# Patient Record
Sex: MALE | Race: WHITE | NOT HISPANIC OR LATINO | Employment: UNEMPLOYED | URBAN - METROPOLITAN AREA
[De-identification: names, ages, dates, MRNs, and addresses within clinical notes are randomized per-mention and may not be internally consistent; named-entity substitution may affect disease eponyms.]

---

## 2017-01-03 PROBLEM — E78.00 HYPERCHOLESTEREMIA: Status: ACTIVE | Noted: 2017-01-03

## 2017-01-03 PROBLEM — Z90.49 S/P LAPAROSCOPIC APPENDECTOMY: Status: ACTIVE | Noted: 2017-01-03

## 2017-01-03 PROBLEM — I10 ESSENTIAL HYPERTENSION: Status: ACTIVE | Noted: 2017-01-03

## 2017-09-01 ENCOUNTER — OFFICE VISIT (OUTPATIENT)
Dept: NEUROLOGY | Facility: CLINIC | Age: 52
End: 2017-09-01
Payer: COMMERCIAL

## 2017-09-01 VITALS
WEIGHT: 192.69 LBS | SYSTOLIC BLOOD PRESSURE: 99 MMHG | DIASTOLIC BLOOD PRESSURE: 62 MMHG | HEIGHT: 68 IN | HEART RATE: 91 BPM | BODY MASS INDEX: 29.2 KG/M2

## 2017-09-01 DIAGNOSIS — M48.02 CERVICAL SPINAL STENOSIS: ICD-10-CM

## 2017-09-01 DIAGNOSIS — G54.2 CERVICAL SYNDROME: ICD-10-CM

## 2017-09-01 PROCEDURE — 3078F DIAST BP <80 MM HG: CPT | Mod: S$GLB,,, | Performed by: NEUROMUSCULOSKELETAL MEDICINE & OMM

## 2017-09-01 PROCEDURE — 3074F SYST BP LT 130 MM HG: CPT | Mod: S$GLB,,, | Performed by: NEUROMUSCULOSKELETAL MEDICINE & OMM

## 2017-09-01 PROCEDURE — 99204 OFFICE O/P NEW MOD 45 MIN: CPT | Mod: S$GLB,,, | Performed by: NEUROMUSCULOSKELETAL MEDICINE & OMM

## 2017-09-01 PROCEDURE — 3008F BODY MASS INDEX DOCD: CPT | Mod: S$GLB,,, | Performed by: NEUROMUSCULOSKELETAL MEDICINE & OMM

## 2017-09-01 PROCEDURE — 99999 PR PBB SHADOW E&M-NEW PATIENT-LVL III: CPT | Mod: PBBFAC,,, | Performed by: NEUROMUSCULOSKELETAL MEDICINE & OMM

## 2017-09-01 RX ORDER — CYCLOBENZAPRINE HCL 10 MG
10 TABLET ORAL 2 TIMES DAILY PRN
Refills: 0 | COMMUNITY
Start: 2017-08-11 | End: 2017-10-09

## 2017-09-01 RX ORDER — METHOCARBAMOL 500 MG/1
500 TABLET, FILM COATED ORAL 2 TIMES DAILY PRN
Refills: 0 | COMMUNITY
Start: 2017-06-20 | End: 2017-10-09

## 2017-09-01 NOTE — PROGRESS NOTES
Tyrone Garcia Danielle  1965  Review of patient's allergies indicates:   Allergen Reactions    Codeine Nausea Only     [unfilled]    Past Medical History:   Diagnosis Date    Bipolar disorder     Hyperlipidemia     Hypertension      Social History     Social History    Marital status: Single     Spouse name: N/A    Number of children: N/A    Years of education: N/A     Occupational History    Not on file.     Social History Main Topics    Smoking status: Never Smoker    Smokeless tobacco: Never Used    Alcohol use Yes    Drug use: No    Sexual activity: Not on file     Other Topics Concern    Not on file     Social History Narrative    No narrative on file     No family history on file.    Review of systems:  Constitutional-negative  Eyes-negative  ENT, mouth-negative  Cardiovascular-negative  Respiratory-negative  GI-negative  - negative  Musculoskeletal-negative  Skin-negative  Neurologic-negative  Psychiatric-negative  Endocrine-negative  Hematology/lymph nodes-negative  Allergies/immunology-negative  Tyrone Garcia Danielle  1965  Review of patient's allergies indicates:   Allergen Reactions    Codeine Nausea Only     [unfilled]    Past Medical History:   Diagnosis Date    Bipolar disorder     Hyperlipidemia     Hypertension      Social History     Social History    Marital status: Single     Spouse name: N/A    Number of children: N/A    Years of education: N/A     Occupational History    Not on file.     Social History Main Topics    Smoking status: Never Smoker    Smokeless tobacco: Never Used    Alcohol use Yes    Drug use: No    Sexual activity: Not on file     Other Topics Concern    Not on file     Social History Narrative    No narrative on file     No family history on file.    Review of systems:  Constitutional-negative  Eyes-negative  ENT, mouth-negative  Cardiovascular-negative  Respiratory-negative  GI-negative  -  negative  Musculoskeletal-negative  Skin-negative  Neurologic-negative  Psychiatric-negative  Endocrine-negative  Hematology/lymph nodes-negative  Allergies/immunology-negative  Gen. 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  He works 18 hours per day 7 days a week frequently.  He has been attending physical therapy where they have been doing needling.  Present history:   This a 52-year-old white male who presents with a two-month history of left neck and shoulder pain.  Patient has been attending physical therapy for several weeks where he has been having needling and range of motion exercises.  He feels that this is not been helping.  He has been taking hydrocodone 1-2 times per day as well as Flexeril 10 mg in the morning.  He was previously on naproxen for about one month but he does not feel this helped him.  The pain is described as as sharp stabbing pain at times radiating into the shoulder.  He denies any numbness, tingling, or gait disorder.  He does complain of some recent sexual dysfunction and bladder hesitancy.  MRI recently performed outside the system supposedly showed cervical spinal stenosis at C5-6 and C6-7.  It was these findings prompted a neurology referral.    Neurological Exam:  Mental status-alert and oriented to person, place, and time; attention span and concentration is good. Fund of knowledge-patient is aware of current events and able to give detailed history of the current problem.recent and remote memory seems intact. Language function is normal with no evidence of aphasia  Cranial nerves:Visual acuity to hand chart -normal; visual fields to confrontation normal;pupils were equal and reactive to light ;no evidence of ptosis ;  funduscopic  examination was normal with sharp disc margins. external ocular movements were full with no nystagmus. Facial sensation to pinprick : normal ; corneal reflexes intact; Facial muscles were symmetrical. Hearing is unimpaired symmetrical finger rub; Tongue movements - normal ; palate movements - normal ;Swallowing unimpaired. Shoulder shrug was intact with good strength Speech was normal  Motor examination: Upper : normal                                      Lower extremities - Normal;muscle tone was normal ;                  Right-handed  Sensory examination:   Upper; normal pinprick and soft touch ;   Lower extremities - normal and symmetrical.   Vibration sense: 15-20 seconds @ toes  Deep tendon reflexes: upper extremities :1-2+ symmetrical ;     lower extremities KJ- 1-2 +; AJ - 1-2+ Both plantar responses were flexor  Cerebellar examination upper: Normal finger to nose and rapid alternating movements  Gait: Steady with no ataxia;      heel and toe walk normal  Romberg test: negative       Tandem gait: Normal    Involuntary movements: Negative  TMJ - no tenderness  Cervical examination: Decreased left range of motion with  Pain;  Cervical tenderness : Left positive  Lumbar examination: Low back tenderness-negative                  Sciatic notchtenderness-negative            Straight leg raising : negative    Impression: Left cervical syndrome; MRI reportedly shows severe spinal stenosis at C5-6 and C6-7.    Recommendations/Plan : Long discussion with the patient in reference to spinal stenosis and neck problems.  At this point would continue present physical therapy with restriction of mobilization in view of spinal stenosis and possible symptoms of bladder and sexual dysfunction potentially related to this issue.  At this point he has no proximal leg weakness however he is warned that if he develops this could be related to the spinal stenosis and spinal cord involvement- myelopathy.  Patient will obtain MRI  report and further recommendations will be made as to further course of action- neurosurgical referral versus pain management for possible epidural steroid injection.  Patient noted to have very low blood pressure 87/59 with recent increase of  blood pressure medications.  He is told to follow-up with his primary care doctor.  Follow-up one month

## 2017-09-01 NOTE — LETTER
September 1, 2017      Claribel Wong MD  [address] [facility] [address]           [facility]  [address] [place] [address]  Phone: [phone]          Patient: Tyrone Santos   MR Number: [id number]   YOB: 1965   Date of Visit: 9/1/2017       Dear Dr. Claribel Wong:    Thank you for referring Tyrone Santos to me for evaluation. Attached you will find relevant portions of my assessment and plan of care.    If you have questions, please do not hesitate to call me. I look forward to following Tyrone Santos along with you.    Sincerely,    Jose Daily MD    Enclosure  CC:  No Recipients    If you would like to receive this communication electronically, please contact [email or web address] or [phone] to request more information on [employer] Link access.    For providers and/or their staff who would like to refer a patient to [facility], please contact us through our one-stop-shop provider referral line, [facility] , at [phone].    If you feel you have received this communication in error or would no longer like to receive these types of communications, please e-mail [email or web address]

## 2017-09-06 ENCOUNTER — TELEPHONE (OUTPATIENT)
Dept: NEUROLOGY | Facility: CLINIC | Age: 52
End: 2017-09-06

## 2017-09-06 NOTE — TELEPHONE ENCOUNTER
----- Message from Wesley Mack sent at 9/6/2017  9:02 AM CDT -----  Contact: Patient @ [phone]  Patient is calling to get an update on the MRI that was dropped off for  to review, pls call       I have spoken with this patient and explained to him that we need the written report from his MRI not the disc and if he did not have the report he could stop by the office and complete a MANSI form and I can request the results for him he stated that would be the easiest way and he will stop by the office

## 2017-09-12 ENCOUNTER — TELEPHONE (OUTPATIENT)
Dept: NEUROLOGY | Facility: CLINIC | Age: 52
End: 2017-09-12

## 2017-09-12 NOTE — TELEPHONE ENCOUNTER
----- Message from Vida Ryder sent at 9/12/2017  8:05 AM CDT -----  Contact: Patient [phone]  Patient is calling to find out if, office received his MRI results. Please call         I have spoken to this patient and have explained to him that I have submitted his MANSI to [facility] and have not received his MRI report as of now and explained that I am not sure how the process works there and also explained to him that the process could go faster if he could go to [facility] and retreive the files him self he stated that he will attempt to do this at lunch and will keep me posted on his progress and I explained to him that I will  Contact him if I receive the report before he does

## 2017-09-13 DIAGNOSIS — M79.10 MYALGIA: ICD-10-CM

## 2017-09-13 DIAGNOSIS — M48.02 CERVICAL SPINAL STENOSIS: Primary | ICD-10-CM

## 2017-09-19 ENCOUNTER — TELEPHONE (OUTPATIENT)
Dept: NEUROLOGY | Facility: CLINIC | Age: 52
End: 2017-09-19

## 2017-09-19 NOTE — TELEPHONE ENCOUNTER
----- Message from Abi Lugo sent at 9/18/2017  3:24 PM CDT -----  Contact: self @ [phone]  Pt has been scheduled to see dr castillo on Monday 9-25-17.  Pt would like to know if this is the direction dr rivera wanted him to go.  Would like to know if his records will be forwarded to [place] before Monday or will he have to come pick them up to bring with him to the appt.  pls call.       I have attempted to contact this patient to inform him that he is headed in the right direction but there was no answer therefore I did not leave a message

## 2017-09-21 ENCOUNTER — TELEPHONE (OUTPATIENT)
Dept: NEUROLOGY | Facility: CLINIC | Age: 52
End: 2017-09-21

## 2017-09-21 NOTE — TELEPHONE ENCOUNTER
----- Message from Vida Ryder sent at 9/21/2017 11:21 AM CDT -----  Contact: Patient [phone]  Patient is calling to find out if he needs to come in to  his MRI disk and report to bring to his visit on Monday, or will it be sent to Dr. Berry office. Please call         I have spoken to this patient and informed him that he has made a great choice in choosing an [facility]geon also that can come by tomorrow anytime to  his report

## 2017-09-25 ENCOUNTER — OFFICE VISIT (OUTPATIENT)
Dept: NEUROSURGERY | Facility: CLINIC | Age: 52
End: 2017-09-25
Payer: COMMERCIAL

## 2017-09-25 ENCOUNTER — HOSPITAL ENCOUNTER (OUTPATIENT)
Dept: RADIOLOGY | Facility: HOSPITAL | Age: 52
Discharge: HOME | End: 2017-09-25
Attending: NEUROLOGICAL SURGERY
Payer: COMMERCIAL

## 2017-09-25 VITALS
HEIGHT: 68 IN | SYSTOLIC BLOOD PRESSURE: 128 MMHG | DIASTOLIC BLOOD PRESSURE: 86 MMHG | HEART RATE: 85 BPM | BODY MASS INDEX: 28.64 KG/M2 | WEIGHT: 189 LBS

## 2017-09-25 DIAGNOSIS — M47.812 SPONDYLOSIS OF CERVICAL REGION WITHOUT MYELOPATHY OR RADICULOPATHY: ICD-10-CM

## 2017-09-25 DIAGNOSIS — M79.18 CERVICAL MYOFASCIAL PAIN SYNDROME: Primary | ICD-10-CM

## 2017-09-25 DIAGNOSIS — M79.18 CERVICAL MYOFASCIAL PAIN SYNDROME: ICD-10-CM

## 2017-09-25 PROBLEM — G54.2 CERVICAL SYNDROME: Status: RESOLVED | Noted: 2017-09-01 | Resolved: 2017-09-25

## 2017-09-25 PROCEDURE — 3008F BODY MASS INDEX DOCD: CPT | Mod: S$GLB,,, | Performed by: NEUROLOGICAL SURGERY

## 2017-09-25 PROCEDURE — 3079F DIAST BP 80-89 MM HG: CPT | Mod: S$GLB,,, | Performed by: NEUROLOGICAL SURGERY

## 2017-09-25 PROCEDURE — 99204 OFFICE O/P NEW MOD 45 MIN: CPT | Mod: S$GLB,,, | Performed by: NEUROLOGICAL SURGERY

## 2017-09-25 PROCEDURE — 99999 PR PBB SHADOW E&M-EST. PATIENT-LVL IV: CPT | Mod: PBBFAC,,, | Performed by: NEUROLOGICAL SURGERY

## 2017-09-25 PROCEDURE — 72050 X-RAY EXAM NECK SPINE 4/5VWS: CPT | Mod: 26,,, | Performed by: RADIOLOGY

## 2017-09-25 PROCEDURE — 3074F SYST BP LT 130 MM HG: CPT | Mod: S$GLB,,, | Performed by: NEUROLOGICAL SURGERY

## 2017-09-25 PROCEDURE — 72050 X-RAY EXAM NECK SPINE 4/5VWS: CPT | Mod: TC

## 2017-09-25 RX ORDER — CELECOXIB 200 MG/1
200 CAPSULE ORAL 2 TIMES DAILY
Qty: 60 CAPSULE | Refills: 2 | Status: SHIPPED | OUTPATIENT
Start: 2017-09-25 | End: 2017-10-09

## 2017-09-25 NOTE — LETTER
September 25, 2017      Jose Daily MD  [address] [place] [address]           [facility]  [address], [address]  [facility] [address]  Phone: [phone]          Patient: Tyrone Santos   MR Number: [id number]   YOB: 1965   Date of Visit: 9/25/2017       Dear Dr. Jose Daily:    Thank you for referring Tyrone Santos to me for evaluation. Attached you will find relevant portions of my assessment and plan of care.    If you have questions, please do not hesitate to call me. I look forward to following Tyrone Santos along with you.    Sincerely,    Henry Berry MD    Enclosure  CC:  No Recipients    If you would like to receive this communication electronically, please contact [email or web address] or [phone] to request more information on [employer] Link access.    For providers and/or their staff who would like to refer a patient to [facility], please contact us through our one-stop-shop provider referral line, [facility], at [phone].    If you feel you have received this communication in error or would no longer like to receive these types of communications, please e-mail [email or web address]

## 2017-09-25 NOTE — PROGRESS NOTES
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 Has done heavy lifting at work.  Has been complaining of left sided neck pain irradiating in the left temporal area and left shoulder for 2-3 months. Denies having arm pain, weakness, numbness, difficulty buttoning shirts or constant gait imbalance. The pain is constant and can be severe. Has tried naproxen, ibuprofen, flexeril, gabapentin and percocet without significant pain relief. Has tried PT but no deep neck flexor muscle strengthening exercises.                 PAST MEDICAL HISTORY:  Active Ambulatory Problems     Diagnosis Date Noted    S/P laparoscopic appendectomy 01/03/2017    Essential hypertension 01/03/2017    Hypercholesteremia 01/03/2017    Cervical spinal stenosis 09/01/2017    Cervical myofascial pain syndrome 09/25/2017     Resolved Ambulatory Problems     Diagnosis Date Noted    Cervical syndrome 09/01/2017     Past Medical History:   Diagnosis Date    Bipolar disorder     Hyperlipidemia     Hypertension        PAST SURGICAL HISTORY:  Past Surgical History:   Procedure Laterality Date    APPENDECTOMY      WRIST SURGERY      6 years ago       SOCIAL HISTORY:   Social History     Social History    Marital status: Single     Spouse name: N/A    Number of children: N/A    Years of education: N/A     Occupational History    Not on file.     Social History Main Topics    Smoking status: Never Smoker    Smokeless tobacco: Never Used    Alcohol use Yes    Drug use: No    Sexual activity: Not on file     Other Topics Concern    Not on file     Social History Narrative    No narrative on file       FAMILY HISTORY:  No family history on file.    CURRENTS  MEDICATIONS:  Current Outpatient Prescriptions on File Prior to Visit   Medication Sig Dispense Refill    amlodipine (NORVASC) 10 MG tablet Take 10 mg by mouth once daily.  0    aspirin (ECOTRIN) 81 MG EC tablet Take 81 mg by mouth once daily.      buPROPion (WELLBUTRIN XL) 150 MG TB24 tablet Take 150 mg by mouth once daily.      cyclobenzaprine (FLEXERIL) 10 MG tablet Take 10 mg by mouth 2 (two) times daily as needed.  0    hydrocodone-acetaminophen 5-325mg (NORCO) 5-325 mg per tablet Take 1 tablet by mouth every 4 (four) hours.  0    lisinopril (PRINIVIL,ZESTRIL) 20 MG tablet Take 20 mg by mouth once daily.  0    methocarbamol (ROBAXIN) 500 MG Tab Take 500 mg by mouth 2 (two) times daily as needed.  0    omeprazole (PRILOSEC) 20 MG capsule Take 20 mg by mouth once daily.  0    oxcarbazepine (TRILEPTAL) 300 MG Tab Take 300 mg by mouth 2 (two) times daily.  1    quetiapine (SEROQUEL) 200 MG Tab Take 400 mg by mouth nightly.  1    simvastatin (ZOCOR) 10 MG tablet Take 10 mg by mouth every evening.  0    [DISCONTINUED] b complex vitamins tablet Take 1 tablet by mouth once daily.      [DISCONTINUED] ciprofloxacin HCl (CIPRO) 500 MG tablet Take 500 mg by mouth every 12 (twelve) hours.  0     No current facility-administered medications on file prior to visit.        ALLERGIES:  Review of patient's allergies indicates:   Allergen Reactions    Codeine Nausea Only       REVIEW OF SYSTEMS:  Review of Systems   Constitutional: Negative for diaphoresis, fever and weight loss.   Respiratory: Negative for shortness of breath.    Cardiovascular: Negative for chest pain.   Gastrointestinal: Negative for blood in stool.   Genitourinary: Negative for hematuria.   Endo/Heme/Allergies: Does not bruise/bleed easily.   All other systems reviewed and are negative.      OBJECTIVE:    PHYSICAL EXAMINATION:   Vitals:    09/25/17 0816   BP: 128/86   Pulse: 85       Physical Exam:  Vitals reviewed.    Constitutional: He appears  well-developed and well-nourished.     Eyes: Pupils are equal, round, and reactive to light. Conjunctivae and EOM are normal.     Cardiovascular: Normal distal pulses and no edema.     Abdominal: Soft.     Skin: Skin displays no rash on trunk and no rash on extremities. Skin displays no lesions on trunk and no lesions on extremities.     Psych/Behavior: He is alert. He is oriented to person, place, and time. He has a normal mood and affect.     Musculoskeletal:        Neck: Range of motion is full.     Neurological:        DTRs: Tricep reflexes are 3+ on the right side and 3+ on the left side. Bicep reflexes are 3+ on the right side and 3+ on the left side. Brachioradialis reflexes are 3+ on the right side and 3+ on the left side. Patellar reflexes are 3+ on the right side and 3+ on the left side. Achilles reflexes are 2+ on the right side and 2+ on the left side.       Back Exam     Tenderness   The patient is experiencing tenderness in the cervical (left trapezius and levator scapulae +++ trigger points).    Range of Motion   Extension: normal   Flexion: normal   Lateral Bend Right: normal   Lateral Bend Left: normal   Rotation Right: normal   Rotation Left: normal     Muscle Strength   Right Quadriceps:  5/5   Left Quadriceps:  5/5   Right Hamstrings:  5/5   Left Hamstrings:  5/5     Tests   Straight leg raise right: negative  Straight leg raise left: negative    Other   Toe Walk: normal  Heel Walk: normal                Neurologic Exam     Mental Status   Oriented to person, place, and time.   Speech: speech is normal   Level of consciousness: alert    Cranial Nerves   Cranial nerves II through XII intact.     CN III, IV, VI   Pupils are equal, round, and reactive to light.  Extraocular motions are normal.     Motor Exam   Muscle bulk: normal  Overall muscle tone: normal    Strength   Right deltoid: 5/5  Left deltoid: 5/5  Right biceps: 5/5  Left biceps: 5/5  Right triceps: 5/5  Left triceps: 5/5  Right wrist  flexion: 5/5  Left wrist flexion: 5/5  Right wrist extension: 5/5  Left wrist extension: 5/5  Right interossei: 5/5  Left interossei: 5/5  Right iliopsoas: 5/5  Left iliopsoas: 5/5  Right quadriceps: 5/5  Left quadriceps: 5/5  Right hamstrin/5  Left hamstrin/5  Right anterior tibial: 5/5  Left anterior tibial: 5/5  Right posterior tibial: 5/5  Left posterior tibial: 5/5  Right peroneal: 5/5  Left peroneal: 5/5  Right gastroc: 5/5  Left gastroc: 5/5    Sensory Exam   Light touch normal.   Pinprick normal.     Gait, Coordination, and Reflexes     Gait  Gait: normal    Coordination   Finger to nose coordination: normal  Tandem walking coordination: normal    Reflexes   Right brachioradialis: 3+  Left brachioradialis: 3+  Right biceps: 3+  Left biceps: 3+  Right triceps: 3+  Left triceps: 3+  Right patellar: 3+  Left patellar: 3+  Right achilles: 2+  Left achilles: 2+  Right plantar: normal  Left plantar: normal  Right Mcginnis: present  Left Mcginnis: present  Right ankle clonus: absent  Left ankle clonus: absent        DIAGNOSTIC DATA:  I personally reviewed the following imaging:   Cervical spine MRI 2017: C5-6 and C6-7 spondylosis with mild central stenosis and moderate bilateral foraminal stenosis.     ASSESMENT:  This is a 52 y.o. male with     Problem List Items Addressed This Visit        Neuro    Spondylosis of cervical region without myelopathy or radiculopathy       Orthopedic    Cervical myofascial pain syndrome - Primary    Relevant Orders    Ambulatory consult to Physical Therapy    Ambulatory Referral to Pain Clinic    X-Ray Cervical Spine AP Lat with Flexion  Extension      Other Visit Diagnoses    None.       Upper trapezius and levator scapulae trigger points injections with Dr Champagne  PT 3 times a week for 6 weeks: deep neck flexor muscle strengthening, myofascial release  Follow-up in 6 weeks  Celebrex 200 mg PO BID            Henry Berry MD  Pager: [phone]

## 2017-10-05 ENCOUNTER — CLINICAL SUPPORT (OUTPATIENT)
Dept: REHABILITATION | Facility: HOSPITAL | Age: 52
End: 2017-10-05
Attending: NEUROLOGICAL SURGERY
Payer: COMMERCIAL

## 2017-10-05 DIAGNOSIS — M79.18 CERVICAL MYOFASCIAL PAIN SYNDROME: ICD-10-CM

## 2017-10-05 PROCEDURE — 97161 PT EVAL LOW COMPLEX 20 MIN: CPT

## 2017-10-05 PROCEDURE — 97140 MANUAL THERAPY 1/> REGIONS: CPT

## 2017-10-05 NOTE — PLAN OF CARE
Physical Therapy Initial Evaluation     Name: Tyrone Santos  Clinic Number: [id number]    Diagnosis: No diagnosis found.  Physician: Henry Berry MD  Treatment Orders: PT Eval and Treat  Past Medical History:   Diagnosis Date    Bipolar disorder     Hyperlipidemia     Hypertension      Current Outpatient Prescriptions   Medication Sig    amlodipine (NORVASC) 10 MG tablet Take 10 mg by mouth once daily.    aspirin (ECOTRIN) 81 MG EC tablet Take 81 mg by mouth once daily.    buPROPion (WELLBUTRIN XL) 150 MG TB24 tablet Take 150 mg by mouth once daily.    celecoxib (CELEBREX) 200 MG capsule Take 1 capsule (200 mg total) by mouth 2 (two) times daily.    cyclobenzaprine (FLEXERIL) 10 MG tablet Take 10 mg by mouth 2 (two) times daily as needed.    hydrocodone-acetaminophen 5-325mg (NORCO) 5-325 mg per tablet Take 1 tablet by mouth every 4 (four) hours.    lisinopril (PRINIVIL,ZESTRIL) 20 MG tablet Take 20 mg by mouth once daily.    methocarbamol (ROBAXIN) 500 MG Tab Take 500 mg by mouth 2 (two) times daily as needed.    omeprazole (PRILOSEC) 20 MG capsule Take 20 mg by mouth once daily.    oxcarbazepine (TRILEPTAL) 300 MG Tab Take 300 mg by mouth 2 (two) times daily.    quetiapine (SEROQUEL) 200 MG Tab Take 400 mg by mouth nightly.    simvastatin (ZOCOR) 10 MG tablet Take 10 mg by mouth every evening.     No current facility-administered medications for this visit.      Review of patient's allergies indicates:   Allergen Reactions    Codeine Nausea Only       Time In: 1:05 pm   Time Out: 1:50 pm     Subjective     Patient reports 2.5 months ago he started getting headaches on the L side of his head and pain in L side of neck/upper trap region. Pt saw a MD at , and went to PT 2x/week x8 weeks and did dry needling and manipulation, with no relief. Pt was not happy at , so started seeing MD at [facility]. Pt denies any trauma to the neck. Pt reports  "he gets the headaches when the neck pain is "at its peak". Pt denies any numbness, tingling, or weakness in the UE. Co morbidities include HTN, bipolar, and high cholesterol (all controlled by medication). Pt reports he has been taking percocet for the pain, but is currently out of it and is tolerating the pain until he sees pain management Monday morning. Pt reports every time he turns his head or moves it up or down, his neck "pops" and it is painful. Pt reprots that turning the L side is the hardest and most painful.   Radicular symptoms:  denies  Diagnostic Imaging: xray: There is straightening of the normal cervical lordosis.  There is loss of disc space height with degenerative endplate change identified from C5-6 through C7-T1.  There is no instability on flexion-extension.  There is no fracture, dislocation, or bone erosion.  MRI: disc osteophytes   Pain Scale: Tyrone rates pain on a scale of 0-10 to be 9 at worst; 8 currently; 5 at best .  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, does a lot of physical labor                        Pts goals:  Decrease pain     Objective     Posture Alignment: slouched posture    Palpation: TTP L upper trap; hypertonicity B upper trap regoin (L>R)    CERVICAL SPINE AROM:   Flexion: 100% pain on L   Extension: 10% pain   Left Sidebend: 10% pain on L   Right Sidebend: 50% pulling on L   Left Rotation: 50% pain on L   Right Rotation: 50% pulling on L     SEGMENTAL MOBILITY: decreased as observed with AROM    UPPER EXTREMITY STRENGTH:   Left Right   Shoulder Flexion 5/5 5/5   Shoulder Abduction 5/5 5/5     Shoulder Internal Rotation 5/5 " "5/5   Shoulder External Rotation 4+/5 4+/5   Elbow Flexion 5/5 5/5   Elbow Extension 5/5 5/5   Wrist Flexion 5/5 5/5   Wrist Extension 5/5 5/5   Decreased periscapular strength    Dermatomes: Sensation: Light Touch: Intact    FLEXIBILITY: tight upper trap, levator scap, pec    Pt/family was provided educational information, including: role of PT, goals for PT, scheduling - pt verbalized understanding. Discussed insurance limitations with pt.     TREATMENT     PT Evaluation Completed? Yes  Discussed Plan of Care with patient: Yes    Tyrone received 3 minutes of therapeutic exercise including:  Scapular retraction 2x10  L upper trap stretch 2x30"    Tyrone received 10 minutes of manual therapy including:  Vacuum/cupping STM with manual therapy techniques was performed to B upper trap and periscapular region to decrease muscle tightness, increase circulation and promote healing process. The pt's skin was monitored for redness adjusting pressure as needed. The pt was instructed in possible side effects of bruising and/or soreness.     Written Home Exercises Provided: scapular retraction (see handout)   Tyrone demo good understanding of the education provided. Patient demo good return demo of skill of exercises.    Assessment     History  Co-morbidities and personal factors that may impact the plan of care Examination  Body Structures and Functions, activity limitations and participation restrictions that may impact the plan of care    Clinical Presentation   Co-morbidities:   HTN  High cholesterol        Personal Factors:   Bipolar  Body Regions:   neck    Body Systems:    gross symmetry  ROM  strength  gross coordinated movement  Poor postural awareness           Participation Restrictions:   Pain with functional mobility      Activity limitations:   Learning and applying knowledge  no deficits    General Tasks and Commands  no deficits    Communication  no deficits    Mobility  lifting and carrying objects    Self care  no " deficits    Domestic Life  no deficits    Interactions/Relationships  no deficits    Life Areas  no deficits    Community and Social Life  no deficits         stable and uncomplicated                      low   low  low Decision Making/ Complexity Score:  low     Pt with L side neck pain and headaches presents with increased tone and tenderness in L upper trap region, poor postural awareness, decreased strength, decreased ROM, decreased flexibility, and subsequent functional limitations. Pt will benefit from PT to address these deficits.   Pt prognosis is Good.  Pt will benefit from skilled outpatient physical therapy to address the above stated deficits, provide pt/family education and to maximize pt's level of independence.     Medical necessity is demonstrated by the following IMPAIRMENTS/PROBLEMS:  1. Increased Pain  2. Decreased Segmental Mobility & Decreased ROM  3. Decreased Core & BLE strength  4. Decreased Flexibility BLE  5. Decreased Tolerance to Functional Activities    Pt's spiritual, cultural and educational needs considered and pt agreeable to plan of care and goals as stated below:     Anticipated Barriers for physical therapy: chronic pain    Short Term GOALS: 3 weeks. Pt agrees with goals set.  1. Patient demonstrates independence with HEP.   2. Patient demonstrates independence with Postural Awareness.   3. Patient demonstrates independence with body mechanics.     Long Term GOALS: 6 weeks. Pt agrees with goals set.  1. Patient demonstrates increased cervical AROM by 25% to improve tolerance to functional activities.   2. Patient demonstrates increased strength BUE's to 5/5 or greater to improve tolerance to functional activities.   3. Patient demonstrates improved overall function per FOTO Neck Survey to 46% or less.     Functional Limitations Reports  Tool: FOTO Neck Survey  Score: 56% Limitation    PLAN     Outpatient physical therapy 2 times weekly to include: pt ed, hep, therapeutic  exercises, neuromuscular re-education/ balance exercises, joint mobilizations, aquatic therapy and modalities prn. Cont PT for  6 weeks. Pt may be seen by PTA as part of the rehabilitation team.     Therapist: Wesley Singletary, PT  10/5/2017

## 2017-10-09 ENCOUNTER — OFFICE VISIT (OUTPATIENT)
Dept: PAIN MEDICINE | Facility: CLINIC | Age: 52
End: 2017-10-09
Attending: ANESTHESIOLOGY
Payer: COMMERCIAL

## 2017-10-09 VITALS
WEIGHT: 186.06 LBS | DIASTOLIC BLOOD PRESSURE: 93 MMHG | HEART RATE: 91 BPM | BODY MASS INDEX: 28.2 KG/M2 | SYSTOLIC BLOOD PRESSURE: 128 MMHG | HEIGHT: 68 IN | TEMPERATURE: 98 F

## 2017-10-09 DIAGNOSIS — M47.812 FACET ARTHRITIS, DEGENERATIVE, CERVICAL SPINE: ICD-10-CM

## 2017-10-09 DIAGNOSIS — M79.10 MYALGIA: Primary | ICD-10-CM

## 2017-10-09 DIAGNOSIS — M62.838 CERVICAL PARASPINAL MUSCLE SPASM: ICD-10-CM

## 2017-10-09 PROCEDURE — 99244 OFF/OP CNSLTJ NEW/EST MOD 40: CPT | Mod: 25,S$GLB,, | Performed by: ANESTHESIOLOGY

## 2017-10-09 PROCEDURE — 99999 PR PBB SHADOW E&M-EST. PATIENT-LVL III: CPT | Mod: PBBFAC,,, | Performed by: ANESTHESIOLOGY

## 2017-10-09 PROCEDURE — 20553 NJX 1/MLT TRIGGER POINTS 3/>: CPT | Mod: S$GLB,,, | Performed by: ANESTHESIOLOGY

## 2017-10-09 RX ORDER — ETODOLAC 400 MG/1
400 TABLET, FILM COATED ORAL 2 TIMES DAILY PRN
Qty: 30 TABLET | Refills: 3 | Status: SHIPPED | OUTPATIENT
Start: 2017-10-09 | End: 2017-10-23

## 2017-10-09 RX ORDER — GABAPENTIN 300 MG/1
CAPSULE ORAL
COMMUNITY
Start: 2017-09-20 | End: 2018-09-24

## 2017-10-09 RX ORDER — BUPIVACAINE HYDROCHLORIDE 5 MG/ML
9 INJECTION, SOLUTION EPIDURAL; INTRACAUDAL
Status: COMPLETED | OUTPATIENT
Start: 2017-10-09 | End: 2017-10-09

## 2017-10-09 RX ORDER — TIZANIDINE 4 MG/1
4 TABLET ORAL EVERY 8 HOURS PRN
Qty: 45 TABLET | Refills: 3 | Status: SHIPPED | OUTPATIENT
Start: 2017-10-09 | End: 2017-10-16

## 2017-10-09 RX ORDER — BETAMETHASONE SODIUM PHOSPHATE AND BETAMETHASONE ACETATE 3; 3 MG/ML; MG/ML
6 INJECTION, SUSPENSION INTRA-ARTICULAR; INTRALESIONAL; INTRAMUSCULAR; SOFT TISSUE
Status: COMPLETED | OUTPATIENT
Start: 2017-10-09 | End: 2017-10-09

## 2017-10-09 RX ADMIN — BUPIVACAINE HYDROCHLORIDE 45 MG: 5 INJECTION, SOLUTION EPIDURAL; INTRACAUDAL at 08:10

## 2017-10-09 RX ADMIN — BETAMETHASONE SODIUM PHOSPHATE AND BETAMETHASONE ACETATE 6 MG: 3; 3 INJECTION, SUSPENSION INTRA-ARTICULAR; INTRALESIONAL; INTRAMUSCULAR; SOFT TISSUE at 08:10

## 2017-10-09 NOTE — PROGRESS NOTES
Subjective:     Patient ID: Tyrone Santos is a 52 y.o. male.    Chief Complaint: Pain    Consulted by: MD Jamal     Disclaimer: This note was generated using voice recognition software.  There may be a typographical errors that were missed during proofreading.      HPI:    Tyrone Santos is a 52 y.o. male who presents today with left sided neck pain X 2-3 months in absence of trauma.  This pain is described in detail below.    He reports pain begins in the neck area and radiates into the shoulder, never into the left arm. He reports symptoms are worse with sudden activity (twisting/turning). Pain reported as constant, and described as sharp. No burning, but patient reports pins and needles sensation. Reports subjective weakness in LUE. Difficulty with lifting heavy objects - he reports this is more limited secondary to pain. Patient denies bowel/bladder incontinence or saddle anesthesia. However, he reports difficulty with urination (incomplete emptying) for approximately 1 month. The only remedy that has helped his pain has been percocet.     Physical Therapy: currently enrolled in PT x 1 week. Had previous PT at  with dry needling which was not helpful.     Non-pharmacologic Treatment: heating pad - not helpful.          · TENS? no    Pain Medications:         · Currently taking:  Celebrex - not helpful.     · Has tried in the past:  Percocet - helpful for 1 day, then no longer helpful. Neurontin (unknown dose - not helpful). Robaxin, not helpful.  Flexeril    · Has not tried: Muscle relaxants, TCAs, SNRIs, anticonvulsants, topical creams    Blood thinners: no    Interventional Therapies: no    Relevant Surgeries: no    Affecting sleep? Yes - difficult to stay asleep.     Affecting daily activities? yes    Depressive symptoms? no          · SI/HI? No    Work status: yes - entertainment industry.     Prescription Monitoring Program database:  Reviewed and consistent with medication use as  prescribed.      Neck Pain    This is a chronic problem. The current episode started more than 1 month ago. The problem occurs constantly. The problem has been gradually worsening. The pain is present in the left side. The quality of the pain is described as aching, stabbing and cramping. The pain is moderate. Pertinent negatives include no chest pain, fever or photophobia. He has tried NSAIDs and acetaminophen for the symptoms. The treatment provided mild relief. Physical therapy was not tried.      Last 3 PDI Scores 10/9/2017   Pain Disability Index (PDI) 52     Review of Systems   Constitution: Positive for night sweats. Negative for chills, fever, malaise/fatigue and weight gain.        Night sweats for several years   Eyes: Negative for blurred vision, double vision and photophobia.   Cardiovascular: Negative for chest pain, dyspnea on exertion and irregular heartbeat.   Respiratory: Negative for cough and shortness of breath.    Skin: Negative for skin cancer.   Musculoskeletal: Positive for muscle weakness, myalgias and neck pain.   Gastrointestinal: Negative for change in bowel habit and bowel incontinence.   Genitourinary: Positive for incomplete emptying. Negative for bladder incontinence.             Past Medical History:   Diagnosis Date    Bipolar disorder     Hyperlipidemia     Hypertension        Past Surgical History:   Procedure Laterality Date    APPENDECTOMY      WRIST SURGERY      6 years ago       Review of patient's allergies indicates:   Allergen Reactions    Codeine Nausea Only       Current Outpatient Prescriptions   Medication Sig Dispense Refill    amlodipine (NORVASC) 10 MG tablet Take 10 mg by mouth once daily.  0    aspirin (ECOTRIN) 81 MG EC tablet Take 81 mg by mouth once daily.      buPROPion (WELLBUTRIN XL) 150 MG TB24 tablet Take 150 mg by mouth once daily.      gabapentin (NEURONTIN) 300 MG capsule       lisinopril (PRINIVIL,ZESTRIL) 20 MG tablet Take 20 mg by mouth  "once daily.  0    omeprazole (PRILOSEC) 20 MG capsule Take 20 mg by mouth once daily.  0    oxcarbazepine (TRILEPTAL) 300 MG Tab Take 300 mg by mouth 2 (two) times daily.  1    quetiapine (SEROQUEL) 200 MG Tab Take 400 mg by mouth nightly.  1    simvastatin (ZOCOR) 10 MG tablet Take 10 mg by mouth every evening.  0    etodolac (LODINE) 400 MG tablet Take 1 tablet (400 mg total) by mouth 2 (two) times daily as needed. 30 tablet 3    tizanidine (ZANAFLEX) 4 MG tablet Take 1 tablet (4 mg total) by mouth every 8 (eight) hours as needed. 45 tablet 3     No current facility-administered medications for this visit.        History reviewed. No pertinent family history.    Social History     Social History    Marital status: Single     Spouse name: N/A    Number of children: N/A    Years of education: N/A     Occupational History    Not on file.     Social History Main Topics    Smoking status: Never Smoker    Smokeless tobacco: Never Used    Alcohol use Yes    Drug use: No    Sexual activity: Not on file     Other Topics Concern    Not on file     Social History Narrative    No narrative on file       Objective:     Vitals:    10/09/17 0725   BP: (!) 128/93   Pulse: 91   Temp: 98.3 °F (36.8 °C)   Weight: 84.4 kg (186 lb 1.1 oz)   Height: 5' 8" (1.727 m)   PainSc:   8     GEN:  Well developed, well nourished.  No acute distress.  No pain behavior.  HEENT:  No trauma.  Mucous membranes moist.  Nares patent bilaterally.  PSYCH: Normal affect. Thought content appropriate.  CHEST:  Breathing symmetric.  No audible wheezing.  ABD: Soft, non-tender, non-distended.  SKIN:  Warm, pink, dry.  No rash on exposed areas.    EXT:  No cyanosis, clubbing, or edema.  No color change or changes in nail or hair growth.  NEURO/MUSCULOSKELETAL:  Fully alert, oriented, and appropriate. Speech normal deuce. No cranial nerve deficits.   Gait: Normal.  5/5 motor strength throughout upper extremities.   Sensory: No sensory deficit " in the upper extremities.   Reflexes: 2+ and symmetric throughout.  Negative Mcginnis's bilaterally.  C-Spine:  Decreased ROM with pain on flexion, extension, ipsilateral and contralateral bending. Positive facet loading on left.  Negative Spurling's bilaterally.    TTP over left cervical paraspinal muscles        Imaging:        The imaging studies listed below were independently reviewed by me, and I agree with the findings as documented below.     MRI C spine:  C3-4 osteophytes, Left facet arthropathy, left NF stenosis, mild/mod central spinal stenosis    C4-5 no central stenosis    C5-6 bilateral facet hypertrophy, mild central stenosis    C6-7 mild central stenosis    C7-T1 mild central stenosis, facet hypertrophy      X ray C spine:  straightening of the normal cervical lordosis.  There is loss of disc space height with degenerative endplate change identified from C5-6 through C7-T1.  There is no instability on flexion-extension.  There is no fracture, dislocation, or bone erosion.?    Assessment:     Encounter Diagnoses   Name Primary?    Myalgia Yes    Cervical paraspinal muscle spasm     Facet arthritis, degenerative, cervical spine        Plan:     Diagnoses and all orders for this visit:    Myalgia  -     etodolac (LODINE) 400 MG tablet; Take 1 tablet (400 mg total) by mouth 2 (two) times daily as needed.  -     tizanidine (ZANAFLEX) 4 MG tablet; Take 1 tablet (4 mg total) by mouth every 8 (eight) hours as needed.  -     bupivacaine (PF) 0.5% (5 mg/ml) injection 45 mg; Inject 9 mLs (45 mg total) into the muscle one time.    Cervical paraspinal muscle spasm  -     etodolac (LODINE) 400 MG tablet; Take 1 tablet (400 mg total) by mouth 2 (two) times daily as needed.  -     tizanidine (ZANAFLEX) 4 MG tablet; Take 1 tablet (4 mg total) by mouth every 8 (eight) hours as needed.  -     bupivacaine (PF) 0.5% (5 mg/ml) injection 45 mg; Inject 9 mLs (45 mg total) into the muscle one time.    Facet arthritis,  degenerative, cervical spine  -     etodolac (LODINE) 400 MG tablet; Take 1 tablet (400 mg total) by mouth 2 (two) times daily as needed.  -     tizanidine (ZANAFLEX) 4 MG tablet; Take 1 tablet (4 mg total) by mouth every 8 (eight) hours as needed.  -     bupivacaine (PF) 0.5% (5 mg/ml) injection 45 mg; Inject 9 mLs (45 mg total) into the muscle one time.         Patient's pain is consistent with the above.    We discussed the assessment and recommendations.  All available images were reviewed. We discussed the disease process, prognosis, treatment plan, and risks and benefits. The patient is aware of the risks and benefits of the medications being prescribed, common side effects, and proper usage. The following is the plan we agreed on:     1. Trigger point injections to left cervical paraspinal muscles today (see procedure note below)  2. Discontinue Celebrex as this has not been helpful. Will start Lodine (patient denies any kidney or liver disease) - 2 week supply given  3. RX given for Zanaflex today for muscle pain (2 week supply given)  4. MANSI from PCP for lab-work for baselines with NSAIDs and tizanidine  5. I do not recommend opioids for the chronic treatment of his non-malignant pain at this time.  Short courses for acute flares are appropriate.  6. RTC in 2 weeks. If above medications are helpful, will give 1 month supply at that time    To High MD, Pain Fellow    Trigger Point Injection Procedure note:   The procedure was discussed with the patient including complications of damage, bleeding, infection, and failure of pain relief.     All medications, allergies, and relevant histories were reviewed. No recent antibiotics or infections.  A time-out was taken to verify the correct patient, procedure, laterality, and appropriate medications/allergies.    Trigger points were identified by palpation and marked. CHG prep of sites done. A 27-gauge needle was advanced to the point of maximal tenderness,  and 1 mL of a mixture of 0.5% bupivacaine with betamethasone 3 mg was injected after negative aspiration. All sites done in the same manner. Patient tolerated the procedure well and without complications. Sites injected included: left levator scap x 1, 3x upper left trapezius, 1x rhomboid     The patient tolerated the procedure well and was discharged in excellent condition.    Thank you for allowing me to participate in the care of this patient.   Please do not hesitate to call me at [phone] with any questions or concerns.    Greater than 25 minutes spent in total in todays visit with the patient, with more than half that time direct face to face counseling and education with the patient today. We discussed the disease process, prognosis, treatment plan, and risks and benefits.    I have seen the patient with the fellow physician.  I have performed my own history and physical exam and we have come up with the above plan.  The patient is in agreement with our plan.    I certify that I provided the above services.  I was present for the entire procedure, which was performed by the fellow physician under my supervision.  There were no parts of the procedure that were performed not by myself or without my direct supervision.    The above plan and management options were discussed at length with patient. Patient is in agreement with the above and verbalized understanding. It will be communicated with the referring physician via electronic record, fax, or mail.

## 2017-10-09 NOTE — LETTER
October 9, 2017      Henry Berry MD  [address]  [address]  [facility] [address]           [facility] [place] - Pain [facility]  [address]  [facility] [address]  Phone: [phone]  Fax: [phone]          Patient: Tyrone Santos   MR Number: [id number]   YOB: 1965   Date of Visit: 10/9/2017       Dear Dr. Henry Berry:    Thank you for referring Tyrone Santos to me for evaluation. Attached you will find relevant portions of my assessment and plan of care.    If you have questions, please do not hesitate to call me. I look forward to following Tyrone Santos along with you.    Sincerely,    Izabela Champagne MD    Enclosure  CC:  No Recipients    If you would like to receive this communication electronically, please contact [email or web address] or [phone] to request more information on [facility] access.    For providers and/or their staff who would like to refer a patient to [facility], please contact us through our one-stop-shop provider referral line, [facility], at [phone].    If you feel you have received this communication in error or would no longer like to receive these types of communications, please e-mail [email or web address]

## 2017-10-10 ENCOUNTER — TELEPHONE (OUTPATIENT)
Dept: PAIN MEDICINE | Facility: CLINIC | Age: 52
End: 2017-10-10

## 2017-10-10 NOTE — TELEPHONE ENCOUNTER
"----- Message from Emerald Wesleyin sent at 10/10/2017  7:31 AM CDT -----  Contact: Patient herself  X  1st Request  _  2nd Request  _  3rd Request    Who: Tyrone Santos (mrn# [id number])    Why:  Patient called and said, "he's experienced side effects from medication ZANAFLEX."  Says,"he becomes pale green and yellow in color with full body pain." Please give a call back at your earliest convenience.        THANKS!    What Number to Call Back:  [phone]    When to Expect a call back: (Before the end of the day)   -- if the call is after 12:00, the call back will be tomorrow.                          "

## 2017-10-10 NOTE — TELEPHONE ENCOUNTER
Patient was contacted as per patient he stated that he looks pale he thinks its from the medication. Patient stated that he cut the pill in half and within an hour he had pain in his neck down to his ankle. Patient stated that he has discontinued the Zanaflex and he has taken the other medication and he's reacting fine. Patient was informed that this message will be forwarded to  and the office will contact him.

## 2017-10-11 ENCOUNTER — CLINICAL SUPPORT (OUTPATIENT)
Dept: REHABILITATION | Facility: HOSPITAL | Age: 52
End: 2017-10-11
Attending: NEUROLOGICAL SURGERY
Payer: COMMERCIAL

## 2017-10-11 DIAGNOSIS — M79.18 CERVICAL MYOFASCIAL PAIN SYNDROME: Primary | ICD-10-CM

## 2017-10-11 DIAGNOSIS — M48.02 CERVICAL SPINAL STENOSIS: ICD-10-CM

## 2017-10-11 DIAGNOSIS — M47.812 SPONDYLOSIS OF CERVICAL REGION WITHOUT MYELOPATHY OR RADICULOPATHY: ICD-10-CM

## 2017-10-11 PROCEDURE — 97110 THERAPEUTIC EXERCISES: CPT

## 2017-10-11 PROCEDURE — 97140 MANUAL THERAPY 1/> REGIONS: CPT

## 2017-10-11 NOTE — TELEPHONE ENCOUNTER
Patient was contacted and informed that his message was forwarded to  and the staff is waiting for a response once this happens he will get a call back on this matter

## 2017-10-11 NOTE — TELEPHONE ENCOUNTER
"----- Message from Yeni Wilson sent at 10/11/2017  8:05 AM CDT -----  _  1st Request  XXXX_  2nd Request  _  3rd Request        Who: patient    Why: Requesting a call back in regards to the side effects of the ZANAFLEX."  , pt called yesterday and did received a call back stating they would talk to the doctor, pt is calling back to check on the status of this. Pt states he stopped taking the ZANAFLEX."  Until he hears from the doctor    What Number to Call Back:[phone]    When to Expect a call back: (Within 24 hours)    Please return the call at earliest convenience. Thanks!                            "

## 2017-10-11 NOTE — TELEPHONE ENCOUNTER
"----- Message from Yeni Wilson sent at 10/11/2017  8:05 AM CDT -----  _  1st Request  XXXX_  2nd Request  _  3rd Request        Who: patient    Why: Requesting a call back in regards to the side effects of the ZANAFLEX."  , pt called yesterday and did received a call back stating they would talk to the doctor, pt is calling back to check on the status of this. Pt states he stopped taking the ZANAFLEX."  Until he hears from the doctor    What Number to Call Back:[phone]    When to Expect a call back: (Within 24 hours)    Please return the call at earliest convenience. Thanks!                            "

## 2017-10-11 NOTE — PROGRESS NOTES
"                                                    Physical Therapy Daily Note     Name: Tyrone Santos  Clinic Number: [id number]  Diagnosis:   Encounter Diagnoses   Name Primary?   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 Pt has stopped taking that pain medication and has been trying to contact the doctor.   Pain Scale: Tyrone rates pain on a scale of 0-10 to be 6 currently.    Objective     Tyrone received individual therapeutic exercises to develop strength, endurance, ROM, flexibility, posture and core stabilization for 33 minutes including:  UBE 3' bwd  Supine chin tucks 2x10  Supine serratus punches 2x10  Supine shoulder ER OTB 2x10  Supine shoulder HZ ABD with ER OTB 2x10  Corner stretch 3x30"  Wall angels 1x10    Tyrone received the following manual therapy techniques: Manual traction and Soft tissue Mobilization were applied to the: cervical spine for 10 minutes including:  Vacuum/cupping STM with manual therapy techniques was performed to upper trap and periscapular region to decrease muscle tightness, increase circulation and promote healing process. The pt's skin was monitored for redness adjusting pressure as needed. The pt was instructed in possible side effects of bruising and/or soreness.   Gentle cervical manual traction      The patient received the following supervised modalities after being cleared for contradictions: moist heat to cervical spine x15 minutes    Written Home Exercises Provided: per eval   Pt demo good understanding of the education provided. Tyrone demonstrated good return demonstration of activities.     Education provided re: " benefits of cupping and possible bruising; MATHIEU Hansen verbalized good understanding of education provided.   No spiritual or educational barriers to learning provided    Assessment     Patient tolerated tx well without increased pain. Pt reports relief after tx. Pt requires cues for proper periscapular muscle activation during exercises.   This is a 52 y.o. male referred to outpatient physical therapy and presents with a medical diagnosis of neck pain and demonstrates limitations as described in the problem list. Pt prognosis is Good. Pt will continue to benefit from skilled outpatient physical therapy to address the deficits listed in the problem list, provide pt/family education and to maximize pt's level of independence in the home and community environment.     Goals as follows:  Short Term GOALS: 3 weeks. Pt agrees with goals set.  1. Patient demonstrates independence with HEP.   2. Patient demonstrates independence with Postural Awareness.   3. Patient demonstrates independence with body mechanics.      Long Term GOALS: 6 weeks. Pt agrees with goals set.  1. Patient demonstrates increased cervical AROM by 25% to improve tolerance to functional activities.   2. Patient demonstrates increased strength BUE's to 5/5 or greater to improve tolerance to functional activities.   3. Patient demonstrates improved overall function per FOTO Neck Survey to 46% or less.      Plan     Continue with established Plan of Care towards PT goals.    Therapist: Wesley Singletary, PT  10/11/2017

## 2017-10-12 ENCOUNTER — TELEPHONE (OUTPATIENT)
Dept: PAIN MEDICINE | Facility: CLINIC | Age: 52
End: 2017-10-12

## 2017-10-12 NOTE — TELEPHONE ENCOUNTER
----- Message from Juliann Nichole sent at 10/12/2017  7:42 AM CDT -----  x_  1st Request  _  2nd Request  _  3rd Request        Who: huma    Why:  pt. Requesting to speak with dr. Champagne only. Please call to discuss    What Number to Call Back:[phone]    When to Expect a call back: (Within 24 hours)    Please return the call at earliest convenience. Thanks!

## 2017-10-13 ENCOUNTER — CLINICAL SUPPORT (OUTPATIENT)
Dept: REHABILITATION | Facility: HOSPITAL | Age: 52
End: 2017-10-13
Attending: NEUROLOGICAL SURGERY
Payer: COMMERCIAL

## 2017-10-13 DIAGNOSIS — M47.812 SPONDYLOSIS OF CERVICAL REGION WITHOUT MYELOPATHY OR RADICULOPATHY: ICD-10-CM

## 2017-10-13 DIAGNOSIS — M48.02 CERVICAL SPINAL STENOSIS: ICD-10-CM

## 2017-10-13 DIAGNOSIS — M79.18 CERVICAL MYOFASCIAL PAIN SYNDROME: Primary | ICD-10-CM

## 2017-10-13 PROCEDURE — 97140 MANUAL THERAPY 1/> REGIONS: CPT

## 2017-10-13 PROCEDURE — 97110 THERAPEUTIC EXERCISES: CPT

## 2017-10-13 NOTE — PROGRESS NOTES
"TIME RECORD    Date:  10/13/2017    Start Time:  11:00 am   Stop Time:  11:50 am     PROCEDURES:    TIMED  Procedure Time Min.   MT Start:11:00 am   Stop:11:10 am  10   TE Start:11:10 am   Stop:11:40 am  30          UNTIMED  Procedure Time Min.   MHP Start:11:40 am   Stop:11:50 am  10      Total Timed Minutes:  40  Total Timed Units:  3  Total Untimed Units:  1  Charges Billed/# of units:  1 MT, 2 TE    Progress/Current Status    Subjective:     Patient ID: Tyrone Santos is a 52 y.o. male.  Diagnosis:   1. Cervical myofascial pain syndrome     2. Spondylosis of cervical region without myelopathy or radiculopathy     3. Cervical spinal stenosis       Pain: 4 /10  Patient reports having 4/10 pain at left neck region with patient stating my pain is still uncontrolled because my medicine is not right. I have called my doctors office every day since Monday and I have not received a call back yet. I can tell you that I felt better after last time so that is a good thing.     Objective:     Patient received MT x 10 minutes consisting of STM/MFR to left UT musculature and Vacuum/cupping STM with manual therapy techniques was performed to left UT and periscapular region to decrease muscle tightness, increase circulation and promote healing process. The pt's skin was monitored for redness adjusting pressure as needed. The pt was instructed in possible side effects of bruising and/or soreness. Then, patient completed therex per log as below 1:1 with PT x 30 minutes with patient able to tolerate progression of exercises without any complaints of increase in pain requiring verbal instruction to ensure patient obtains good posture throughout all exercises. Session ended with moist heat pad to cervical spine x 10 minutes in hookline position.     Date 10/13/17   Visit  Exp 12/31/17 3/15   MHP 10'   MT 10'    UT Str. 30"x2   LV Str. --   Pec Str. 30"x3    Chin Tuck 5"x10   SA punches 2x10    shldr ER in supine OTB  2x10   Shldr " Hz Abd w/ ER OTB  2x10   Lats --   Rows --   Horizontal Abd --   Scaption GTB  2x10   Wall Slides 5'x10   UBE Bwd 3'   Initials MNB     Assessment:     Patient with increase in tissue tension and tenderness to palpation which improved after manual therapy and cupping to left UT and periscapular region. Patient able to complete all exercises without any complaints of increase in pain however some reports of fatigue requiring verbal instruction to ensure proper form and posture with each exercise.     Patient Education/Response:     Patient educated to continue to complete HEP on days not attending therapy with patient demonstrating understanding.    Plans and Goals:     Continue as gm, progress with cervical spine stretching and strengthening as able. .    Short Term GOALS: 3 weeks. Pt agrees with goals set.  1. Patient demonstrates independence with HEP.   2. Patient demonstrates independence with Postural Awareness.   3. Patient demonstrates independence with body mechanics.      Long Term GOALS: 6 weeks. Pt agrees with goals set.  1. Patient demonstrates increased cervical AROM by 25% to improve tolerance to functional activities.   2. Patient demonstrates increased strength BUE's to 5/5 or greater to improve tolerance to functional activities.   3. Patient demonstrates improved overall function per FOTO Neck Survey to 46% or less.

## 2017-10-13 NOTE — TELEPHONE ENCOUNTER
----- Message from Juliann Nichole sent at 10/13/2017  8:04 AM CDT -----  _  1st Request  _x  2nd Request  _  3rd Request        Who: Tyrone    Why: pt. Calling to speak with dr. Champagne only. Please call asap.    What Number to Call Back:[phone]    When to Expect a call back: (Before the end of the day)   -- if the call is after 12:00, the call back will be tomorrow.

## 2017-10-16 ENCOUNTER — TELEPHONE (OUTPATIENT)
Dept: PAIN MEDICINE | Facility: CLINIC | Age: 52
End: 2017-10-16

## 2017-10-16 ENCOUNTER — CLINICAL SUPPORT (OUTPATIENT)
Dept: REHABILITATION | Facility: HOSPITAL | Age: 52
End: 2017-10-16
Attending: NEUROLOGICAL SURGERY
Payer: COMMERCIAL

## 2017-10-16 DIAGNOSIS — M79.10 MYALGIA: Primary | ICD-10-CM

## 2017-10-16 DIAGNOSIS — M79.18 CERVICAL MYOFASCIAL PAIN SYNDROME: Primary | ICD-10-CM

## 2017-10-16 DIAGNOSIS — M47.812 SPONDYLOSIS OF CERVICAL REGION WITHOUT MYELOPATHY OR RADICULOPATHY: ICD-10-CM

## 2017-10-16 DIAGNOSIS — M62.838 CERVICAL PARASPINAL MUSCLE SPASM: ICD-10-CM

## 2017-10-16 DIAGNOSIS — M48.02 CERVICAL SPINAL STENOSIS: ICD-10-CM

## 2017-10-16 PROCEDURE — 97140 MANUAL THERAPY 1/> REGIONS: CPT

## 2017-10-16 PROCEDURE — 97150 GROUP THERAPEUTIC PROCEDURES: CPT

## 2017-10-16 RX ORDER — ORPHENADRINE CITRATE 100 MG/1
100 TABLET, EXTENDED RELEASE ORAL 2 TIMES DAILY PRN
Qty: 30 TABLET | Refills: 0 | Status: SHIPPED | OUTPATIENT
Start: 2017-10-16 | End: 2017-10-23

## 2017-10-16 NOTE — TELEPHONE ENCOUNTER
----- Message from Sugey Rosales sent at 10/16/2017  8:21 AM CDT -----  Contact: Patient  _  1st Request  _x  2nd Request  _  3rd Request         Who:  BETH CASTELLANOS [[id number]]     Why: pt. Calling to speak with dr. Champagne or nurse he states he had a reaction from the pain medication he was given tizanidine (ZANAFLEX) 4 MG tablet and he would like to speak with someone asap     What Number to Call Back:[phone]     When to Expect a call back: (Before the end of the day)              -- if the call is after 12:00, the call back will be tomorrow.

## 2017-10-16 NOTE — PROGRESS NOTES
"TIME RECORD    Date:  10/16/2017    Start Time: 7:00 am   Stop Time:  8:00 am     PROCEDURES:    TIMED  Procedure Time Min.   MT Start:7:10 am   Stop:7:20 am  10   TE Start:7:20 am   Stop:7:50 am  30          UNTIMED  Procedure Time Min.   MHP Start:7:00 am   Stop:7:10 am  10      Total Timed Minutes:  40  Total Timed Units:  3  Total Untimed Units:  1  Charges Billed/# of units:  1 MT, 2 TE    Progress/Current Status    Subjective:     Patient ID: Tyrone Santos is a 52 y.o. male.  Diagnosis:   1. Cervical myofascial pain syndrome     2. Spondylosis of cervical region without myelopathy or radiculopathy     3. Cervical spinal stenosis       Pain: 8 /10  Patient reports that he had a reaction to his medication a week ago and has not been able to get a hold of his doctor.  He states that his pain is bad this morning.  Exercises at home are going well.  He states that he always feels better leaving therapy.     Objective:     Patient received MT x 10 minutes consisting of STM/MFR to left UT musculature and Vacuum/cupping STM with manual therapy techniques was performed to left UT and periscapular region to decrease muscle tightness, increase circulation and promote healing process. The pt's skin was monitored for redness adjusting pressure as needed. The pt was instructed in possible side effects of bruising and/or soreness. Then, patient completed therex per log as below 1:1 with PT x 30 minutes with patient able to tolerate progression of exercises without any complaints of increase in pain requiring verbal instruction to ensure patient obtains good posture throughout all exercises. Session ended with moist heat pad to cervical spine x 10 minutes in hookline position.     Date 10/16/17   Visit  Exp 12/31/17 4/15   MHP 10'   MT 10'    UT Str. 30"x5   LV Str. 30"x5   Pec Str. 30"x3    Chin Tuck 5"x10   SA punches    shldr ER in sitting OTB  2x10   Shldr Hz Abd w/ ER OTB  2x10   Lats OTB 10x3   Rows OTB 10x3 "   Horizontal Abd --   Scaption GTB  2x10   Wall Slides 5'x10   UBE    Initials MNB     Assessment:     Patient tolerated treatment well.  He had increased L levator tightness which improved with manual therapy and levator stretch.  Patient reported  Improved symptoms with treatment.  He was encouraged to watch his posture and perform HEP 3x/day for best response.      Patient Education/Response:     Patient educated to continue to complete HEP on days not attending therapy with patient demonstrating understanding.    Plans and Goals:     Continue as gm, progress with cervical spine stretching and strengthening as able. .    Short Term GOALS: 3 weeks. Pt agrees with goals set.  1. Patient demonstrates independence with HEP. (progressing, not met)  2. Patient demonstrates independence with Postural Awareness. (progressing, not met)  3. Patient demonstrates independence with body mechanics. (progressing, not met)     Long Term GOALS: 6 weeks. Pt agrees with goals set.  1. Patient demonstrates increased cervical AROM by 25% to improve tolerance to functional activities. (progressing, not met)  2. Patient demonstrates increased strength BUE's to 5/5 or greater to improve tolerance to functional activities. (progressing, not met)  3. Patient demonstrates improved overall function per FOTO Neck Survey to 46% or less. (progressing, not met)

## 2017-10-16 NOTE — TELEPHONE ENCOUNTER
Spoke to him.  Recommend Lidocaine patch and norflex.  Sent Norflex to pharmacy.  Also [facility] that [facility]

## 2017-10-16 NOTE — TELEPHONE ENCOUNTER
Patient was contacted as per patient he stated that he took the Zanaflex last Monday and had a adverse reaction so he has no taken the medication since then. Patient stated that he has nothing for pain and would like for  to contact him on this matter. Patient was informed that this message will be forwarded to her.

## 2017-10-18 ENCOUNTER — CLINICAL SUPPORT (OUTPATIENT)
Dept: REHABILITATION | Facility: HOSPITAL | Age: 52
End: 2017-10-18
Attending: NEUROLOGICAL SURGERY
Payer: COMMERCIAL

## 2017-10-18 DIAGNOSIS — M48.02 CERVICAL SPINAL STENOSIS: ICD-10-CM

## 2017-10-18 DIAGNOSIS — M47.812 SPONDYLOSIS OF CERVICAL REGION WITHOUT MYELOPATHY OR RADICULOPATHY: ICD-10-CM

## 2017-10-18 DIAGNOSIS — M79.18 CERVICAL MYOFASCIAL PAIN SYNDROME: Primary | ICD-10-CM

## 2017-10-18 PROCEDURE — 97110 THERAPEUTIC EXERCISES: CPT

## 2017-10-18 PROCEDURE — 97140 MANUAL THERAPY 1/> REGIONS: CPT

## 2017-10-18 NOTE — PROGRESS NOTES
"                                                    Physical Therapy Daily Note     Name: Tyrone Santos  Clinic Number: [id number]  Diagnosis:   Encounter Diagnoses   Name Primary?    Spondylosis of cervical region without myelopathy or radiculopathy     Cervical myofascial pain syndrome Yes    Cervical spinal stenosis      Physician: Henry Berry MD  Visit #: 5 of 12  PTA Visit #: 1  Time In: 12:00 pm   Time Out: 1:01 pm     Subjective     Pt reports neck is about a 7  Pain Scale: Tyrone rates pain on a scale of 0-10 to be 7 currently.    Objective     Tyrone received individual therapeutic exercises to develop strength, endurance, ROM, flexibility, posture and core stabilization for 35 minutes including:  UBE 3' bwd  UT/LS S  Supine chin tucks 30x 5" hold  Supine serratus punches 3x10  Scaption 3x10  Supine shoulder ER OTB 3x10  Supine shoulder HZ ABD with ER OTB 3x10  Corner stretch 3x30"  Wall angels 1x10  Rows OTB 3x10  Shoulder ext OTB 3x10    Tyrone received the following manual therapy techniques: Manual traction and Soft tissue Mobilization were applied to the: cervical spine for 10 minutes including:  Vacuum/cupping STM with manual therapy techniques was performed to upper trap and periscapular region to decrease muscle tightness, increase circulation and promote healing process. The pt's skin was monitored for redness adjusting pressure as needed. The pt was instructed in possible side effects of bruising and/or soreness.   Gentle cervical manual traction  -NT  Suboccipital release    The patient received the following supervised modalities after being cleared for contradictions: moist heat to cervical spine x15 minutes    Written Home Exercises Provided: per eval   Pt demo good understanding of the education provided. Tyrone demonstrated good return demonstration of activities.     Education provided re: benefits of cupping and possible bruising; DOMS  Tyrone verbalized good understanding of education " provided.   No spiritual or educational barriers to learning provided    Assessment     Patient tolerated tx well without increased pain. Tone UT. Pt doesn't report any cunha in symptoms.   This is a 52 y.o. male referred to outpatient physical therapy and presents with a medical diagnosis of neck pain and demonstrates limitations as described in the problem list. Pt prognosis is Good. Pt will continue to benefit from skilled outpatient physical therapy to address the deficits listed in the problem list, provide pt/family education and to maximize pt's level of independence in the home and community environment.     Goals as follows:  Short Term GOALS: 3 weeks. Pt agrees with goals set.  1. Patient demonstrates independence with HEP.   2. Patient demonstrates independence with Postural Awareness.   3. Patient demonstrates independence with body mechanics.      Long Term GOALS: 6 weeks. Pt agrees with goals set.  1. Patient demonstrates increased cervical AROM by 25% to improve tolerance to functional activities.   2. Patient demonstrates increased strength BUE's to 5/5 or greater to improve tolerance to functional activities.   3. Patient demonstrates improved overall function per FOTO Neck Survey to 46% or less.      Plan     Continue with established Plan of Care towards PT goals.    Therapist: Tracy Núñez, ODESSA  10/18/2017

## 2017-10-23 ENCOUNTER — CLINICAL SUPPORT (OUTPATIENT)
Dept: REHABILITATION | Facility: HOSPITAL | Age: 52
End: 2017-10-23
Attending: NEUROLOGICAL SURGERY
Payer: COMMERCIAL

## 2017-10-23 ENCOUNTER — OFFICE VISIT (OUTPATIENT)
Dept: PAIN MEDICINE | Facility: CLINIC | Age: 52
End: 2017-10-23
Attending: ANESTHESIOLOGY
Payer: COMMERCIAL

## 2017-10-23 VITALS
WEIGHT: 183 LBS | HEIGHT: 68 IN | SYSTOLIC BLOOD PRESSURE: 129 MMHG | DIASTOLIC BLOOD PRESSURE: 84 MMHG | BODY MASS INDEX: 27.74 KG/M2 | TEMPERATURE: 97 F | HEART RATE: 90 BPM | RESPIRATION RATE: 16 BRPM

## 2017-10-23 DIAGNOSIS — M79.10 MYALGIA: Primary | ICD-10-CM

## 2017-10-23 DIAGNOSIS — M79.18 CERVICAL MYOFASCIAL PAIN SYNDROME: Primary | ICD-10-CM

## 2017-10-23 DIAGNOSIS — M62.838 CERVICAL PARASPINAL MUSCLE SPASM: ICD-10-CM

## 2017-10-23 DIAGNOSIS — M47.812 FACET ARTHRITIS, DEGENERATIVE, CERVICAL SPINE: ICD-10-CM

## 2017-10-23 DIAGNOSIS — M48.02 CERVICAL SPINAL STENOSIS: ICD-10-CM

## 2017-10-23 DIAGNOSIS — M47.812 SPONDYLOSIS OF CERVICAL REGION WITHOUT MYELOPATHY OR RADICULOPATHY: ICD-10-CM

## 2017-10-23 PROCEDURE — 97110 THERAPEUTIC EXERCISES: CPT

## 2017-10-23 PROCEDURE — 97140 MANUAL THERAPY 1/> REGIONS: CPT

## 2017-10-23 PROCEDURE — 99999 PR PBB SHADOW E&M-EST. PATIENT-LVL III: CPT | Mod: PBBFAC,,, | Performed by: ANESTHESIOLOGY

## 2017-10-23 PROCEDURE — 99213 OFFICE O/P EST LOW 20 MIN: CPT | Mod: S$GLB,,, | Performed by: ANESTHESIOLOGY

## 2017-10-23 RX ORDER — DICLOFENAC SODIUM 50 MG/1
50 TABLET, DELAYED RELEASE ORAL 2 TIMES DAILY PRN
Qty: 30 TABLET | Refills: 3 | Status: SHIPPED | OUTPATIENT
Start: 2017-10-23 | End: 2017-12-22

## 2017-10-23 RX ORDER — CHLORZOXAZONE 500 MG/1
500 TABLET ORAL 4 TIMES DAILY PRN
Qty: 60 TABLET | Refills: 3 | Status: SHIPPED | OUTPATIENT
Start: 2017-10-23 | End: 2017-12-22

## 2017-10-23 NOTE — LETTER
October 23, 2017      [facility]  [address]  [facility] [address]  Phone: [phone]  Fax: [phone]       Patient: Tyrone Santos   YOB: 1965  Date of Visit: 10/23/2017    To Whom It May Concern:    Randall Santos  was at [facility] on 10/23/2017. He may return to work on 10/23/2017 with no restrictions. If you have any questions or concerns, or if I can be of further assistance, please do not hesitate to contact me.    Sincerely,    Izabela Champagne MD

## 2017-10-23 NOTE — PROGRESS NOTES
Subjective:      Patient ID: Tyrone Santos is a 52 y.o. male.    Chief Complaint: left neck and shoulder pain    Referred by: No ref. provider found     HPI:    Initial Visit      Tyrone Santos is a 52 y.o. male who presents today with left sided neck pain X 2-3 months in absence of trauma.  This pain is described in detail below.     He reports pain begins in the neck area and radiates into the shoulder, never into the left arm. He reports symptoms are worse with sudden activity (twisting/turning). Pain reported as constant, and described as sharp. No burning, but patient reports pins and needles sensation. Reports subjective weakness in LUE. Difficulty with lifting heavy objects - he reports this is more limited secondary to pain. Patient denies bowel/bladder incontinence or saddle anesthesia. However, he reports difficulty with urination (incomplete emptying) for approximately 1 month. The only remedy that has helped his pain has been percocet.      Interim history (10/23/2017):  He returns for follow up today. Pain in similar location and distribution as previous. No relief with trigger point injection. Continues PT - this is only helpful for pain while at therapy, but not receiving prolonged benefit from this. He had reaction to zanaflex and increased pain. He was switched to Norflex, which was not helpful. Lodine not helpful either. Lidocaine patch not helpful. Pain improved with laying still. Reports decreased concentration due to pain. No new weakness or numbness. He is asking for something stronger for pain    Physical Therapy: currently enrolled in PT x 1 week. Had previous PT at  with dry needling which was not helpful.      Non-pharmacologic Treatment: heating pad - not helpful.          · TENS? no     Pain Medications:         · Currently taking:   Etodolac, lidocaine patch, Norflex - not helpful.      · Has tried in the past:  Percocet - helpful for 1 day, then no longer helpful. Neurontin  "(unknown dose - not helpful). Robaxin, not helpful.  Flexeril,  Celebrex - not helpful. Tizanidine caused "locking up"     · Has not tried: Muscle relaxants, TCAs, SNRIs, anticonvulsants, topical creams     Blood thinners: no     Interventional Therapies:   · TPIs 10/9/2017: No relief     Relevant Surgeries: no     Affecting sleep? Yes - difficult to stay asleep.      Affecting daily activities? yes     Depressive symptoms? no          · SI/HI? No     Work status: yes - entertainment industry.      Prescription Monitoring Program database:  Reviewed and consistent with medication use as prescribed.    Pain Scales  Best: 7/10  Worst: 9/10  Usually: 8/10  Today: 8/10        Review of Systems   Constitution: Negative for chills, fever, malaise/fatigue, weight gain and weight loss.   HENT: Negative for ear pain and hoarse voice.    Eyes: Negative for blurred vision, pain and visual disturbance.   Cardiovascular: Negative for chest pain, dyspnea on exertion and irregular heartbeat.   Respiratory: Negative for cough, shortness of breath and wheezing.    Endocrine: Negative for cold intolerance and heat intolerance.   Hematologic/Lymphatic: Negative for adenopathy and bleeding problem. Does not bruise/bleed easily.   Skin: Negative for color change, itching and rash.   Musculoskeletal: Positive for neck pain. Negative for back pain.   Gastrointestinal: Negative for change in bowel habit, diarrhea, hematemesis, hematochezia, melena and vomiting.   Genitourinary: Positive for hesitancy. Negative for flank pain, frequency, hematuria and urgency.   Neurological: Positive for headaches. Negative for difficulty with concentration, dizziness, loss of balance and seizures.   Psychiatric/Behavioral: Negative for altered mental status, depression and suicidal ideas. The patient is not nervous/anxious.    Allergic/Immunologic: Negative for HIV exposure.     Past Medical History:   Diagnosis Date    Bipolar disorder     " Hyperlipidemia     Hypertension        Past Surgical History:   Procedure Laterality Date    APPENDECTOMY      WRIST SURGERY      6 years ago       Review of patient's allergies indicates:   Allergen Reactions    Codeine Nausea Only    Tizanidine Other (See Comments)     Caused increased pain and muscle pain       Current Outpatient Prescriptions   Medication Sig Dispense Refill    amlodipine (NORVASC) 10 MG tablet Take 10 mg by mouth once daily.  0    aspirin (ECOTRIN) 81 MG EC tablet Take 81 mg by mouth once daily.      buPROPion (WELLBUTRIN XL) 150 MG TB24 tablet Take 150 mg by mouth once daily.      gabapentin (NEURONTIN) 300 MG capsule       lisinopril (PRINIVIL,ZESTRIL) 20 MG tablet Take 20 mg by mouth once daily.  0    omeprazole (PRILOSEC) 20 MG capsule Take 20 mg by mouth once daily.  0    oxcarbazepine (TRILEPTAL) 300 MG Tab Take 300 mg by mouth 2 (two) times daily.  1    quetiapine (SEROQUEL) 200 MG Tab Take 400 mg by mouth nightly.  1    simvastatin (ZOCOR) 10 MG tablet Take 10 mg by mouth every evening.  0    chlorzoxazone (PARAFON FORTE) 500 mg Tab Take 1 tablet (500 mg total) by mouth 4 (four) times daily as needed. 60 tablet 3    diclofenac (VOLTAREN) 50 MG EC tablet Take 1 tablet (50 mg total) by mouth 2 (two) times daily as needed. 30 tablet 3     No current facility-administered medications for this visit.        No family history on file.    Social History     Social History    Marital status: Single     Spouse name: N/A    Number of children: N/A    Years of education: N/A     Occupational History    Not on file.     Social History Main Topics    Smoking status: Never Smoker    Smokeless tobacco: Never Used    Alcohol use Yes    Drug use: No    Sexual activity: Not on file     Other Topics Concern    Not on file     Social History Narrative    No narrative on file           Objective:       Vitals:    10/23/17 0731 10/23/17 0736   BP: 129/84    Pulse: 90    Resp: 16   "  Temp: 97.3 °F (36.3 °C)    TempSrc: Oral    Weight: 83 kg (182 lb 15.7 oz)    Height: 5' 8" (1.727 m)    PainSc:   8   8   PainLoc: Neck        GEN:  Well developed, well nourished.  No acute distress.  No pain behavior.  HEENT:  No trauma.  Mucous membranes moist.  Nares patent bilaterally.  PSYCH: Normal affect. Thought content appropriate.  CHEST:  Breathing symmetric.  No audible wheezing.  ABD: Soft, non-tender, non-distended.  SKIN:  Warm, pink, dry.  No rash on exposed areas.    EXT:  No cyanosis, clubbing, or edema.  No color change or changes in nail or hair growth.  NEURO/MUSCULOSKELETAL:  Fully alert, oriented, and appropriate. Speech normal deuce. No cranial nerve deficits.   Gait: Normal.  5/5 motor strength throughout upper extremities.   Sensory: No sensory deficit in the upper extremities.   Reflexes: 2+ and symmetric throughout.  Negative Mcginnis's bilaterally.  C-Spine:  Decreased ROM with pain on flexion, extension, ipsilateral and contralateral bending. Positive facet loading on left.  Negative Spurling's bilaterally.    TTP over left cervical paraspinal muscles          Imaging:         The imaging studies listed below were independently reviewed by me, and I agree with the findings as documented below.      MRI C spine:  C3-4 osteophytes, Left facet arthropathy, left NF stenosis, mild/mod central spinal stenosis     C4-5 no central stenosis     C5-6 bilateral facet hypertrophy, mild central stenosis     C6-7 mild central stenosis     C7-T1 mild central stenosis, facet hypertrophy        X ray C spine:  straightening of the normal cervical lordosis.  There is loss of disc space height with degenerative endplate change identified from C5-6 through C7-T1.  There is no instability on flexion-extension.  There is no fracture, dislocation, or bone erosion.?         Ortho/SPM Exam      Assessment:       Encounter Diagnoses   Name Primary?    Myalgia Yes    Cervical paraspinal muscle spasm     " Facet arthritis, degenerative, cervical spine          Plan:       Diagnoses and all orders for this visit:    Myalgia  -     diclofenac (VOLTAREN) 50 MG EC tablet; Take 1 tablet (50 mg total) by mouth 2 (two) times daily as needed.  -     chlorzoxazone (PARAFON FORTE) 500 mg Tab; Take 1 tablet (500 mg total) by mouth 4 (four) times daily as needed.    Cervical paraspinal muscle spasm  -     diclofenac (VOLTAREN) 50 MG EC tablet; Take 1 tablet (50 mg total) by mouth 2 (two) times daily as needed.  -     chlorzoxazone (PARAFON FORTE) 500 mg Tab; Take 1 tablet (500 mg total) by mouth 4 (four) times daily as needed.    Facet arthritis, degenerative, cervical spine  -     diclofenac (VOLTAREN) 50 MG EC tablet; Take 1 tablet (50 mg total) by mouth 2 (two) times daily as needed.  -     chlorzoxazone (PARAFON FORTE) 500 mg Tab; Take 1 tablet (500 mg total) by mouth 4 (four) times daily as needed.       Patient's pain is consistent with the above.     We discussed the assessment and recommendations.  All available images were reviewed. We discussed the disease process, prognosis, treatment plan, and risks and benefits. The patient is aware of the risks and benefits of the medications being prescribed, common side effects, and proper usage. The following is the plan we agreed on:      1. Will schedule for LEFT C5, C6, C7, C8 MBB under fluoroscopic guidance.   1. If positive, will proceed to RFA.  2. If negative, BOZENA vs TPI  2. Discontinue Lodine and Norflex as these are not helpful for patient's pain.   3. Will start Diclofenac - 2 week supply given  4. RX given for Parafon Forte today for muscle pain (2 week supply given)  5. Pennsaid topical sample given to patient as well. If this is helpful, he is to call for rx.   6. Again, I do not recommend opioids for the chronic treatment of his non-malignant pain at this time.  Short courses for acute flares are appropriate.  7. RTC in 2 weeks. If above medications are helpful,  will give 1 month supply at that time    Thank you for allowing me to participate in the care of this patient.   Please do not hesitate to call me at [phone] with any questions or concerns.    Greater than 25 minutes spent in total in todays visit with the patient, with more than half that time direct face to face counseling and education with the patient today. We discussed the disease process, prognosis, treatment plan, and risks and benefits.    I have seen the patient with the fellow physician.  I have performed my own history and physical exam and we have come up with the above plan.  The patient is in agreement with our plan.    The above plan and management options were discussed at length with patient. Patient is in agreement with the above and verbalized understanding. It will be communicated with the consulting physician via electronic record, fax, or mail

## 2017-10-23 NOTE — PROGRESS NOTES
"                                                    Physical Therapy Daily Note     Name: Tyrone Santos  Clinic Number: [id number]  Diagnosis:   Encounter Diagnoses   Name Primary?    Spondylosis of cervical region without myelopathy or radiculopathy     Cervical myofascial pain syndrome Yes    Cervical spinal stenosis      Physician: Henry Berry MD  Visit #: 6 of 12  PTA Visit #: 2  Time In: 12:00 pm   Time Out: 1:02 pm     Subjective     Pt reports neck is about a 7, feel better for 1.5 day after sessoin but then come back   Pain Scale: Tyrone rates pain on a scale of 0-10 to be 7 currently.    Objective   Foto Score:62%    Tyrone received individual therapeutic exercises to develop strength, endurance, ROM, flexibility, posture and core stabilization for 42 minutes including:  UBE 3' bwd  UT/LS S 3x30"  Supine chin tucks 30x 5" hold  Supine serratus punches 3x10 #1  Scaption 3x10 #1  Supine shoulder ER OTB 3x10  Supine shoulder HZ ABD with ER OTB 3x10  Corner stretch 3x30"  Wall angels 2x10  Rows OTB 3x10  Shoulder ext OTB 3x10    Tyrone received the following manual therapy techniques: Manual traction and Soft tissue Mobilization were applied to the: cervical spine for 10 minutes including:  Vacuum/cupping STM with manual therapy techniques was performed to upper trap and periscapular region to decrease muscle tightness, increase circulation and promote healing process. The pt's skin was monitored for redness adjusting pressure as needed. The pt was instructed in possible side effects of bruising and/or soreness.   Gentle cervical manual traction  -NT  Suboccipital release    The patient received the following supervised modalities after being cleared for contradictions: moist heat to cervical spine x10 minutes    Written Home Exercises Provided: per eval   Pt demo good understanding of the education provided. Tyrone demonstrated good return demonstration of activities.     Education provided re: benefits of " cupping and possible bruising; MATHIEU Hansen verbalized good understanding of education provided.   No spiritual or educational barriers to learning provided    Assessment     Patient tolerated tx well without increased pain. Pt's foto score went down even though pt states he doesn't feel any worse than when he first started therapy. Pt states he feels better for 1.5 days after each session but then pain comes back. Saw MD this a.m., MD put him on different medication   This is a 52 y.o. male referred to outpatient physical therapy and presents with a medical diagnosis of neck pain and demonstrates limitations as described in the problem list. Pt prognosis is Good. Pt will continue to benefit from skilled outpatient physical therapy to address the deficits listed in the problem list, provide pt/family education and to maximize pt's level of independence in the home and community environment.     Goals as follows:  Short Term GOALS: 3 weeks. Pt agrees with goals set.  1. Patient demonstrates independence with HEP.   2. Patient demonstrates independence with Postural Awareness.   3. Patient demonstrates independence with body mechanics.      Long Term GOALS: 6 weeks. Pt agrees with goals set.  1. Patient demonstrates increased cervical AROM by 25% to improve tolerance to functional activities.   2. Patient demonstrates increased strength BUE's to 5/5 or greater to improve tolerance to functional activities.   3. Patient demonstrates improved overall function per FOTO Neck Survey to 46% or less.      Plan     Continue with established Plan of Care towards PT goals.    Therapist: Trayc Núñez, ODESSA  10/23/2017

## 2017-10-25 ENCOUNTER — CLINICAL SUPPORT (OUTPATIENT)
Dept: REHABILITATION | Facility: HOSPITAL | Age: 52
End: 2017-10-25
Attending: NEUROLOGICAL SURGERY
Payer: COMMERCIAL

## 2017-10-25 DIAGNOSIS — M79.18 CERVICAL MYOFASCIAL PAIN SYNDROME: Primary | ICD-10-CM

## 2017-10-25 DIAGNOSIS — M47.812 SPONDYLOSIS OF CERVICAL REGION WITHOUT MYELOPATHY OR RADICULOPATHY: ICD-10-CM

## 2017-10-25 DIAGNOSIS — M48.02 CERVICAL SPINAL STENOSIS: ICD-10-CM

## 2017-10-25 PROCEDURE — 97140 MANUAL THERAPY 1/> REGIONS: CPT

## 2017-10-25 PROCEDURE — 97110 THERAPEUTIC EXERCISES: CPT

## 2017-10-25 NOTE — PROGRESS NOTES
"                                                    Physical Therapy Daily Note     Name: Tyrone Santos  Clinic Number: [id number]  Diagnosis:   Encounter Diagnoses   Name Primary?    Spondylosis of cervical region without myelopathy or radiculopathy     Cervical myofascial pain syndrome Yes    Cervical spinal stenosis      Physician: Henry Berry MD  Visit #: 7 of 12  PTA Visit #: 3  Time In: 11:53 pm   Time Out: 12:53 pm     Subjective     Pt reports:"doing good, sitting at about a 4"  Pain Scale: Tyrone rates pain on a scale of 0-10 to be 4 currently.    Objective       Tyrone received individual therapeutic exercises to develop strength, endurance, ROM, flexibility, posture and core stabilization for 40 minutes including:  UBE 3' bwd  UT/LS S 3x30"  Supine chin tucks 30x 5" hold  Supine serratus punches 3x10 #1  Scaption 3x10 #1  Supine shoulder ER OTB 3x10  Supine shoulder HZ ABD with ER OTB 3x10  Corner stretch 3x30"  Wall angels 2x10  Rows OTB 3x10  Shoulder ext OTB 3x10    Tyrone received the following manual therapy techniques: Manual traction and Soft tissue Mobilization were applied to the: cervical spine for 10 minutes including:  Vacuum/cupping STM with manual therapy techniques was performed to upper trap and periscapular region to decrease muscle tightness, increase circulation and promote healing process. The pt's skin was monitored for redness adjusting pressure as needed. The pt was instructed in possible side effects of bruising and/or soreness.   Gentle cervical manual traction  -NT  Suboccipital release    The patient received the following supervised modalities after being cleared for contradictions: moist heat to cervical spine x10 minutes    Written Home Exercises Provided: per eval   Pt demo good understanding of the education provided. Tyrone demonstrated good return demonstration of activities.     Education provided re: benefits of cupping and possible bruising; DOMS  Tyrone verbalized " good understanding of education provided.   No spiritual or educational barriers to learning provided    Assessment     Patient tolerated tx well without increased pain. Pt experiencing decreased neck pain today. Progressing well w/ poc  This is a 52 y.o. male referred to outpatient physical therapy and presents with a medical diagnosis of neck pain and demonstrates limitations as described in the problem list. Pt prognosis is Good. Pt will continue to benefit from skilled outpatient physical therapy to address the deficits listed in the problem list, provide pt/family education and to maximize pt's level of independence in the home and community environment.     Goals as follows:  Short Term GOALS: 3 weeks. Pt agrees with goals set.  1. Patient demonstrates independence with HEP.   2. Patient demonstrates independence with Postural Awareness.   3. Patient demonstrates independence with body mechanics.      Long Term GOALS: 6 weeks. Pt agrees with goals set.  1. Patient demonstrates increased cervical AROM by 25% to improve tolerance to functional activities.   2. Patient demonstrates increased strength BUE's to 5/5 or greater to improve tolerance to functional activities.   3. Patient demonstrates improved overall function per FOTO Neck Survey to 46% or less.      Plan     Continue with established Plan of Care towards PT goals.    Therapist: Tracy Núñez, PTA  10/25/2017

## 2017-10-30 ENCOUNTER — CLINICAL SUPPORT (OUTPATIENT)
Dept: REHABILITATION | Facility: HOSPITAL | Age: 52
End: 2017-10-30
Attending: NEUROLOGICAL SURGERY
Payer: COMMERCIAL

## 2017-10-30 DIAGNOSIS — M48.02 CERVICAL SPINAL STENOSIS: ICD-10-CM

## 2017-10-30 DIAGNOSIS — M79.18 CERVICAL MYOFASCIAL PAIN SYNDROME: Primary | ICD-10-CM

## 2017-10-30 DIAGNOSIS — M47.812 SPONDYLOSIS OF CERVICAL REGION WITHOUT MYELOPATHY OR RADICULOPATHY: ICD-10-CM

## 2017-10-30 PROCEDURE — 97110 THERAPEUTIC EXERCISES: CPT

## 2017-10-30 PROCEDURE — 97140 MANUAL THERAPY 1/> REGIONS: CPT

## 2017-10-30 NOTE — PROGRESS NOTES
"                                                    Physical Therapy Daily Note     Name: Tyrone Santos  Clinic Number: [id number]  Diagnosis:   Encounter Diagnoses   Name Primary?    Spondylosis of cervical region without myelopathy or radiculopathy     Cervical myofascial pain syndrome Yes    Cervical spinal stenosis      Physician: Henry Berry MD  Visit #: 8 of 12  PTA Visit #: 3  Time In: 7:00  Time Out: 8:05    Subjective     Pt reports: That he is a 5/10 pain today.  He states that he will have some times where his neck does not bother him.  Pain is definitely better since starting therapy.   Pain Scale: Tyrone rates pain on a scale of 0-10 to be 4 currently.    Objective       Tyrone received individual therapeutic exercises to develop strength, endurance, ROM, flexibility, posture and core stabilization for 40 minutes including:  UBE 3' bwd  UT/LS S 3x30"  Supine chin tucks 30x 5" hold  Supine serratus punches 3x10 #1  Scaption 3x10 #1  Standing shoulder ER OTB 3x10  Supine shoulder HZ ABD with ER OTB 3x10(NT)  Corner stretch 3x30"  Wall angels 2x10  Rows OTB 3x10  Shoulder ext OTB 3x10  Standing shoulder flexion with RTB horizontal resistance. 3x10  Retractions into OTB 3x10    Tyrone received the following manual therapy techniques: Manual traction and Soft tissue Mobilization were applied to the: cervical spine for 10 minutes including:  Deep pressure holds and j-strokes to cervical paraspinals and upper trapezius. Vacuum/cupping STM with manual therapy techniques was performed to upper trap and periscapular region to decrease muscle tightness, increase circulation and promote healing process. The pt's skin was monitored for redness adjusting pressure as needed. The pt was instructed in possible side effects of bruising and/or soreness.   Gentle cervical manual traction   Suboccipital release    The patient received the following supervised modalities after being cleared for contradictions: ice to " cervical spine in z-lie position x10 minutes    Written Home Exercises Provided: per eval   Pt demo good understanding of the education provided. Tyrone demonstrated good return demonstration of activities.     Education provided re: benefits of cupping and possible bruising; DOMS, benefits of using ice.   Tyrone verbalized good understanding of education provided.   No spiritual or educational barriers to learning provided    Assessment     Patient tolerated tx well without increased pain. Patient reported decreased discomfort with manual therapy.  Patient ended with ice and was instructed on the benefits of ice and to use it at home.  Patient understood.  Progressing well w/ poc  This is a 52 y.o. male referred to outpatient physical therapy and presents with a medical diagnosis of neck pain and demonstrates limitations as described in the problem list. Pt prognosis is Good. Pt will continue to benefit from skilled outpatient physical therapy to address the deficits listed in the problem list, provide pt/family education and to maximize pt's level of independence in the home and community environment.     Goals as follows:  Short Term GOALS: 3 weeks. Pt agrees with goals set.  1. Patient demonstrates independence with HEP. (progressing, not met)  2. Patient demonstrates independence with Postural Awareness. (progressing, not met)  3. Patient demonstrates independence with body mechanics. (progressing, not met)     Long Term GOALS: 6 weeks. Pt agrees with goals set.  1. Patient demonstrates increased cervical AROM by 25% to improve tolerance to functional activities. (progressing, not met)  2. Patient demonstrates increased strength BUE's to 5/5 or greater to improve tolerance to functional activities. (progressing, not met)  3. Patient demonstrates improved overall function per FOTO Neck Survey to 46% or less. (progressing, not met)     Plan     Continue with established Plan of Care towards PT goals.    Therapist:  Tyrell Nuñez, PT  10/30/2017

## 2017-11-01 ENCOUNTER — CLINICAL SUPPORT (OUTPATIENT)
Dept: REHABILITATION | Facility: HOSPITAL | Age: 52
End: 2017-11-01
Attending: NEUROLOGICAL SURGERY
Payer: COMMERCIAL

## 2017-11-01 DIAGNOSIS — M79.18 CERVICAL MYOFASCIAL PAIN SYNDROME: Primary | ICD-10-CM

## 2017-11-01 DIAGNOSIS — M48.02 CERVICAL SPINAL STENOSIS: ICD-10-CM

## 2017-11-01 DIAGNOSIS — M47.812 SPONDYLOSIS OF CERVICAL REGION WITHOUT MYELOPATHY OR RADICULOPATHY: ICD-10-CM

## 2017-11-01 PROCEDURE — 97110 THERAPEUTIC EXERCISES: CPT

## 2017-11-01 PROCEDURE — 97140 MANUAL THERAPY 1/> REGIONS: CPT

## 2017-11-01 NOTE — PROGRESS NOTES
"                                                    Physical Therapy Daily Note     Name: Tyrone Santos  Clinic Number: [id number]  Diagnosis:   Encounter Diagnoses   Name Primary?    Spondylosis of cervical region without myelopathy or radiculopathy     Cervical myofascial pain syndrome Yes    Cervical spinal stenosis      Physician: Henry Berry MD  Visit #: 9 of 12  PTA Visit #: 1  Time In: 12:03 pm  Time Out: 1:01 pm    Subjective     Pt reports: neck is a 4  Pain Scale: Tyrone rates pain on a scale of 0-10 to be 4 currently.    Objective       Tyrone received individual therapeutic exercises to develop strength, endurance, ROM, flexibility, posture and core stabilization for 38 minutes including:  UBE 3' bwd  UT/LS S 3x30"  Supine chin tucks 30x 5" hold  Supine serratus punches 3x10 #1  Scaption 3x10 #1  Standing shoulder ER OTB 3x10  Supine shoulder HZ ABD with ER OTB 3x10(NT)  Corner stretch 3x30"  Wall angels 2x10  W's OTB 3x10  Shoulder ext OTB 3x10  Standing shoulder flexion with RTB horizontal resistance. 3x10  Retractions into OTB 3x10-NT    Tyrone received the following manual therapy techniques: Manual traction and Soft tissue Mobilization were applied to the: cervical spine for 10 minutes including:  Deep pressure holds and j-strokes to cervical paraspinals and upper trapezius. Vacuum/cupping STM with manual therapy techniques was performed to upper trap and periscapular region to decrease muscle tightness, increase circulation and promote healing process. The pt's skin was monitored for redness adjusting pressure as needed. The pt was instructed in possible side effects of bruising and/or soreness.   Gentle cervical manual traction -NT  Suboccipital release    The patient received the following supervised modalities after being cleared for contradictions: ice to cervical spine in z-lie position x10 minutes    Written Home Exercises Provided: per eval   Pt demo good understanding of the education " provided. Tyrone demonstrated good return demonstration of activities.     Education provided re: benefits of cupping and possible bruising; DOMS, benefits of using ice.   Tyrone verbalized good understanding of education provided.   No spiritual or educational barriers to learning provided    Assessment     Patient tolerated tx well without increased pain. Progressing well w/ poc  This is a 52 y.o. male referred to outpatient physical therapy and presents with a medical diagnosis of neck pain and demonstrates limitations as described in the problem list. Pt prognosis is Good. Pt will continue to benefit from skilled outpatient physical therapy to address the deficits listed in the problem list, provide pt/family education and to maximize pt's level of independence in the home and community environment.     Goals as follows:  Short Term GOALS: 3 weeks. Pt agrees with goals set.  1. Patient demonstrates independence with HEP. (progressing, not met)  2. Patient demonstrates independence with Postural Awareness. (progressing, not met)  3. Patient demonstrates independence with body mechanics. (progressing, not met)     Long Term GOALS: 6 weeks. Pt agrees with goals set.  1. Patient demonstrates increased cervical AROM by 25% to improve tolerance to functional activities. (progressing, not met)  2. Patient demonstrates increased strength BUE's to 5/5 or greater to improve tolerance to functional activities. (progressing, not met)  3. Patient demonstrates improved overall function per FOTO Neck Survey to 46% or less. (progressing, not met)     Plan     Continue with established Plan of Care towards PT goals.    Therapist: Tracy Núñez, PTA  11/1/2017

## 2017-11-02 ENCOUNTER — HOSPITAL ENCOUNTER (OUTPATIENT)
Facility: OTHER | Age: 52
Discharge: HOME | End: 2017-11-02
Attending: ANESTHESIOLOGY | Admitting: ANESTHESIOLOGY
Payer: COMMERCIAL

## 2017-11-02 ENCOUNTER — SURGERY (OUTPATIENT)
Age: 52
End: 2017-11-02

## 2017-11-02 VITALS
BODY MASS INDEX: 28.79 KG/M2 | HEIGHT: 68 IN | OXYGEN SATURATION: 99 % | HEART RATE: 83 BPM | SYSTOLIC BLOOD PRESSURE: 126 MMHG | TEMPERATURE: 98 F | DIASTOLIC BLOOD PRESSURE: 80 MMHG | WEIGHT: 190 LBS | RESPIRATION RATE: 18 BRPM

## 2017-11-02 DIAGNOSIS — M47.812 FACET ARTHRITIS, DEGENERATIVE, CERVICAL SPINE: Primary | ICD-10-CM

## 2017-11-02 PROCEDURE — 25000003 PHARM REV CODE 250: Performed by: ANESTHESIOLOGY

## 2017-11-02 PROCEDURE — 25500020 PHARM REV CODE 255: Performed by: ANESTHESIOLOGY

## 2017-11-02 PROCEDURE — 64490 INJ PARAVERT F JNT C/T 1 LEV: CPT | Performed by: ANESTHESIOLOGY

## 2017-11-02 PROCEDURE — 64491 INJ PARAVERT F JNT C/T 2 LEV: CPT | Mod: LT,,, | Performed by: ANESTHESIOLOGY

## 2017-11-02 PROCEDURE — S0020 INJECTION, BUPIVICAINE HYDRO: HCPCS | Performed by: ANESTHESIOLOGY

## 2017-11-02 PROCEDURE — 64492 INJ PARAVERT F JNT C/T 3 LEV: CPT | Mod: LT,,, | Performed by: ANESTHESIOLOGY

## 2017-11-02 PROCEDURE — 64492 INJ PARAVERT F JNT C/T 3 LEV: CPT | Performed by: ANESTHESIOLOGY

## 2017-11-02 PROCEDURE — 64490 INJ PARAVERT F JNT C/T 1 LEV: CPT | Mod: LT,,, | Performed by: ANESTHESIOLOGY

## 2017-11-02 PROCEDURE — 64491 INJ PARAVERT F JNT C/T 2 LEV: CPT | Performed by: ANESTHESIOLOGY

## 2017-11-02 RX ORDER — BUPIVACAINE HYDROCHLORIDE 5 MG/ML
3 INJECTION, SOLUTION EPIDURAL; INTRACAUDAL ONCE
Status: COMPLETED | OUTPATIENT
Start: 2017-11-02 | End: 2017-11-02

## 2017-11-02 RX ORDER — LIDOCAINE HYDROCHLORIDE 10 MG/ML
10 INJECTION INFILTRATION; PERINEURAL ONCE
Status: COMPLETED | OUTPATIENT
Start: 2017-11-02 | End: 2017-11-02

## 2017-11-02 RX ADMIN — IOHEXOL 5 ML: 300 INJECTION, SOLUTION INTRAVENOUS at 08:11

## 2017-11-02 RX ADMIN — LIDOCAINE HYDROCHLORIDE 10 ML: 10 INJECTION, SOLUTION INFILTRATION; PERINEURAL at 08:11

## 2017-11-02 RX ADMIN — BUPIVACAINE HYDROCHLORIDE 3 ML: 5 INJECTION, SOLUTION EPIDURAL; INTRACAUDAL; PERINEURAL at 08:11

## 2017-11-02 NOTE — OP NOTE
Date: 11/02/2017    Procedure: Cervical Medial Branch Blocks, Left C5-8.      Referring Provider: None    Pre-op diagnosis: Other osteoarthritis of spine, cervical region [M47.892]    Post-op diagnosis: Other osteoarthritis of spine, cervical region [M47.892]     Physician: Dr. Izabela Champagne     Assistant: None    Anesthestia: local     All medications, allergies, and relevant histories were reviewed. No recent antibiotics or infections.  A time-out was taken to verify the correct patient, procedure, laterality, and appropriate medications/allergies.    Cervical Medial Branch Block:     The procedure risks, benefits, and possible complications were discussed with the patient including nerve damage, infection, spinal headache, and paresis. NIBP, pulse rate, and O2 saturation were monitored throughout the procedure.   Patient was placed in the prone position with the midriff elevated. Oblique view of the spine was obtained with fluoroscopy. Entry sites were marked over the skin. Skin was prepped with CHG and draped. Sterile precautions observed throughout the procedure. Xylocaine 1% was infiltrated locally over the entire site. A 22-gauge spinal needle was introduced at an angle, and the needle was placed onto the lateral aspect of the mid articular pilar. Placement was confirmed with a fluoroscopic view. 0.5 cc of 0.5% bupivicaine was injected at each level. Procedure performed at the levels indicated: Left:C5-8 . Patient tolerated the procedure well and there were no complications.     The patient discharged home with a responsible adult.    Future Management:   If good results, can proceed to RFA.  If no relief, can follow up to discuss options.

## 2017-11-02 NOTE — DISCHARGE INSTRUCTIONS

## 2017-11-06 ENCOUNTER — CLINICAL SUPPORT (OUTPATIENT)
Dept: REHABILITATION | Facility: HOSPITAL | Age: 52
End: 2017-11-06
Attending: NEUROLOGICAL SURGERY
Payer: COMMERCIAL

## 2017-11-06 DIAGNOSIS — M47.812 SPONDYLOSIS OF CERVICAL REGION WITHOUT MYELOPATHY OR RADICULOPATHY: ICD-10-CM

## 2017-11-06 DIAGNOSIS — M79.18 CERVICAL MYOFASCIAL PAIN SYNDROME: Primary | ICD-10-CM

## 2017-11-06 DIAGNOSIS — M48.02 CERVICAL SPINAL STENOSIS: ICD-10-CM

## 2017-11-06 PROCEDURE — 97140 MANUAL THERAPY 1/> REGIONS: CPT

## 2017-11-06 PROCEDURE — 97110 THERAPEUTIC EXERCISES: CPT

## 2017-11-06 NOTE — PROGRESS NOTES
"                                                    Physical Therapy Daily Note     Name: Tyrone Santos  Clinic Number: [id number]  Diagnosis:   Encounter Diagnoses   Name Primary?    Spondylosis of cervical region without myelopathy or radiculopathy     Cervical myofascial pain syndrome Yes    Cervical spinal stenosis      Physician: Henry Berry MD  Visit #: 10 of 12  PTA Visit #: 1  Time In: 7:30 am  Time Out: 8:35m    Subjective     Pt reports: Patient states that he received injections from his doctor last week.  He states that they worked for a day and then his pain returned.  He states that he is going to see his neurologist tomorrow.  He states that he has seen some improvements since starting therapy.   Pain Scale: Tyrone rates pain on a scale of 0-10 to be 5 currently.    Objective     CERVICAL SPINE AROM:   Flexion: 100% pain on L   Extension: 50% pain   Left Sidebend: 50% pain on L   Right Sidebend: 50% pulling on L   Left Rotation: 75% pain on L   Right Rotation: 75% pulling on L         UPPER EXTREMITY STRENGTH:    Left Right   Shoulder Flexion 5/5 5/5   Shoulder Abduction 5/5 5/5      Shoulder Internal Rotation 5/5 5/5   Shoulder External Rotation 4+/5 4+/5   Elbow Flexion 5/5 5/5   Elbow Extension 5/5 5/5   Wrist Flexion 5/5 5/5   Wrist Extension 5/5 5/5     Tyrone received individual therapeutic exercises to develop strength, endurance, ROM, flexibility, posture and core stabilization for 38 minutes including:  UBE 3' bwd 3 fwd  UT/LS S 3x30"  Supine chin tucks 30x 5" hold  Seated chin tucks 30x1"  Supine serratus punches 3x10 #1(NT)  Scaption 3x10 #1  Standing shoulder ER OTB 3x10  Supine shoulder HZ ABD with ER OTB 3x10(NT)  Corner stretch 3x30"  Wall angels 2x10(NT)  W's OTB 3x10  Shoulder ext BTB 3x15  Standing shoulder flexion with RTB horizontal resistance. 3x10  Retractions into OTB 3x10-NT  Standing shoulder rows 3x15 BTB  Standing ER stretch with wand 30x2"    Tyrone received the " following manual therapy techniques: Manual traction and Soft tissue Mobilization were applied to the: cervical spine for 10 minutes including:   Manual therapy consisted of IASTM with edge tool.  IASTM was performed to cervical paraspinals.  Patient reported no increased symptoms following.     The patient received the following supervised modalities after being cleared for contradictions: ice to cervical spine in z-lie position x10 minutes    Written Home Exercises Provided: per eval   Pt demo good understanding of the education provided. Tyrone demonstrated good return demonstration of activities.     Education provided re: benefits of cupping and possible bruising; DOMS, benefits of using ice.   Tyrone verbalized good understanding of education provided.   No spiritual or educational barriers to learning provided    Assessment     Patient tolerated tx well without increased pain. Progressing well w/ poc.  Patient demonstrates tightness in B shoulders and as a result has compromised posture.  GH joints are internally rotated and he is very tight with ER.  Patient can benefit from flexibility training and stretching of postural muscles.  Reassessed patient and he is making good goals towards goals.  Patient's ROM is improving.     This is a 52 y.o. male referred to outpatient physical therapy and presents with a medical diagnosis of neck pain and demonstrates limitations as described in the problem list. Pt prognosis is Good. Pt will continue to benefit from skilled outpatient physical therapy to address the deficits listed in the problem list, provide pt/family education and to maximize pt's level of independence in the home and community environment.     Goals as follows:  Short Term GOALS: 3 weeks. Pt agrees with goals set.  1. Patient demonstrates independence with HEP. (met)  2. Patient demonstrates independence with Postural Awareness. (progressing,not met)  3. Patient demonstrates independence with body  mechanics. (progressing, not met)     Long Term GOALS: 6 weeks. Pt agrees with goals set.  1. Patient demonstrates increased cervical AROM by 25% to improve tolerance to functional activities. (progressing, not met)  2. Patient demonstrates increased strength BUE's to 5/5 or greater to improve tolerance to functional activities. (progressing, not met)  3. Patient demonstrates improved overall function per FOTO Neck Survey to 46% or less. (progressing, not met)     Plan     Continue with established Plan of Care towards PT goals.    Therapist: Tyrell Nuñez, PT  11/6/2017

## 2017-11-07 ENCOUNTER — OFFICE VISIT (OUTPATIENT)
Dept: NEUROSURGERY | Facility: CLINIC | Age: 52
End: 2017-11-07
Payer: COMMERCIAL

## 2017-11-07 VITALS
HEIGHT: 68 IN | HEART RATE: 81 BPM | WEIGHT: 186.5 LBS | SYSTOLIC BLOOD PRESSURE: 103 MMHG | DIASTOLIC BLOOD PRESSURE: 69 MMHG | BODY MASS INDEX: 28.26 KG/M2

## 2017-11-07 DIAGNOSIS — M47.812 ARTHROPATHY OF CERVICAL FACET JOINT: ICD-10-CM

## 2017-11-07 DIAGNOSIS — M47.812 CERVICAL SPONDYLOSIS WITHOUT MYELOPATHY: Primary | ICD-10-CM

## 2017-11-07 DIAGNOSIS — M79.18 CERVICAL MYOFASCIAL PAIN SYNDROME: ICD-10-CM

## 2017-11-07 PROCEDURE — 99999 PR PBB SHADOW E&M-EST. PATIENT-LVL III: CPT | Mod: PBBFAC,,, | Performed by: NEUROLOGICAL SURGERY

## 2017-11-07 PROCEDURE — 99213 OFFICE O/P EST LOW 20 MIN: CPT | Mod: S$GLB,,, | Performed by: NEUROLOGICAL SURGERY

## 2017-11-07 RX ORDER — TRAMADOL HYDROCHLORIDE 50 MG/1
50 TABLET ORAL EVERY 6 HOURS PRN
Qty: 60 TABLET | Refills: 0 | Status: SHIPPED | OUTPATIENT
Start: 2017-11-07 | End: 2017-11-17

## 2017-11-07 NOTE — PROGRESS NOTES
NEUROSURGICAL PROGRESS NOTE    DATE OF SERVICE:  11/07/2017    ATTENDING PHYSICIAN:  Henry Berry MD    SUBJECTIVE:    INTERIM HISTORY:    This is a very pleasant 52 y.o. male, who is status post cervical medial branch block with short term good pain relief. He has C5-6 and C6-7 spondylosis with foraminal stenosis and facet arthropathy. He has left trapezius myofascial pain that irradiates over the left shoulder. He tried celebrex, diclofenac, zanaflex without significant pain releif. Denies having weakness in his hands or gait imbalance. HE has been doing PT with some pain improvement.         Neck Disability  Neck Disability-Pain Score: 4  Neck Disability-Pain Intensity: The pain is moderate at the moment  Neck Disability-Personal Care: I can look after myself but it causes extra pain  Neck Disability-Lifting: Pain prevents me from lifting heavy weights off the floor but I can manage light to medium weights if they are conveniently positioned  Neck Disability-Reading: I can read as much as I want to, with moderate pain in my neck  Neck Disability-Headaches: I have moderate headaches that come infrequently  Neck Disability-Concentration: I can concentrate fully, when I want to, with slight difficulty  Neck Disability-work: I can do most of my usual work, but no more  Neck Disability-Driving: I can drive my car as long as I want with moderate pain in my neck  Neck Disability-Sleeping: My sleep is mildly disturbed (1-2 hours sleeplessness)  Neck Disability-Recreation: I am able to engage in most but not all of my usual recreation activiites because of pain in my neck    PAST MEDICAL HISTORY:  Active Ambulatory Problems     Diagnosis Date Noted    S/P laparoscopic appendectomy 01/03/2017    Essential hypertension 01/03/2017    Hypercholesteremia 01/03/2017    Cervical spinal stenosis 09/01/2017    Cervical myofascial pain syndrome 09/25/2017    Spondylosis of cervical region without myelopathy or  radiculopathy 09/25/2017    Facet arthritis, degenerative, cervical spine 11/02/2017     Resolved Ambulatory Problems     Diagnosis Date Noted    Cervical syndrome 09/01/2017     Past Medical History:   Diagnosis Date    Bipolar disorder     Hyperlipidemia     Hypertension        PAST SURGICAL HISTORY:  Past Surgical History:   Procedure Laterality Date    APPENDECTOMY      WRIST SURGERY      6 years ago       SOCIAL HISTORY:   Social History     Social History    Marital status: Single     Spouse name: N/A    Number of children: N/A    Years of education: N/A     Occupational History    Not on file.     Social History Main Topics    Smoking status: Never Smoker    Smokeless tobacco: Never Used    Alcohol use Yes    Drug use: No    Sexual activity: Not on file     Other Topics Concern    Not on file     Social History Narrative    No narrative on file       FAMILY HISTORY:  No family history on file.    CURRENTS MEDICATIONS:  Current Outpatient Prescriptions on File Prior to Visit   Medication Sig Dispense Refill    amlodipine (NORVASC) 10 MG tablet Take 10 mg by mouth once daily.  0    aspirin (ECOTRIN) 81 MG EC tablet Take 81 mg by mouth once daily.      buPROPion (WELLBUTRIN XL) 150 MG TB24 tablet Take 150 mg by mouth once daily.      diclofenac (VOLTAREN) 50 MG EC tablet Take 1 tablet (50 mg total) by mouth 2 (two) times daily as needed. 30 tablet 3    lisinopril (PRINIVIL,ZESTRIL) 20 MG tablet Take 20 mg by mouth once daily.  0    omeprazole (PRILOSEC) 20 MG capsule Take 20 mg by mouth once daily.  0    quetiapine (SEROQUEL) 200 MG Tab Take 400 mg by mouth nightly.  1    simvastatin (ZOCOR) 10 MG tablet Take 10 mg by mouth every evening.  0    chlorzoxazone (PARAFON FORTE) 500 mg Tab Take 1 tablet (500 mg total) by mouth 4 (four) times daily as needed. 60 tablet 3    gabapentin (NEURONTIN) 300 MG capsule       oxcarbazepine (TRILEPTAL) 300 MG Tab Take 300 mg by mouth 2 (two) times  daily.  1     No current facility-administered medications on file prior to visit.        ALLERGIES:  Review of patient's allergies indicates:   Allergen Reactions    Codeine Nausea Only    Tizanidine Other (See Comments)     Caused increased pain and muscle pain       REVIEW OF SYSTEMS:  Review of Systems   Constitutional: Negative for diaphoresis, fever and weight loss.   Respiratory: Negative for shortness of breath.    Cardiovascular: Negative for chest pain.   Gastrointestinal: Negative for blood in stool.   Genitourinary: Negative for hematuria.   Endo/Heme/Allergies: Does not bruise/bleed easily.   All other systems reviewed and are negative.        OBJECTIVE:    PHYSICAL EXAMINATION:   Vitals:    11/07/17 1044   BP: 103/69   Pulse: 81       Physical Exam:  Vitals reviewed.    Constitutional: He appears well-developed and well-nourished.     Eyes: Pupils are equal, round, and reactive to light. Conjunctivae and EOM are normal.     Cardiovascular: Normal distal pulses and no edema.     Abdominal: Soft.     Skin: Skin displays no rash on trunk and no rash on extremities. Skin displays no lesions on trunk and no lesions on extremities.     Psych/Behavior: He is alert. He is oriented to person, place, and time. He has a normal mood and affect.     Musculoskeletal:        Neck: Range of motion is full.     Neurological:        DTRs: Tricep reflexes are 2+ on the right side and 2+ on the left side. Bicep reflexes are 2+ on the right side and 2+ on the left side. Brachioradialis reflexes are 2+ on the right side and 2+ on the left side. Patellar reflexes are 2+ on the right side and 2+ on the left side. Achilles reflexes are 2+ on the right side and 2+ on the left side.       Back Exam     Tenderness   The patient is experiencing tenderness in the cervical (left upper trapezius).    Range of Motion   Extension: normal   Flexion: normal   Lateral Bend Right: normal   Lateral Bend Left: normal   Rotation Right:  normal   Rotation Left: normal     Muscle Strength   Right Quadriceps:  5/5   Left Quadriceps:  5/5   Right Hamstrings:  5/5   Left Hamstrings:  5/5     Tests   Straight leg raise right: negative  Straight leg raise left: negative    Other   Toe Walk: normal  Heel Walk: normal            SI joint:   Palpation at the right and left SI joints not painful  NANCY test is negative bilaterally  Gaenslen test is negative bilaterally  Thigh thrust test is negative bilaterally    Neurologic Exam     Mental Status   Oriented to person, place, and time.   Speech: speech is normal   Level of consciousness: alert    Cranial Nerves   Cranial nerves II through XII intact.     CN III, IV, VI   Pupils are equal, round, and reactive to light.  Extraocular motions are normal.     Motor Exam   Muscle bulk: normal  Overall muscle tone: normal    Strength   Right deltoid: 5/5  Left deltoid: 5/5  Right biceps: 5/5  Left biceps: 5/5  Right triceps: 5/5  Left triceps: 5/5  Right wrist flexion: 5/5  Left wrist flexion: 5/5  Right wrist extension: 5/5  Left wrist extension: 5/5  Right interossei: 5/5  Left interossei: 5/5  Right iliopsoas: 5/5  Left iliopsoas: 5/5  Right quadriceps: 5/5  Left quadriceps: 5/5  Right hamstrin/5  Left hamstrin/5  Right anterior tibial: 5/5  Left anterior tibial: 5/5  Right posterior tibial: 5/5  Left posterior tibial: 5/5  Right peroneal: 5/5  Left peroneal: 5/5  Right gastroc: 5/5  Left gastroc: 5/5    Sensory Exam   Light touch normal.   Pinprick normal.     Gait, Coordination, and Reflexes     Gait  Gait: normal    Coordination   Finger to nose coordination: normal  Tandem walking coordination: normal    Reflexes   Right brachioradialis: 2+  Left brachioradialis: 2+  Right biceps: 2+  Left biceps: 2+  Right triceps: 2+  Left triceps: 2+  Right patellar: 2+  Left patellar: 2+  Right achilles: 2+  Left achilles: 2+  Right plantar: normal  Left plantar: normal  Right Mcginnis: absent  Left Mcginnis:  absent  Right ankle clonus: absent  Left ankle clonus: absent        DIAGNOSTIC DATA:  I personally reviewed the following imaging:   Cervical spine MRI 09/29/2017: C5-6 and C6-7 spondylosis with bilateral foraminal stenosis, facet arthropathy    ASSESMENT:  This is a 52 y.o. male with     Problem List Items Addressed This Visit        Orthopedic    Cervical myofascial pain syndrome      Other Visit Diagnoses     Cervical spondylosis without myelopathy    -  Primary    Arthropathy of cervical facet joint                PLAN:  Tramadol 50 mg 60 pills to help with the pain during the day at work  OK with cervical RFA  Follow-up in 3 months            Henry Berry MD  Pager: [phone]

## 2017-11-09 ENCOUNTER — CLINICAL SUPPORT (OUTPATIENT)
Dept: REHABILITATION | Facility: HOSPITAL | Age: 52
End: 2017-11-09
Attending: NEUROLOGICAL SURGERY
Payer: COMMERCIAL

## 2017-11-09 DIAGNOSIS — M79.18 CERVICAL MYOFASCIAL PAIN SYNDROME: Primary | ICD-10-CM

## 2017-11-09 DIAGNOSIS — M47.812 SPONDYLOSIS OF CERVICAL REGION WITHOUT MYELOPATHY OR RADICULOPATHY: ICD-10-CM

## 2017-11-09 DIAGNOSIS — M48.02 CERVICAL SPINAL STENOSIS: ICD-10-CM

## 2017-11-09 PROCEDURE — 97110 THERAPEUTIC EXERCISES: CPT

## 2017-11-09 PROCEDURE — 97140 MANUAL THERAPY 1/> REGIONS: CPT

## 2017-11-09 NOTE — PROGRESS NOTES
"                                                    Physical Therapy Daily Note     Name: Tyrone Santos  Clinic Number: [id number]  Diagnosis:   Encounter Diagnoses   Name Primary?    Spondylosis of cervical region without myelopathy or radiculopathy     Cervical myofascial pain syndrome Yes    Cervical spinal stenosis      Physician: Henry Berry MD  Visit #: 11 of 12  PTA Visit #: 0  Time In: 7:00 am  Time Out: 8:05 am    Subjective     Pt reports: significant improvement with PT intervention. Pt reports he is going to have the procedure to burn the nerves in the near future.   Pain Scale: Tyrone rates pain on a scale of 0-10 to be 2 currently.    Objective     Tyrone received individual therapeutic exercises to develop strength, endurance, ROM, flexibility, posture and core stabilization for 45 minutes including:  UBE 3' bwd 3 fwd  UT/LS S 3x30"  Supine chin tucks 30x 5" hold - NT  Seated chin tucks 30x1"  Supine serratus punches 3x10 #1  Scaption 3x10 #1  Standing shoulder ER OTB 3x10  Supine shoulder HZ ABD with ER OTB 3x10(NT)  Corner stretch 3x30"  Wall angels 2x10  W's OTB 3x10  Shoulder ext BTB 3x15  Standing shoulder flexion with RTB horizontal resistance. 3x10  Retractions into OTB 3x10-NT  Standing shoulder rows 3x15 BTB  Standing ER stretch with wand 30x2" - NT    Tyrone received the following manual therapy techniques: Manual traction and Soft tissue Mobilization were applied to the: cervical spine for 10 minutes including:   Manual therapy consisted of IASTM with edge tool.  IASTM was performed to cervical paraspinals.  Patient reported no increased symptoms following.   L shoulder oscillations and GH posterior glide    The patient received the following supervised modalities after being cleared for contradictions: ice to cervical spine in z-lie position x10 minutes    Written Home Exercises Provided: per eval   Pt demo good understanding of the education provided. Tyrone demonstrated good return " demonstration of activities.     Education provided re: benefits of cupping and possible bruising; DOMS, benefits of using ice.   Tyrone verbalized good understanding of education provided.   No spiritual or educational barriers to learning provided    Assessment     Patient tolerated tx well without increased pain. Progressing well w/ current POC towards PT goals.     This is a 52 y.o. male referred to outpatient physical therapy and presents with a medical diagnosis of neck pain and demonstrates limitations as described in the problem list. Pt prognosis is Good. Pt will continue to benefit from skilled outpatient physical therapy to address the deficits listed in the problem list, provide pt/family education and to maximize pt's level of independence in the home and community environment.     Goals as follows:  Short Term GOALS: 3 weeks. Pt agrees with goals set.  1. Patient demonstrates independence with HEP. (met)  2. Patient demonstrates independence with Postural Awareness. (progressing,not met)  3. Patient demonstrates independence with body mechanics. (progressing, not met)     Long Term GOALS: 6 weeks. Pt agrees with goals set.  1. Patient demonstrates increased cervical AROM by 25% to improve tolerance to functional activities. (progressing, not met)  2. Patient demonstrates increased strength BUE's to 5/5 or greater to improve tolerance to functional activities. (progressing, not met)  3. Patient demonstrates improved overall function per FOTO Neck Survey to 46% or less. (progressing, not met)     Plan     Continue with established Plan of Care towards PT goals.    Therapist: Wesley Singletary, PT  11/9/2017

## 2017-11-13 ENCOUNTER — CLINICAL SUPPORT (OUTPATIENT)
Dept: REHABILITATION | Facility: HOSPITAL | Age: 52
End: 2017-11-13
Attending: NEUROLOGICAL SURGERY
Payer: COMMERCIAL

## 2017-11-13 DIAGNOSIS — M48.02 CERVICAL SPINAL STENOSIS: ICD-10-CM

## 2017-11-13 DIAGNOSIS — M47.812 SPONDYLOSIS OF CERVICAL REGION WITHOUT MYELOPATHY OR RADICULOPATHY: ICD-10-CM

## 2017-11-13 DIAGNOSIS — M79.18 CERVICAL MYOFASCIAL PAIN SYNDROME: Primary | ICD-10-CM

## 2017-11-13 PROCEDURE — 97140 MANUAL THERAPY 1/> REGIONS: CPT

## 2017-11-13 PROCEDURE — 97110 THERAPEUTIC EXERCISES: CPT

## 2017-11-13 NOTE — PROGRESS NOTES
"                                                    Physical Therapy Daily Note     Name: Tyrone Santos  Clinic Number: [id number]  Diagnosis:   Encounter Diagnoses   Name Primary?    Spondylosis of cervical region without myelopathy or radiculopathy     Cervical myofascial pain syndrome Yes    Cervical spinal stenosis      Physician: Henry Berry MD  Visit #: 12 of 12  PTA Visit #: 1  Time In: 12:00  pm  Time Out: 12:55 pm    Subjective     Pt reports: "its only a 2"  Pain Scale: Tyrone rates pain on a scale of 0-10 to be 2 currently.    Objective     Tyrone received individual therapeutic exercises to develop strength, endurance, ROM, flexibility, posture and core stabilization for 35 minutes including:  UBE 2' bwd 2fwd  UT/LS S 3x30"  Supine chin tucks 30x 5" hold - NT  Seated chin tucks 30x1"  Supine serratus punches 3x10 #1  Scaption 3x10 #1  Standing shoulder ER OTB 3x10  Supine shoulder HZ ABD with ER OTB 3x10(NT)  Corner stretch 3x30"  Wall angels 2x10  W's OTB 3x10  Shoulder ext BTB 3x15  Standing shoulder flexion with RTB horizontal resistance. 3x10  Retractions into OTB 3x10-NT  Standing shoulder rows 3x15 BTB  Standing ER stretch with wand 30x2" - NT    Tyrone received the following manual therapy techniques: Manual traction and Soft tissue Mobilization were applied to the: cervical spine for 10 minutes including:   Manual therapy consisted of IASTM with edge tool.  IASTM was performed to cervical paraspinals.  Patient reported no increased symptoms following.   L shoulder oscillations and GH posterior glide-NT    The patient received the following supervised modalities after being cleared for contradictions: ice to cervical spine in z-lie position x10 minutes    Written Home Exercises Provided: per eval   Pt demo good understanding of the education provided. Tyrone demonstrated good return demonstration of activities.     Education provided re: benefits of cupping and possible bruising; DOMS, benefits " of using ice.   Tyrone verbalized good understanding of education provided.   No spiritual or educational barriers to learning provided    Assessment     Patient tolerated tx well without increased pain. Re-eval next session.     This is a 52 y.o. male referred to outpatient physical therapy and presents with a medical diagnosis of neck pain and demonstrates limitations as described in the problem list. Pt prognosis is Good. Pt will continue to benefit from skilled outpatient physical therapy to address the deficits listed in the problem list, provide pt/family education and to maximize pt's level of independence in the home and community environment.     Goals as follows:  Short Term GOALS: 3 weeks. Pt agrees with goals set.  1. Patient demonstrates independence with HEP. (met)  2. Patient demonstrates independence with Postural Awareness. (progressing,not met)  3. Patient demonstrates independence with body mechanics. (progressing, not met)     Long Term GOALS: 6 weeks. Pt agrees with goals set.  1. Patient demonstrates increased cervical AROM by 25% to improve tolerance to functional activities. (progressing, not met)  2. Patient demonstrates increased strength BUE's to 5/5 or greater to improve tolerance to functional activities. (progressing, not met)  3. Patient demonstrates improved overall function per FOTO Neck Survey to 46% or less. (progressing, not met)     Plan     Continue with established Plan of Care towards PT goals.    Therapist: Tracy Núñez, PTA  11/13/2017

## 2017-11-15 ENCOUNTER — CLINICAL SUPPORT (OUTPATIENT)
Dept: REHABILITATION | Facility: HOSPITAL | Age: 52
End: 2017-11-15
Attending: NEUROLOGICAL SURGERY
Payer: COMMERCIAL

## 2017-11-15 DIAGNOSIS — M47.812 SPONDYLOSIS OF CERVICAL REGION WITHOUT MYELOPATHY OR RADICULOPATHY: ICD-10-CM

## 2017-11-15 DIAGNOSIS — M79.18 CERVICAL MYOFASCIAL PAIN SYNDROME: Primary | ICD-10-CM

## 2017-11-15 DIAGNOSIS — M48.02 CERVICAL SPINAL STENOSIS: ICD-10-CM

## 2017-11-15 PROCEDURE — 97110 THERAPEUTIC EXERCISES: CPT

## 2017-11-15 PROCEDURE — 97140 MANUAL THERAPY 1/> REGIONS: CPT

## 2017-11-15 NOTE — PROGRESS NOTES
"                                                    Physical Therapy Daily Note     Name: Tyrone Santos  Clinic Number: [id number]  Diagnosis:   Encounter Diagnoses   Name Primary?    Spondylosis of cervical region without myelopathy or radiculopathy     Cervical myofascial pain syndrome Yes    Cervical spinal stenosis      Physician: Henry Berry MD  Visit #: 1 of 1  PTA Visit #: 0  Time In: 11:00  am  Time Out: 12:00 pm    Subjective     Pt reports: "there is no doubt" PT has decreased his neck pain. Pt reports he has been more loose after the last few sessions.   Pain Scale: Tyrone rates pain on a scale of 0-10 to be 2 currently.    Objective   Functional Limitations Reports  Tool: FOTO Neck Survey  Score: 31% Limitation    CERVICAL SPINE AROM:   Flexion: 100%   Extension: 50%    Left Sidebend: 75%    Right Sidebend: 75%   Left Rotation: 75%   Right Rotation: 100%        UPPER EXTREMITY STRENGTH:    Left Right   Shoulder Flexion 5/5 5/5   Shoulder Abduction 5/5 5/5      Shoulder Internal Rotation 5/5 5/5   Shoulder External Rotation 5/5 5/5   Elbow Flexion 5/5 5/5   Elbow Extension 5/5 5/5   Wrist Flexion 5/5 5/5   Wrist Extension 5/5 5/5     Tyrone received individual therapeutic exercises to develop strength, endurance, ROM, flexibility, posture and core stabilization for 42 minutes including:  UBE 2' bwd 2' fwd  UT/LS S 3x30"  Seated chin tucks 30x1"  Supine serratus punches 3x10 #1  Scaption 3x10 #1  Standing shoulder ER OTB 3x10  Corner stretch 3x30"  Wall angels 2x10  Shoulder ext BTB 3x15  Standing shoulder flexion with RTB horizontal resistance. 3x10  Standing shoulder rows 3x15 BTB    Tyrone received the following manual therapy techniques: Manual traction and Soft tissue Mobilization were applied to the: cervical spine for 8 minutes including:  Manual therapy consisted of IASTM with edge tool.  IASTM was performed to cervical paraspinals.  Patient reported no increased symptoms following.     The " patient received the following supervised modalities after being cleared for contradictions: ice to cervical spine in z-lie position x10 minutes    Written Home Exercises Provided: see handout   Pt demo good understanding of the education provided. Tyrone demonstrated good return demonstration of activities.     Education provided re: continue HEP  Tyrone verbalized good understanding of education provided.   No spiritual or educational barriers to learning provided    Assessment     Patient tolerated tx well without increased pain. Pt progressed well with PT as evidenced by increased ROM, increased strength, decreased pain, improved postural awareness, and decreased functional limitations. Pt met all STGs and LTGs. Pt discharged with HEP.     This is a 52 y.o. male referred to outpatient physical therapy and presents with a medical diagnosis of neck pain and demonstrates limitations as described in the problem list.     Goals as follows:  Short Term GOALS: 3 weeks. Pt agrees with goals set.  1. Patient demonstrates independence with HEP. MET  2. Patient demonstrates independence with Postural Awareness. MET  3. Patient demonstrates independence with body mechanics. MET     Long Term GOALS: 6 weeks. Pt agrees with goals set.  1. Patient demonstrates increased cervical AROM by 25% to improve tolerance to functional activities. MET  2. Patient demonstrates increased strength BUE's to 5/5 or greater to improve tolerance to functional activities. MET  3. Patient demonstrates improved overall function per FOTO Neck Survey to 46% or less. MET     Plan     Discharge to [facility]    Therapist: Wesley Singletary, PT  11/15/2017

## 2017-12-14 ENCOUNTER — TELEPHONE (OUTPATIENT)
Dept: PAIN MEDICINE | Facility: CLINIC | Age: 52
End: 2017-12-14

## 2017-12-14 NOTE — TELEPHONE ENCOUNTER
Procedure Schedulers have left several message asking the patient to call back to schedule the following procedure: Cervical Left C5-C8 RFA with Dr. Champagne, paperwork on file.

## 2018-09-21 ENCOUNTER — TELEPHONE (OUTPATIENT)
Dept: NEUROSURGERY | Facility: CLINIC | Age: 53
End: 2018-09-21

## 2018-09-21 NOTE — TELEPHONE ENCOUNTER
I contacted the pt and LM requesting that the pt bring his updated imaging to the appt on monday 9/24/18.

## 2018-09-24 ENCOUNTER — OFFICE VISIT (OUTPATIENT)
Dept: NEUROSURGERY | Facility: CLINIC | Age: 53
End: 2018-09-24
Payer: COMMERCIAL

## 2018-09-24 VITALS
WEIGHT: 169.13 LBS | HEART RATE: 84 BPM | TEMPERATURE: 98 F | DIASTOLIC BLOOD PRESSURE: 76 MMHG | BODY MASS INDEX: 25.63 KG/M2 | HEIGHT: 68 IN | SYSTOLIC BLOOD PRESSURE: 113 MMHG

## 2018-09-24 DIAGNOSIS — M47.812 FACET ARTHRITIS, DEGENERATIVE, CERVICAL SPINE: ICD-10-CM

## 2018-09-24 DIAGNOSIS — M48.02 CERVICAL SPINAL STENOSIS: ICD-10-CM

## 2018-09-24 DIAGNOSIS — M79.18 CERVICAL MYOFASCIAL PAIN SYNDROME: Primary | ICD-10-CM

## 2018-09-24 DIAGNOSIS — M47.12 OSTEOARTHRITIS OF CERVICAL SPINE WITH MYELOPATHY: ICD-10-CM

## 2018-09-24 PROCEDURE — 99215 OFFICE O/P EST HI 40 MIN: CPT | Mod: S$GLB,,, | Performed by: NEUROLOGICAL SURGERY

## 2018-09-24 PROCEDURE — 99999 PR PBB SHADOW E&M-EST. PATIENT-LVL III: CPT | Mod: PBBFAC,,, | Performed by: NEUROLOGICAL SURGERY

## 2018-09-24 RX ORDER — OXYCODONE AND ACETAMINOPHEN 10; 325 MG/1; MG/1
1 TABLET ORAL EVERY 6 HOURS PRN
Status: ON HOLD | COMMUNITY
Start: 2018-09-20 | End: 2018-10-24 | Stop reason: CLARIF

## 2018-09-24 RX ORDER — GABAPENTIN 600 MG/1
1800 TABLET ORAL NIGHTLY
COMMUNITY
Start: 2018-08-30 | End: 2018-11-05

## 2018-09-24 NOTE — H&P (VIEW-ONLY)
History & Physical    I, Simón Whitaker, attest that this documentation has been prepared under the direction and in the presence of Jw Anne MD.    09/30/2018    Chief Complaint   Patient presents with    Consult       History of Present Illness:  Tyrone Santos is a 53 y.o. patient who presents to me by self-referral for evaluation of his cervical spine.     Patient complains of left-sided neck pain that starts from his left ear and radiates down to his left shoulder. Patient reports gradual onset of this pain from 1.5 years ago. He is unable to identify a specific precipitating event. Patient reports his pain as constant aching pain that is 9/10 in intensity. His pain worsens with neck rotation and nothing makes it better. Past treatments include physical therapy, chiropractor, cervical spine ESIs, narcotics (percocet). He states that only percocet has given him any relief. Associated signs and symptoms are negative for change in fine motor skills, negative for dropping objects from his hands, negative for balance difficulty or falls within the last 2 years, negative for worsened handwriting (patient is left-handed).      Patient is a former smoker (20-year history of smoking, 1/2 ppd),  discontinued about 20 years ago.       Review of patient's allergies indicates:   Allergen Reactions    Codeine Nausea Only    Tizanidine Other (See Comments)     Caused increased pain and muscle pain       Current Outpatient Medications   Medication Sig Dispense Refill    amlodipine (NORVASC) 10 MG tablet Take 10 mg by mouth once daily.  0    aspirin (ECOTRIN) 81 MG EC tablet Take 81 mg by mouth once daily.      buPROPion (WELLBUTRIN XL) 150 MG TB24 tablet Take 150 mg by mouth once daily.      gabapentin (NEURONTIN) 400 MG capsule 400 mg 3 (three) times daily as needed.       lisinopril (PRINIVIL,ZESTRIL) 20 MG tablet Take 20 mg by mouth once daily.  0    omeprazole (PRILOSEC) 20 MG capsule Take 20 mg by mouth  "once daily.  0    oxcarbazepine (TRILEPTAL) 300 MG Tab Take 300 mg by mouth 2 (two) times daily.  1    oxyCODONE-acetaminophen (PERCOCET)  mg per tablet       simvastatin (ZOCOR) 10 MG tablet Take 10 mg by mouth every evening.  0    quetiapine (SEROQUEL) 200 MG Tab Take 400 mg by mouth nightly.  1     No current facility-administered medications for this visit.        Past Medical History:   Diagnosis Date    Bipolar disorder     Hyperlipidemia     Hypertension        Past Surgical History:   Procedure Laterality Date    APPENDECTOMY      BLOCK-NERVE-MEDIAL BRANCH-CERVICAL Left 11/2/2017    Performed [facility]abela [facility]agne [facility] at [facility] PAIN [facility]GT    WRIST SURGERY      6 years ago       History reviewed. No pertinent family history.    Social History     Tobacco Use    Smoking status: Never Smoker    Smokeless tobacco: Never Used   Substance Use Topics    Alcohol use: Yes    Drug use: No        Review of Systems:  Review of Systems   Constitutional: Negative for appetite change.   HENT: Negative for congestion.    Eyes: Negative for discharge.   Respiratory: Negative for apnea.    Cardiovascular: Negative for chest pain.   Gastrointestinal: Negative for abdominal distention.   Endocrine: Negative for cold intolerance.   Genitourinary: Negative for difficulty urinating.   Musculoskeletal: Positive for arthralgias and neck pain (left-sided).   Allergic/Immunologic: Negative for environmental allergies.   Neurological: Negative for dizziness, weakness and headaches.   Psychiatric/Behavioral: Negative for agitation.       Vital Signs (Most Recent)  Temp: 98.4 °F (36.9 °C) (09/24/18 1351)  Pulse: 84 (09/24/18 1351)  BP: 113/76 (09/24/18 1351)  5' 8" (1.727 m)  76.7 kg (169 lb 1.6 oz)       Physical Exam:  Physical Exam:    Constitutional: He appears well-developed.     Eyes: Pupils are equal, round, and reactive to light. EOM are normal. Right eye exhibits no discharge. Left eye exhibits no discharge. "     Abdominal: Soft.     Skin: Skin displays no rash on trunk and no rash on extremities.     Psych/Behavior: He is alert. He is oriented to person, place, and time.     Musculoskeletal: Gait is normal.        Neck: There is no tenderness.        Back: Range of motion is full.        Right Upper Extremities: Range of motion is full. Muscle strength is 5/5. Tone is normal.        Left Upper Extremities: Range of motion is full. Muscle strength is 5/5. Tone is normal.       Right Lower Extremities: Range of motion is full. Muscle strength is 5/5. Tone is normal.        Left Lower Extremities: Range of motion is full. Muscle strength is 5/5. Tone is normal.     Neurological:        Coordination: He has abnormal tandem walking coordination. He has a normal Romberg Test.        Sensory: There is no sensory deficit in the trunk. There is no sensory deficit in the extremities.        DTRs: DTRs are DTRS NORMAL AND SYMMETRICnormal and symmetric. Tricep reflexes are 4+ on the right side and 4+ on the left side. Bicep reflexes are 4+ on the right side and 4+ on the left side. Brachioradialis reflexes are 4+ on the right side and 4+ on the left side. Patellar reflexes are 4+ on the right side and 4+ on the left side. Achilles reflexes are 4+ on the right side and 4+ on the left side. He displays Babinski's sign on the right side. He displays Babinski's sign on the left side.        Cranial nerves: Cranial nerve(s) II, III, IV, V, VI, VII, VIII, IX, X, XI and XII are intact.     Positive Spurling's on the left side.   Negative Lhermitte's.     Positive Mcginnis's bilaterally.   Abnormal tandem gait.   Positive clonus.   Positive Babinski's     Negative straight leg raising test bilaterally.   Negative Yon's test bilaterally.     Laboratory  CBC: Reviewed  CMP: Reviewed    Diagnostic Results:  MRI: Reviewed   I have personally reviewed the patient's MRI/ bone scan of the cervical spine and CT C-spine with 3D reconstruction  and bone scan: 8/27/18 from diagnostic imaging services.  There is dynamic instability at C3-4 and spondylosis at C5-6, C6-7, C7-T1. I do not see any foraminal stenosis. Patient also has a bone scan that shows most of the enhancement at the C3-4 facet and minimal enhancement at C5-6, C6-7, and C7-T1.     X-Ray Cervical Spine AP Lat with Flexion  Extension   Findings: There is straightening of the normal cervical lordosis.  There is loss of disc space height with degenerative endplate change identified from C5-6 through C7-T1.  There is no instability on flexion-extension.  There is no fracture, dislocation, or bone       ASSESSMENT/PLAN:       ICD-10-CM ICD-9-CM   1. Cervical myofascial pain syndrome M79.1 729.1   2. Cervical spinal stenosis M48.02 723.0   3. Facet arthritis, degenerative, cervical spine M47.812 721.0   4. Osteoarthritis of cervical spine with myelopathy M47.12 721.1       PLAN:    1. Chronic neck pain with spinal stenosis, and dynamic instability at C3-4 and spondylosis at C5-6, C6-7, C7-T1. I do not see any foraminal stenosis. Patient also has a bone scan that shows most of the enhancement at the C3-4 facet and minimal enhancement at C5-6, C6-7, and C7-T1. Patient also shows early myelopathic signs and myelomalacia changes at C3-4.     2. I have explained the natural history of spondylotic myelopathy, the stepwise decline in neurological function over time, and the role of surgery to prevent further neurological decline but not to improve the already lost neurological functions.  Patient understands this natural history, and is aware of signs and symptoms of progression, including worsening gait and balance, loss of bb  function, weakness, loss of fine motor skills.      3. I have recommended the patient to have a C3-4 anterior cervical decompression and fusion. I have explained that the fusion surgery will take care of the dynamic instability and will prevent his myelopathic symptoms from getting  worse, but I cannot guarantee that his pain will get resolved. Patient has failed all conservative therapy. I will prescribe him a neck brace and see him back in clinic in 8 weeks after he talks to his fiancee and discusses with his job the possible surgery.    4. I spent 45 minutes with the patient, 50% of time in face to face counseling.       Tyroen was seen today for consult.    Diagnoses and all orders for this visit:    Cervical myofascial pain syndrome    Cervical spinal stenosis    Facet arthritis, degenerative, cervical spine    Osteoarthritis of cervical spine with myelopathy        I, Dr. Jw Anne, personally performed the services described in this documentation. All medical record entries made by the scribe were at my direction and in my presence.  I have reviewed the chart and agree that the record reflects my personal performance and is accurate and complete. Jw Anne MD.  6:20 AM 09/30/2018

## 2018-09-24 NOTE — PROGRESS NOTES
History & Physical    I, Simón Whitaker, attest that this documentation has been prepared under the direction and in the presence of Jw Anne MD.    09/30/2018    Chief Complaint   Patient presents with    Consult       History of Present Illness:  Tyrone Santos is a 53 y.o. patient who presents to me by self-referral for evaluation of his cervical spine.     Patient complains of left-sided neck pain that starts from his left ear and radiates down to his left shoulder. Patient reports gradual onset of this pain from 1.5 years ago. He is unable to identify a specific precipitating event. Patient reports his pain as constant aching pain that is 9/10 in intensity. His pain worsens with neck rotation and nothing makes it better. Past treatments include physical therapy, chiropractor, cervical spine ESIs, narcotics (percocet). He states that only percocet has given him any relief. Associated signs and symptoms are negative for change in fine motor skills, negative for dropping objects from his hands, negative for balance difficulty or falls within the last 2 years, negative for worsened handwriting (patient is left-handed).      Patient is a former smoker (20-year history of smoking, 1/2 ppd),  discontinued about 20 years ago.       Review of patient's allergies indicates:   Allergen Reactions    Codeine Nausea Only    Tizanidine Other (See Comments)     Caused increased pain and muscle pain       Current Outpatient Medications   Medication Sig Dispense Refill    amlodipine (NORVASC) 10 MG tablet Take 10 mg by mouth once daily.  0    aspirin (ECOTRIN) 81 MG EC tablet Take 81 mg by mouth once daily.      buPROPion (WELLBUTRIN XL) 150 MG TB24 tablet Take 150 mg by mouth once daily.      gabapentin (NEURONTIN) 400 MG capsule 400 mg 3 (three) times daily as needed.       lisinopril (PRINIVIL,ZESTRIL) 20 MG tablet Take 20 mg by mouth once daily.  0    omeprazole (PRILOSEC) 20 MG capsule Take 20 mg by mouth  "once daily.  0    oxcarbazepine (TRILEPTAL) 300 MG Tab Take 300 mg by mouth 2 (two) times daily.  1    oxyCODONE-acetaminophen (PERCOCET)  mg per tablet       simvastatin (ZOCOR) 10 MG tablet Take 10 mg by mouth every evening.  0    quetiapine (SEROQUEL) 200 MG Tab Take 400 mg by mouth nightly.  1     No current facility-administered medications for this visit.        Past Medical History:   Diagnosis Date    Bipolar disorder     Hyperlipidemia     Hypertension        Past Surgical History:   Procedure Laterality Date    APPENDECTOMY      BLOCK-NERVE-MEDIAL BRANCH-CERVICAL Left 11/2/2017    Performed by [facility] at [facility]    WRIST SURGERY      6 years ago       History reviewed. No pertinent family history.    Social History     Tobacco Use    Smoking status: Never Smoker    Smokeless tobacco: Never Used   Substance Use Topics    Alcohol use: Yes    Drug use: No        Review of Systems:  Review of Systems   Constitutional: Negative for appetite change.   HENT: Negative for congestion.    Eyes: Negative for discharge.   Respiratory: Negative for apnea.    Cardiovascular: Negative for chest pain.   Gastrointestinal: Negative for abdominal distention.   Endocrine: Negative for cold intolerance.   Genitourinary: Negative for difficulty urinating.   Musculoskeletal: Positive for arthralgias and neck pain (left-sided).   Allergic/Immunologic: Negative for environmental allergies.   Neurological: Negative for dizziness, weakness and headaches.   Psychiatric/Behavioral: Negative for agitation.       Vital Signs (Most Recent)  Temp: 98.4 °F (36.9 °C) (09/24/18 1351)  Pulse: 84 (09/24/18 1351)  BP: 113/76 (09/24/18 1351)  5' 8" (1.727 m)  76.7 kg (169 lb 1.6 oz)       Physical Exam:  Physical Exam:    Constitutional: He appears well-developed.     Eyes: Pupils are equal, round, and reactive to light. EOM are normal. Right eye exhibits no discharge. Left eye exhibits no discharge. "     Abdominal: Soft.     Skin: Skin displays no rash on trunk and no rash on extremities.     Psych/Behavior: He is alert. He is oriented to person, place, and time.     Musculoskeletal: Gait is normal.        Neck: There is no tenderness.        Back: Range of motion is full.        Right Upper Extremities: Range of motion is full. Muscle strength is 5/5. Tone is normal.        Left Upper Extremities: Range of motion is full. Muscle strength is 5/5. Tone is normal.       Right Lower Extremities: Range of motion is full. Muscle strength is 5/5. Tone is normal.        Left Lower Extremities: Range of motion is full. Muscle strength is 5/5. Tone is normal.     Neurological:        Coordination: He has abnormal tandem walking coordination. He has a normal Romberg Test.        Sensory: There is no sensory deficit in the trunk. There is no sensory deficit in the extremities.        DTRs: DTRs are DTRS NORMAL AND SYMMETRICnormal and symmetric. Tricep reflexes are 4+ on the right side and 4+ on the left side. Bicep reflexes are 4+ on the right side and 4+ on the left side. Brachioradialis reflexes are 4+ on the right side and 4+ on the left side. Patellar reflexes are 4+ on the right side and 4+ on the left side. Achilles reflexes are 4+ on the right side and 4+ on the left side. He displays Babinski's sign on the right side. He displays Babinski's sign on the left side.        Cranial nerves: Cranial nerve(s) II, III, IV, V, VI, VII, VIII, IX, X, XI and XII are intact.     Positive Spurling's on the left side.   Negative Lhermitte's.     Positive Mcginnis's bilaterally.   Abnormal tandem gait.   Positive clonus.   Positive Babinski's     Negative straight leg raising test bilaterally.   Negative Yon's test bilaterally.     Laboratory  CBC: Reviewed  CMP: Reviewed    Diagnostic Results:  MRI: Reviewed   I have personally reviewed the patient's MRI/ bone scan of the cervical spine and CT C-spine with 3D reconstruction  and bone scan: 8/27/18 from diagnostic imaging services.  There is dynamic instability at C3-4 and spondylosis at C5-6, C6-7, C7-T1. I do not see any foraminal stenosis. Patient also has a bone scan that shows most of the enhancement at the C3-4 facet and minimal enhancement at C5-6, C6-7, and C7-T1.     X-Ray Cervical Spine AP Lat with Flexion  Extension   Findings: There is straightening of the normal cervical lordosis.  There is loss of disc space height with degenerative endplate change identified from C5-6 through C7-T1.  There is no instability on flexion-extension.  There is no fracture, dislocation, or bone       ASSESSMENT/PLAN:       ICD-10-CM ICD-9-CM   1. Cervical myofascial pain syndrome M79.1 729.1   2. Cervical spinal stenosis M48.02 723.0   3. Facet arthritis, degenerative, cervical spine M47.812 721.0   4. Osteoarthritis of cervical spine with myelopathy M47.12 721.1       PLAN:    1. Chronic neck pain with spinal stenosis, and dynamic instability at C3-4 and spondylosis at C5-6, C6-7, C7-T1. I do not see any foraminal stenosis. Patient also has a bone scan that shows most of the enhancement at the C3-4 facet and minimal enhancement at C5-6, C6-7, and C7-T1. Patient also shows early myelopathic signs and myelomalacia changes at C3-4.     2. I have explained the natural history of spondylotic myelopathy, the stepwise decline in neurological function over time, and the role of surgery to prevent further neurological decline but not to improve the already lost neurological functions.  Patient understands this natural history, and is aware of signs and symptoms of progression, including worsening gait and balance, loss of bb  function, weakness, loss of fine motor skills.      3. I have recommended the patient to have a C3-4 anterior cervical decompression and fusion. I have explained that the fusion surgery will take care of the dynamic instability and will prevent his myelopathic symptoms from getting  worse, but I cannot guarantee that his pain will get resolved. Patient has failed all conservative therapy. I will prescribe him a neck brace and see him back in clinic in 8 weeks after he talks to his fiancee and discusses with his job the possible surgery.    4. I spent 45 minutes with the patient, 50% of time in face to face counseling.       Tyrone was seen today for consult.    Diagnoses and all orders for this visit:    Cervical myofascial pain syndrome    Cervical spinal stenosis    Facet arthritis, degenerative, cervical spine    Osteoarthritis of cervical spine with myelopathy        I, Dr. Jw Anne, personally performed the services described in this documentation. All medical record entries made by the scribe were at my direction and in my presence.  I have reviewed the chart and agree that the record reflects my personal performance and is accurate and complete. Jw Anne MD.  6:20 AM 09/30/2018

## 2018-09-30 PROBLEM — M47.12 OSTEOARTHRITIS OF CERVICAL SPINE WITH MYELOPATHY: Status: ACTIVE | Noted: 2017-09-25

## 2018-10-02 ENCOUNTER — TELEPHONE (OUTPATIENT)
Dept: NEUROSURGERY | Facility: CLINIC | Age: 53
End: 2018-10-02

## 2018-10-02 DIAGNOSIS — M48.02 CERVICAL SPINAL STENOSIS: Primary | ICD-10-CM

## 2018-10-02 DIAGNOSIS — M47.12 OSTEOARTHRITIS OF CERVICAL SPINE WITH MYELOPATHY: ICD-10-CM

## 2018-10-02 DIAGNOSIS — M79.18 CERVICAL MYOFASCIAL PAIN SYNDROME: ICD-10-CM

## 2018-10-03 ENCOUNTER — TELEPHONE (OUTPATIENT)
Dept: PREADMISSION TESTING | Facility: HOSPITAL | Age: 53
End: 2018-10-03

## 2018-10-03 NOTE — TELEPHONE ENCOUNTER
----- Message from Brittney Orta MA sent at 10/2/2018  3:14 PM CDT -----  Good Afternoon,    is requesting that the pt be medically cleared with  and Anesthesia . The pt is scheduled for ACDF C3-4 Oct 24th.  Thank You

## 2018-10-03 NOTE — TELEPHONE ENCOUNTER
The patient above will be triaged by an RN in the Pre-op center.  Your physician's request will be given to the assigned nurse to implement in the patient's perioperative plan.

## 2018-10-04 ENCOUNTER — TELEPHONE (OUTPATIENT)
Dept: NEUROSURGERY | Facility: CLINIC | Age: 53
End: 2018-10-04

## 2018-10-04 NOTE — TELEPHONE ENCOUNTER
"Called pt back. Explained the pre-op suite would be contacting him with his appts. States he was told Monday they would contact him (that day or the next day". Explained that was generally not the case with procedures booked 3 weeks out. Pt v/u.    ----- Message from Mary Kay Mathias sent at 10/4/2018  4:39 PM CDT -----  Contact: Pt.[phone]  Needs Advice    Reason for call:  The patient stated that no one contacted him regarding a Pre-Op appointment         Communication Preference:PHONE     Additional Information:      "

## 2018-10-05 ENCOUNTER — ANESTHESIA EVENT (OUTPATIENT)
Dept: SURGERY | Facility: HOSPITAL | Age: 53
DRG: 472 | End: 2018-10-05
Payer: COMMERCIAL

## 2018-10-05 DIAGNOSIS — Z01.818 PREOPERATIVE TESTING: Primary | ICD-10-CM

## 2018-10-05 NOTE — PRE-PROCEDURE INSTRUCTIONS
Patient stated that has not had any problems with anesthesia in the past. Will  Need medical optimization by Dr. Sol, poc appt, labs, and ekg.  Our  will call to set up these appts. His PCP is Dr. Yoon Wong ([facility]). Preop instructions given. Hold asa, asa containing products, nsaids, vitamins and supplements one week prior to surgery. Verbalizes understanding..

## 2018-10-05 NOTE — PRE ADMISSION SCREENING
Anesthesia Assessment: Preoperative EQUATION    Planned Procedure: Procedure(s) (LRB):  DISCECTOMY, SPINE, CERVICAL, ANTERIOR APPROACH, WITH FUSION C3-4 (N/A)  Requested Anesthesia Type:General  Surgeon: Jw Anne MD  Service: Neurosurgery  Known or anticipated Date of Surgery:10/24/2018    Surgeon notes: reviewed    Electronic QUestionnaire Assessment completed via nurse interview with patient.    NO AQ      Triage considerations:     Previous anesthesia records:No problems and Not available    Last PCP note: within 3 months , outside [facility]   Subspecialty notes: Pain Management, NEUROSURGERY    Other important co-morbidities: PER Epic: HTN, HLD     Tests already available:  No recent tests.            Instructions given. (See in Nurse's note)    Optimization:  Anesthesia Preop Clinic Assessment  Indicated    Medical Opinion Indicated         Plan:    Testing:  CBC, CMP, PT/INR, T&S and EKG   Pre-anesthesia  visit       Visit focus: concerns in complex and/or prolonged anesthesia     Consultation:IM Perioperative Hospitalist     Patient  has previously scheduled Medical Appointment:NONE    Navigation: Tests Scheduled. TBD             Consults scheduled.TBD             Results will be tracked by Preop Clinic.

## 2018-10-05 NOTE — ANESTHESIA PREPROCEDURE EVALUATION
Anesthesia Assessment: Preoperative EQUATION     Planned Procedure: Procedure(s) (LRB):  DISCECTOMY, SPINE, CERVICAL, ANTERIOR APPROACH, WITH FUSION C3-4 (N/A)  Requested Anesthesia Type:General  Surgeon: Jw Anne MD  Service: Neurosurgery  Known or anticipated Date of Surgery:10/24/2018     Surgeon notes: reviewed     Electronic QUestionnaire Assessment completed via nurse interview with patient.    NO AQ        Triage considerations:      Previous anesthesia records:No problems and Not available     Last PCP note: within 3 months , outside [facility]   Subspecialty notes: Pain Management, NEUROSURGERY     Other important co-morbidities: PER Epic: HTN, HLD     Tests already available:  No recent tests.                            Instructions given. (See in Nurse's note)     Optimization:  Anesthesia Preop Clinic Assessment  Indicated    Medical Opinion Indicated                                        Plan:    Testing:  CBC, CMP, PT/INR, T&S and EKG   Pre-anesthesia  visit                                        Visit focus: concerns in complex and/or prolonged anesthesia                           Consultation:IM Perioperative Hospitalist                           Patient  has previously scheduled Medical Appointment:NONE     Navigation: Tests Scheduled. TBD                        Consults scheduled.TBD                        Results will be tracked by Preop Clinic.       10/5 Informed Dr. Leopold is a [facility] patient.                              Electronically signed by Jennifer Bar RN at 10/5/2018 11:34 AM       Pre-admit on 10/24/2018            Detailed Report                                                                                                                     10/05/2018  Tyrone Santos is a 53 y.o., male.    Anesthesia Evaluation    I have reviewed the Patient Summary Reports.    I have reviewed the Nursing Notes.   I have reviewed the Medications.   Steroids Taken In Past Year:      Review of Systems  Anesthesia Hx:  History of prior surgery of interest to airway management or planning: Previous anesthesia: MAC  Bunring off nerves in neck: 8 months ago with MAC.  Denies Family Hx of Anesthesia complications.   Denies Personal Hx of Anesthesia complications.   Social:  Bipolar affective disorder Tobacco Use: Former smoker, quit smoking >10 years ago Alcohol Use: Pt consumes a few beers daily,    Hematology/Oncology:         -- Anemia:   EENT/Dental:   Denies Throat Symptoms Denies Jaw Problems   Cardiovascular:   Hypertension  Functional Capacity good / => 4 METS, job is very physical: heavy lifting/walking  Denies Coronary Artery Disease.  Denies Deep Venous Thrombosis (DVT)  Hypertension    Pulmonary:  Denies Asthma.  Denies Chronic Obstructive Pulmonary Disease (COPD).  Obstructive Sleep Apnea (ERIC), CPAP used.   Renal/:  Denies Kidney Function/Disease    Hepatic/GI:   GERD  Esophageal / Stomach Disorders Gerd Controlled by chronic antireflux medication.  Denies Liver Disease    Musculoskeletal:   Arthritis   Joint Disease:  Arthritis, Osteoarthritis  Spine Disorders: Spinal Stenosis, Cervical Spinal Stenosis   Neurological:   Chronic continuous opioids Osteoarthritis  Denies Seizure Disorder  Denies CVA - Cerebrovasular Accident  Denies TIA - Transient Ischemic Attack    Endocrine:  Denies Diabetes  Denies Thyroid Disease  Metabolic Disorders, Hyperlipoproteinemia, hypercholesterolemia  Psych:   Psychiatric History Bipolar disorder       Patient Active Problem List   Diagnosis    S/P laparoscopic appendectomy    Essential hypertension    Hypercholesteremia    Cervical spinal stenosis    Cervical myofascial pain syndrome    Osteoarthritis of cervical spine with myelopathy    Facet arthritis, degenerative, cervical spine    Chronic, continuous use of opioids    Acid reflux    Bipolar affective disorder in remission    Sleep apnea    Anemia    Elevated AST (SGOT)    Cervical  stenosis of spinal canal     Past Medical History:   Diagnosis Date    Bipolar disorder     GERD (gastroesophageal reflux disease)     Hyperlipidemia     Hypertension     Overdose of illicit drug     quit in the early 2000's     Past Surgical History:   Procedure Laterality Date    APPENDECTOMY      around 2012    BLOCK-NERVE-MEDIAL BRANCH-CERVICAL Left 11/2/2017    [facility]    SCAPHOID FRACTURE SURGERY      had to have a bone graft -early  1990's     WRIST SURGERY      6 years ago         Physical Exam  General:  Well nourished    Airway/Jaw/Neck:  Airway Findings: Mouth Opening: Normal Tongue: Normal  General Airway Assessment: Possible difficult intubation  Mallampati: II  TM Distance: Normal, at least 6 cm  Jaw/Neck Findings:  Neck ROM: Extension Decreased, Mod., Decreased Lateral Motion, to the right, to the left      Dental:  Dental Findings: In tact   Chest/Lungs:  Chest/Lungs Findings: Clear to auscultation     Heart/Vascular:  Heart Findings: Rate: Normal  Rhythm: Regular Rhythm  Sounds: Normal        Mental Status:  Mental Status Findings:  Cooperative, Alert and Oriented         Anesthesia Plan  Type of Anesthesia, risks & benefits discussed:  Anesthesia Type:  general  Patient's Preference: general  Intra-op Monitoring Plan: standard ASA monitors  Intra-op Monitoring Plan Comments:   Post Op Pain Control Plan: per primary service following discharge from PACU and multimodal analgesia  Post Op Pain Control Plan Comments:   Induction:   IV  Beta Blocker:  Patient is not currently on a Beta-Blocker (No further documentation required).       Informed Consent: Patient understands risks and agrees with Anesthesia plan.  Questions answered. Anesthesia consent signed with patient.  ASA Score: 2     Day of Surgery Review of History & Physical:    H&P update referred to the surgeon.         Ready For Surgery From Anesthesia Perspective.     The patient was seen by  Perioperative Internal Medicine physician Dr. Sol on 10/19/18 , please see recommendations.    Munir Michaud RN

## 2018-10-08 ENCOUNTER — TELEPHONE (OUTPATIENT)
Dept: PREADMISSION TESTING | Facility: HOSPITAL | Age: 53
End: 2018-10-08

## 2018-10-08 NOTE — TELEPHONE ENCOUNTER
----- Message from Jennifer Bar RN sent at 10/5/2018 11:43 AM CDT -----  Please schedule Dr. Sol, poc, labs, and ekg appts. no sooner than 10/10. Thanks!

## 2018-10-17 ENCOUNTER — HOSPITAL ENCOUNTER (EMERGENCY)
Facility: HOSPITAL | Age: 53
Discharge: HOME | End: 2018-10-17
Attending: EMERGENCY MEDICINE
Payer: COMMERCIAL

## 2018-10-17 VITALS
BODY MASS INDEX: 25.76 KG/M2 | HEART RATE: 69 BPM | HEIGHT: 68 IN | SYSTOLIC BLOOD PRESSURE: 111 MMHG | TEMPERATURE: 99 F | RESPIRATION RATE: 18 BRPM | DIASTOLIC BLOOD PRESSURE: 77 MMHG | OXYGEN SATURATION: 98 % | WEIGHT: 170 LBS

## 2018-10-17 DIAGNOSIS — S69.92XA WRIST INJURIES, LEFT, INITIAL ENCOUNTER: ICD-10-CM

## 2018-10-17 DIAGNOSIS — S63.502A WRIST SPRAIN, LEFT, INITIAL ENCOUNTER: Primary | ICD-10-CM

## 2018-10-17 PROCEDURE — 25000003 PHARM REV CODE 250: Performed by: EMERGENCY MEDICINE

## 2018-10-17 PROCEDURE — 99285 EMERGENCY DEPT VISIT HI MDM: CPT | Mod: 25

## 2018-10-17 PROCEDURE — 29125 APPL SHORT ARM SPLINT STATIC: CPT | Mod: LT

## 2018-10-17 PROCEDURE — 99284 EMERGENCY DEPT VISIT MOD MDM: CPT | Mod: ,,, | Performed by: EMERGENCY MEDICINE

## 2018-10-17 RX ORDER — NAPROXEN 500 MG/1
500 TABLET ORAL
Status: COMPLETED | OUTPATIENT
Start: 2018-10-17 | End: 2018-10-17

## 2018-10-17 RX ADMIN — NAPROXEN 500 MG: 500 TABLET ORAL at 06:10

## 2018-10-17 NOTE — ED TRIAGE NOTES
53 year old male presents to the ED c/o left wrist pain. States that he injured it while using a drill when he was working on his house last night

## 2018-10-17 NOTE — ED PROVIDER NOTES
Encounter Date: 10/17/2018    SCRIBE #1 NOTE: I, Son Judith, am scribing for, and in the presence of,  Dr. Doan . I have scribed the entire note.       History     Chief Complaint   Patient presents with    Wrist Injury     Pt reports yesterday he twisted his left wrist. Pt able to move wrist slightly but with pain. No obvious deformity noted. +2 pulse.     Time patient was seen by the provider: 6:38 AM      The patient is a 53 y.o. male who presents to the ED with a complaint of severe left wrist pain. Last night, the pt was using a drill and twisted his left wrist. His pain worsens when palpated and with movement. Pt was at baseline before the injury. Reports of previously injuring his wrist in the past.       The history is provided by the patient and medical records.     Review of patient's allergies indicates:   Allergen Reactions    Codeine Nausea Only    Tizanidine Other (See Comments)     Caused increased pain and muscle pain     Past Medical History:   Diagnosis Date    Bipolar disorder     GERD (gastroesophageal reflux disease)     Hyperlipidemia     Hypertension     Overdose of illicit drug     quit in the early      Past Surgical History:   Procedure Laterality Date    APPENDECTOMY      around     BLOCK-NERVE-MEDIAL BRANCH-CERVICAL Left 2017    Performed by Izabela [facility]agne [facility] at [facility]IN MGT    SCAPHOID FRACTURE SURGERY      had to have a bone graft -early       WRIST SURGERY      6 years ago     No family history on file.  Social History     Tobacco Use    Smoking status: Former Smoker     Last attempt to quit:      Years since quittin.8    Smokeless tobacco: Never Used   Substance Use Topics    Alcohol use: Yes     Comment: 2 beers most night     Drug use: No     Review of Systems   Constitutional: Negative for fever.   HENT: Negative for congestion.    Eyes: Negative for visual disturbance.   Respiratory: Negative for cough.    Cardiovascular: Negative  for chest pain.   Gastrointestinal: Negative for abdominal pain.   Genitourinary: Negative for dysuria.   Musculoskeletal:        + left wrist pain    Skin: Negative for color change.   Neurological: Negative for headaches.       Physical Exam     Initial Vitals [10/17/18 0627]   BP Pulse Resp Temp SpO2   128/71 64 18 98 °F (36.7 °C) 99 %      MAP       --         Physical Exam    Nursing note and vitals reviewed.    Appearance: No acute distress.  Skin: No rashes seen.  Good turgor.  No abrasions. No ecchymoses, no erythema, no warmth (left wrist)  Eyes: No conjunctival injection.  ENT: Oropharynx clear.    Chest: Clear to auscultation bilaterally.  Good air movement.  No wheezes.  No rhonchi.  Cardiovascular: Regular rate and rhythm.  No murmurs. No gallops. No rubs.  Abdomen: Soft.  Not distended.  Nontender.  No guarding.  No rebound.  Musculoskeletal: No deformities.  Neck supple.  No meningismus. Tenderness to distal midline of mid wrist (left wrist).  Neurologic: Motor intact.  Sensation intact.  Cerebellar intact.  Cranial nerves intact.  Mental Status:  Alert and oriented x 3.  Appropriate, conversant.      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  Additional severe degenerative change throughout the distal pole of the scaphoid.  Recommend consultation with orthopedics.    Lateral displacement of the lunate.    Joint effusion about the ulnar.    Additional degenerative change of the radius and trapezium.    This report was flagged in Epic as abnormal.    Electronically signed by resident: Rafael  Carole  Date:    10/17/2018  Time:    08:52    Electronically signed by: Henry Caldwell MD  Date:    10/17/2018  Time:    09:29             Narrative:    EXAMINATION:  CT WRIST WITHOUT CONTRAST LEFT    CLINICAL HISTORY:  Bone pain, wrist;    TECHNIQUE:  Noncontrast axial CT images of the left wrist were obtained without the administration of IV contrast.    COMPARISON:  Wrist radiograph left 10/17/2018    FINDINGS:  Chronic appearing sclerosis of the scaphoid with 2 remote appearing fractures in the proximal and distal aspects of the mid body of the scaphoid both with sclerotic margins.  Lucency and bone loss of the proximal and mid body scaphoid consistent with avascular necrosis.  Distal aspect of the scaphoid show cystic degenerative changes.  Additional degenerative changes of the styloid process of the radius at the radioscaphoid margin with some erosive degenerative changes and degenerative change of the trapezium.  There is lateral displacement of the lunate.  The other carpal bones and metacarpal are better maintained.    Joint effusion about the ulnar.                               X-Ray Wrist Complete Left (Final result)  Result time 10/17/18 07:41:19    Final result by Tyrell Manuel MD (10/17/18 07:41:19)                 Impression:      Significant deformity of the scaphoid as well as irregularity of the distal radius, predominately involving the radial styloid and scaphoid facet of the distal radius.  Findings may relate to scaphoid nonunion advanced collapsed with associated secondary degenerative osteoarthrosis of the radioscaphoid articulation.  Clinical correlation with patient history is advised.    No definite acute displaced fracture.      Electronically signed by: Tyrell Manuel MD  Date:    10/17/2018  Time:    07:41             Narrative:    EXAMINATION:  XR WRIST COMPLETE 3 VIEWS LEFT    CLINICAL HISTORY:  Unspecified injury of left wrist, hand and finger(s), initial  encounter    TECHNIQUE:  PA, lateral, and oblique views of the left wrist were performed.    COMPARISON:  No prior studies are available for comparison.    FINDINGS:  There is a significant deformity of the scaphoid which appears chronic in etiology.  There is an additional deformity of the distal radius predominately involving the scaphoid facet and radial styloid.    No definite acute displaced fracture identified.  There is soft tissue edema about the left wrist.  No radiopaque foreign body identified.                               X-Ray Forearm Left (Final result)  Result time 10/17/18 07:40:16    Final result by Tyrell Manuel MD (10/17/18 07:40:16)                 Impression:      No radiographic evidence of acute osseous injury of the left forearm.      Electronically signed by: Tyrell Manuel MD  Date:    10/17/2018  Time:    07:40             Narrative:    EXAMINATION:  XR FOREARM LEFT    CLINICAL HISTORY:  Unspecified injury of left wrist, hand and finger(s), initial encounter    TECHNIQUE:  AP and lateral views of the left forearm were performed.    COMPARISON:  None    FINDINGS:  There are chronic appearing deformities of the scaphoid and radial styloid.  The remaining visualized osseous structures appear intact without evidence of acute displaced fracture.  Elbow alignment appears within normal limits without significant joint effusion appreciated.  Soft tissues are unremarkable.                                 Medical Decision Making:   History:   Old Medical Records: I decided to obtain old medical records.  Initial Assessment:   Left wrist injury when a drill torque him. Tender over the ulnar styloid process. Mid wrist joint tenderness. Will x-ray. Hx of scaphoid fracture in the past with surgery and screwed removed.   Independently Interpreted Test(s):   I have ordered and independently interpreted X-rays - see prior notes.  Clinical Tests:   Radiological Study: Ordered and Reviewed  ED  Management:  CT showed old scaphoid fracture and avascular necrosis. Discussed with ortho. They reviewed CT. Nothing acute. They recommended f/u and removable wrist splint.             Scribe Attestation:   Scribe #1: I performed the above scribed service and the documentation accurately describes the services I performed. I attest to the accuracy of the note.               Clinical Impression:   The primary encounter diagnosis was Wrist sprain, left, initial encounter. A diagnosis of Wrist injuries, left, initial encounter was also pertinent to this visit.      Disposition:   Disposition: Discharged  Condition: Stable                        Kranthi Doan MD  10/19/18 2871

## 2018-10-19 ENCOUNTER — HOSPITAL ENCOUNTER (OUTPATIENT)
Dept: CARDIOLOGY | Facility: CLINIC | Age: 53
Discharge: HOME | End: 2018-10-19
Attending: ANESTHESIOLOGY
Payer: COMMERCIAL

## 2018-10-19 ENCOUNTER — HOSPITAL ENCOUNTER (OUTPATIENT)
Dept: PREADMISSION TESTING | Facility: HOSPITAL | Age: 53
Discharge: HOME | End: 2018-10-19
Attending: ANESTHESIOLOGY
Payer: COMMERCIAL

## 2018-10-19 ENCOUNTER — INITIAL CONSULT (OUTPATIENT)
Dept: INTERNAL MEDICINE | Facility: CLINIC | Age: 53
End: 2018-10-19
Payer: COMMERCIAL

## 2018-10-19 VITALS
HEART RATE: 77 BPM | TEMPERATURE: 98 F | SYSTOLIC BLOOD PRESSURE: 115 MMHG | DIASTOLIC BLOOD PRESSURE: 78 MMHG | WEIGHT: 169.13 LBS | RESPIRATION RATE: 18 BRPM | OXYGEN SATURATION: 97 % | BODY MASS INDEX: 25.63 KG/M2 | HEIGHT: 68 IN

## 2018-10-19 DIAGNOSIS — K21.9 GASTROESOPHAGEAL REFLUX DISEASE, ESOPHAGITIS PRESENCE NOT SPECIFIED: ICD-10-CM

## 2018-10-19 DIAGNOSIS — D64.9 ANEMIA, UNSPECIFIED TYPE: ICD-10-CM

## 2018-10-19 DIAGNOSIS — E78.00 HYPERCHOLESTEREMIA: ICD-10-CM

## 2018-10-19 DIAGNOSIS — G47.30 SLEEP APNEA, UNSPECIFIED TYPE: ICD-10-CM

## 2018-10-19 DIAGNOSIS — R74.01 ELEVATED AST (SGOT): ICD-10-CM

## 2018-10-19 DIAGNOSIS — F31.70 BIPOLAR AFFECTIVE DISORDER IN REMISSION: ICD-10-CM

## 2018-10-19 DIAGNOSIS — Z01.818 PREOPERATIVE TESTING: ICD-10-CM

## 2018-10-19 DIAGNOSIS — Z01.818 PREOP EXAMINATION: Primary | ICD-10-CM

## 2018-10-19 DIAGNOSIS — F11.90 CHRONIC, CONTINUOUS USE OF OPIOIDS: ICD-10-CM

## 2018-10-19 DIAGNOSIS — I10 ESSENTIAL HYPERTENSION: ICD-10-CM

## 2018-10-19 PROCEDURE — 99244 OFF/OP CNSLTJ NEW/EST MOD 40: CPT | Mod: S$GLB,,, | Performed by: HOSPITALIST

## 2018-10-19 PROCEDURE — 99999 PR PBB SHADOW E&M-EST. PATIENT-LVL I: CPT | Mod: PBBFAC,,, | Performed by: HOSPITALIST

## 2018-10-19 PROCEDURE — 93000 ELECTROCARDIOGRAM COMPLETE: CPT | Mod: S$GLB,,, | Performed by: INTERNAL MEDICINE

## 2018-10-19 NOTE — PROGRESS NOTES
[facility] - Pre Op Consult  Progress Note    Patient Name: Tyrone Santos  MRN: [id number]  Date of Evaluation- 10/19/2018  PCP- Yoon Wong MD    Future cases for Tyrone Santos [[id number]]     Case ID Status Date Time [facility] Procedure Provider Location    [id number] [facility] 10/24/2018  8:00  DISCECTOMY, SPINE, CERVICAL, ANTERIOR APPROACH, WITH FUSION C3-4 Jw Anne MD [[id number]] [facility] OR 2ND FLR          HPI:  History of present illness- I had the pleasure of meeting this pleasant 53 y.o. gentleman in the pre op clinic prior to his elective spine surgery. The patient is new to me .     I have obtained the history by speaking to the patient and by reviewing the electronic health records.    Events leading up to surgery / History of presenting illness -    Cervical spinal stenosis   Cervical myofascial pain syndrome   Osteoarthritis of cervical spine with myelopathy       He has been troubled with severe  Left side of the neck , down to the Left shoulder and left upper shoulder blade  for 1.5 years  .Tried Physical therapy , chiropractic treatment , needling treatment, pain management  with no lasting benefits   ,  Pain increases with activity and decreases with pain medication.      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 He sees a room with improvement with diet     He has Bipolar disorder and is  under Psychiatry care and subjectively  Feel that he is doing good     Patient Active Problem List    Diagnosis Date Noted    Chronic, continuous use of opioids 10/19/2018    Acid reflux 10/19/2018    Bipolar affective disorder in remission 10/19/2018                             Essential hypertension 01/03/2017    Hypercholesteremia 01/03/2017     Not known to have heart disease , Diabetes  Mellitus, Lung disease       Subjective/ Objective:          Chief complaint-Preoperative evaluation, Perioperative Medical management, complication reduction plan     Active cardiac conditions- none    Revised cardiac risk index predictors- none    Functional capacity -Examples of physical activity , stays active ,  can take 1 flight of stairs, cutting the grass with a mower, raking lawn, painting, planting shrubs, weeding garden, swimming, dancing and digging----- He can undertake all the above activities without  chest pain,chest tightness, Shortness of breath ,dizziness,lightheadedness making his exercise tolerance more, than 4 Mets.       Review of Systems   Constitutional: Negative for chills and fever.        No unusual weight changes     HENT:        Sleep apnea   Eyes:        No unusual vision changes   Respiratory:        No cough , phlegm     No Hemoptysis   Cardiovascular:        As noted   Gastrointestinal:        Bowels- Regular   No overt GI/ blood losses   Endocrine:        Prednisone use > 20 mg daily for 3 weeks- None   Genitourinary: Negative for dysuria.        No urinary hesitancy    Musculoskeletal:        As above      Skin: Negative for rash.   Neurological: Negative for syncope.        No unilateral weakness   Hematological:          Aspirin use for preventive reasons - holding 1 week pre op   Psychiatric/Behavioral:        Bipolar  Controlled   No SI/HI     No vascular stenting   No past medical history pertinent negatives.  No family history on file.      No anesthesia, bleeding, cardiac problems , PONV with previous surgeries/procedures.  Medications and Allergies reviewed in epic.   FH- No anesthesia,bleeding / venous thrombosis , early onset heart disease in family   Lives with his fiancee , who is going to help post op  Physical Exam  There were no vitals taken for this visit.      Physical Exam  Constitutional- Vitals - There is no height or weight on file to calculate BMI., There  were no vitals filed for this visit.  General appearance-Conscious,Coherent  Eyes- No conjunctival icterus,pupils  round  and reactive to light   ENT-Oral cavity-red spot on the palate - suggestedfollow up and  moist  , Hearing grossly normal   Neck- No thyromegaly ,Trachea -central, No jugular venous distension,   No Carotid Bruit   Cardiovascular -Heart Sounds- Normal  and  no murmur   , No gallop rhythm   Respiratory - Normal Respiratory Effort, Normal breath sounds,  no wheeze  and  no forced expiratory wheeze    Peripheral pitting pedal edema-- none , no calf pain   Gastrointestinal -Soft abdomen, No palpable masses, Non Tender,Liver,Spleen not palpable. No-- free fluid and shifting dullness  Musculoskeletal- No finger Clubbing. Strength grossly normal   Lymphatic-No Palpable cervical, axillary,Inguinal lymphadenopathy   Psychiatric - normal effect,Orientation  Rt Dorsalis pedis pulses-palpable    Lt Dorsalis pedis pulses- palpable   Rt Posterior tibial pulses -palpable   Left posterior tibial pulses -palpable   Miscellaneous -  no asterixis,  no dupuytren's contracture,  Surgical scarabove  the umbilicus ,  no renal bruit,  bowel sounds positive  and osteoarthritic nodes   Investigations  Lab and Imaging have been reviewed in [facility].    Review of Medicine tests    EKG-pending      Review of clinical lab tests:  Lab Results   Component Value Date    CREATININE 1.0 10/19/2018    HGB 13.5 (L) 10/19/2018     10/19/2018           Review of old records- Was done and information gathered regards to events leading to surgery and health conditions of significance in the perioperative period.        Preoperative cardiac risk assessment-  The patient does not have any active cardiac conditions . Revised cardiac risk index predictors-0 ---.Functional capacity is more than 4 Mets. He will be undergoing a Spine procedure that carries a intermediate risk     The estimated risk of the rate of adverse cardiac outcomes   0.4%    No further cardiac work up is indicated prior to proceeding with the surgery          American Society of Anesthesiologists Physical status classification ( ASA ) class: 3     Postoperative pulmonary complication risk assessment:      ARISCAT ( Canet) risk index- risk class -  Low, if duration of surgery is under 3 hours, intermediate, if duration of surgery is over 3 hours      Nahum Respiratory failure index- percentage risk of respiratory failure: 1.8 %     Assessment/Plan:     Essential hypertension  Amlodipine   Lisinopril  Home BP readings -120/80  Recent BP readings in the record-  Hypertension-  Blood pressure is acceptable . I suggest continuation of Amlodipine - during the entire perioperative period. I suggest holdingLisinopril - on the morning of the surgery and can continue that  post operatively under blood pressure, electrolyte and renal function monitoring as long as they are acceptable.I suggest addressing pain control as uncontrolled pain can increased blood pressure     Acid reflux  Controlled   GERD-  I suggest continuation of the Proton pump inhibitor in the perioperative period . I suggest aspiration precautions    Bipolar affective disorder in remission  On , Wellbutrion, Triletal   Controlled   Had a history of substance abuse that he quit early 2000's   I suggest monitoring the sodium as SIADH fromWellbutrion, Triletal   use and hypersecretion of ADH associated with surgery can reduce sodium in the perioperative period      Chronic, continuous use of opioids  For over 3 months   Under Pain management Dr Banuelos   Plan is for increase in Gabapentin to 1800 mg at bed time ( currently at 1200 mg at bed time )     Chronic continuous opioid use- In view of the opioid use, the patient may have opioid tolerance .  I suggest considering the possibility of opioid tolerance  in planning post operative pain control     As per pain management ,plan is for his opioid ( Percocet ) to be changed  to Oxycodone 15 mg as needed 4 times a day as needed     Teena op team to please know that he is under Opioid contract and  already has a prescription for post op opioid    Hypercholesteremia  HLD-I  suggest continuation of statin during the entire perioperative period.    Sleep apnea  Sleep apnea .Uses CPAP .Suggested bringing for hospital use . Informed the risk of worsening sleep apnea in the perioperative period and suggest using CPAP  use any time in 24 hrs ( day or night )for planned sleep    Avoidance of  supine sleep, weight gain and alcoholic beverages discussed since all of these can worsen ERIC        I suggest  caution with usage of medication that can cause respiratory suppression in the perioperative period    Care with opioids discussed             Anemia  Mild   No NSAID use   Suggested follow up     Elevated AST (SGOT)  Mild   Suggested follow up , alcohol reduction  Care with Hepatotoxic use teena op   No suggestion of  hepatic decompensation           Preventive perioperative care    Thromboembolic prophylaxis:  His risk factors for thrombosis include surgical procedure and age.I suggest  thromboembolic prophylaxis ( mechanical/pharmacological, weighing the risk benefits of pharmacological agent use considering teena procedural bleeding )  during the perioperative period.I suggested being active in the post operative period.      Postoperative pulmonary complication prophylaxis-Risk factors for post operative pulmonary complications include surgery lasting over 3 hours and ASA class >2- I suggest incentive spirometry use, early ambulation and end tidal carbon dioxide monitoring , oral care, head end of bed elevation       Renal complication prophylaxis-Risk factors for renal complications include hypertension . I suggest keeping him well hydrated .I suggested drinking 2 litre's of water a day      Surgical site Infection Prophylaxis-I  suggest appropriate antibiotic for Prophylaxis against Surgical  site infections     Delirium prophylaxis-Risk factors - opioid use - I suggest avoidance / minimizing the use of  Benzodiazepines ( unless the patient has been taking it on a regular basis ),Anticholinergic medication,Antihistamines ( like  Benadryl).I suggest minimizing the use of opioid medication and use of IV tylenol,if it is appropriate. I suggest using the lowest possible dose of opioids for the shortest duration possible in the perioperative period. I suggest to Keep shades/blinds open during the day, lights off and shades closed at night to encourage normal sleep/wake cycle.I encourage the presence of the family member with the patient at all times, if at all possible as mental status changes can be picked up early by the family members and they help with reorientation. I encouraged the presence of family to help with orientation in the perioperative period. Benadryl avoidance suggested      In view of Spine procedure the patient  is at risk of postoperative urinary retention.  I suggest avoidance / minimizing the of  Benzodiazepines,Anticholinergic medication,antihistamines ( Benadryl) , if possible in the perioperative period. I suggest using the minimum possible use of opioids for the minimum period of time in the perioperative period. Benadryl avoidance suggested      This visit was focused on Preoperative evaluation, Perioperative Medical management, complication reduction plans. I suggest that the patient follows up with primary care or relevant sub specialists for ongoing health care.    I appreciate the opportunity to be involved in this patients care. Please feel free to contact me if there were any questions about this consultation.    Patient is optimized     Patient/was instructed to call and update me about any changes to health,  medication, office visits ,testing out side of the teena operative care center , hospitalizations between now and surgery     Zeinab Sol MD  Perioperative  Medicine  [facility]   Pager [phone]  --  10/19- 16 27   /78 , pulse 77, Temp 97.7 , sat 97 %   Review of Medicine tests    EKG- I had independently reviewed the EKG from--10/19/2018   It was reported to be showing   Sinus bradycardia  Otherwise normal ECG  No previous ECGs available  ----  10/23- 7 53     Surgery preponed to today   Called to follow up , to address any concerns with the up coming surgery or any questions on Medication instructions -  Doing good,No changes to Medication, Health -  Spoke to him   He had no questions

## 2018-10-19 NOTE — ASSESSMENT & PLAN NOTE
Controlled   GERD-  I suggest continuation of the Proton pump inhibitor in the perioperative period . I suggest aspiration precautions

## 2018-10-19 NOTE — DISCHARGE INSTRUCTIONS
Anesthesia: General Anesthesia     You are watched continuously during your procedure by your anesthesia provider.     Youre due to have surgery. During surgery, youll be given medicine called anesthesia or anesthetic. This will keep you comfortable and pain-free. Your anesthesia provider will use general anesthesia.  What is general anesthesia?  General anesthesia puts you into a state like deep sleep. It goes into the bloodstream (IV anesthetics), into the lungs (gas anesthetics), or both. You feel nothing during the procedure. You will not remember it. During the procedure, the anesthesia provider monitors you continuously. He or she checks your heart rate and rhythm, blood pressure, breathing, and blood oxygen.  · IV anesthetics. IV anesthetics are given through an IV line in your arm. Theyre often given first. This is so you are asleep before a gas anesthetic is started. Some kinds of IV anesthetics relieve pain. Others relax you. Your doctor will decide which kind is best in your case.  · Gas anesthetics. Gas anesthetics are breathed into the lungs. They are often used to keep you asleep. They can be given through a facemask or a tube placed in your larynx or trachea (breathing tube).  ? If you have a facemask, your anesthesia provider will most likely place it over your nose and mouth while youre still awake. Youll breathe oxygen through the mask as your IV anesthetic is started. Gas anesthetic may be added through the mask.  ? If you have a tube in the larynx or trachea, it will be inserted into your throat after youre asleep.  Anesthesia tools and medicines  You will likely have:  · IV anesthetics. These are put into an IV line into your bloodstream.  · Gas anesthetics. You breathe these anesthetics into your lungs, where they pass into your bloodstream.  · Pulse oximeter. This is a small clip that is attached to the end of your finger. This measures your blood oxygen level.  · Electrocardiography  leads (electrodes). These are small sticky pads that are placed on your chest. They record your heart rate and rhythm.  · Blood pressure cuff. This reads your blood pressure.  Risks and possible complications  General anesthesia has some risks. These include:  · Breathing problems  · Nausea and vomiting  · Sore throat or hoarseness (usually temporary)  · Allergic reaction to the anesthetic  · Irregular heartbeat (rare)  · Cardiac arrest (rare)   Anesthesia safety  · Follow all instructions you are given for how long not to eat or drink before your procedure.  · Be sure your doctor knows what medicines and drugs you take. This includes over-the-counter medicines, herbs, supplements, alcohol or other drugs. You will be asked when those were last taken.  · Have an adult family member or friend drive you home after the procedure.  · For the first 24 hours after your surgery:  ? Do not drive or use heavy equipment.  ? Do not make important decisions or sign legal documents. If important decisions or signing legal documents is necessary during the first 24 hours after surgery, have a trusted family member or spouse act on your behalf.  ? Avoid alcohol.  ? Have a responsible adult stay with you. He or she can watch for problems and help keep you safe.  Date Last Reviewed: 12/1/2016  © 4188-3084 [employer]. [address] [place] [address], [place] [address]. All rights reserved. This information is not intended as a substitute for professional medical care. Always follow your healthcare professional's instructions

## 2018-10-19 NOTE — ASSESSMENT & PLAN NOTE
For over 3 months   Under Pain management Dr Banuelos   Plan is for increase in Gabapentin to 1800 mg at bed time ( currently at 1200 mg at bed time )     Chronic continuous opioid use- In view of the opioid use, the patient may have opioid tolerance .  I suggest considering the possibility of opioid tolerance  in planning post operative pain control     As per pain management ,plan is for his opioid ( Percocet ) to be changed to Oxycodone 15 mg as needed 4 times a day as needed     [facility] op team to please know that he is under Opioid contract and  already has a prescription for post op opioid

## 2018-10-19 NOTE — ASSESSMENT & PLAN NOTE
Mild   Suggested follow up , alcohol reduction  Care with Hepatotoxic use teena op   No suggestion of  hepatic decompensation

## 2018-10-19 NOTE — ASSESSMENT & PLAN NOTE
Sleep apnea .Uses CPAP .Suggested bringing for hospital use . Informed the risk of worsening sleep apnea in the perioperative period and suggest using CPAP  use any time in 24 hrs ( day or night )for planned sleep    Avoidance of  supine sleep, weight gain and alcoholic beverages discussed since all of these can worsen ERIC        I suggest  caution with usage of medication that can cause respiratory suppression in the perioperative period    Care with opioids discussed

## 2018-10-19 NOTE — ASSESSMENT & PLAN NOTE
Amlodipine   Lisinopril  Home BP readings -120/80  Recent BP readings in the record-  Hypertension-  Blood pressure is acceptable . I suggest continuation of Amlodipine - during the entire perioperative period. I suggest holdingLisinopril - on the morning of the surgery and can continue that  post operatively under blood pressure, electrolyte and renal function monitoring as long as they are acceptable.I suggest addressing pain control as uncontrolled pain can increased blood pressure

## 2018-10-19 NOTE — LETTER
October 19, 2018      Rhonda G Leopold, MD  [address]  [place] [address]           [facility] - Pre Op Consult  [address] [facility] [address]  Phone: [phone]          Patient: Tyrone Santos   MR Number: [id number]   YOB: 1965   Date of Visit: 10/19/2018       Dear Dr. Rhonda G Leopold:    Thank you for referring Tyrone Santos to me for evaluation. Attached you will find relevant portions of my assessment and plan of care.    If you have questions, please do not hesitate to call me. I look forward to following Tyrone Santos along with you.    Sincerely,    Zeinab Sol MD    Enclosure  CC:  MD Yoon Apodaca) MD Judith    If you would like to receive this communication electronically, please contact [email or web address] or [phone] to request more information on [employer] access.    For providers and/or their staff who would like to refer a patient to [facility], please contact us through our one-stop-shop provider referral line, [facility], at [phone].    If you feel you have received this communication in error or would no longer like to receive these types of communications, please e-mail [email or web address]

## 2018-10-19 NOTE — OUTPATIENT SUBJECTIVE & OBJECTIVE
Outpatient Subjective & Objective     Chief complaint-Preoperative evaluation, Perioperative Medical management, complication reduction plan     Active cardiac conditions- none    Revised cardiac risk index predictors- none    Functional capacity -Examples of physical activity , stays active ,  can take 1 flight of stairs, cutting the grass with a mower, raking lawn, painting, planting shrubs, weeding garden, swimming, dancing and digging----- He can undertake all the above activities without  chest pain,chest tightness, Shortness of breath ,dizziness,lightheadedness making his exercise tolerance more, than 4 Mets.       Review of Systems   Constitutional: Negative for chills and fever.        No unusual weight changes     HENT:        Sleep apnea   Eyes:        No unusual vision changes   Respiratory:        No cough , phlegm     No Hemoptysis   Cardiovascular:        As noted   Gastrointestinal:        Bowels- Regular   No overt GI/ blood losses   Endocrine:        Prednisone use > 20 mg daily for 3 weeks- None   Genitourinary: Negative for dysuria.        No urinary hesitancy    Musculoskeletal:        As above      Skin: Negative for rash.   Neurological: Negative for syncope.        No unilateral weakness   Hematological:          Aspirin use for preventive reasons - holding 1 week pre op   Psychiatric/Behavioral:        Bipolar  Controlled   No SI/HI     No vascular stenting   No past medical history pertinent negatives.  No family history on file.      No anesthesia, bleeding, cardiac problems , PONV with previous surgeries/procedures.  Medications and Allergies reviewed in epic.   FH- No anesthesia,bleeding / venous thrombosis , early onset heart disease in family   Lives with his maria fernanda , who is going to help post op  Physical Exam  There were no vitals taken for this visit.      Physical Exam  Constitutional- Vitals - There is no height or weight on file to calculate BMI., There were no vitals filed for  this visit.  General appearance-Conscious,Coherent  Eyes- No conjunctival icterus,pupils  round  and reactive to light   ENT-Oral cavity-red spot on the palate - suggestedfollow up and  moist  , Hearing grossly normal   Neck- No thyromegaly ,Trachea -central, No jugular venous distension,   No Carotid Bruit   Cardiovascular -Heart Sounds- Normal  and  no murmur   , No gallop rhythm   Respiratory - Normal Respiratory Effort, Normal breath sounds,  no wheeze  and  no forced expiratory wheeze    Peripheral pitting pedal edema-- none , no calf pain   Gastrointestinal -Soft abdomen, No palpable masses, Non Tender,Liver,Spleen not palpable. No-- free fluid and shifting dullness  Musculoskeletal- No finger Clubbing. Strength grossly normal   Lymphatic-No Palpable cervical, axillary,Inguinal lymphadenopathy   Psychiatric - normal effect,Orientation  Rt Dorsalis pedis pulses-palpable    Lt Dorsalis pedis pulses- palpable   Rt Posterior tibial pulses -palpable   Left posterior tibial pulses -palpable   Miscellaneous -  no asterixis,  no dupuytren's contracture,  Surgical scarabove  the umbilicus ,  no renal bruit,  bowel sounds positive  and osteoarthritic nodes   Investigations  Lab and Imaging have been reviewed in epic.    Review of Medicine tests    EKG-pending      Review of clinical lab tests:  Lab Results   Component Value Date    CREATININE 1.0 10/19/2018    HGB 13.5 (L) 10/19/2018     10/19/2018           Review of old records- Was done and information gathered regards to events leading to surgery and health conditions of significance in the perioperative period.    Outpatient Subjective & Objective

## 2018-10-19 NOTE — HPI
History of present illness- I had the pleasure of meeting this pleasant 53 y.o. gentleman in the pre op clinic prior to his elective spine surgery. The patient is new to me .     I have obtained the history by speaking to the patient and by reviewing the electronic health records.    Events leading up to surgery / History of presenting illness -    Cervical spinal stenosis   Cervical myofascial pain syndrome   Osteoarthritis of cervical spine with myelopathy       He has been troubled with severe  Left side of the neck , down to the Left shoulder and left upper shoulder blade  for 1.5 years  .Tried Physical therapy , chiropractic treatment , needling treatment, pain management  with no lasting benefits   ,  Pain increases with activity and decreases with pain medication.      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 He sees a room with improvement with diet     He has Bipolar disorder and is  under Psychiatry care and subjectively  Feel that he is doing good     Patient Active Problem List    Diagnosis Date Noted    Chronic, continuous use of opioids 10/19/2018    Acid reflux 10/19/2018    Bipolar affective disorder in remission 10/19/2018                             Essential hypertension 01/03/2017    Hypercholesteremia 01/03/2017     Not known to have heart disease , Diabetes Mellitus, Lung disease

## 2018-10-19 NOTE — ASSESSMENT & PLAN NOTE
On , Wellbutrion, Triletal   Controlled   Had a history of substance abuse that he quit early 2000's   I suggest monitoring the sodium as SIADH fromWellbutrion, Triletal   use and hypersecretion of ADH associated with surgery can reduce sodium in the perioperative period

## 2018-10-22 ENCOUNTER — TELEPHONE (OUTPATIENT)
Dept: NEUROSURGERY | Facility: CLINIC | Age: 53
End: 2018-10-22

## 2018-10-22 DIAGNOSIS — M48.02 CERVICAL SPINAL STENOSIS: Primary | ICD-10-CM

## 2018-10-22 DIAGNOSIS — Z98.1 S/P CERVICAL SPINAL FUSION: Primary | ICD-10-CM

## 2018-10-22 DIAGNOSIS — M47.812 FACET ARTHRITIS, DEGENERATIVE, CERVICAL SPINE: ICD-10-CM

## 2018-10-22 DIAGNOSIS — M47.12 OSTEOARTHRITIS OF CERVICAL SPINE WITH MYELOPATHY: ICD-10-CM

## 2018-10-22 NOTE — TELEPHONE ENCOUNTER
C3-4 spondylosis with dynamic instability and myelomalacia changes. Patient also has degenerative spondylotic changes at C5-6 C6-7 and C7-T1 that are not as severe as the C3-4 level.     Patient have discusses with his fiance risks and benefits of the surgery and wants to move forward with surgery.     Consent is provided for a Anterior cervical decompression and fusion C3-4.    Risks / Benefits of treatment VS no treatment were discussed in layman's terms. Risks included but are not limited to: bleeding, infection, loss of limb, abdominal hemorrhage, trache-esophageal injury with need of temporary or permanent feeding/breathing tube, renal failure, speech and vision deficits, stroke, brain hemorrhage, paralysis, coma and death.     Ample time was provided for question. I have explained that I can not guarantee him from being pain free after the surgery.    Opportunity for a second opinion was given.     The patient wishes to proceed at this point.     Jw Anne M.D.  [facility].

## 2018-10-23 ENCOUNTER — HOSPITAL ENCOUNTER (INPATIENT)
Facility: HOSPITAL | Age: 53
LOS: 1 days | Discharge: HOME | DRG: 472 | End: 2018-10-24
Attending: NEUROLOGICAL SURGERY | Admitting: NEUROLOGICAL SURGERY
Payer: COMMERCIAL

## 2018-10-23 ENCOUNTER — HOSPITAL ENCOUNTER (OUTPATIENT)
Dept: RADIOLOGY | Facility: HOSPITAL | Age: 53
Discharge: HOME | DRG: 472 | End: 2018-10-23
Attending: NEUROLOGICAL SURGERY | Admitting: NEUROLOGICAL SURGERY
Payer: COMMERCIAL

## 2018-10-23 DIAGNOSIS — M47.812 FACET ARTHRITIS, DEGENERATIVE, CERVICAL SPINE: ICD-10-CM

## 2018-10-23 DIAGNOSIS — M47.12 OSTEOARTHRITIS OF CERVICAL SPINE WITH MYELOPATHY: ICD-10-CM

## 2018-10-23 DIAGNOSIS — M48.02 CERVICAL SPINAL STENOSIS: ICD-10-CM

## 2018-10-23 DIAGNOSIS — M48.02 CERVICAL STENOSIS OF SPINAL CANAL: Primary | ICD-10-CM

## 2018-10-23 LAB
ABO + RH BLD: NORMAL
ANION GAP SERPL CALC-SCNC: 10 MMOL/L
APTT BLDCRRT: 22.9 SEC
BASOPHILS # BLD AUTO: 0.05 K/UL
BASOPHILS NFR BLD: 1 %
BLD GP AB SCN CELLS X3 SERPL QL: NORMAL
BUN SERPL-MCNC: 13 MG/DL
CALCIUM SERPL-MCNC: 9.5 MG/DL
CHLORIDE SERPL-SCNC: 105 MMOL/L
CO2 SERPL-SCNC: 24 MMOL/L
CREAT SERPL-MCNC: 0.8 MG/DL
DIFFERENTIAL METHOD: ABNORMAL
EOSINOPHIL # BLD AUTO: 0.1 K/UL
EOSINOPHIL NFR BLD: 1.2 %
ERYTHROCYTE [DISTWIDTH] IN BLOOD BY AUTOMATED COUNT: 13.2 %
EST. GFR  (AFRICAN AMERICAN): >60 ML/MIN/1.73 M^2
EST. GFR  (NON AFRICAN AMERICAN): >60 ML/MIN/1.73 M^2
GLUCOSE SERPL-MCNC: 97 MG/DL
HCT VFR BLD AUTO: 38.9 %
HGB BLD-MCNC: 12.8 G/DL
IMM GRANULOCYTES # BLD AUTO: 0.01 K/UL
IMM GRANULOCYTES NFR BLD AUTO: 0.2 %
INR PPP: 0.9
LYMPHOCYTES # BLD AUTO: 0.9 K/UL
LYMPHOCYTES NFR BLD: 18.5 %
MCH RBC QN AUTO: 31.4 PG
MCHC RBC AUTO-ENTMCNC: 32.9 G/DL
MCV RBC AUTO: 95 FL
MONOCYTES # BLD AUTO: 0.6 K/UL
MONOCYTES NFR BLD: 11.9 %
NEUTROPHILS # BLD AUTO: 3.3 K/UL
NEUTROPHILS NFR BLD: 67.2 %
NRBC BLD-RTO: 0 /100 WBC
PLATELET # BLD AUTO: 264 K/UL
PMV BLD AUTO: 9.4 FL
POTASSIUM SERPL-SCNC: 3.9 MMOL/L
PROTHROMBIN TIME: 9.9 SEC
RBC # BLD AUTO: 4.08 M/UL
SODIUM SERPL-SCNC: 139 MMOL/L
WBC # BLD AUTO: 4.86 K/UL

## 2018-10-23 PROCEDURE — C1713 ANCHOR/SCREW BN/BN,TIS/BN: HCPCS | Performed by: NEUROLOGICAL SURGERY

## 2018-10-23 PROCEDURE — 0RG10K0 FUSION OF CERVICAL VERTEBRAL JOINT WITH NONAUTOLOGOUS TISSUE SUBSTITUTE, ANTERIOR APPROACH, ANTERIOR COLUMN, OPEN APPROACH: ICD-10-PCS | Performed by: NEUROLOGICAL SURGERY

## 2018-10-23 PROCEDURE — 20600001 HC STEP DOWN PRIVATE ROOM

## 2018-10-23 PROCEDURE — 63600175 PHARM REV CODE 636 W HCPCS: Performed by: STUDENT IN AN ORGANIZED HEALTH CARE EDUCATION/TRAINING PROGRAM

## 2018-10-23 PROCEDURE — 25000003 PHARM REV CODE 250: Performed by: NEUROLOGICAL SURGERY

## 2018-10-23 PROCEDURE — 63600175 PHARM REV CODE 636 W HCPCS: Performed by: NEUROLOGICAL SURGERY

## 2018-10-23 PROCEDURE — 22551 ARTHRD ANT NTRBDY CERVICAL: CPT | Mod: ,,, | Performed by: NEUROLOGICAL SURGERY

## 2018-10-23 PROCEDURE — 63600175 PHARM REV CODE 636 W HCPCS: Performed by: ANESTHESIOLOGY

## 2018-10-23 PROCEDURE — 71000033 HC RECOVERY, INTIAL HOUR: Performed by: NEUROLOGICAL SURGERY

## 2018-10-23 PROCEDURE — 37000009 HC ANESTHESIA EA ADD 15 MINS: Performed by: NEUROLOGICAL SURGERY

## 2018-10-23 PROCEDURE — 4A1004G MONITORING OF CENTRAL NERVOUS ELECTRICAL ACTIVITY, INTRAOPERATIVE, OPEN APPROACH: ICD-10-PCS | Performed by: NEUROLOGICAL SURGERY

## 2018-10-23 PROCEDURE — 22845 INSERT SPINE FIXATION DEVICE: CPT | Mod: ,,, | Performed by: NEUROLOGICAL SURGERY

## 2018-10-23 PROCEDURE — 0RT30ZZ RESECTION OF CERVICAL VERTEBRAL DISC, OPEN APPROACH: ICD-10-PCS | Performed by: NEUROLOGICAL SURGERY

## 2018-10-23 PROCEDURE — 86850 RBC ANTIBODY SCREEN: CPT

## 2018-10-23 PROCEDURE — 71000039 HC RECOVERY, EACH ADD'L HOUR: Performed by: NEUROLOGICAL SURGERY

## 2018-10-23 PROCEDURE — 20931 SP BONE ALGRFT STRUCT ADD-ON: CPT | Mod: ,,, | Performed by: NEUROLOGICAL SURGERY

## 2018-10-23 PROCEDURE — D9220A PRA ANESTHESIA: Mod: ,,, | Performed by: ANESTHESIOLOGY

## 2018-10-23 PROCEDURE — 25000003 PHARM REV CODE 250: Performed by: STUDENT IN AN ORGANIZED HEALTH CARE EDUCATION/TRAINING PROGRAM

## 2018-10-23 PROCEDURE — 85025 COMPLETE CBC W/AUTO DIFF WBC: CPT

## 2018-10-23 PROCEDURE — 36000711: Performed by: NEUROLOGICAL SURGERY

## 2018-10-23 PROCEDURE — 37000008 HC ANESTHESIA 1ST 15 MINUTES: Performed by: NEUROLOGICAL SURGERY

## 2018-10-23 PROCEDURE — 85730 THROMBOPLASTIN TIME PARTIAL: CPT

## 2018-10-23 PROCEDURE — 72125 CT NECK SPINE W/O DYE: CPT | Mod: 26,,, | Performed by: RADIOLOGY

## 2018-10-23 PROCEDURE — 72125 CT NECK SPINE W/O DYE: CPT | Mod: TC

## 2018-10-23 PROCEDURE — 36000710: Performed by: NEUROLOGICAL SURGERY

## 2018-10-23 PROCEDURE — 94761 N-INVAS EAR/PLS OXIMETRY MLT: CPT

## 2018-10-23 PROCEDURE — 27201423 OPTIME MED/SURG SUP & DEVICES STERILE SUPPLY: Performed by: NEUROLOGICAL SURGERY

## 2018-10-23 PROCEDURE — 0RB30ZZ EXCISION OF CERVICAL VERTEBRAL DISC, OPEN APPROACH: ICD-10-PCS | Performed by: NEUROLOGICAL SURGERY

## 2018-10-23 PROCEDURE — 85610 PROTHROMBIN TIME: CPT

## 2018-10-23 PROCEDURE — 27800903 OPTIME MED/SURG SUP & DEVICES OTHER IMPLANTS: Performed by: NEUROLOGICAL SURGERY

## 2018-10-23 PROCEDURE — 80048 BASIC METABOLIC PNL TOTAL CA: CPT

## 2018-10-23 DEVICE — IMPLANTABLE DEVICE: Type: IMPLANTABLE DEVICE | Site: SPINE CERVICAL | Status: FUNCTIONAL

## 2018-10-23 RX ORDER — SODIUM CHLORIDE 9 MG/ML
INJECTION, SOLUTION INTRAVENOUS CONTINUOUS PRN
Status: DISCONTINUED | OUTPATIENT
Start: 2018-10-23 | End: 2018-10-23

## 2018-10-23 RX ORDER — MUPIROCIN 20 MG/G
1 OINTMENT TOPICAL 2 TIMES DAILY
Status: DISCONTINUED | OUTPATIENT
Start: 2018-10-23 | End: 2018-10-24 | Stop reason: HOSPADM

## 2018-10-23 RX ORDER — HEPARIN SODIUM 5000 [USP'U]/ML
5000 INJECTION, SOLUTION INTRAVENOUS; SUBCUTANEOUS EVERY 8 HOURS
Status: DISCONTINUED | OUTPATIENT
Start: 2018-10-24 | End: 2018-10-24 | Stop reason: HOSPADM

## 2018-10-23 RX ORDER — ROCURONIUM BROMIDE 10 MG/ML
INJECTION, SOLUTION INTRAVENOUS
Status: DISCONTINUED | OUTPATIENT
Start: 2018-10-23 | End: 2018-10-23

## 2018-10-23 RX ORDER — OXYCODONE AND ACETAMINOPHEN 10; 325 MG/1; MG/1
1 TABLET ORAL EVERY 4 HOURS PRN
Status: DISCONTINUED | OUTPATIENT
Start: 2018-10-23 | End: 2018-10-24 | Stop reason: HOSPADM

## 2018-10-23 RX ORDER — HYDROMORPHONE HYDROCHLORIDE 1 MG/ML
1 INJECTION, SOLUTION INTRAMUSCULAR; INTRAVENOUS; SUBCUTANEOUS ONCE
Status: COMPLETED | OUTPATIENT
Start: 2018-10-24 | End: 2018-10-23

## 2018-10-23 RX ORDER — ACETAMINOPHEN 325 MG/1
325 TABLET ORAL EVERY 6 HOURS PRN
Status: DISCONTINUED | OUTPATIENT
Start: 2018-10-23 | End: 2018-10-24 | Stop reason: HOSPADM

## 2018-10-23 RX ORDER — MUPIROCIN 20 MG/G
OINTMENT TOPICAL
Status: DISCONTINUED | OUTPATIENT
Start: 2018-10-23 | End: 2018-10-23 | Stop reason: HOSPADM

## 2018-10-23 RX ORDER — AMOXICILLIN 250 MG
1 CAPSULE ORAL 2 TIMES DAILY
Status: DISCONTINUED | OUTPATIENT
Start: 2018-10-23 | End: 2018-10-24 | Stop reason: HOSPADM

## 2018-10-23 RX ORDER — DIAZEPAM 5 MG/1
5 TABLET ORAL ONCE
Status: COMPLETED | OUTPATIENT
Start: 2018-10-24 | End: 2018-10-23

## 2018-10-23 RX ORDER — ONDANSETRON 8 MG/1
8 TABLET, ORALLY DISINTEGRATING ORAL EVERY 6 HOURS PRN
Status: DISCONTINUED | OUTPATIENT
Start: 2018-10-23 | End: 2018-10-24 | Stop reason: HOSPADM

## 2018-10-23 RX ORDER — FENTANYL CITRATE 50 UG/ML
INJECTION, SOLUTION INTRAMUSCULAR; INTRAVENOUS
Status: DISCONTINUED | OUTPATIENT
Start: 2018-10-23 | End: 2018-10-23

## 2018-10-23 RX ORDER — FENTANYL CITRATE 50 UG/ML
50 INJECTION, SOLUTION INTRAMUSCULAR; INTRAVENOUS EVERY 5 MIN PRN
Status: DISCONTINUED | OUTPATIENT
Start: 2018-10-23 | End: 2018-10-23 | Stop reason: HOSPADM

## 2018-10-23 RX ORDER — PROPOFOL 10 MG/ML
VIAL (ML) INTRAVENOUS
Status: DISCONTINUED | OUTPATIENT
Start: 2018-10-23 | End: 2018-10-23

## 2018-10-23 RX ORDER — SODIUM CHLORIDE 9 MG/ML
INJECTION, SOLUTION INTRAVENOUS CONTINUOUS
Status: DISCONTINUED | OUTPATIENT
Start: 2018-10-23 | End: 2018-10-24 | Stop reason: HOSPADM

## 2018-10-23 RX ORDER — HYDROMORPHONE HYDROCHLORIDE 1 MG/ML
0.5 INJECTION, SOLUTION INTRAMUSCULAR; INTRAVENOUS; SUBCUTANEOUS
Status: DISCONTINUED | OUTPATIENT
Start: 2018-10-23 | End: 2018-10-24 | Stop reason: HOSPADM

## 2018-10-23 RX ORDER — GLYCOPYRROLATE 0.2 MG/ML
INJECTION INTRAMUSCULAR; INTRAVENOUS
Status: DISCONTINUED | OUTPATIENT
Start: 2018-10-23 | End: 2018-10-23

## 2018-10-23 RX ORDER — SODIUM CHLORIDE 0.9 % (FLUSH) 0.9 %
3 SYRINGE (ML) INJECTION EVERY 8 HOURS
Status: DISCONTINUED | OUTPATIENT
Start: 2018-10-23 | End: 2018-10-24 | Stop reason: HOSPADM

## 2018-10-23 RX ORDER — BUPROPION HYDROCHLORIDE 150 MG/1
150 TABLET ORAL DAILY
Status: DISCONTINUED | OUTPATIENT
Start: 2018-10-24 | End: 2018-10-24 | Stop reason: HOSPADM

## 2018-10-23 RX ORDER — DIAZEPAM 5 MG/1
5 TABLET ORAL EVERY 6 HOURS PRN
Status: DISCONTINUED | OUTPATIENT
Start: 2018-10-23 | End: 2018-10-24 | Stop reason: HOSPADM

## 2018-10-23 RX ORDER — BACITRACIN 50000 [IU]/1
INJECTION, POWDER, FOR SOLUTION INTRAMUSCULAR
Status: DISCONTINUED | OUTPATIENT
Start: 2018-10-23 | End: 2018-10-23 | Stop reason: HOSPADM

## 2018-10-23 RX ORDER — MUPIROCIN 20 MG/G
1 OINTMENT TOPICAL
Status: DISCONTINUED | OUTPATIENT
Start: 2018-10-23 | End: 2018-10-23 | Stop reason: HOSPADM

## 2018-10-23 RX ORDER — OXCARBAZEPINE 150 MG/1
300 TABLET, FILM COATED ORAL 2 TIMES DAILY
Status: DISCONTINUED | OUTPATIENT
Start: 2018-10-23 | End: 2018-10-24 | Stop reason: HOSPADM

## 2018-10-23 RX ORDER — ASPIRIN 81 MG/1
81 TABLET ORAL EVERY MORNING
Refills: 0 | COMMUNITY
Start: 2018-10-23 | End: 2021-02-24

## 2018-10-23 RX ORDER — MAG HYDROX/ALUMINUM HYD/SIMETH 200-200-20
30 SUSPENSION, ORAL (FINAL DOSE FORM) ORAL EVERY 4 HOURS PRN
Status: DISCONTINUED | OUTPATIENT
Start: 2018-10-23 | End: 2018-10-24 | Stop reason: HOSPADM

## 2018-10-23 RX ORDER — LIDOCAINE HYDROCHLORIDE AND EPINEPHRINE 10; 10 MG/ML; UG/ML
INJECTION, SOLUTION INFILTRATION; PERINEURAL
Status: DISCONTINUED | OUTPATIENT
Start: 2018-10-23 | End: 2018-10-23 | Stop reason: HOSPADM

## 2018-10-23 RX ORDER — MUPIROCIN 20 MG/G
1 OINTMENT TOPICAL DAILY
Status: DISCONTINUED | OUTPATIENT
Start: 2018-10-25 | End: 2018-10-23

## 2018-10-23 RX ORDER — SIMVASTATIN 10 MG/1
10 TABLET, FILM COATED ORAL DAILY
Status: DISCONTINUED | OUTPATIENT
Start: 2018-10-24 | End: 2018-10-24 | Stop reason: HOSPADM

## 2018-10-23 RX ORDER — MUPIROCIN 20 MG/G
1 OINTMENT TOPICAL 2 TIMES DAILY
Status: DISCONTINUED | OUTPATIENT
Start: 2018-10-23 | End: 2018-10-23

## 2018-10-23 RX ORDER — ACETAMINOPHEN 10 MG/ML
1000 INJECTION, SOLUTION INTRAVENOUS EVERY 8 HOURS
Status: DISCONTINUED | OUTPATIENT
Start: 2018-10-23 | End: 2018-10-24 | Stop reason: HOSPADM

## 2018-10-23 RX ORDER — ACETAMINOPHEN 10 MG/ML
1000 INJECTION, SOLUTION INTRAVENOUS ONCE
Status: COMPLETED | OUTPATIENT
Start: 2018-10-23 | End: 2018-10-23

## 2018-10-23 RX ORDER — ONDANSETRON 2 MG/ML
INJECTION INTRAMUSCULAR; INTRAVENOUS
Status: DISCONTINUED | OUTPATIENT
Start: 2018-10-23 | End: 2018-10-23

## 2018-10-23 RX ORDER — LIDOCAINE 50 MG/G
1 PATCH TOPICAL
Status: DISCONTINUED | OUTPATIENT
Start: 2018-10-23 | End: 2018-10-24 | Stop reason: HOSPADM

## 2018-10-23 RX ORDER — KETAMINE HCL IN 0.9 % NACL 50 MG/5 ML
SYRINGE (ML) INTRAVENOUS
Status: DISCONTINUED | OUTPATIENT
Start: 2018-10-23 | End: 2018-10-23

## 2018-10-23 RX ORDER — MIDAZOLAM HYDROCHLORIDE 1 MG/ML
INJECTION, SOLUTION INTRAMUSCULAR; INTRAVENOUS
Status: DISCONTINUED | OUTPATIENT
Start: 2018-10-23 | End: 2018-10-23

## 2018-10-23 RX ORDER — ACETAMINOPHEN 500 MG
500 TABLET ORAL EVERY 6 HOURS PRN
Status: DISCONTINUED | OUTPATIENT
Start: 2018-10-23 | End: 2018-10-24 | Stop reason: HOSPADM

## 2018-10-23 RX ORDER — AMLODIPINE BESYLATE 10 MG/1
10 TABLET ORAL DAILY
Status: DISCONTINUED | OUTPATIENT
Start: 2018-10-24 | End: 2018-10-24 | Stop reason: HOSPADM

## 2018-10-23 RX ORDER — SODIUM CHLORIDE 0.9 % (FLUSH) 0.9 %
3 SYRINGE (ML) INJECTION
Status: DISCONTINUED | OUTPATIENT
Start: 2018-10-23 | End: 2018-10-23 | Stop reason: HOSPADM

## 2018-10-23 RX ORDER — PROPOFOL 10 MG/ML
VIAL (ML) INTRAVENOUS CONTINUOUS PRN
Status: DISCONTINUED | OUTPATIENT
Start: 2018-10-23 | End: 2018-10-23

## 2018-10-23 RX ORDER — LIDOCAINE HCL/PF 100 MG/5ML
SYRINGE (ML) INTRAVENOUS
Status: DISCONTINUED | OUTPATIENT
Start: 2018-10-23 | End: 2018-10-23

## 2018-10-23 RX ORDER — BISACODYL 10 MG
10 SUPPOSITORY, RECTAL RECTAL DAILY PRN
Status: DISCONTINUED | OUTPATIENT
Start: 2018-10-23 | End: 2018-10-24 | Stop reason: HOSPADM

## 2018-10-23 RX ORDER — METHYLPREDNISOLONE ACETATE 40 MG/ML
INJECTION, SUSPENSION INTRA-ARTICULAR; INTRALESIONAL; INTRAMUSCULAR; SOFT TISSUE
Status: DISCONTINUED | OUTPATIENT
Start: 2018-10-23 | End: 2018-10-23 | Stop reason: HOSPADM

## 2018-10-23 RX ORDER — HYDROMORPHONE HYDROCHLORIDE 2 MG/ML
0.2 INJECTION, SOLUTION INTRAMUSCULAR; INTRAVENOUS; SUBCUTANEOUS EVERY 5 MIN PRN
Status: DISCONTINUED | OUTPATIENT
Start: 2018-10-23 | End: 2018-10-23 | Stop reason: HOSPADM

## 2018-10-23 RX ORDER — CEFAZOLIN SODIUM 1 G/3ML
2 INJECTION, POWDER, FOR SOLUTION INTRAMUSCULAR; INTRAVENOUS
Status: COMPLETED | OUTPATIENT
Start: 2018-10-23 | End: 2018-10-23

## 2018-10-23 RX ORDER — OXYCODONE HYDROCHLORIDE 5 MG/1
5 TABLET ORAL EVERY 4 HOURS PRN
Status: DISCONTINUED | OUTPATIENT
Start: 2018-10-23 | End: 2018-10-24 | Stop reason: HOSPADM

## 2018-10-23 RX ORDER — ACETAMINOPHEN 10 MG/ML
INJECTION, SOLUTION INTRAVENOUS
Status: DISCONTINUED | OUTPATIENT
Start: 2018-10-23 | End: 2018-10-23

## 2018-10-23 RX ORDER — GABAPENTIN 400 MG/1
1200 CAPSULE ORAL NIGHTLY
Status: DISCONTINUED | OUTPATIENT
Start: 2018-10-23 | End: 2018-10-24 | Stop reason: HOSPADM

## 2018-10-23 RX ORDER — SODIUM CHLORIDE 9 MG/ML
INJECTION, SOLUTION INTRAVENOUS CONTINUOUS
Status: DISCONTINUED | OUTPATIENT
Start: 2018-10-23 | End: 2018-10-23

## 2018-10-23 RX ORDER — LISINOPRIL 20 MG/1
20 TABLET ORAL DAILY
Status: DISCONTINUED | OUTPATIENT
Start: 2018-10-24 | End: 2018-10-24 | Stop reason: HOSPADM

## 2018-10-23 RX ORDER — NEOSTIGMINE METHYLSULFATE 1 MG/ML
INJECTION, SOLUTION INTRAVENOUS
Status: DISCONTINUED | OUTPATIENT
Start: 2018-10-23 | End: 2018-10-23

## 2018-10-23 RX ADMIN — FENTANYL CITRATE 25 MCG: 50 INJECTION, SOLUTION INTRAMUSCULAR; INTRAVENOUS at 04:10

## 2018-10-23 RX ADMIN — HYDROMORPHONE HYDROCHLORIDE 0.2 MG: 2 INJECTION, SOLUTION INTRAMUSCULAR; INTRAVENOUS; SUBCUTANEOUS at 11:10

## 2018-10-23 RX ADMIN — OXYCODONE HYDROCHLORIDE AND ACETAMINOPHEN 1 TABLET: 10; 325 TABLET ORAL at 05:10

## 2018-10-23 RX ADMIN — FENTANYL CITRATE 50 MCG: 50 INJECTION INTRAMUSCULAR; INTRAVENOUS at 05:10

## 2018-10-23 RX ADMIN — HYDROMORPHONE HYDROCHLORIDE 0.5 MG: 1 INJECTION, SOLUTION INTRAMUSCULAR; INTRAVENOUS; SUBCUTANEOUS at 11:10

## 2018-10-23 RX ADMIN — OXCARBAZEPINE 300 MG: 150 TABLET, FILM COATED ORAL at 09:10

## 2018-10-23 RX ADMIN — SODIUM CHLORIDE, SODIUM GLUCONATE, SODIUM ACETATE, POTASSIUM CHLORIDE, MAGNESIUM CHLORIDE, SODIUM PHOSPHATE, DIBASIC, AND POTASSIUM PHOSPHATE: .53; .5; .37; .037; .03; .012; .00082 INJECTION, SOLUTION INTRAVENOUS at 01:10

## 2018-10-23 RX ADMIN — ACETAMINOPHEN 1000 MG: 10 INJECTION, SOLUTION INTRAVENOUS at 03:10

## 2018-10-23 RX ADMIN — NEOSTIGMINE METHYLSULFATE 3 MG: 1 INJECTION INTRAVENOUS at 04:10

## 2018-10-23 RX ADMIN — CEFAZOLIN 2 G: 330 INJECTION, POWDER, FOR SOLUTION INTRAMUSCULAR; INTRAVENOUS at 02:10

## 2018-10-23 RX ADMIN — PROPOFOL 150 MG: 10 INJECTION, EMULSION INTRAVENOUS at 01:10

## 2018-10-23 RX ADMIN — ACETAMINOPHEN 1000 MG: 10 INJECTION, SOLUTION INTRAVENOUS at 10:10

## 2018-10-23 RX ADMIN — OXYCODONE HYDROCHLORIDE AND ACETAMINOPHEN 1 TABLET: 10; 325 TABLET ORAL at 09:10

## 2018-10-23 RX ADMIN — SODIUM CHLORIDE: 0.9 INJECTION, SOLUTION INTRAVENOUS at 09:10

## 2018-10-23 RX ADMIN — HYDROMORPHONE HYDROCHLORIDE 0.2 MG: 2 INJECTION, SOLUTION INTRAMUSCULAR; INTRAVENOUS; SUBCUTANEOUS at 12:10

## 2018-10-23 RX ADMIN — ACETAMINOPHEN 1000 MG: 10 INJECTION, SOLUTION INTRAVENOUS at 09:10

## 2018-10-23 RX ADMIN — Medication 20 MG: at 03:10

## 2018-10-23 RX ADMIN — SODIUM CHLORIDE, SODIUM GLUCONATE, SODIUM ACETATE, POTASSIUM CHLORIDE, MAGNESIUM CHLORIDE, SODIUM PHOSPHATE, DIBASIC, AND POTASSIUM PHOSPHATE: .53; .5; .37; .037; .03; .012; .00082 INJECTION, SOLUTION INTRAVENOUS at 02:10

## 2018-10-23 RX ADMIN — GABAPENTIN 1200 MG: 400 CAPSULE ORAL at 09:10

## 2018-10-23 RX ADMIN — MUPIROCIN 1 G: 20 OINTMENT TOPICAL at 09:10

## 2018-10-23 RX ADMIN — PROPOFOL 150 MCG/KG/MIN: 10 INJECTION, EMULSION INTRAVENOUS at 01:10

## 2018-10-23 RX ADMIN — GLYCOPYRROLATE 0.4 MG: 0.2 INJECTION, SOLUTION INTRAMUSCULAR; INTRAVENOUS at 04:10

## 2018-10-23 RX ADMIN — REMIFENTANIL HYDROCHLORIDE 0.2 MCG/KG/MIN: 1 INJECTION, POWDER, LYOPHILIZED, FOR SOLUTION INTRAVENOUS at 01:10

## 2018-10-23 RX ADMIN — FENTANYL CITRATE 100 MCG: 50 INJECTION, SOLUTION INTRAMUSCULAR; INTRAVENOUS at 01:10

## 2018-10-23 RX ADMIN — SODIUM CHLORIDE: 0.9 INJECTION, SOLUTION INTRAVENOUS at 01:10

## 2018-10-23 RX ADMIN — DIAZEPAM 5 MG: 5 TABLET ORAL at 11:10

## 2018-10-23 RX ADMIN — MIDAZOLAM HYDROCHLORIDE 2 MG: 1 INJECTION, SOLUTION INTRAMUSCULAR; INTRAVENOUS at 01:10

## 2018-10-23 RX ADMIN — OXYCODONE HYDROCHLORIDE 5 MG: 5 TABLET ORAL at 10:10

## 2018-10-23 RX ADMIN — DIAZEPAM 5 MG: 5 TABLET ORAL at 06:10

## 2018-10-23 RX ADMIN — DIAZEPAM 5 MG: 5 TABLET ORAL at 10:10

## 2018-10-23 RX ADMIN — ONDANSETRON 4 MG: 2 INJECTION INTRAMUSCULAR; INTRAVENOUS at 03:10

## 2018-10-23 RX ADMIN — ROCURONIUM BROMIDE 50 MG: 10 INJECTION, SOLUTION INTRAVENOUS at 01:10

## 2018-10-23 RX ADMIN — LIDOCAINE HYDROCHLORIDE 75 MG: 20 INJECTION, SOLUTION INTRAVENOUS at 01:10

## 2018-10-23 RX ADMIN — HYDROMORPHONE HYDROCHLORIDE 1 MG: 1 INJECTION, SOLUTION INTRAMUSCULAR; INTRAVENOUS; SUBCUTANEOUS at 11:10

## 2018-10-23 NOTE — PLAN OF CARE
Pt prepped and ready for surgery.  Pt asking if he can get pain pill prior to procedure.  Anesthesia contacted to complete consent at bedside, prior to pain medication administration

## 2018-10-23 NOTE — TRANSFER OF CARE
"Anesthesia Transfer of Care Note    Patient: Tyrone Santos    Procedure(s) Performed: Procedure(s) (LRB):  DISCECTOMY, SPINE, CERVICAL, ANTERIOR APPROACH, WITH FUSION C3-4 (N/A)    Patient location: PACU    Anesthesia Type: general    Transport from OR: Transported from OR on 6-10 L/min O2 by face mask with adequate spontaneous ventilation    Post pain: adequate analgesia    Post assessment: tolerated procedure well and no apparent anesthetic complications    Post vital signs: stable    Level of consciousness: awake, alert and oriented    Nausea/Vomiting: no nausea/vomiting    Complications: none    Transfer of care protocol was followed      Last vitals:   Visit Vitals  /65 (BP Location: Right arm, Patient Position: Lying)   Pulse 63   Temp 36.8 °C (98.3 °F) (Oral)   Resp 16   Ht 5' 8" (1.727 m)   Wt 77.1 kg (170 lb)   SpO2 99%   BMI 25.85 kg/m²     "

## 2018-10-23 NOTE — ANESTHESIA POSTPROCEDURE EVALUATION
"Anesthesia Post Evaluation    Patient: Tyrone Santos    Procedure(s) Performed: Procedure(s) (LRB):  DISCECTOMY, SPINE, CERVICAL, ANTERIOR APPROACH, WITH FUSION C3-4 (N/A)    Final Anesthesia Type: general  Patient location during evaluation: PACU  Patient participation: Yes- Able to Participate  Level of consciousness: awake and alert  Post-procedure vital signs: reviewed and stable  Pain management: adequate  Airway patency: patent  PONV status at discharge: No PONV  Anesthetic complications: no      Cardiovascular status: blood pressure returned to baseline and hemodynamically stable  Respiratory status: spontaneous ventilation, unassisted and room air  Follow-up not needed.        Visit Vitals  /65   Pulse (!) 55   Temp 37.2 °C (98.9 °F) (Temporal)   Resp 16   Ht 5' 8" (1.727 m)   Wt 77.1 kg (170 lb)   SpO2 98%   BMI 25.85 kg/m²       Pain/Kassandra Score: Pain Assessment Performed: Yes (10/23/2018  5:29 PM)  Presence of Pain: complains of pain/discomfort (10/23/2018  5:29 PM)  Pain Rating Prior to Med Admin: 7 (10/23/2018  5:17 PM)  Pain Rating Post Med Admin: 3 (states that this is his baseline) (10/23/2018  5:29 PM)  Kassandra Score: 9 (10/23/2018  5:29 PM)        "

## 2018-10-23 NOTE — INTERVAL H&P NOTE
The patient has been examined and the H&P has been reviewed:    I concur with the findings and no changes have occurred since H&P was written.    Anesthesia/Surgery risks, benefits and alternative options discussed and understood by patient/family.          Active Hospital Problems    Diagnosis  POA    Cervical stenosis of spinal canal [M48.02]  Yes      Resolved Hospital Problems   No resolved problems to display.

## 2018-10-23 NOTE — BRIEF OP NOTE
[facility]  Neurosurgery  Operative Note    SUMMARY      Date of Procedure: 10/23/2018     Procedure: Procedure(s) (LRB):  DISCECTOMY, SPINE, CERVICAL, ANTERIOR APPROACH, WITH FUSION C3-4 (N/A)     Surgeon(s) and Role:     * Jw Anne MD - Primary    Assisting Surgeon: Mo Silva MD    Pre-Operative Diagnosis: Cervical spinal stenosis [M48.02]  Cervical myofascial pain syndrome [M79.18]  Osteoarthritis of cervical spine with myelopathy [M47.12]    Post-Operative Diagnosis: Post-Op Diagnosis Codes:     * Cervical spinal stenosis [M48.02]     * Cervical myofascial pain syndrome [M79.18]     * Osteoarthritis of cervical spine with myelopathy [M47.12]    Anesthesia: General    Technical Procedures Used: C3-4 ACDF    Description of the Findings of the Procedure: see full op note    Complications: No    Estimated Blood Loss (EBL): 20 mL           Specimens:   Specimen (12h ago, onward)    None           Implants:   Implant Name Type Inv. Item Serial No.  Lot No. LRB No. Used   SPACER ARTIX-C  CORTICAL 6MM - [id number]  SPACER ARTIX-C  CORTICAL 6MM  X SPINE SYSTEMS [id number] N/A 1   OsteoSelect (DBM) Putty     [id number] N/A 1   12mm Zavation Plate    [employer]  N/A 1   4.0 x 16mm Screws    [employer]  N/A 4              Condition: Good    Disposition: PACU - hemodynamically stable.

## 2018-10-23 NOTE — PROGRESS NOTES
Spoke with Diya Madrid, verified to send patient to XRAY before transfer to phase 2 of recovery and then discharge home with brace.

## 2018-10-24 ENCOUNTER — ANESTHESIA (OUTPATIENT)
Dept: SURGERY | Facility: HOSPITAL | Age: 53
DRG: 472 | End: 2018-10-24
Payer: COMMERCIAL

## 2018-10-24 VITALS
HEIGHT: 68 IN | WEIGHT: 170 LBS | DIASTOLIC BLOOD PRESSURE: 71 MMHG | HEART RATE: 70 BPM | RESPIRATION RATE: 18 BRPM | OXYGEN SATURATION: 88 % | BODY MASS INDEX: 25.76 KG/M2 | TEMPERATURE: 98 F | SYSTOLIC BLOOD PRESSURE: 93 MMHG

## 2018-10-24 PROCEDURE — 25000003 PHARM REV CODE 250: Performed by: STUDENT IN AN ORGANIZED HEALTH CARE EDUCATION/TRAINING PROGRAM

## 2018-10-24 PROCEDURE — 25000003 PHARM REV CODE 250: Performed by: NEUROLOGICAL SURGERY

## 2018-10-24 PROCEDURE — 63600175 PHARM REV CODE 636 W HCPCS: Performed by: NEUROLOGICAL SURGERY

## 2018-10-24 PROCEDURE — 63600175 PHARM REV CODE 636 W HCPCS: Performed by: STUDENT IN AN ORGANIZED HEALTH CARE EDUCATION/TRAINING PROGRAM

## 2018-10-24 PROCEDURE — A4216 STERILE WATER/SALINE, 10 ML: HCPCS | Performed by: NEUROLOGICAL SURGERY

## 2018-10-24 PROCEDURE — 97161 PT EVAL LOW COMPLEX 20 MIN: CPT

## 2018-10-24 PROCEDURE — 97165 OT EVAL LOW COMPLEX 30 MIN: CPT

## 2018-10-24 RX ORDER — OXYCODONE AND ACETAMINOPHEN 10; 325 MG/1; MG/1
1 TABLET ORAL
Qty: 60 TABLET | Refills: 0 | Status: SHIPPED | OUTPATIENT
Start: 2018-10-24 | End: 2018-11-05 | Stop reason: ALTCHOICE

## 2018-10-24 RX ORDER — DIAZEPAM 5 MG/1
5 TABLET ORAL EVERY 6 HOURS PRN
Qty: 60 TABLET | Refills: 0 | Status: SHIPPED | OUTPATIENT
Start: 2018-10-24 | End: 2021-02-24

## 2018-10-24 RX ORDER — OXYCODONE HYDROCHLORIDE 15 MG/1
15 TABLET ORAL EVERY 6 HOURS PRN
COMMUNITY
End: 2021-02-24

## 2018-10-24 RX ADMIN — OXYCODONE HYDROCHLORIDE AND ACETAMINOPHEN 1 TABLET: 10; 325 TABLET ORAL at 08:10

## 2018-10-24 RX ADMIN — ACETAMINOPHEN 1000 MG: 10 INJECTION, SOLUTION INTRAVENOUS at 05:10

## 2018-10-24 RX ADMIN — OXYCODONE HYDROCHLORIDE AND ACETAMINOPHEN 1 TABLET: 10; 325 TABLET ORAL at 01:10

## 2018-10-24 RX ADMIN — BUPROPION HYDROCHLORIDE 150 MG: 150 TABLET, FILM COATED, EXTENDED RELEASE ORAL at 08:10

## 2018-10-24 RX ADMIN — SODIUM CHLORIDE: 0.9 INJECTION, SOLUTION INTRAVENOUS at 05:10

## 2018-10-24 RX ADMIN — OXCARBAZEPINE 300 MG: 150 TABLET, FILM COATED ORAL at 08:10

## 2018-10-24 RX ADMIN — AMLODIPINE BESYLATE 10 MG: 10 TABLET ORAL at 08:10

## 2018-10-24 RX ADMIN — OXYCODONE HYDROCHLORIDE AND ACETAMINOPHEN 1 TABLET: 10; 325 TABLET ORAL at 05:10

## 2018-10-24 RX ADMIN — Medication 3 ML: at 05:10

## 2018-10-24 RX ADMIN — SENNOSIDES AND DOCUSATE SODIUM 1 TABLET: 8.6; 5 TABLET ORAL at 08:10

## 2018-10-24 RX ADMIN — OXYCODONE HYDROCHLORIDE 5 MG: 5 TABLET ORAL at 04:10

## 2018-10-24 RX ADMIN — DIAZEPAM 5 MG: 5 TABLET ORAL at 05:10

## 2018-10-24 RX ADMIN — HEPARIN SODIUM 5000 UNITS: 5000 INJECTION, SOLUTION INTRAVENOUS; SUBCUTANEOUS at 05:10

## 2018-10-24 RX ADMIN — HYDROMORPHONE HYDROCHLORIDE 0.5 MG: 1 INJECTION, SOLUTION INTRAMUSCULAR; INTRAVENOUS; SUBCUTANEOUS at 03:10

## 2018-10-24 RX ADMIN — LISINOPRIL 20 MG: 20 TABLET ORAL at 08:10

## 2018-10-24 NOTE — PLAN OF CARE
Patient discharged home.  The patient does not have any home needs.  Family provided transportation home.  Neurosurgery clinic to schedule follow up appointment.    Future Appointments   Date Time Provider Department Center   11/5/2018  9:40 AM Fide Mack RN [facility]   12/10/2018 12:00 PM Jw Anne MD [facility]        10/24/18 1506   Final Note   Assessment Type Final Discharge Note   Anticipated Discharge Disposition Home   Hospital Follow Up  Appt(s) scheduled? (Neurosrugery clinic to schedule follow up appointment.)   Discharge plans and expectations educations in teach back method with documentation complete? Yes

## 2018-10-24 NOTE — HPI
Patient complains of left-sided neck pain that starts from his left ear and radiates down to his left shoulder. Patient reports gradual onset of this pain from 1.5 years ago. He is unable to identify a specific precipitating event. Patient reports his pain as constant aching pain that is 9/10 in intensity. His pain worsens with neck rotation and nothing makes it better. Past treatments include physical therapy, chiropractor, cervical spine ESIs, narcotics (percocet). He states that only percocet has given him any relief. Associated signs and symptoms are negative for change in fine motor skills, negative for dropping objects from his hands, negative for balance difficulty or falls within the last 2 years, negative for worsened handwriting (patient is left-handed).

## 2018-10-24 NOTE — PT/OT/SLP EVAL
Physical Therapy Evaluation and Discharge Note    Patient Name:  Tyrone Santos   MRN:  [id number]    Recommendations:     Discharge Recommendations:  home   Discharge Equipment Recommendations: none   Barriers to discharge: none    Assessment:     Tyrone Santos is a 53 y.o. male admitted with a medical diagnosis of C3-C4 discectomy with fusion.  At this time, patient is functioning at their prior level of function and does not require further acute PT services. Strength throughout trunk and all extremities are found to be WFL. Pt does not require physical therapy services at this time due to decreased assistance required and high level of function. Anticipated discharge to home.    Recent Surgery: Procedure(s) (LRB):  DISCECTOMY, SPINE, CERVICAL, ANTERIOR APPROACH, WITH FUSION C3-4 (N/A) 1 Day Post-Op    Plan:     During this hospitalization, patient does not require further acute PT services.  Please re-consult if situation changes.      Subjective     Chief Complaint: pain  Patient/Family Comments/goals: Pt states he is ready to go home  Pain/Comfort:  · Pain Rating 1: 5/10  · Location - Side 1: Left  · Location 1: neck  · Pain Addressed 1: Reposition  · Pain Rating Post-Intervention 1: 5/10    Patients cultural, spiritual, Faith conflicts given the current situation: no conflicts    Living Environment:  Pt lives in a 1-story home with no MAGALY (small threshold at front door). [facility]  Prior to admission, patients level of function was independent.  Equipment used at home: none. Upon discharge, patient will have assistance from fiancee.    Objective:     Communicated with RN prior to session.  Patient found laying supine upon PT entry to room found with: peripheral IV, CPAP     General Precautions: Standard, fall   Orthopedic Precautions:N/A   Braces: N/A     Exams:  · Cognitive Exam:  Patient is oriented to Person, Place, Time and Situation  · Fine Motor Coordination:    · -        Intact  · Gross Motor Coordination:  WFL  · Postural Exam:  Patient presented with the following abnormalities:    · -       No postural abnormalities identified  · Sensation:    · -       Intact  · Skin Integrity/Edema:      · -       Skin integrity: Visible skin intact  · -       Edema: None noted    · RLE ROM: WFL  · RLE Strength: WFL  · LLE ROM: WFL  · LLE Strength: WFL    Functional Mobility:  Bed mobility:  Sit>supine: x1 trial(s); independent    Transfers:  Sit>stand: x1 trial(s) from bed; supervision  Stand>sit: x1 trial(s) to bed; supervision    Gait:  · Distance: x1 trial(s); 200ft with SBA from PT  · AD: none  · Gait deviations: decreased arm swing bilaterally    Balance:  · Static sitting: independent  · Dyamic sitting: independent  · Static standing: supervision  · Dynamic standing: supervision        AM-PAC 6 CLICK MOBILITY  Total Score:22       Therapeutic Activities and Exercises:   Functional mobility performed as described above. No exercises performed due to pt high level of function. Pt educated on BLE exercises to perform while sitting up in chair to maintain endurance. All questions and needs addressed.     AM-PAC 6 CLICK MOBILITY  Total Score:22     Patient left up in chair with all lines intact, call button in reach and RN notified.    GOALS:   Multidisciplinary Problems     Physical Therapy Goals     Not on file          Multidisciplinary Problems (Resolved)        Problem: Physical Therapy Goal    Goal Priority Disciplines Outcome Goal Variances Interventions   Physical Therapy Goal   (Resolved)     PT, PT/OT Outcome(s) achieved                     History:     Past Medical History:   Diagnosis Date    Bipolar disorder     GERD (gastroesophageal reflux disease)     Hyperlipidemia     Hypertension     Overdose of illicit drug     quit in the early 2000's       Past Surgical History:   Procedure Laterality Date    APPENDECTOMY      around 2012    BLOCK-NERVE-MEDIAL BRANCH-CERVICAL Left  11/2/2017    [facility]    SCAPHOID FRACTURE SURGERY      had to have a bone graft -early  1990's     WRIST SURGERY      6 years ago       Clinical Decision Making:     History  Co-morbidities and personal factors that may impact the plan of care Examination  Body Structures and Functions, activity limitations and participation restrictions that may impact the plan of care Clinical Presentation   Decision Making/ Complexity Score   Co-morbidities:   [] Time since onset of injury / illness / exacerbation  [] Status of current condition  []Patient's cognitive status and safety concerns    [] Multiple Medical Problems (see med hx)  Personal Factors:   [] Patient's age  [] Prior Level of function   [] Patient's home situation (environment and family support)  [] Patient's level of motivation  [] Expected progression of patient      HISTORY:(criteria)    [x] 12811 - no personal factors/history    [] 44301 - has 1-2 personal factor/comorbidity     [] 58573 - has >3 personal factor/comorbidity     Body Regions:  [] Objective examination findings  [] Head     []  Neck  [] Trunk   [] Upper Extremity  [] Lower Extremity    Body Systems:  [] For communication ability, affect, cognition, language, and learning style: the assessment of the ability to make needs known, consciousness, orientation (person, place, and time), expected emotional /behavioral responses, and learning preferences (eg, learning barriers, education  needs)  [] For the neuromuscular system: a general assessment of gross coordinated movement (eg, balance, gait, locomotion, transfers, and transitions) and motor function  (motor control and motor learning)  [] For the musculoskeletal system: the assessment of gross symmetry, gross range of motion, gross strength, height, and weight  [] For the integumentary system: the assessment of pliability(texture), presence of scar formation, skin color, and skin integrity  [] For  cardiovascular/pulmonary system: the assessment of heart rate, respiratory rate, blood pressure, and edema     Activity limitations:    [] Patient's cognitive status and saf ety concerns          [x] Status of current condition      [] Weight bearing restriction  [] Cardiopulmunary Restriction    Participation Restrictions:   [] Goals and goal agreement with the patient     [] Rehab potential (prognosis) and probable outcome      Examination of Body System: (criteria)    [x] 74881 - addressing 1-2 elements    [] 22962 - addressing a total of 3 or more elements     [] 74296 -  Addressing a total of 4 or more elements         Clinical Presentation: (criteria)  Stable - 78798     On examination of body system using standardized tests and measures patient presents with 1-2 elements from any of the following: body structures and functions, activity limitations, and/or participation restrictions.  Leading to a clinical presentation that is considered stable and/or uncomplicated                              Clinical Decision Making  (Eval Complexity):  Low- 67431     Time Tracking:     PT Received On: 10/24/18  PT Start Time: 0741     PT Stop Time: 0752  PT Total Time (min): 11 min     Billable Minutes: Evaluation 11      Aleksander Brito, [facility]  10/24/2018

## 2018-10-24 NOTE — ASSESSMENT & PLAN NOTE
Pt is 53 y./o male with cervical stenosis now s/p C3-4 ACDF POD#1    -Pt neurologically stable  -Neuro checks q4h  -Post op imaging satisfactory  -Adult regular diet  -Voiding appropriately  -Pain controlled on current regimen.    -C-collar to be worn at all times except when flat in bed  -TEDs/SCDs for dvt prophylaxis  -IS to bedside. Pt should use every hour while awake  -Pt stable for discharge. All discharge instructions were explained to pt and family and all questions were answered. Follow up appointments will be scheduled. Pt instructed to call NSGY clinic with any new/worsening symptoms.

## 2018-10-24 NOTE — DISCHARGE SUMMARY
[facility]  Neurosurgery  Discharge Summary      Patient Name: Tyrone Santos  MRN: [id number]  Admission Date: 10/23/2018  Hospital Length of Stay: 1 days  Discharge Date and Time:  10/24/2018 6:46 PM  Attending Physician: No att. providers found   Discharging Provider: Rosemary Palacios PA-C  Primary Care Provider: Yoon Wong MD    HPI:    Patient complains of left-sided neck pain that starts from his left ear and radiates down to his left shoulder. Patient reports gradual onset of this pain from 1.5 years ago. He is unable to identify a specific precipitating event. Patient reports his pain as constant aching pain that is 9/10 in intensity. His pain worsens with neck rotation and nothing makes it better. Past treatments include physical therapy, chiropractor, cervical spine ESIs, narcotics (percocet). He states that only percocet has given him any relief. Associated signs and symptoms are negative for change in fine motor skills, negative for dropping objects from his hands, negative for balance difficulty or falls within the last 2 years, negative for worsened handwriting (patient is left-handed).         Procedure(s) (LRB):  DISCECTOMY, SPINE, CERVICAL, ANTERIOR APPROACH, WITH FUSION C3-4 (N/A)     Hospital Course: The patient was taken to the OR for the above mentioned procedure  and tolerated the procedure well.  After surgery, the patient was extubated and taken to recovery in stable condition.  After observation in recovery, the patient was transferred to the neurosurgical floor.  At time of discharge the patient was neurologically stable, was afebrile, and vital signs were stable.  The patient was tolerating a diet and voiding without difficulty.  The patient is being discharged to their home.  Discharge instructions were verbally discussed with the patient, including wound care and follow up appointments.  The patient was also given a discharge instruction sheet explaining the  aforementioned discussion.  The patient verbalized understanding of instructions and agreed to the plan.    Consults:     Significant Diagnostic Studies: Labs:   BMP:   Recent Labs   Lab 10/23/18  0939   GLU 97      K 3.9      CO2 24   BUN 13   CREATININE 0.8   CALCIUM 9.5    and CBC   Recent Labs   Lab 10/23/18  0939   WBC 4.86   HGB 12.8*   HCT 38.9*          Pending Diagnostic Studies:     None        Final Active Diagnoses:    Diagnosis Date Noted POA    PRINCIPAL PROBLEM:  Cervical stenosis of spinal canal [M48.02] 10/23/2018 Yes      Problems Resolved During this Admission:      Discharged Condition: good    Disposition: Home or Self Care    Follow Up:  Follow-up Information     Jw Anne MD In 2 weeks.    Specialty:  Neurosurgery  Why:  For wound re-check w/ PA  Contact information:  [address]  [facility] [address]  [phone]                 Patient Instructions:      Diet general     Diet Adult Regular     Diet Adult Regular     Call MD for:  extreme fatigue     Call MD for:  persistent dizziness or light-headedness     Call MD for:  hives     Call MD for:  redness, tenderness, or signs of infection (pain, swelling, redness, odor or green/yellow discharge around incision site)     Call MD for:  difficulty breathing, headache or visual disturbances     Call MD for:  severe uncontrolled pain     Call MD for:  persistent nausea and vomiting     Call MD for:  temperature >100.4     No dressing needed     No dressing needed   Order Comments: See ACDF Spine discharge instructions     Call MD for:  temperature >100.4     Call MD for:  persistent nausea and vomiting or diarrhea     Call MD for:  severe uncontrolled pain     Call MD for:  redness, tenderness, or signs of infection (pain, swelling, redness, odor or green/yellow discharge around incision site)     Call MD for:  difficulty breathing or increased cough     Call MD for:  severe persistent headache     Call MD  for:  worsening rash     Call MD for:  persistent dizziness, light-headedness, or visual disturbances     Call MD for:  increased confusion or weakness     Activity as tolerated     Activity per ACDF Spine discharge instructions     Notify your health care provider if you experience any of the following:  increased confusion or weakness     Notify your health care provider if you experience any of the following:  persistent dizziness, light-headedness, or visual disturbances     Notify your health care provider if you experience any of the following:  worsening rash     Notify your health care provider if you experience any of the following:  severe persistent headache     Notify your health care provider if you experience any of the following:  difficulty breathing or increased cough     Notify your health care provider if you experience any of the following:  redness, tenderness, or signs of infection (pain, swelling, redness, odor or green/yellow discharge around incision site)     Notify your health care provider if you experience any of the following:  severe uncontrolled pain     Notify your health care provider if you experience any of the following:  persistent nausea and vomiting or diarrhea     Notify your health care provider if you experience any of the following:  temperature >100.4     Activity as tolerated     Medications:  Reconciled Home Medications:      Medication List      START taking these medications    diazePAM 5 MG tablet  Commonly known as:  VALIUM  Take 1 tablet (5 mg total) by mouth every 6 (six) hours as needed (muscle spasm).     oxyCODONE-acetaminophen  mg per tablet  Commonly known as:  PERCOCET  Take 1 tablet by mouth every 4 to 6 hours as needed.  PT refused prescription due to previous scripts from pain management      CHANGE how you take these medications    aspirin 81 MG EC tablet  Commonly known as:  ECOTRIN  Take 1 tablet (81 mg total) by mouth every morning. DO NOT TAKE  FOR 7 DAYS AFTER SURGERY  What changed:  additional instructions        CONTINUE taking these medications    amLODIPine 10 MG tablet  Commonly known as:  NORVASC  Take 10 mg by mouth every morning.     buPROPion 150 MG TB24 tablet  Commonly known as:  WELLBUTRIN XL  Take 150 mg by mouth every morning.     gabapentin 600 MG tablet  Commonly known as:  NEURONTIN  Take 1,800 mg by mouth every evening.     lisinopril 20 MG tablet  Commonly known as:  PRINIVIL,ZESTRIL  Take 20 mg by mouth every morning.     omeprazole 20 MG capsule  Commonly known as:  PRILOSEC  Take 20 mg by mouth every morning.     OXcarbazepine 300 MG Tab  Commonly known as:  TRILEPTAL  Take 300 mg by mouth 2 (two) times daily.     simvastatin 10 MG tablet  Commonly known as:  ZOCOR  Take 10 mg by mouth every morning.        ASK your doctor about these medications    oxyCODONE 15 MG Tab  Commonly known as:  ROXICODONE  Take 15 mg by mouth every 6 (six) hours as needed for Pain.            Rosemary Palacios PA-C  Neurosurgery  [facility]

## 2018-10-24 NOTE — PLAN OF CARE
CM met with patient and S/O this am to obtain discharge planning assessment.  Patient is POD 1 for anterior cervical diskectomy and fusion.  Planned discharge is home with family - Plan (A) or home with family and home health - Plan (B).    PCP:  Yoon Wong MD     Payor:  Payor: GENERIC COMMERCIAL / Plan: GENERIC COMMERCIAL / Product Type: Commercial /      Pharmacy:    [facility]/pharmacy #[id number] - [place] - [address] [address]  Phone: [phone] Fax: [phone]       10/24/18 1500   Discharge Assessment   Assessment Type Discharge Planning Assessment   Confirmed/corrected address and phone number on facesheet? Yes   Assessment information obtained from? Patient   Expected Length of Stay (days) 1   Communicated expected length of stay with patient/caregiver yes   Prior to hospitilization cognitive status: Alert/Oriented   Prior to hospitalization functional status: Independent   Current cognitive status: Alert/Oriented   Current Functional Status: Independent   Lives With spouse   Able to Return to Prior Arrangements yes   Is patient able to care for self after discharge? Yes   Who are your caregiver(s) and their phone number(s)? Aileen Hester - S/O [phone]   Patient's perception of discharge disposition home or selfcare   Readmission Within The Last 30 Days no previous admission in last 30 days   Patient currently being followed by outpatient case management? No   Patient currently receives any other outside agency services? No   Equipment Currently Used at Home none   Do you have any problems affording any of your prescribed medications? No   Is the patient taking medications as prescribed? yes   Does the patient have transportation home? Yes   Transportation Available family or friend will provide   Does the patient receive services at the Coumadin Clinic? No   Discharge Plan A Home with family   Discharge Plan B Home with family;Home Health   Patient/Family In Agreement  With Plan yes

## 2018-10-24 NOTE — DISCHARGE INSTRUCTIONS
Please follow ONLY the instructions that are checked below.    Activity Restrictions:  [x]  Return to work will be determined on an individual basis.  [x]  No lifting greater than 10 pounds.  [x]  Avoid bending and twisting the area of your surgery more than 45 degrees from neutral position in any direction.  [x]  No driving or operating machinery:  [x]  until cleared by your surgeon.  [x]  while taking narcotic pain medications or muscle relaxants.  []  No cervical collar, soft collar, or lumbar brace required.  []  Wear your brace at all times. You may be given an extra brace or soft collar to wear when showering.  [x]  Wear your brace at all times except when flat in bed.  []  Wear brace for comfort only.  [x]  Increase ambulation over the next 2 weeks so that you are walking 2 miles per day at 2 weeks post-operatively.  [x]  Walk on paved surfaces only. It is okay to walk up and down stairs while holding onto a side rail.  [x]  No sexual activity for 2-3 weeks.    Discharge Medication/Follow-up:  [x]  Please refer to discharge medication reconciliation form.  [x]  Do not take ANY non-steroidal anti-inflammatory drugs (NSAIDS), including the following: ibuprofen, naprosyn, Aleve, Advil, Indocin, Mobic, or Celebrex for:  []  4 weeks  [x]  8 weeks  []  6 months  [x]  Prescriptions for appropriate medication will be given upon discharge.   []  Pain control:             []  Muscle relaxer:            [x]  Take docusate (Colace 100 mg): take one capsule a day as needed for constipation. You can get this over the counter.  [x]  Follow-up appointment:  [x]  10-14 days post-op for wound check by physician assistant/nurse- 11/5/18  [x]  4-6 weeks with MD: 12/10/18 with Dr. Jean  [x]  with x-rays  []  without x-rays  []  An appointment will be mailed to you.    Wound Care:  []  Remove dressing or bandaid in    days.  [x]  No bandage required. Keep your incision open to the air.  [x]  You may shower on the 2nd day after  your surgery. Have the force of water hit you opposite from the incision. Pat the incision dry after your shower; do not scrub the incision.  [x]  You cannot take a bath until 8 weeks after surgery.  []  Apply bacitracin to incision twice a day for    more days.    Call your doctor or go to the Emergency Room for any signs of infection, including: increased redness, drainage, pain, or fever (temperature ?101.5 for 24 hours). Call your doctor or go to the Emergency Room if there are any localized neurological changes; problems with speech, vision, numbness, tingling, weakness, or severe headache; or for other concerns.    Special Instructions:  [x]  No use of tobacco products.  [x]  Diet: Please eat a regular diet as tolerated.  []  Other diet:              Specific physician instructions:           Physicians need 3 days' notice for pain medicine to be refilled. Pain medicine will only be refilled between 8 AM and 5 PM, Monday through Friday, due to Food and Drug Administration regulation of documentation.    If you have any questions about this form, please call [phone].    Form No. 73572 (Revised 10/31/2013)

## 2018-10-24 NOTE — PLAN OF CARE
Problem: Patient Care Overview  Goal: Plan of Care Review  POC reviewed with pt. Verbalized understanding. VSS. Neuro exam remains unchanged. Fall precautions maintained. Pain managed with PRN meds. SCDs on. Pt advised to call staff for assistance. Will continue to monitor.

## 2018-10-24 NOTE — PROGRESS NOTES
Pt arrived on unit from PACU via stretcher. Walked to bed. NAD noted. VSS. Plan of care reviewed with pt and fiance. Pt oriented to room, call bell within reach, and instructed to call for any assistance. Pt verbalized understanding. SCDs on. Will continue to monitor.

## 2018-10-24 NOTE — PROGRESS NOTES
[facility]  Neurosurgery  Progress Note    Subjective:     History of Present Illness:  Patient complains of left-sided neck pain that starts from his left ear and radiates down to his left shoulder. Patient reports gradual onset of this pain from 1.5 years ago. He is unable to identify a specific precipitating event. Patient reports his pain as constant aching pain that is 9/10 in intensity. His pain worsens with neck rotation and nothing makes it better. Past treatments include physical therapy, chiropractor, cervical spine ESIs, narcotics (percocet). He states that only percocet has given him any relief. Associated signs and symptoms are negative for change in fine motor skills, negative for dropping objects from his hands, negative for balance difficulty or falls within the last 2 years, negative for worsened handwriting (patient is left-handed).         Post-Op Info:  Procedure(s) (LRB):  DISCECTOMY, SPINE, CERVICAL, ANTERIOR APPROACH, WITH FUSION C3-4 (N/A)   1 Day Post-Op     Interval History: Pt resting in bed comfortably. States he has been ambulating around room without difficulty. Pain well controlled. Tolerating diet and voiding. Denies weakness or loss of sensation.     Medications:  Continuous Infusions:  Scheduled Meds:  PRN Meds:     Review of Systems  Objective:     Weight: 77.1 kg (170 lb)  Body mass index is 25.85 kg/m².  Vital Signs (Most Recent):  Temp: 97.9 °F (36.6 °C) (10/24/18 1202)  Pulse: 70 (10/24/18 1202)  Resp: 18 (10/24/18 1202)  BP: 93/71 (10/24/18 1202)  SpO2: (!) 88 % (10/24/18 1202) Vital Signs (24h Range):  Temp:  [96.2 °F (35.7 °C)-98.5 °F (36.9 °C)] 97.9 °F (36.6 °C)  Pulse:  [55-77] 70  Resp:  [17-18] 18  SpO2:  [88 %-98 %] 88 %  BP: ()/(68-79) 93/71                           Neurosurgery Physical Exam  General: well developed, well nourished, no distress.   Head: normocephalic, atraumatic  Neurologic: Alert and oriented. Thought content  appropriate.  GCS: Motor: 6/Verbal: 5/Eyes: 4 GCS Total: 15  Mental Status: Awake, Alert, Oriented x 4  Language: No aphasia  Speech: No dysarthria  Cranial nerves: face symmetric, tongue midline, CN II-XII grossly intact.   Eyes: pupils equal, round, reactive to light with accomodation, EOMI.   Pulmonary: normal respirations, no signs of respiratory distress  Abdomen: soft, non-distended, not tender to palpation  Sensory: intact to light touch throughout    Motor Strength:Moves all extremities spontaneously with good tone.  Full strength upper and lower extremities. No abnormal movements seen.     Strength  Deltoids Triceps Biceps Wrist Extension Wrist Flexion Hand    Upper: R 5/5 5/5 5/5 5/5 5/5 5/5    L 5/5 5/5 5/5 5/5 5/5 5/5     Iliopsoas Quadriceps Knee  Flexion Tibialis  anterior Gastro- cnemius EHL   Lower: R 5/5 5/5 5/5 5/5 5/5 5/5    L 5/5 5/5 5/5 5/5 5/5 5/5     Mcginnis: absent  Clonus: absent  Babinski: absent  Pulses: 2+ and symmetric radial and dorsalis pedis.  Skin: Skin is warm, dry and intact.  Incision c/d/i with no surrounding erythema, edema, or drainage    Significant Labs:  Recent Labs   Lab 10/23/18  0939   GLU 97      K 3.9      CO2 24   BUN 13   CREATININE 0.8   CALCIUM 9.5     Recent Labs   Lab 10/23/18  0939   WBC 4.86   HGB 12.8*   HCT 38.9*        Recent Labs   Lab 10/23/18  0939   INR 0.9   APTT 22.9     Microbiology Results (last 7 days)     ** No results found for the last 168 hours. **        Recent Lab Results     None        All pertinent labs from the last 24 hours have been reviewed.    Significant Diagnostics:  No new imaging for review    Assessment/Plan:     * Cervical stenosis of spinal canal    Pt is 53 y./o male with cervical stenosis now s/p C3-4 ACDF POD#1    -Pt neurologically stable  -Neuro checks q4h  -Post op imaging satisfactory  -Adult regular diet  -Voiding appropriately  -Pain controlled on current regimen.    -C-collar to be worn at all  times except when flat in bed  -TEDs/SCDs for dvt prophylaxis  -IS to bedside. Pt should use every hour while awake  -Pt stable for discharge. All discharge instructions were explained to pt and family and all questions were answered. Follow up appointments will be scheduled. Pt instructed to call NSGY clinic with any new/worsening symptoms.            Rosemary Palacios PA-C  Neurosurgery  [facility]

## 2018-10-24 NOTE — PLAN OF CARE
Problem: Physical Therapy Goal  Goal: Physical Therapy Goal  Outcome: Outcome(s) achieved Date Met: 10/24/18  Evaluation performed. Pt discharged due to no acute PT needs.    Aleksander Brito, JORGE  10/24/2018

## 2018-10-24 NOTE — OP NOTE
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  C3-4 dynamic instability.    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  C3-4 dynamic instability.    PROCEDURES PERFORMED:   1.- Anterior cervical diskectomy, decompression and fusion C3-4 with bilateral foraminotomies.   2.- Anterior cervical arthrodesis with Use of cadaveric bone graft C3-4: [id number] mm 7degree.   3.- Use of allograft DBX.   4.- Anterior cervical instrumentation C3-4, using 12 mm lordotic cervical plate (Zavation plate, spinewave).  5.- Neuromonitoring with SSEP, MEP.  6.- Microscopic technique.     Surgeon(s) and Role:     * Jw Anne MD - Primary    RESIDENT:  Mo Silva MD (Neurosurgery resident)    ANESTHESIA: General    ESTIMATED BLOOD LOSS: 20 cc    IVF: 2 Lt    CLINICAL HISTORY AND INDICATIONS FOR SURGERY: Tyrone Santos is a 53 y.o. male who developed signs and symptoms of cervical myelopathy with a herniated disk and dynamic instability at C3-4. Preoperative MRI demonstrated disk herniation at C3-C4, spinal stenosis with cord compression. he failed all non surgical treatment options. Given the progression of the symptoms, surgery was offered and after discussing all the attendant risks, benefits, and potential complication of surgery. he  wanted to proceed with surgery and consented to surgery.     OPERATIVE PROCEDURE: The patient was brought into the Operating Room, identified and general anesthesia was induced using endotracheal intubation. We set up for intraoperative SSEPs and free-running EMGs and baseline parameters were obtained. The patient was then gently logrolled in the supine  position and   all neurocutaneous pressure points were checked and padded.  We used lateral x-ray to localize the C3-C4 level and right transverse incision was designed. Anterior cervical region was prepped and draped using sterile technique.    Time out was carried. Perioperative antibiotics were given.   We incised the skin, subcutaneous tissue, and then got to the platysma. We   dissected the platysma along its fibers, undermined the platysma as well as the   Deep cutaneous tissue layer of the skin. This gave us good mobilization of the tissues up and down. We then found the medial edge of the sternocleidomastoid muscle. We found the omohyoid, which was disescted along its fibers for easy mobilization. We then went through the avascular tissue plane, palpated for the carotid sheath, which was maintained laterally, and the tracheo-esophageal complex was maintained medially. We then got into the anterior cervical region.  We placed a spinal needle and obtained another lateral x-ray to confirm that we are at C3-C4 level. We then used a monopolar cautery to undermine the longus colli bilaterally. We placed Trimline retractors. We used Leksell rongeur to remove the osteophyte overlying the C3-C4 disk space.    We used a #15 blade to cut into the C3-C4 disk space and then placed Kenly pin distractors. We distracted the disk spaces. We brought in the operating microscope and under the operative microscope, we used a combination of angled curette and pituitary forceps to do a complete diskectomy at C3-C4. We undercut the osteophyte bridging the disk space to get to better access to the disk space and remove all the remaining disk materials. We got to the posterior longitudinal ligament. We used a nerve hook to enter the PLL laterally and then used a combination of Kerrisons 1, 2, and 3 to remove the PLL together with the bridging osteophytes pushing on the spinal cord and the nerve roots. We were able to do a complete  removal of the PLL as well as do complete osteophytectomy, fully decompressing the spinal cord and the nerve roots at C3-C4. We used a nerve hook to probe into the foramina and they were fully decompressed. We had good decompression of the nerve roots at these levels as well as the central spinal canal. We used Gelfoam soaked in thrombin for hemostasis throughout the surgery. We used angled curette and a rasp to prepare the endplates. We then chose a 6 mm lordotic allograft nd placed it at C3-C4 interbody space to create the C3-4 anterior arthodesis.     We chose a 12*mm plate and place it in the anterior cervical region, at the C3-C4,  using 4 by 16 mm self-drilling screws to fix to the bone from C3-4. Final AP and lateral x-ray were satisfactory. Neuromonitoring signals were at baseline at the end of the procedure.     We withdrew the Trimline retractors and cauterized any bleeding points on our way out. We then used 3-0 Vicryl stitch to approximate the platysma and the subcuticular tissue and we used skin glue to approximate the skin edges. We placed Telfa and Tegaderm for final dressing of the incision.     The patient was repositioned supine on the hospital bed, weaned off general   anesthesia and extubated. he  was moving both upper and lower extremities   symmetrically and strongly and was transferred to the Recovery Room in stable   condition.     Dr. Jean was present during the entire procedure.

## 2018-10-24 NOTE — SUBJECTIVE & OBJECTIVE
Interval History: Pt resting in bed comfortably. States he has been ambulating around room without difficulty. Pain well controlled. Tolerating diet and voiding. Denies weakness or loss of sensation.     Medications:  Continuous Infusions:  Scheduled Meds:  PRN Meds:     Review of Systems  Objective:     Weight: 77.1 kg (170 lb)  Body mass index is 25.85 kg/m².  Vital Signs (Most Recent):  Temp: 97.9 °F (36.6 °C) (10/24/18 1202)  Pulse: 70 (10/24/18 1202)  Resp: 18 (10/24/18 1202)  BP: 93/71 (10/24/18 1202)  SpO2: (!) 88 % (10/24/18 1202) Vital Signs (24h Range):  Temp:  [96.2 °F (35.7 °C)-98.5 °F (36.9 °C)] 97.9 °F (36.6 °C)  Pulse:  [55-77] 70  Resp:  [17-18] 18  SpO2:  [88 %-98 %] 88 %  BP: ()/(68-79) 93/71                           Neurosurgery Physical Exam  General: well developed, well nourished, no distress.   Head: normocephalic, atraumatic  Neurologic: Alert and oriented. Thought content appropriate.  GCS: Motor: 6/Verbal: 5/Eyes: 4 GCS Total: 15  Mental Status: Awake, Alert, Oriented x 4  Language: No aphasia  Speech: No dysarthria  Cranial nerves: face symmetric, tongue midline, CN II-XII grossly intact.   Eyes: pupils equal, round, reactive to light with accomodation, EOMI.   Pulmonary: normal respirations, no signs of respiratory distress  Abdomen: soft, non-distended, not tender to palpation  Sensory: intact to light touch throughout    Motor Strength:Moves all extremities spontaneously with good tone.  Full strength upper and lower extremities. No abnormal movements seen.     Strength  Deltoids Triceps Biceps Wrist Extension Wrist Flexion Hand    Upper: R 5/5 5/5 5/5 5/5 5/5 5/5    L 5/5 5/5 5/5 5/5 5/5 5/5     Iliopsoas Quadriceps Knee  Flexion Tibialis  anterior Gastro- cnemius EHL   Lower: R 5/5 5/5 5/5 5/5 5/5 5/5    L 5/5 5/5 5/5 5/5 5/5 5/5     Mcginnis: absent  Clonus: absent  Babinski: absent  Pulses: 2+ and symmetric radial and dorsalis pedis.  Skin: Skin is warm, dry and  intact.  Incision c/d/i with no surrounding erythema, edema, or drainage    Significant Labs:  Recent Labs   Lab 10/23/18  0939   GLU 97      K 3.9      CO2 24   BUN 13   CREATININE 0.8   CALCIUM 9.5     Recent Labs   Lab 10/23/18  0939   WBC 4.86   HGB 12.8*   HCT 38.9*        Recent Labs   Lab 10/23/18  0939   INR 0.9   APTT 22.9     Microbiology Results (last 7 days)     ** No results found for the last 168 hours. **        Recent Lab Results     None        All pertinent labs from the last 24 hours have been reviewed.    Significant Diagnostics:  No new imaging for review

## 2018-10-24 NOTE — PLAN OF CARE
Problem: Occupational Therapy Goal  Goal: Occupational Therapy Goal  Outcome: Outcome(s) achieved Date Met: 10/24/18  Pt with no skilled OT needs identified upon evaluation. rec :home no needs/no DME needs. FRANCO Hodgson 10/24/2018

## 2018-10-24 NOTE — PT/OT/SLP EVAL
Occupational Therapy   Evaluation and Discharge Note    Name: Tyrone Santos  MRN: [id number]  Admitting Diagnosis: 1 Day Post-Op C3-4 ACDF    Recommendations:     Discharge Recommendations: home  Discharge Equipment Recommendations:  none  Barriers to discharge:  None    History:     Occupational Profile:  Living Environment: Pt lives with clark in [facility] with threshold to enter. Tub/shower combo- has walk in shower being built.   Previous level of function: active and indep and requiring no DME/assist with mobility, ADLs/self care. Reported chronic pain from neck to L UE. Still driving and working full time.  Roles and Routines: significant other, , home and community dweller, care taker to self, employee in entertainment industry   Equipment Owned:  CPAP  Assistance upon Discharge: yes - but limited 24 hour care- significant other works    Past Medical History:   Diagnosis Date    Bipolar disorder     GERD (gastroesophageal reflux disease)     Hyperlipidemia     Hypertension     Overdose of illicit drug     quit in the early 2000's       Past Surgical History:   Procedure Laterality Date    APPENDECTOMY      around 2012    BLOCK-NERVE-MEDIAL BRANCH-CERVICAL Left 11/2/2017    [facility] PA[facility]GT    SCAPHOID FRACTURE SURGERY      had to have a bone graft -early  1990's     WRIST SURGERY      6 years ago       Subjective     Chief Complaint: pain  Patient/Family stated goals: home today  Communicated with: RN prior to session. Completed with PTS/PT.   Pain/Comfort:  · Pain Rating 1: 5/10(neck to L shoulder)  · Pain Addressed 1: Reposition  · Pain Rating Post-Intervention 1: 5/10    Patients cultural, spiritual, Hindu conflicts given the current situation: none reported    Objective:     Patient found with: bed alarm, peripheral IV, CPAP    General Precautions: Standard, fall   Orthopedic Precautions:spinal precautions   Braces: N/A     Occupational Performance:    Bed  Mobility:    · Patient completed Rolling/Turning to Left with  modified independence  · Patient completed Supine to Sit with modified independence    Functional Mobility/Transfers:  · Patient completed Sit <> Stand Transfer with modified independence  with  no assistive device   · Patient completed Bed <> Chair Transfer using Step Transfer technique with modified independence with no assistive device  · Patient completed  Shower Transfer Step Transfer technique with modified independence with no AD  · Functional Mobility: mod(I) at household and short community distances - requiring no AD    Activities of Daily Living:  · Upper Body Dressing: modified independence with set up;completed fine motor tie closure  · Lower Body Dressing: modified independence EOB - 4 point position for B socks    Cognitive/Visual Perceptual:  Cognitive/Psychosocial Skills:     -       Oriented to: Person, Place, Time and Situation   -       Follows Commands/attention:Follows multistep  commands  -       Mood/Affect/Coping skills/emotional control: Cooperative  Visual/Perceptual:      -Intact      Physical Exam:  Balance:    -       WFL  Postural examination/scapula alignment:    -       Rounded shoulders  Skin integrity: Dry scar sx site  Sensation:    -       Intact  light/touch B UE    Motor Planning:    -       Intact B UE  Dominant hand:    -       L  Upper Extremity Range of Motion:     -       Right Upper Extremity: WFL    -       Left Upper Extremity: WFL      Upper Extremity Strength:    -       Right Upper Extremity: WFL  -       Left Upper Extremity: WFL     Strength:    -       Right Upper Extremity: WFL    -       Left Upper Extremity: WFL    Fine Motor Coordination:    -       Intact  Gross motor coordination:   WFL B UE    Patient left up in chair with all lines intact, call button in reach and RN notified    AMPA 6 Click:  AMPA Total Score: 24     Body mass index is 25.85 kg/m².    Vitals:    10/24/18 0405   BP:  "117/69   Pulse: 66   Resp: 18   Temp: 96.2 °F (35.7 °C)       Treatment & Education:  -Pt alert and agreeable to therapy session; edu on OT role in care and safety in acute care and need for staff/family supervision with mobility in [place]  -Communication board updated; questions/concerns addressed within OT scope of practice  -edu on spinal safety and scar tissue mgmt   Education:    Assessment:     Tyrone Santos is a 53 y.o. male with a medical diagnosis of C3-4 ACDF. At this time, patient is functioning at their prior level of function and does not require further acute OT services.     Clinical Decision Makin.  OT Low:  "Pt evaluation falls under low complexity for evaluation coding due to performance deficits noted in 1-3 areas as stated above and 0 co-morbities affecting current functional status. Data obtained from problem focused assessments. No modifications or assistance was required for completion of evaluation. Only brief occupational profile and history review completed."     Plan:     During this hospitalization, patient does not require further acute OT services.  Please re-consult if situation changes.    · Plan of Care Reviewed with: patient    This Plan of care has been discussed with the patient who was involved in its development and understands and is in agreement with the identified goals and treatment plan      Time Tracking:     OT Date of Treatment: 10/24/18  OT Start Time: 0740  OT Stop Time: 0753  OT Total Time (min): 13 min    Billable Minutes:Evaluation 13    FRANCO Hodgson  10/24/2018    "

## 2018-10-26 ENCOUNTER — TELEPHONE (OUTPATIENT)
Dept: NEUROSURGERY | Facility: CLINIC | Age: 53
End: 2018-10-26

## 2018-10-26 NOTE — TELEPHONE ENCOUNTER
Pt called inquiring as to whether or not he is supposed to be on antibiotics. I explained that his discharging provider did not feel that they were indicated given his post-op progress. Pt denies erythema, induration, fluctuance, purulent drainage, excessive tenderness around wound site; he denies fever, chills, N/V. He has been keep his wound clean and dry. He v/u understanding of explanation and signs of infection to look out for.

## 2018-10-26 NOTE — TELEPHONE ENCOUNTER
----- Message from Wesley Mack sent at 10/26/2018  4:43 PM CDT -----  Contact: Patient @ [phone]  Patient is requesting a return call regarding antibiotics, pls call

## 2018-10-29 ENCOUNTER — TELEPHONE (OUTPATIENT)
Dept: NEUROSURGERY | Facility: CLINIC | Age: 53
End: 2018-10-29

## 2018-10-29 NOTE — TELEPHONE ENCOUNTER
----- Message from Abi Sniderfarshad sent at 10/29/2018  9:42 AM CDT -----  Contact: self @ [phone]  Calling to speak with someone concerning his condition.  Pt says he thinks he may have the flu.  Pt would like to know if he should f/u with Dr Jean or his PCP.  He says it hurts every time he cough's.  Pls call to discuss asap.

## 2018-10-29 NOTE — TELEPHONE ENCOUNTER
Pt reports productive cough (yellow sputum), sore throat worse w/ swallowing, congestion, chest pain upon coughing, and intermittent subjective fevers for the past 2-3 days. He is concerned he has the flu. He denies erythema, warmth, induration, fluctuance, drainage, excessive tenderness around surgical site. He states that he can be seen by his PCP tomorrow for this. Advised him to monitor his temperature with thermometer and follow-up with PCP tomorrow, and to notify us if he develops acute worsening symptoms or new symptoms not consistent with a URI.

## 2018-11-05 ENCOUNTER — CLINICAL SUPPORT (OUTPATIENT)
Dept: NEUROSURGERY | Facility: CLINIC | Age: 53
End: 2018-11-05
Payer: COMMERCIAL

## 2018-11-05 VITALS
WEIGHT: 163.5 LBS | SYSTOLIC BLOOD PRESSURE: 108 MMHG | HEIGHT: 68 IN | DIASTOLIC BLOOD PRESSURE: 79 MMHG | HEART RATE: 86 BPM | TEMPERATURE: 99 F | BODY MASS INDEX: 24.78 KG/M2

## 2018-11-05 PROCEDURE — 99999 PR PBB SHADOW E&M-EST. PATIENT-LVL III: CPT | Mod: PBBFAC,,,

## 2018-11-05 RX ORDER — GABAPENTIN 400 MG/1
600 CAPSULE ORAL 3 TIMES DAILY
Refills: 1 | COMMUNITY
Start: 2018-10-12 | End: 2021-01-19

## 2018-11-05 NOTE — PROGRESS NOTES
Wound Check   Neurosurgery      Mr Tyrone Santos is a pleasant 53 year old male who underwent an ACDF C3-4 10/23/18 by Dr. Jean. (For complete diagnosis and procedure, see OP note.) He presents to clinic today for his 2 week post-op wound check.   Pt appears well, in NAD, gait steady. No collar. Patient denies fevers, chills, night sweats or N/V. When asked about changes after surgery, pt states, the pre-op pain from his left occiput to shoulder is gone. Pre-op pain midline posterior cervical area persists but is a 2. Denies any new symptoms.  Pt states he got a URI last week and saw his PCP. PCP gave him Z-pack and steroid shot. Dr Jean notified.  Neck incision appears clean, dry and intact with no signs of erythema, swelling or purulent drainage. All skin edges appear approximated. Upon palpation, incision line is dense with no underlying swelling. Skin color purplish, resolved from what it was per pts wife, likely secondary to glue. Raised portion below is from glue and tape.    Discussed meds. Pts description of dosing belies confusion regarding as needed. Discussed this at length and recommended pt wean to regular Tylenol or ASA. Pt v/u.     Pt would like to go back to work and endorses his boss intention to allow him to comply with restrictions. RTW letter given to patient with restrictions of no lifting more than 10 lbs, no neck flex / ext > 45 degrees.    All questions answered.    Mr Santos was given written instructions that include:     He is to shower normally, pat the incisions dry and keep open to air.    Use of the collar and its rationale were reinforced.    He will see Dr Jean in 4 weeks with X-Rays.    He is to call with any questions or concerns.    Fide Mack RN  Neurosurgery

## 2018-11-09 ENCOUNTER — TELEPHONE (OUTPATIENT)
Dept: NEUROSURGERY | Facility: CLINIC | Age: 53
End: 2018-11-09

## 2018-11-09 NOTE — TELEPHONE ENCOUNTER
Called pt back. He gor a letter from [employer] but doesn't know why. I explained that it is probably a bone stimulator and he should call the company to inquire. Pt v/u.    ----- Message from Judi London sent at 11/9/2018  2:37 PM CST -----  Contact: self  Pt is calling in regards to his neck brace. Pt has a few questions and would like a call back.     Pt can be reached at [phone].    Thank you

## 2018-12-03 ENCOUNTER — HOSPITAL ENCOUNTER (OUTPATIENT)
Dept: RADIOLOGY | Facility: HOSPITAL | Age: 53
Discharge: HOME | End: 2018-12-03
Attending: NEUROLOGICAL SURGERY
Payer: COMMERCIAL

## 2018-12-03 ENCOUNTER — OFFICE VISIT (OUTPATIENT)
Dept: NEUROSURGERY | Facility: CLINIC | Age: 53
End: 2018-12-03
Payer: COMMERCIAL

## 2018-12-03 VITALS
SYSTOLIC BLOOD PRESSURE: 111 MMHG | BODY MASS INDEX: 25.86 KG/M2 | HEART RATE: 76 BPM | TEMPERATURE: 99 F | WEIGHT: 170.13 LBS | DIASTOLIC BLOOD PRESSURE: 72 MMHG

## 2018-12-03 DIAGNOSIS — Z98.1 S/P CERVICAL SPINAL FUSION: Primary | ICD-10-CM

## 2018-12-03 DIAGNOSIS — M79.18 CERVICAL MYOFASCIAL PAIN SYNDROME: ICD-10-CM

## 2018-12-03 DIAGNOSIS — M48.02 CERVICAL SPINAL STENOSIS: ICD-10-CM

## 2018-12-03 DIAGNOSIS — M48.02 CERVICAL STENOSIS OF SPINAL CANAL: ICD-10-CM

## 2018-12-03 DIAGNOSIS — Z98.1 S/P CERVICAL SPINAL FUSION: ICD-10-CM

## 2018-12-03 PROCEDURE — 99024 POSTOP FOLLOW-UP VISIT: CPT | Mod: S$GLB,,, | Performed by: NEUROLOGICAL SURGERY

## 2018-12-03 PROCEDURE — 72040 X-RAY EXAM NECK SPINE 2-3 VW: CPT | Mod: TC

## 2018-12-03 PROCEDURE — 72040 X-RAY EXAM NECK SPINE 2-3 VW: CPT | Mod: 26,,, | Performed by: RADIOLOGY

## 2018-12-03 PROCEDURE — 99999 PR PBB SHADOW E&M-EST. PATIENT-LVL III: CPT | Mod: PBBFAC,,, | Performed by: NEUROLOGICAL SURGERY

## 2018-12-03 NOTE — PROGRESS NOTES
History & Physical    I, Simón Whitaker, attest that this documentation has been prepared under the direction and in the presence of Jw Anne MD.    12/06/2018    No chief complaint on file.      History of Present Illness:  Tyrone Santos is a 53 y.o. patient who is s/p C3-4 ACDF on 10/23/2018 for instability and cervical spine stenosis and myelopathy. Patient presents for postoperative follow up evaluation.     Patient is doing well. His preoperative neck pain is resolved. He reports compliance with his neck brace. Patient returns to clinic with postoperative x-rays of the cervical spine.     Interval History, dated 9/24/2018:   Tyrone Santos is a 53 y.o. patient who presents to me by self-referral for evaluation of his cervical spine.      Patient complains of left-sided neck pain that starts from his left ear and radiates down to his left shoulder. Patient reports gradual onset of this pain from 1.5 years ago. He is unable to identify a specific precipitating event. Patient reports his pain as constant aching pain that is 9/10 in intensity. His pain worsens with neck rotation and nothing makes it better. Past treatments include physical therapy, chiropractor, cervical spine ESIs, narcotics (percocet). He states that only percocet has given him any relief. Associated signs and symptoms are negative for change in fine motor skills, negative for dropping objects from his hands, negative for balance difficulty or falls within the last 2 years, negative for worsened handwriting (patient is left-handed).      Patient is a former smoker (20-year history of smoking, 1/2 ppd),  discontinued about 20 years ago.       Review of patient's allergies indicates:   Allergen Reactions    Codeine Nausea Only    Seroquel [quetiapine]      Heart racing     Tizanidine Other (See Comments)     Caused increased pain and muscle pain       Current Outpatient Medications   Medication Sig Dispense Refill    amlodipine  (NORVASC) 10 MG tablet Take 10 mg by mouth every morning.   0    aspirin (ECOTRIN) 81 MG EC tablet Take 1 tablet (81 mg total) by mouth every morning. DO NOT TAKE FOR 7 DAYS AFTER SURGERY  0    buPROPion (WELLBUTRIN XL) 150 MG TB24 tablet Take 150 mg by mouth every morning.       FLUZONE QUAD 6936-6970, PF, 60 mcg (15 mcg x 4)/0.5 mL Syrg TO BE ADMINISTERED BY PHARMACIST FOR IMMUNIZATION  0    gabapentin (NEURONTIN) 400 MG capsule Take 600 mg by mouth 3 (three) times daily.  1    lisinopril (PRINIVIL,ZESTRIL) 20 MG tablet Take 20 mg by mouth every morning.   0    omeprazole (PRILOSEC) 20 MG capsule Take 20 mg by mouth every morning.   0    oxcarbazepine (TRILEPTAL) 300 MG Tab Take 300 mg by mouth 2 (two) times daily.  1    oxyCODONE (ROXICODONE) 15 MG Tab Take 15 mg by mouth every 6 (six) hours as needed for Pain.      simvastatin (ZOCOR) 10 MG tablet Take 10 mg by mouth every morning.   0    diazePAM (VALIUM) 5 MG tablet Take 1 tablet (5 mg total) by mouth every 6 (six) hours as needed (muscle spasm). 60 tablet 0     No current facility-administered medications for this visit.        Past Medical History:   Diagnosis Date    Bipolar disorder     GERD (gastroesophageal reflux disease)     Hyperlipidemia     Hypertension     Overdose of illicit drug     quit in the early 2000's       Past Surgical History:   Procedure Laterality Date    ANTERIOR CERVICAL DISCECTOMY W/ FUSION N/A 10/23/2018    Procedure: DISCECTOMY, SPINE, CERVICAL, ANTERIOR APPROACH, WITH FUSION C3-4;  Surgeon: Jw Anne MD;  Location: [facility] OR [address] [place] [address];  Service: Neurosurgery;  Laterality: N/A;    APPENDECTOMY      around 2012    BLOCK-NERVE-MEDIAL BRANCH-CERVICAL Left 11/2/2017    [facility]    DISCECTOMY, SPINE, CERVICAL, ANTERIOR APPROACH, WITH FUSION C3-4 N/A 10/23/2018    Performed by Jw Anne MD at [facility] OR [address] [place] [address]    SCAPHOID FRACTURE SURGERY      had to have a  bone graft -early  's     WRIST SURGERY      6 years ago       History reviewed. No pertinent family history.    Social History     Tobacco Use    Smoking status: Former Smoker     Last attempt to quit: 1998     Years since quittin.9    Smokeless tobacco: Former User   Substance Use Topics    Alcohol use: Yes     Comment: 2 beers most night     Drug use: No        Review of Systems:  Review of Systems   Constitutional: Negative for appetite change.   HENT: Negative for congestion.    Eyes: Negative for discharge.   Respiratory: Negative for apnea.    Cardiovascular: Negative for chest pain.   Gastrointestinal: Negative for abdominal distention.   Endocrine: Negative for cold intolerance.   Genitourinary: Negative for difficulty urinating.   Musculoskeletal: Negative for arthralgias.   Allergic/Immunologic: Negative for environmental allergies.   Neurological: Negative for dizziness, weakness and headaches.   Psychiatric/Behavioral: Negative for agitation.       Vital Signs (Most Recent)  Temp: 98.8 °F (37.1 °C) (18 1341)  Pulse: 76 (18 1341)  BP: 111/72 (18 1341)     77.2 kg (170 lb 1.6 oz)       Physical Exam:  Physical Exam:    Constitutional: He appears well-developed.     Eyes: Pupils are equal, round, and reactive to light. EOM are normal. Right eye exhibits no discharge. Left eye exhibits no discharge.     Abdominal: Soft.     Skin: Skin displays no rash on trunk and no rash on extremities.     Psych/Behavior: He is alert. He is oriented to person, place, and time.     Musculoskeletal:        Neck: There is no tenderness.        Back: Range of motion is full.        Right Upper Extremities: Range of motion is full. Muscle strength is 5/5. Tone is normal.        Left Upper Extremities: Range of motion is full. Muscle strength is 5/5. Tone is normal.       Right Lower Extremities: Range of motion is full. Muscle strength is 5/5. Tone is normal.        Left Lower Extremities: Range  of motion is full. Muscle strength is 5/5. Tone is normal.     Neurological:        Coordination: He has a normal Romberg Test and normal tandem walking coordination.        Sensory: There is no sensory deficit in the trunk. There is no sensory deficit in the extremities.        DTRs: DTRs are DTRS NORMAL AND SYMMETRICnormal and symmetric. Tricep reflexes are 2+ on the right side and 2+ on the left side. Bicep reflexes are 2+ on the right side and 2+ on the left side. Brachioradialis reflexes are 2+ on the right side and 2+ on the left side. Patellar reflexes are 2+ on the right side and 2+ on the left side. Achilles reflexes are 2+ on the right side and 2+ on the left side. He displays no Babinski's sign on the right side. He displays no Babinski's sign on the left side.        Cranial nerves: Cranial nerve(s) II, III, IV, V, VI, VII, VIII, IX, X, XI and XII are intact.         Laboratory  CBC: Reviewed  CMP: Reviewed    Diagnostic Results:  X-Ray: Reviewed  X-Ray Cervical Spine AP And Lateral, dated 12/03/2018: Reviewed.   Postoperative changes of a anterior cervical fusion with associated disc replacement at the C3/C4 level identified.  The position alignment is satisfactory and unchanged as compared to the previous study.  DJD and disc space narrowings identified in the lower cervical spine.  No fracture or bone destruction.    ASSESSMENT/PLAN:       ICD-10-CM ICD-9-CM   1. S/P cervical spinal fusion Z98.1 V45.4   2. Cervical spinal stenosis M48.02 723.0   3. Cervical stenosis of spinal canal M48.02 723.0   4. Cervical myofascial pain syndrome M79.18 729.1       PLAN:    1. S/p C3-4 ACDF for instability and cervical stenosis with myelopathy.  Patient's preoperative symptoms are resolved. I see signs of early fusion on x-rays done today. 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     2. RTC end of January 2019  With x-rays of the cervical spine.    Diagnoses and all orders for this visit:    S/P cervical spinal fusion    Cervical spinal stenosis  -     X-Ray Cervical Spine AP And Lateral; Future    Cervical stenosis of spinal canal  -     X-Ray Cervical Spine AP And Lateral; Future    Cervical myofascial pain syndrome  -     X-Ray Cervical Spine AP And Lateral; Future    Other orders  -     Cancel: X-Ray Cervical Spine AP And Lateral; Future      I, Dr. Jw Anne, personally performed the services described in this documentation. All medical record entries made by the scribe were at my direction and in my presence.  I have reviewed the chart and agree that the record reflects my personal performance and is accurate and complete. Jw Anne MD.  5:47 PM 12/06/2018

## 2018-12-06 PROBLEM — Z98.1 S/P CERVICAL SPINAL FUSION: Status: ACTIVE | Noted: 2018-12-06

## 2019-01-28 ENCOUNTER — OFFICE VISIT (OUTPATIENT)
Dept: NEUROSURGERY | Facility: CLINIC | Age: 54
End: 2019-01-28
Payer: COMMERCIAL

## 2019-01-28 ENCOUNTER — HOSPITAL ENCOUNTER (OUTPATIENT)
Dept: RADIOLOGY | Facility: HOSPITAL | Age: 54
Discharge: HOME | End: 2019-01-28
Attending: NEUROLOGICAL SURGERY
Payer: COMMERCIAL

## 2019-01-28 VITALS
BODY MASS INDEX: 25.76 KG/M2 | HEIGHT: 68 IN | WEIGHT: 170 LBS | TEMPERATURE: 97 F | DIASTOLIC BLOOD PRESSURE: 77 MMHG | HEART RATE: 71 BPM | SYSTOLIC BLOOD PRESSURE: 105 MMHG

## 2019-01-28 DIAGNOSIS — M48.02 CERVICAL SPINAL STENOSIS: Primary | ICD-10-CM

## 2019-01-28 DIAGNOSIS — M79.18 CERVICAL MYOFASCIAL PAIN SYNDROME: ICD-10-CM

## 2019-01-28 DIAGNOSIS — M47.12 OSTEOARTHRITIS OF CERVICAL SPINE WITH MYELOPATHY: ICD-10-CM

## 2019-01-28 DIAGNOSIS — M48.02 CERVICAL SPINAL STENOSIS: ICD-10-CM

## 2019-01-28 DIAGNOSIS — M48.02 CERVICAL STENOSIS OF SPINAL CANAL: ICD-10-CM

## 2019-01-28 DIAGNOSIS — Z98.1 S/P CERVICAL SPINAL FUSION: ICD-10-CM

## 2019-01-28 PROCEDURE — 72040 X-RAY EXAM NECK SPINE 2-3 VW: CPT | Mod: TC

## 2019-01-28 PROCEDURE — 72040 X-RAY EXAM NECK SPINE 2-3 VW: CPT | Mod: 26,,, | Performed by: RADIOLOGY

## 2019-01-28 PROCEDURE — 99024 POSTOP FOLLOW-UP VISIT: CPT | Mod: S$GLB,,, | Performed by: NEUROLOGICAL SURGERY

## 2019-01-28 PROCEDURE — 99999 PR PBB SHADOW E&M-EST. PATIENT-LVL III: ICD-10-PCS | Mod: PBBFAC,,, | Performed by: NEUROLOGICAL SURGERY

## 2019-01-28 PROCEDURE — 72040 XR CERVICAL SPINE AP LATERAL: ICD-10-PCS | Mod: 26,,, | Performed by: RADIOLOGY

## 2019-01-28 PROCEDURE — 99024 PR POST-OP FOLLOW-UP VISIT: ICD-10-PCS | Mod: S$GLB,,, | Performed by: NEUROLOGICAL SURGERY

## 2019-01-28 PROCEDURE — 99999 PR PBB SHADOW E&M-EST. PATIENT-LVL III: CPT | Mod: PBBFAC,,, | Performed by: NEUROLOGICAL SURGERY

## 2019-01-28 RX ORDER — TRAZODONE HYDROCHLORIDE 150 MG/1
150 TABLET ORAL NIGHTLY
COMMUNITY
End: 2021-02-24

## 2019-01-28 RX ORDER — TIZANIDINE 4 MG/1
4 TABLET ORAL NIGHTLY PRN
COMMUNITY
End: 2021-02-24

## 2019-01-28 NOTE — PROGRESS NOTES
History & Physical      01/29/2019    No chief complaint on file.      History of Present Illness:  Tyrone Santos is a 53 y.o. patient who is s/p C3-4 ACDF on 10/23/2018 for instability and cervical spine stenosis and myelopathy. Patient presents for 3 month postoperative follow up evaluation.     Patient continues to do well and  Reports that all his pre operative signs and symptoms are resolved after the surgery. He reports continued compliance with his neck brace.  Patient is eating a regular diet, and does not complain of any dysphagia.      Interval History, dated 12/03/2018:   Tyrone Santos is a 53 y.o. patient who is s/p C3-4 ACDF on 10/23/2018 for instability and cervical spine stenosis and myelopathy. Patient presents for postoperative follow up evaluation.      Patient is doing well. His preoperative neck pain is resolved. He reports compliance with his neck brace. Patient returns to clinic with postoperative x-rays of the cervical spine.      Interval History, dated 9/24/2018:   Tyrone Santos is a 53 y.o. patient who presents to me by self-referral for evaluation of his cervical spine.      Patient complains of left-sided neck pain that starts from his left ear and radiates down to his left shoulder. Patient reports gradual onset of this pain from 1.5 years ago. He is unable to identify a specific precipitating event. Patient reports his pain as constant aching pain that is 9/10 in intensity. His pain worsens with neck rotation and nothing makes it better. Past treatments include physical therapy, chiropractor, cervical spine ESIs, narcotics (percocet). He states that only percocet has given him any relief. Associated signs and symptoms are negative for change in fine motor skills, negative for dropping objects from his hands, negative for balance difficulty or falls within the last 2 years, negative for worsened handwriting (patient is left-handed).         Review of patient's allergies  indicates:   Allergen Reactions    Codeine Nausea Only    Seroquel [quetiapine]      Heart racing     Tizanidine Other (See Comments)     Caused increased pain and muscle pain       Current Outpatient Medications   Medication Sig Dispense Refill    amlodipine (NORVASC) 10 MG tablet Take 10 mg by mouth every morning.   0    aspirin (ECOTRIN) 81 MG EC tablet Take 1 tablet (81 mg total) by mouth every morning. DO NOT TAKE FOR 7 DAYS AFTER SURGERY  0    buPROPion (WELLBUTRIN XL) 150 MG TB24 tablet Take 150 mg by mouth every morning.       lisinopril (PRINIVIL,ZESTRIL) 20 MG tablet Take 20 mg by mouth every morning.   0    omeprazole (PRILOSEC) 20 MG capsule Take 20 mg by mouth every morning.   0    oxcarbazepine (TRILEPTAL) 300 MG Tab Take 300 mg by mouth 2 (two) times daily.  1    simvastatin (ZOCOR) 10 MG tablet Take 10 mg by mouth every morning.   0    tiZANidine (ZANAFLEX) 4 MG tablet Take 4 mg by mouth nightly as needed.      traZODone (DESYREL) 150 MG tablet Take 150 mg by mouth every evening.      diazePAM (VALIUM) 5 MG tablet Take 1 tablet (5 mg total) by mouth every 6 (six) hours as needed (muscle spasm). 60 tablet 0    FLUZONE QUAD 2458-7768, PF, 60 mcg (15 mcg x 4)/0.5 mL Syrg TO BE ADMINISTERED BY PHARMACIST FOR IMMUNIZATION  0    gabapentin (NEURONTIN) 400 MG capsule Take 600 mg by mouth 3 (three) times daily.  1    oxyCODONE (ROXICODONE) 15 MG Tab Take 15 mg by mouth every 6 (six) hours as needed for Pain.       No current facility-administered medications for this visit.        Past Medical History:   Diagnosis Date    Bipolar disorder     GERD (gastroesophageal reflux disease)     Hyperlipidemia     Hypertension     Overdose of illicit drug     quit in the early 2000's       Past Surgical History:   Procedure Laterality Date    APPENDECTOMY      around 2012    BLOCK-NERVE-MEDIAL BRANCH-CERVICAL Left 11/2/2017    [facility]    DISCECTOMY,  "SPINE, CERVICAL, ANTERIOR APPROACH, WITH FUSION C3-4 N/A 10/23/2018    Performed by Jw Anne MD at [facility] OR [address] [place] [address]    SCAPHOID FRACTURE SURGERY      had to have a bone graft -early       WRIST SURGERY      6 years ago       History reviewed. No pertinent family history.    Social History     Tobacco Use    Smoking status: Former Smoker     Last attempt to quit: 1998     Years since quittin.0    Smokeless tobacco: Former User   Substance Use Topics    Alcohol use: Yes     Comment: 2 beers most night     Drug use: No        Review of Systems:  Review of Systems   Constitutional: Negative for appetite change.   HENT: Negative for congestion.    Eyes: Negative for discharge.   Respiratory: Negative for apnea.    Cardiovascular: Negative for chest pain.   Gastrointestinal: Negative for abdominal distention.   Endocrine: Negative for cold intolerance.   Genitourinary: Negative for difficulty urinating.   Musculoskeletal: Negative for arthralgias.   Allergic/Immunologic: Negative for environmental allergies.   Neurological: Negative for dizziness, weakness and headaches.   Psychiatric/Behavioral: Negative for agitation.       Vital Signs (Most Recent)  Temp: 97.2 °F (36.2 °C) (19 1407)  Pulse: 71 (19 1407)  BP: 105/77 (19 1407)  5' 8" (1.727 m)  77.1 kg (170 lb)       Physical Exam:  Physical Exam:    Constitutional: He appears well-developed.     Eyes: Pupils are equal, round, and reactive to light. EOM are normal. Right eye exhibits no discharge. Left eye exhibits no discharge.     Abdominal: Soft.     Skin: Skin displays no rash on trunk and no rash on extremities.     Psych/Behavior: He is alert. He is oriented to person, place, and time.     Musculoskeletal:        Neck: There is no tenderness.        Back: Range of motion is full.        Right Upper Extremities: Range of motion is full. Muscle strength is 5/5. Tone is normal.        Left Upper Extremities: Range of " motion is full. Muscle strength is 5/5. Tone is normal.       Right Lower Extremities: Range of motion is full. Muscle strength is 5/5. Tone is normal.        Left Lower Extremities: Range of motion is full. Muscle strength is 5/5. Tone is normal.     Neurological:        Coordination: He has a normal Romberg Test and normal tandem walking coordination.        Sensory: There is no sensory deficit in the trunk. There is no sensory deficit in the extremities.        DTRs: DTRs are DTRS NORMAL AND SYMMETRICnormal and symmetric. Tricep reflexes are 2+ on the right side and 2+ on the left side. Bicep reflexes are 2+ on the right side and 2+ on the left side. Brachioradialis reflexes are 2+ on the right side and 2+ on the left side. Patellar reflexes are 2+ on the right side and 2+ on the left side. Achilles reflexes are 2+ on the right side and 2+ on the left side. He displays no Babinski's sign on the right side. He displays no Babinski's sign on the left side.        Cranial nerves: Cranial nerve(s) II, III, IV, V, VI, VII, VIII, IX, X, XI and XII are intact.         Laboratory  CBC: Reviewed  CMP: Reviewed    Diagnostic Results:  X-Ray: Reviewed   I have personally reviewed the images and discussed them with the patient:     X-Ray Cervical Spine AP And Lateral, dated 1/28/2019: Reviewed.   FINDINGS:  Cervical vertebra are intact.  Alignment shows mild reversal of lordosis in the lower cervical region without significant subluxation.  Degenerative changes are again seen at the disc spaces from C5-T1 with narrowing and osteophytes.  Postoperative findings are again seen at C3-4  with plug in the disc space and anterior plate screwed into the vertebral bodies.  Hardware appears stable.     Prevertebral soft tissues show mild impression from the superior edge of the plate.      ASSESSMENT/PLAN:       ICD-10-CM ICD-9-CM   1. Cervical spinal stenosis M48.02 723.0   2. S/P cervical spinal fusion Z98.1 V45.4   3.  Osteoarthritis of cervical spine with myelopathy M47.12 721.1       PLAN:    1. S/p C3-4 ACDF, for instability and myelopathy  on 10/23/2018 . Patient's symptoms have resolved, and he shows early signs of fusion on X-rays. At this point, he is clear to resume his work activities without a neck brace.     2. RTC in 6 months with X-rays of cervical spine to check for bony fusion.    Diagnoses and all orders for this visit:    Cervical spinal stenosis    S/P cervical spinal fusion    Osteoarthritis of cervical spine with myelopathy      I, Dr. Jw Anne, personally performed the services described in this documentation. All medical record entries made by the scribe were at my direction and in my presence.  I have reviewed the chart and agree that the record reflects my personal performance and is accurate and complete. Jw Anne MD.  4:46 PM 01/29/2019

## 2019-02-04 ENCOUNTER — TELEPHONE (OUTPATIENT)
Dept: NEUROSURGERY | Facility: CLINIC | Age: 54
End: 2019-02-04

## 2019-02-04 DIAGNOSIS — Z98.1 S/P CERVICAL SPINAL FUSION: Primary | ICD-10-CM

## 2019-02-04 DIAGNOSIS — M48.02 CERVICAL SPINAL STENOSIS: ICD-10-CM

## 2019-02-04 NOTE — TELEPHONE ENCOUNTER
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 The pt declines falling or blunt forced trama. However, he reports sudden onset of sharp pains. I will have the pt get an xray since he did not have any major trama's to occur. I informed the pt that  is currently out of the office but I can have a PA review his imaging and follow up call with results. The pt agreed and will be scheduled.

## 2019-02-05 ENCOUNTER — HOSPITAL ENCOUNTER (OUTPATIENT)
Dept: RADIOLOGY | Facility: HOSPITAL | Age: 54
Discharge: HOME | End: 2019-02-05
Attending: NEUROLOGICAL SURGERY
Payer: COMMERCIAL

## 2019-02-05 DIAGNOSIS — Z98.1 S/P CERVICAL SPINAL FUSION: ICD-10-CM

## 2019-02-05 DIAGNOSIS — M48.02 CERVICAL SPINAL STENOSIS: ICD-10-CM

## 2019-02-05 PROCEDURE — 72040 X-RAY EXAM NECK SPINE 2-3 VW: CPT | Mod: TC

## 2019-02-05 PROCEDURE — 72040 XR CERVICAL SPINE AP LATERAL: ICD-10-PCS | Mod: 26,,, | Performed by: RADIOLOGY

## 2019-02-05 PROCEDURE — 72040 X-RAY EXAM NECK SPINE 2-3 VW: CPT | Mod: 26,,, | Performed by: RADIOLOGY

## 2019-02-07 ENCOUNTER — TELEPHONE (OUTPATIENT)
Dept: NEUROSURGERY | Facility: CLINIC | Age: 54
End: 2019-02-07

## 2019-02-07 NOTE — TELEPHONE ENCOUNTER
I returned the pt call and informed him that his xray is normal not fractures. The pt will continue with [facility] meds if needed  ----- Message from Wesley Mack sent at 2/7/2019  8:30 AM CST -----  Contact: Patient @ [phone]  Patient calling to discuss the results of the X-ray's, pls call to discuss

## 2020-03-09 ENCOUNTER — TELEPHONE (OUTPATIENT)
Dept: NEUROSURGERY | Facility: CLINIC | Age: 55
End: 2020-03-09

## 2020-03-09 NOTE — TELEPHONE ENCOUNTER
----- Message from Crystal Abdul sent at 3/9/2020  1:50 PM CDT -----  Contact: pt @ [phone]  Calling to speak with someone regarding him getting an appt, was a patient of Dr. Anne, also has questions regarding psychiatry. Please call.

## 2021-01-05 ENCOUNTER — TELEPHONE (OUTPATIENT)
Dept: NEUROSURGERY | Facility: CLINIC | Age: 56
End: 2021-01-05

## 2021-01-06 ENCOUNTER — TELEPHONE (OUTPATIENT)
Dept: NEUROSURGERY | Facility: CLINIC | Age: 56
End: 2021-01-06

## 2021-01-11 ENCOUNTER — TELEPHONE (OUTPATIENT)
Dept: NEUROSURGERY | Facility: CLINIC | Age: 56
End: 2021-01-11

## 2021-01-11 DIAGNOSIS — Z98.1 ARTHRODESIS STATUS: ICD-10-CM

## 2021-01-11 DIAGNOSIS — Z98.1 S/P CERVICAL SPINAL FUSION: Primary | ICD-10-CM

## 2021-01-11 DIAGNOSIS — M54.2 CERVICALGIA: ICD-10-CM

## 2021-01-14 ENCOUNTER — HOSPITAL ENCOUNTER (OUTPATIENT)
Dept: RADIOLOGY | Facility: HOSPITAL | Age: 56
Discharge: HOME | End: 2021-01-14
Attending: PHYSICIAN ASSISTANT
Payer: COMMERCIAL

## 2021-01-14 DIAGNOSIS — Z98.1 S/P CERVICAL SPINAL FUSION: ICD-10-CM

## 2021-01-14 DIAGNOSIS — M54.2 CERVICALGIA: ICD-10-CM

## 2021-01-14 DIAGNOSIS — Z98.1 ARTHRODESIS STATUS: ICD-10-CM

## 2021-01-14 PROCEDURE — 72125 CT NECK SPINE W/O DYE: CPT | Mod: 26,,, | Performed by: RADIOLOGY

## 2021-01-14 PROCEDURE — 72125 CT NECK SPINE W/O DYE: CPT | Mod: TC

## 2021-01-14 PROCEDURE — 72125 CT CERVICAL SPINE WITHOUT CONTRAST: ICD-10-PCS | Mod: 26,,, | Performed by: RADIOLOGY

## 2021-01-19 ENCOUNTER — OFFICE VISIT (OUTPATIENT)
Dept: NEUROSURGERY | Facility: CLINIC | Age: 56
End: 2021-01-19
Payer: COMMERCIAL

## 2021-01-19 DIAGNOSIS — Z98.1 S/P CERVICAL SPINAL FUSION: ICD-10-CM

## 2021-01-19 DIAGNOSIS — M47.812 SPONDYLOSIS OF CERVICAL REGION WITHOUT MYELOPATHY OR RADICULOPATHY: Primary | ICD-10-CM

## 2021-01-19 DIAGNOSIS — M47.812 FACET ARTHRITIS, DEGENERATIVE, CERVICAL SPINE: ICD-10-CM

## 2021-01-19 PROCEDURE — 99214 PR OFFICE/OUTPT VISIT, EST, LEVL IV, 30-39 MIN: ICD-10-PCS | Mod: 95,,, | Performed by: PHYSICIAN ASSISTANT

## 2021-01-19 PROCEDURE — 99214 OFFICE O/P EST MOD 30 MIN: CPT | Mod: 95,,, | Performed by: PHYSICIAN ASSISTANT

## 2021-01-19 RX ORDER — NAPROXEN 500 MG/1
500 TABLET ORAL 2 TIMES DAILY
Qty: 60 TABLET | Refills: 2 | Status: SHIPPED | OUTPATIENT
Start: 2021-01-19 | End: 2021-05-18

## 2021-01-19 RX ORDER — GABAPENTIN 400 MG/1
400 CAPSULE ORAL 3 TIMES DAILY
Qty: 90 CAPSULE | Refills: 11 | Status: SHIPPED | OUTPATIENT
Start: 2021-01-19 | End: 2022-08-01

## 2021-01-25 ENCOUNTER — CLINICAL SUPPORT (OUTPATIENT)
Dept: REHABILITATION | Facility: HOSPITAL | Age: 56
End: 2021-01-25
Payer: COMMERCIAL

## 2021-01-25 DIAGNOSIS — M47.812 SPONDYLOSIS OF CERVICAL REGION WITHOUT MYELOPATHY OR RADICULOPATHY: ICD-10-CM

## 2021-01-25 DIAGNOSIS — R26.89 DECREASED FUNCTIONAL MOBILITY: ICD-10-CM

## 2021-01-25 DIAGNOSIS — M47.812 FACET ARTHRITIS, DEGENERATIVE, CERVICAL SPINE: ICD-10-CM

## 2021-01-25 DIAGNOSIS — M54.2 NECK PAIN: ICD-10-CM

## 2021-01-25 PROCEDURE — 97163 PT EVAL HIGH COMPLEX 45 MIN: CPT | Mod: PN

## 2021-01-27 ENCOUNTER — TELEPHONE (OUTPATIENT)
Dept: NEUROSURGERY | Facility: CLINIC | Age: 56
End: 2021-01-27

## 2021-02-22 ENCOUNTER — OFFICE VISIT (OUTPATIENT)
Dept: NEUROSURGERY | Facility: CLINIC | Age: 56
End: 2021-02-22
Payer: COMMERCIAL

## 2021-02-22 VITALS
DIASTOLIC BLOOD PRESSURE: 92 MMHG | SYSTOLIC BLOOD PRESSURE: 129 MMHG | WEIGHT: 204.5 LBS | TEMPERATURE: 98 F | BODY MASS INDEX: 31.09 KG/M2 | HEART RATE: 105 BPM

## 2021-02-22 DIAGNOSIS — Z98.1 S/P CERVICAL SPINAL FUSION: Primary | ICD-10-CM

## 2021-02-22 DIAGNOSIS — M47.12 CERVICAL SPONDYLOSIS WITH MYELOPATHY AND RADICULOPATHY: ICD-10-CM

## 2021-02-22 DIAGNOSIS — M54.2 CERVICALGIA: ICD-10-CM

## 2021-02-22 DIAGNOSIS — M47.22 CERVICAL SPONDYLOSIS WITH MYELOPATHY AND RADICULOPATHY: ICD-10-CM

## 2021-02-22 PROCEDURE — 99213 PR OFFICE/OUTPT VISIT, EST, LEVL III, 20-29 MIN: ICD-10-PCS | Mod: S$GLB,,, | Performed by: PHYSICIAN ASSISTANT

## 2021-02-22 PROCEDURE — 99999 PR PBB SHADOW E&M-EST. PATIENT-LVL III: ICD-10-PCS | Mod: PBBFAC,,, | Performed by: PHYSICIAN ASSISTANT

## 2021-02-22 PROCEDURE — 99213 OFFICE O/P EST LOW 20 MIN: CPT | Mod: S$GLB,,, | Performed by: PHYSICIAN ASSISTANT

## 2021-02-22 PROCEDURE — 99999 PR PBB SHADOW E&M-EST. PATIENT-LVL III: CPT | Mod: PBBFAC,,, | Performed by: PHYSICIAN ASSISTANT

## 2021-02-26 ENCOUNTER — HOSPITAL ENCOUNTER (OUTPATIENT)
Dept: RADIOLOGY | Facility: HOSPITAL | Age: 56
Discharge: HOME | End: 2021-02-26
Attending: PHYSICIAN ASSISTANT
Payer: COMMERCIAL

## 2021-02-26 DIAGNOSIS — M54.2 CERVICALGIA: ICD-10-CM

## 2021-02-26 DIAGNOSIS — M47.12 CERVICAL SPONDYLOSIS WITH MYELOPATHY AND RADICULOPATHY: ICD-10-CM

## 2021-02-26 DIAGNOSIS — M47.22 CERVICAL SPONDYLOSIS WITH MYELOPATHY AND RADICULOPATHY: ICD-10-CM

## 2021-02-26 DIAGNOSIS — Z98.1 S/P CERVICAL SPINAL FUSION: ICD-10-CM

## 2021-02-26 PROCEDURE — 72141 MRI NECK SPINE W/O DYE: CPT | Mod: TC

## 2021-02-26 PROCEDURE — 72141 MRI CERVICAL SPINE WITHOUT CONTRAST: ICD-10-PCS | Mod: 26,,, | Performed by: RADIOLOGY

## 2021-02-26 PROCEDURE — 72141 MRI NECK SPINE W/O DYE: CPT | Mod: 26,,, | Performed by: RADIOLOGY

## 2021-03-10 DIAGNOSIS — M79.18 CERVICAL MYOFASCIAL PAIN SYNDROME: ICD-10-CM

## 2021-03-10 DIAGNOSIS — M48.02 CERVICAL SPINAL STENOSIS: Primary | ICD-10-CM

## 2021-03-10 DIAGNOSIS — Z98.1 S/P CERVICAL SPINAL FUSION: ICD-10-CM

## 2021-03-11 ENCOUNTER — PATIENT MESSAGE (OUTPATIENT)
Dept: NEUROSURGERY | Facility: CLINIC | Age: 56
End: 2021-03-11

## 2021-03-11 ENCOUNTER — TELEPHONE (OUTPATIENT)
Dept: NEUROSURGERY | Facility: CLINIC | Age: 56
End: 2021-03-11

## 2021-03-29 ENCOUNTER — OFFICE VISIT (OUTPATIENT)
Dept: NEUROSURGERY | Facility: CLINIC | Age: 56
End: 2021-03-29
Payer: COMMERCIAL

## 2021-03-29 ENCOUNTER — HOSPITAL ENCOUNTER (OUTPATIENT)
Dept: RADIOLOGY | Facility: HOSPITAL | Age: 56
Discharge: HOME | End: 2021-03-29
Attending: PHYSICIAN ASSISTANT
Payer: COMMERCIAL

## 2021-03-29 VITALS — TEMPERATURE: 97 F | SYSTOLIC BLOOD PRESSURE: 127 MMHG | DIASTOLIC BLOOD PRESSURE: 84 MMHG | HEART RATE: 80 BPM

## 2021-03-29 DIAGNOSIS — Z98.1 S/P CERVICAL SPINAL FUSION: ICD-10-CM

## 2021-03-29 DIAGNOSIS — M48.02 CERVICAL SPINAL STENOSIS: ICD-10-CM

## 2021-03-29 DIAGNOSIS — M47.22 CERVICAL SPONDYLOSIS WITH MYELOPATHY AND RADICULOPATHY: Primary | ICD-10-CM

## 2021-03-29 DIAGNOSIS — M79.18 CERVICAL MYOFASCIAL PAIN SYNDROME: ICD-10-CM

## 2021-03-29 DIAGNOSIS — M47.12 CERVICAL SPONDYLOSIS WITH MYELOPATHY AND RADICULOPATHY: Primary | ICD-10-CM

## 2021-03-29 PROCEDURE — 72050 XR CERVICAL SPINE AP LAT WITH FLEX EXTEN: ICD-10-PCS | Mod: 26,,, | Performed by: RADIOLOGY

## 2021-03-29 PROCEDURE — 72050 X-RAY EXAM NECK SPINE 4/5VWS: CPT | Mod: 26,,, | Performed by: RADIOLOGY

## 2021-03-29 PROCEDURE — 99999 PR PBB SHADOW E&M-EST. PATIENT-LVL II: CPT | Mod: PBBFAC,,, | Performed by: PHYSICIAN ASSISTANT

## 2021-03-29 PROCEDURE — 99214 PR OFFICE/OUTPT VISIT, EST, LEVL IV, 30-39 MIN: ICD-10-PCS | Mod: S$GLB,,, | Performed by: PHYSICIAN ASSISTANT

## 2021-03-29 PROCEDURE — 99999 PR PBB SHADOW E&M-EST. PATIENT-LVL II: ICD-10-PCS | Mod: PBBFAC,,, | Performed by: PHYSICIAN ASSISTANT

## 2021-03-29 PROCEDURE — 72050 X-RAY EXAM NECK SPINE 4/5VWS: CPT | Mod: TC

## 2021-03-29 PROCEDURE — 99214 OFFICE O/P EST MOD 30 MIN: CPT | Mod: S$GLB,,, | Performed by: PHYSICIAN ASSISTANT

## 2021-03-29 RX ORDER — DOXEPIN HYDROCHLORIDE 100 MG/1
100 CAPSULE ORAL NIGHTLY
COMMUNITY
End: 2021-03-30

## 2021-03-29 RX ORDER — BUSPIRONE HYDROCHLORIDE 10 MG/1
10 TABLET ORAL 3 TIMES DAILY
COMMUNITY
End: 2021-03-30

## 2021-03-29 RX ORDER — OMEPRAZOLE 40 MG/1
40 CAPSULE, DELAYED RELEASE ORAL DAILY
COMMUNITY
End: 2023-05-01

## 2021-03-29 RX ORDER — PNV NO.95/FERROUS FUM/FOLIC AC 28MG-0.8MG
100 TABLET ORAL DAILY
COMMUNITY

## 2021-03-29 RX ORDER — HYDROCHLOROTHIAZIDE 25 MG/1
25 TABLET ORAL DAILY
COMMUNITY
End: 2023-06-02 | Stop reason: ALTCHOICE

## 2021-03-29 RX ORDER — FOLIC ACID 1 MG/1
1 TABLET ORAL DAILY
COMMUNITY

## 2021-03-29 RX ORDER — FERROUS SULFATE, DRIED 160(50) MG
1 TABLET, EXTENDED RELEASE ORAL 2 TIMES DAILY WITH MEALS
COMMUNITY

## 2021-03-29 RX ORDER — LAMOTRIGINE 100 MG/1
100 TABLET ORAL DAILY
COMMUNITY
End: 2021-03-30 | Stop reason: SDUPTHER

## 2021-03-30 ENCOUNTER — OFFICE VISIT (OUTPATIENT)
Dept: PSYCHIATRY | Facility: CLINIC | Age: 56
End: 2021-03-30
Payer: COMMERCIAL

## 2021-03-30 VITALS
SYSTOLIC BLOOD PRESSURE: 118 MMHG | DIASTOLIC BLOOD PRESSURE: 79 MMHG | WEIGHT: 204.5 LBS | BODY MASS INDEX: 31.09 KG/M2 | HEART RATE: 92 BPM

## 2021-03-30 DIAGNOSIS — F19.11 HISTORY OF DRUG ABUSE: ICD-10-CM

## 2021-03-30 DIAGNOSIS — F10.10 ALCOHOL ABUSE: ICD-10-CM

## 2021-03-30 DIAGNOSIS — F41.9 ANXIETY: ICD-10-CM

## 2021-03-30 DIAGNOSIS — F31.9 BIPOLAR AFFECTIVE DISORDER, REMISSION STATUS UNSPECIFIED: Primary | ICD-10-CM

## 2021-03-30 PROCEDURE — 90792 PSYCH DIAG EVAL W/MED SRVCS: CPT | Mod: S$GLB,,, | Performed by: NURSE PRACTITIONER

## 2021-03-30 PROCEDURE — 99999 PR PBB SHADOW E&M-EST. PATIENT-LVL III: ICD-10-PCS | Mod: PBBFAC,,, | Performed by: NURSE PRACTITIONER

## 2021-03-30 PROCEDURE — 99999 PR PBB SHADOW E&M-EST. PATIENT-LVL III: CPT | Mod: PBBFAC,,, | Performed by: NURSE PRACTITIONER

## 2021-03-30 PROCEDURE — 90792 PR PSYCHIATRIC DIAGNOSTIC EVALUATION W/MEDICAL SERVICES: ICD-10-PCS | Mod: S$GLB,,, | Performed by: NURSE PRACTITIONER

## 2021-03-30 RX ORDER — BUSPIRONE HYDROCHLORIDE 15 MG/1
15 TABLET ORAL 3 TIMES DAILY
Qty: 90 TABLET | Refills: 0 | Status: SHIPPED | OUTPATIENT
Start: 2021-03-30 | End: 2021-04-21 | Stop reason: SDUPTHER

## 2021-03-30 RX ORDER — LAMOTRIGINE 100 MG/1
100 TABLET ORAL DAILY
Qty: 30 TABLET | Refills: 0 | Status: SHIPPED | OUTPATIENT
Start: 2021-03-30 | End: 2021-04-27 | Stop reason: SDUPTHER

## 2021-03-30 RX ORDER — HYDROXYZINE HYDROCHLORIDE 50 MG/1
TABLET, FILM COATED ORAL
Qty: 90 TABLET | Refills: 0 | Status: SHIPPED | OUTPATIENT
Start: 2021-03-30 | End: 2021-04-27

## 2021-03-31 ENCOUNTER — OFFICE VISIT (OUTPATIENT)
Dept: UROLOGY | Facility: CLINIC | Age: 56
End: 2021-03-31
Payer: COMMERCIAL

## 2021-03-31 ENCOUNTER — PATIENT MESSAGE (OUTPATIENT)
Dept: PSYCHIATRY | Facility: CLINIC | Age: 56
End: 2021-03-31

## 2021-03-31 ENCOUNTER — LAB VISIT (OUTPATIENT)
Dept: LAB | Facility: HOSPITAL | Age: 56
End: 2021-03-31
Payer: COMMERCIAL

## 2021-03-31 VITALS
HEIGHT: 68 IN | HEART RATE: 92 BPM | WEIGHT: 201.06 LBS | SYSTOLIC BLOOD PRESSURE: 149 MMHG | BODY MASS INDEX: 30.47 KG/M2 | DIASTOLIC BLOOD PRESSURE: 98 MMHG

## 2021-03-31 DIAGNOSIS — N13.8 BPH WITH URINARY OBSTRUCTION: ICD-10-CM

## 2021-03-31 DIAGNOSIS — F31.9 BIPOLAR AFFECTIVE DISORDER, REMISSION STATUS UNSPECIFIED: ICD-10-CM

## 2021-03-31 DIAGNOSIS — N40.1 BPH WITH URINARY OBSTRUCTION: ICD-10-CM

## 2021-03-31 DIAGNOSIS — N40.1 BPH WITH URINARY OBSTRUCTION: Primary | ICD-10-CM

## 2021-03-31 DIAGNOSIS — N52.01 ERECTILE DYSFUNCTION DUE TO ARTERIAL INSUFFICIENCY: ICD-10-CM

## 2021-03-31 DIAGNOSIS — N13.8 BPH WITH URINARY OBSTRUCTION: Primary | ICD-10-CM

## 2021-03-31 DIAGNOSIS — E78.00 HYPERCHOLESTEREMIA: ICD-10-CM

## 2021-03-31 DIAGNOSIS — I10 ESSENTIAL HYPERTENSION: ICD-10-CM

## 2021-03-31 PROBLEM — Z98.1 S/P CERVICAL SPINAL FUSION: Status: RESOLVED | Noted: 2018-12-06 | Resolved: 2021-03-31

## 2021-03-31 PROBLEM — Z90.49 S/P LAPAROSCOPIC APPENDECTOMY: Status: RESOLVED | Noted: 2017-01-03 | Resolved: 2021-03-31

## 2021-03-31 LAB
25(OH)D3+25(OH)D2 SERPL-MCNC: 41 NG/ML (ref 30–96)
ALBUMIN SERPL BCP-MCNC: 4.5 G/DL (ref 3.5–5.2)
ALP SERPL-CCNC: 99 U/L (ref 55–135)
ALT SERPL W/O P-5'-P-CCNC: 50 U/L (ref 10–44)
ANION GAP SERPL CALC-SCNC: 16 MMOL/L (ref 8–16)
AST SERPL-CCNC: 48 U/L (ref 10–40)
BASOPHILS # BLD AUTO: 0.06 K/UL (ref 0–0.2)
BASOPHILS NFR BLD: 0.8 % (ref 0–1.9)
BILIRUB SERPL-MCNC: 0.7 MG/DL (ref 0.1–1)
BUN SERPL-MCNC: 11 MG/DL (ref 6–20)
CALCIUM SERPL-MCNC: 9.7 MG/DL (ref 8.7–10.5)
CHLORIDE SERPL-SCNC: 101 MMOL/L (ref 95–110)
CO2 SERPL-SCNC: 25 MMOL/L (ref 23–29)
COMPLEXED PSA SERPL-MCNC: 1.1 NG/ML (ref 0–4)
CREAT SERPL-MCNC: 1.2 MG/DL (ref 0.5–1.4)
DIFFERENTIAL METHOD: ABNORMAL
EOSINOPHIL # BLD AUTO: 0.1 K/UL (ref 0–0.5)
EOSINOPHIL NFR BLD: 1.2 % (ref 0–8)
ERYTHROCYTE [DISTWIDTH] IN BLOOD BY AUTOMATED COUNT: 13.2 % (ref 11.5–14.5)
EST. GFR  (AFRICAN AMERICAN): >60 ML/MIN/1.73 M^2
EST. GFR  (NON AFRICAN AMERICAN): >60 ML/MIN/1.73 M^2
GLUCOSE SERPL-MCNC: 117 MG/DL (ref 70–110)
HCT VFR BLD AUTO: 47.2 % (ref 40–54)
HGB BLD-MCNC: 15.3 G/DL (ref 14–18)
IMM GRANULOCYTES # BLD AUTO: 0.03 K/UL (ref 0–0.04)
IMM GRANULOCYTES NFR BLD AUTO: 0.4 % (ref 0–0.5)
LYMPHOCYTES # BLD AUTO: 1.4 K/UL (ref 1–4.8)
LYMPHOCYTES NFR BLD: 17.9 % (ref 18–48)
MCH RBC QN AUTO: 31.1 PG (ref 27–31)
MCHC RBC AUTO-ENTMCNC: 32.4 G/DL (ref 32–36)
MCV RBC AUTO: 96 FL (ref 82–98)
MONOCYTES # BLD AUTO: 0.7 K/UL (ref 0.3–1)
MONOCYTES NFR BLD: 8.6 % (ref 4–15)
NEUTROPHILS # BLD AUTO: 5.5 K/UL (ref 1.8–7.7)
NEUTROPHILS NFR BLD: 71.1 % (ref 38–73)
NRBC BLD-RTO: 0 /100 WBC
PLATELET # BLD AUTO: 260 K/UL (ref 150–450)
PMV BLD AUTO: 9.8 FL (ref 9.2–12.9)
POTASSIUM SERPL-SCNC: 3.8 MMOL/L (ref 3.5–5.1)
PROT SERPL-MCNC: 7.9 G/DL (ref 6–8.4)
RBC # BLD AUTO: 4.92 M/UL (ref 4.6–6.2)
SODIUM SERPL-SCNC: 142 MMOL/L (ref 136–145)
T4 FREE SERPL-MCNC: 1.01 NG/DL (ref 0.71–1.51)
TESTOST SERPL-MCNC: 306 NG/DL (ref 304–1227)
TSH SERPL DL<=0.005 MIU/L-ACNC: 7.97 UIU/ML (ref 0.4–4)
WBC # BLD AUTO: 7.69 K/UL (ref 3.9–12.7)

## 2021-03-31 PROCEDURE — 84403 ASSAY OF TOTAL TESTOSTERONE: CPT | Performed by: UROLOGY

## 2021-03-31 PROCEDURE — 36415 COLL VENOUS BLD VENIPUNCTURE: CPT | Performed by: UROLOGY

## 2021-03-31 PROCEDURE — 99204 OFFICE O/P NEW MOD 45 MIN: CPT | Mod: S$GLB,,, | Performed by: UROLOGY

## 2021-03-31 PROCEDURE — 84153 ASSAY OF PSA TOTAL: CPT | Performed by: UROLOGY

## 2021-03-31 PROCEDURE — 80053 COMPREHEN METABOLIC PANEL: CPT | Performed by: NURSE PRACTITIONER

## 2021-03-31 PROCEDURE — 99204 PR OFFICE/OUTPT VISIT, NEW, LEVL IV, 45-59 MIN: ICD-10-PCS | Mod: S$GLB,,, | Performed by: UROLOGY

## 2021-03-31 PROCEDURE — 99999 PR PBB SHADOW E&M-EST. PATIENT-LVL IV: CPT | Mod: PBBFAC,,, | Performed by: UROLOGY

## 2021-03-31 PROCEDURE — 99999 PR PBB SHADOW E&M-EST. PATIENT-LVL IV: ICD-10-PCS | Mod: PBBFAC,,, | Performed by: UROLOGY

## 2021-03-31 PROCEDURE — 84439 ASSAY OF FREE THYROXINE: CPT | Performed by: NURSE PRACTITIONER

## 2021-03-31 PROCEDURE — 82306 VITAMIN D 25 HYDROXY: CPT | Performed by: NURSE PRACTITIONER

## 2021-03-31 PROCEDURE — 85025 COMPLETE CBC W/AUTO DIFF WBC: CPT | Performed by: NURSE PRACTITIONER

## 2021-03-31 PROCEDURE — 84443 ASSAY THYROID STIM HORMONE: CPT | Performed by: NURSE PRACTITIONER

## 2021-03-31 RX ORDER — TAMSULOSIN HYDROCHLORIDE 0.4 MG/1
0.4 CAPSULE ORAL DAILY
Qty: 30 CAPSULE | Refills: 11 | Status: SHIPPED | OUTPATIENT
Start: 2021-03-31 | End: 2022-05-05

## 2021-03-31 RX ORDER — TADALAFIL 20 MG/1
20 TABLET ORAL DAILY
Qty: 10 TABLET | Refills: 11 | Status: SHIPPED | OUTPATIENT
Start: 2021-03-31 | End: 2022-08-01

## 2021-04-01 ENCOUNTER — TELEPHONE (OUTPATIENT)
Dept: PSYCHIATRY | Facility: CLINIC | Age: 56
End: 2021-04-01

## 2021-04-06 ENCOUNTER — OFFICE VISIT (OUTPATIENT)
Dept: PSYCHIATRY | Facility: CLINIC | Age: 56
End: 2021-04-06
Payer: COMMERCIAL

## 2021-04-06 DIAGNOSIS — F41.9 ANXIETY: Primary | ICD-10-CM

## 2021-04-06 PROCEDURE — 90791 PR PSYCHIATRIC DIAGNOSTIC EVALUATION: ICD-10-PCS | Mod: 95,,, | Performed by: SOCIAL WORKER

## 2021-04-06 PROCEDURE — 90791 PSYCH DIAGNOSTIC EVALUATION: CPT | Mod: 95,,, | Performed by: SOCIAL WORKER

## 2021-04-13 ENCOUNTER — OFFICE VISIT (OUTPATIENT)
Dept: PSYCHIATRY | Facility: CLINIC | Age: 56
End: 2021-04-13
Payer: COMMERCIAL

## 2021-04-13 DIAGNOSIS — F41.9 ANXIETY: Primary | ICD-10-CM

## 2021-04-13 PROCEDURE — 90837 PR PSYCHOTHERAPY W/PATIENT, 60 MIN: ICD-10-PCS | Mod: 95,,, | Performed by: SOCIAL WORKER

## 2021-04-13 PROCEDURE — 90837 PSYTX W PT 60 MINUTES: CPT | Mod: 95,,, | Performed by: SOCIAL WORKER

## 2021-04-20 ENCOUNTER — OFFICE VISIT (OUTPATIENT)
Dept: PSYCHIATRY | Facility: CLINIC | Age: 56
End: 2021-04-20
Payer: COMMERCIAL

## 2021-04-20 DIAGNOSIS — F41.9 ANXIETY: Primary | ICD-10-CM

## 2021-04-20 PROCEDURE — 90834 PSYTX W PT 45 MINUTES: CPT | Mod: 95,,, | Performed by: SOCIAL WORKER

## 2021-04-20 PROCEDURE — 90834 PR PSYCHOTHERAPY W/PATIENT, 45 MIN: ICD-10-PCS | Mod: 95,,, | Performed by: SOCIAL WORKER

## 2021-04-21 DIAGNOSIS — F31.9 BIPOLAR AFFECTIVE DISORDER, REMISSION STATUS UNSPECIFIED: ICD-10-CM

## 2021-04-21 RX ORDER — BUSPIRONE HYDROCHLORIDE 15 MG/1
15 TABLET ORAL 3 TIMES DAILY
Qty: 60 TABLET | Refills: 0 | Status: SHIPPED | OUTPATIENT
Start: 2021-04-21 | End: 2021-04-27 | Stop reason: SDUPTHER

## 2021-04-27 ENCOUNTER — PATIENT MESSAGE (OUTPATIENT)
Dept: PSYCHIATRY | Facility: CLINIC | Age: 56
End: 2021-04-27

## 2021-04-27 ENCOUNTER — OFFICE VISIT (OUTPATIENT)
Dept: PSYCHIATRY | Facility: CLINIC | Age: 56
End: 2021-04-27
Payer: COMMERCIAL

## 2021-04-27 VITALS
SYSTOLIC BLOOD PRESSURE: 106 MMHG | HEART RATE: 88 BPM | DIASTOLIC BLOOD PRESSURE: 74 MMHG | WEIGHT: 205.38 LBS | BODY MASS INDEX: 31.22 KG/M2

## 2021-04-27 DIAGNOSIS — F41.9 ANXIETY: ICD-10-CM

## 2021-04-27 DIAGNOSIS — G47.33 OSA (OBSTRUCTIVE SLEEP APNEA): ICD-10-CM

## 2021-04-27 DIAGNOSIS — F31.9 BIPOLAR AFFECTIVE DISORDER, REMISSION STATUS UNSPECIFIED: Primary | ICD-10-CM

## 2021-04-27 DIAGNOSIS — F10.10 ALCOHOL ABUSE: ICD-10-CM

## 2021-04-27 PROCEDURE — 99999 PR PBB SHADOW E&M-EST. PATIENT-LVL II: ICD-10-PCS | Mod: PBBFAC,,, | Performed by: NURSE PRACTITIONER

## 2021-04-27 PROCEDURE — 99214 OFFICE O/P EST MOD 30 MIN: CPT | Mod: S$GLB,,, | Performed by: NURSE PRACTITIONER

## 2021-04-27 PROCEDURE — 99214 PR OFFICE/OUTPT VISIT, EST, LEVL IV, 30-39 MIN: ICD-10-PCS | Mod: S$GLB,,, | Performed by: NURSE PRACTITIONER

## 2021-04-27 PROCEDURE — 99999 PR PBB SHADOW E&M-EST. PATIENT-LVL II: CPT | Mod: PBBFAC,,, | Performed by: NURSE PRACTITIONER

## 2021-04-27 RX ORDER — BUSPIRONE HYDROCHLORIDE 15 MG/1
15 TABLET ORAL 3 TIMES DAILY
Qty: 90 TABLET | Refills: 3 | Status: SHIPPED | OUTPATIENT
Start: 2021-04-27 | End: 2021-10-11 | Stop reason: SDUPTHER

## 2021-04-27 RX ORDER — MIRTAZAPINE 15 MG/1
15 TABLET, FILM COATED ORAL NIGHTLY
Qty: 30 TABLET | Refills: 1 | Status: SHIPPED | OUTPATIENT
Start: 2021-04-27 | End: 2021-05-19

## 2021-04-27 RX ORDER — NALTREXONE HYDROCHLORIDE 50 MG/1
50 TABLET, FILM COATED ORAL DAILY
COMMUNITY
End: 2021-04-27 | Stop reason: CLARIF

## 2021-04-27 RX ORDER — LAMOTRIGINE 100 MG/1
100 TABLET ORAL DAILY
Qty: 30 TABLET | Refills: 3 | Status: SHIPPED | OUTPATIENT
Start: 2021-04-27 | End: 2021-10-11 | Stop reason: SDUPTHER

## 2021-04-28 RX ORDER — NALTREXONE HYDROCHLORIDE 50 MG/1
50 TABLET, FILM COATED ORAL DAILY
Qty: 30 TABLET | Refills: 1 | Status: SHIPPED | OUTPATIENT
Start: 2021-04-28 | End: 2021-05-26 | Stop reason: SINTOL

## 2021-05-10 ENCOUNTER — PROCEDURE VISIT (OUTPATIENT)
Dept: PHYSICAL MEDICINE AND REHAB | Facility: CLINIC | Age: 56
End: 2021-05-10
Payer: COMMERCIAL

## 2021-05-10 DIAGNOSIS — M47.12 CERVICAL SPONDYLOSIS WITH MYELOPATHY AND RADICULOPATHY: ICD-10-CM

## 2021-05-10 DIAGNOSIS — M79.18 CERVICAL MYOFASCIAL PAIN SYNDROME: ICD-10-CM

## 2021-05-10 DIAGNOSIS — M47.22 CERVICAL SPONDYLOSIS WITH MYELOPATHY AND RADICULOPATHY: ICD-10-CM

## 2021-05-10 DIAGNOSIS — Z98.1 S/P CERVICAL SPINAL FUSION: ICD-10-CM

## 2021-05-10 PROCEDURE — 95886 MUSC TEST DONE W/N TEST COMP: CPT | Mod: S$GLB,,, | Performed by: PHYSICAL MEDICINE & REHABILITATION

## 2021-05-10 PROCEDURE — 95911 PR NERVE CONDUCTION STUDY; 9-10 STUDIES: ICD-10-PCS | Mod: S$GLB,,, | Performed by: PHYSICAL MEDICINE & REHABILITATION

## 2021-05-10 PROCEDURE — 95911 NRV CNDJ TEST 9-10 STUDIES: CPT | Mod: S$GLB,,, | Performed by: PHYSICAL MEDICINE & REHABILITATION

## 2021-05-10 PROCEDURE — 95886 PR EMG COMPLETE, W/ NERVE CONDUCTION STUDIES, 5+ MUSCLES: ICD-10-PCS | Mod: S$GLB,,, | Performed by: PHYSICAL MEDICINE & REHABILITATION

## 2021-05-25 ENCOUNTER — OFFICE VISIT (OUTPATIENT)
Dept: PSYCHIATRY | Facility: CLINIC | Age: 56
End: 2021-05-25
Payer: COMMERCIAL

## 2021-05-25 VITALS
BODY MASS INDEX: 30.42 KG/M2 | HEART RATE: 82 BPM | WEIGHT: 200.06 LBS | SYSTOLIC BLOOD PRESSURE: 99 MMHG | DIASTOLIC BLOOD PRESSURE: 71 MMHG

## 2021-05-25 DIAGNOSIS — F10.10 ALCOHOL ABUSE: ICD-10-CM

## 2021-05-25 DIAGNOSIS — F41.9 ANXIETY: ICD-10-CM

## 2021-05-25 DIAGNOSIS — G47.00 INSOMNIA, UNSPECIFIED TYPE: ICD-10-CM

## 2021-05-25 DIAGNOSIS — F31.9 BIPOLAR AFFECTIVE DISORDER, REMISSION STATUS UNSPECIFIED: Primary | ICD-10-CM

## 2021-05-25 PROCEDURE — 99214 PR OFFICE/OUTPT VISIT, EST, LEVL IV, 30-39 MIN: ICD-10-PCS | Mod: S$GLB,,, | Performed by: NURSE PRACTITIONER

## 2021-05-25 PROCEDURE — 99214 OFFICE O/P EST MOD 30 MIN: CPT | Mod: S$GLB,,, | Performed by: NURSE PRACTITIONER

## 2021-05-25 PROCEDURE — 99999 PR PBB SHADOW E&M-EST. PATIENT-LVL III: ICD-10-PCS | Mod: PBBFAC,,, | Performed by: NURSE PRACTITIONER

## 2021-05-25 PROCEDURE — 99999 PR PBB SHADOW E&M-EST. PATIENT-LVL III: CPT | Mod: PBBFAC,,, | Performed by: NURSE PRACTITIONER

## 2021-05-25 RX ORDER — TRAZODONE HYDROCHLORIDE 50 MG/1
50 TABLET ORAL NIGHTLY
Qty: 30 TABLET | Refills: 11 | Status: SHIPPED | OUTPATIENT
Start: 2021-05-25 | End: 2021-05-25 | Stop reason: CLARIF

## 2021-05-25 RX ORDER — TRAZODONE HYDROCHLORIDE 50 MG/1
TABLET ORAL
Qty: 60 TABLET | Refills: 1 | Status: SHIPPED | OUTPATIENT
Start: 2021-05-25 | End: 2021-07-15

## 2021-05-25 RX ORDER — LEVOTHYROXINE SODIUM 25 UG/1
25 TABLET ORAL DAILY
COMMUNITY
Start: 2021-05-10

## 2021-05-26 RX ORDER — ACAMPROSATE CALCIUM 333 MG/1
666 TABLET, DELAYED RELEASE ORAL 3 TIMES DAILY
Qty: 180 TABLET | Refills: 0 | Status: SHIPPED | OUTPATIENT
Start: 2021-05-26 | End: 2021-06-17

## 2021-05-28 ENCOUNTER — TELEPHONE (OUTPATIENT)
Dept: NEUROSURGERY | Facility: HOSPITAL | Age: 56
End: 2021-05-28

## 2021-06-02 ENCOUNTER — TELEPHONE (OUTPATIENT)
Dept: SLEEP MEDICINE | Facility: CLINIC | Age: 56
End: 2021-06-02

## 2021-06-02 ENCOUNTER — OFFICE VISIT (OUTPATIENT)
Dept: SLEEP MEDICINE | Facility: CLINIC | Age: 56
End: 2021-06-02
Payer: COMMERCIAL

## 2021-06-02 VITALS
HEIGHT: 68 IN | DIASTOLIC BLOOD PRESSURE: 62 MMHG | BODY MASS INDEX: 31.54 KG/M2 | WEIGHT: 208.13 LBS | HEART RATE: 85 BPM | SYSTOLIC BLOOD PRESSURE: 100 MMHG

## 2021-06-02 DIAGNOSIS — G47.33 OSA (OBSTRUCTIVE SLEEP APNEA): ICD-10-CM

## 2021-06-02 PROCEDURE — 99204 OFFICE O/P NEW MOD 45 MIN: CPT | Mod: S$GLB,,, | Performed by: PSYCHIATRY & NEUROLOGY

## 2021-06-02 PROCEDURE — 99204 PR OFFICE/OUTPT VISIT, NEW, LEVL IV, 45-59 MIN: ICD-10-PCS | Mod: S$GLB,,, | Performed by: PSYCHIATRY & NEUROLOGY

## 2021-06-02 PROCEDURE — 99999 PR PBB SHADOW E&M-EST. PATIENT-LVL IV: CPT | Mod: PBBFAC,,, | Performed by: PSYCHIATRY & NEUROLOGY

## 2021-06-02 PROCEDURE — 99999 PR PBB SHADOW E&M-EST. PATIENT-LVL IV: ICD-10-PCS | Mod: PBBFAC,,, | Performed by: PSYCHIATRY & NEUROLOGY

## 2021-06-04 ENCOUNTER — TELEPHONE (OUTPATIENT)
Dept: NEUROSURGERY | Facility: CLINIC | Age: 56
End: 2021-06-04

## 2021-06-04 ENCOUNTER — TELEPHONE (OUTPATIENT)
Dept: SLEEP MEDICINE | Facility: CLINIC | Age: 56
End: 2021-06-04

## 2021-06-15 ENCOUNTER — HOSPITAL ENCOUNTER (OUTPATIENT)
Facility: HOSPITAL | Age: 56
Discharge: HOME | End: 2021-06-16
Attending: EMERGENCY MEDICINE | Admitting: EMERGENCY MEDICINE
Payer: COMMERCIAL

## 2021-06-15 DIAGNOSIS — M87.051 AVASCULAR NECROSIS OF BONES OF BOTH HIPS: Primary | ICD-10-CM

## 2021-06-15 DIAGNOSIS — M87.052 AVASCULAR NECROSIS OF BONES OF BOTH HIPS: Primary | ICD-10-CM

## 2021-06-15 DIAGNOSIS — M79.605 ACUTE LEG PAIN, LEFT: ICD-10-CM

## 2021-06-15 PROBLEM — M87.9 BILATERAL HIP OSTEONECROSIS: Status: ACTIVE | Noted: 2021-06-15

## 2021-06-15 LAB
ALBUMIN SERPL BCP-MCNC: 4 G/DL (ref 3.5–5.2)
ALP SERPL-CCNC: 91 U/L (ref 55–135)
ALT SERPL W/O P-5'-P-CCNC: 19 U/L (ref 10–44)
ANION GAP SERPL CALC-SCNC: 12 MMOL/L (ref 8–16)
AST SERPL-CCNC: 21 U/L (ref 10–40)
BASOPHILS # BLD AUTO: 0.05 K/UL (ref 0–0.2)
BASOPHILS NFR BLD: 0.7 % (ref 0–1.9)
BILIRUB SERPL-MCNC: 0.3 MG/DL (ref 0.1–1)
BUN SERPL-MCNC: 14 MG/DL (ref 6–20)
CALCIUM SERPL-MCNC: 9.1 MG/DL (ref 8.7–10.5)
CHLORIDE SERPL-SCNC: 101 MMOL/L (ref 95–110)
CO2 SERPL-SCNC: 23 MMOL/L (ref 23–29)
CREAT SERPL-MCNC: 1.1 MG/DL (ref 0.5–1.4)
CRP SERPL-MCNC: 5.4 MG/L (ref 0–8.2)
DIFFERENTIAL METHOD: ABNORMAL
EOSINOPHIL # BLD AUTO: 0.1 K/UL (ref 0–0.5)
EOSINOPHIL NFR BLD: 1.5 % (ref 0–8)
ERYTHROCYTE [DISTWIDTH] IN BLOOD BY AUTOMATED COUNT: 12.7 % (ref 11.5–14.5)
ERYTHROCYTE [SEDIMENTATION RATE] IN BLOOD BY WESTERGREN METHOD: 22 MM/HR (ref 0–23)
EST. GFR  (AFRICAN AMERICAN): >60 ML/MIN/1.73 M^2
EST. GFR  (NON AFRICAN AMERICAN): >60 ML/MIN/1.73 M^2
GLUCOSE SERPL-MCNC: 93 MG/DL (ref 70–110)
HCT VFR BLD AUTO: 40.7 % (ref 40–54)
HGB BLD-MCNC: 13.9 G/DL (ref 14–18)
IMM GRANULOCYTES # BLD AUTO: 0.02 K/UL (ref 0–0.04)
IMM GRANULOCYTES NFR BLD AUTO: 0.3 % (ref 0–0.5)
LYMPHOCYTES # BLD AUTO: 1.5 K/UL (ref 1–4.8)
LYMPHOCYTES NFR BLD: 20.9 % (ref 18–48)
MCH RBC QN AUTO: 30.5 PG (ref 27–31)
MCHC RBC AUTO-ENTMCNC: 34.2 G/DL (ref 32–36)
MCV RBC AUTO: 90 FL (ref 82–98)
MONOCYTES # BLD AUTO: 0.6 K/UL (ref 0.3–1)
MONOCYTES NFR BLD: 8.6 % (ref 4–15)
NEUTROPHILS # BLD AUTO: 4.9 K/UL (ref 1.8–7.7)
NEUTROPHILS NFR BLD: 68 % (ref 38–73)
NRBC BLD-RTO: 0 /100 WBC
PLATELET # BLD AUTO: 280 K/UL (ref 150–450)
PMV BLD AUTO: 9.4 FL (ref 9.2–12.9)
POTASSIUM SERPL-SCNC: 3.9 MMOL/L (ref 3.5–5.1)
PROT SERPL-MCNC: 7.1 G/DL (ref 6–8.4)
RBC # BLD AUTO: 4.55 M/UL (ref 4.6–6.2)
SODIUM SERPL-SCNC: 136 MMOL/L (ref 136–145)
WBC # BLD AUTO: 7.22 K/UL (ref 3.9–12.7)

## 2021-06-15 PROCEDURE — 25000003 PHARM REV CODE 250: Performed by: PHYSICIAN ASSISTANT

## 2021-06-15 PROCEDURE — 86140 C-REACTIVE PROTEIN: CPT | Performed by: PHYSICIAN ASSISTANT

## 2021-06-15 PROCEDURE — 63600175 PHARM REV CODE 636 W HCPCS: Performed by: PHYSICIAN ASSISTANT

## 2021-06-15 PROCEDURE — 96376 TX/PRO/DX INJ SAME DRUG ADON: CPT

## 2021-06-15 PROCEDURE — 99285 EMERGENCY DEPT VISIT HI MDM: CPT | Mod: 25

## 2021-06-15 PROCEDURE — 80053 COMPREHEN METABOLIC PANEL: CPT | Performed by: PHYSICIAN ASSISTANT

## 2021-06-15 PROCEDURE — G0378 HOSPITAL OBSERVATION PER HR: HCPCS

## 2021-06-15 PROCEDURE — 85025 COMPLETE CBC W/AUTO DIFF WBC: CPT | Performed by: PHYSICIAN ASSISTANT

## 2021-06-15 PROCEDURE — 85652 RBC SED RATE AUTOMATED: CPT | Performed by: PHYSICIAN ASSISTANT

## 2021-06-15 PROCEDURE — 96374 THER/PROPH/DIAG INJ IV PUSH: CPT

## 2021-06-15 PROCEDURE — 99284 EMERGENCY DEPT VISIT MOD MDM: CPT | Mod: ,,, | Performed by: EMERGENCY MEDICINE

## 2021-06-15 PROCEDURE — 99284 PR EMERGENCY DEPT VISIT,LEVEL IV: ICD-10-PCS | Mod: ,,, | Performed by: EMERGENCY MEDICINE

## 2021-06-15 PROCEDURE — 96375 TX/PRO/DX INJ NEW DRUG ADDON: CPT

## 2021-06-15 RX ORDER — SUCRALFATE 1 G/10ML
1 SUSPENSION ORAL EVERY 6 HOURS
Status: DISCONTINUED | OUTPATIENT
Start: 2021-06-16 | End: 2021-06-16 | Stop reason: HOSPADM

## 2021-06-15 RX ORDER — MAG HYDROX/ALUMINUM HYD/SIMETH 200-200-20
30 SUSPENSION, ORAL (FINAL DOSE FORM) ORAL
Status: DISCONTINUED | OUTPATIENT
Start: 2021-06-15 | End: 2021-06-16 | Stop reason: HOSPADM

## 2021-06-15 RX ORDER — HYDROMORPHONE HYDROCHLORIDE 1 MG/ML
1 INJECTION, SOLUTION INTRAMUSCULAR; INTRAVENOUS; SUBCUTANEOUS
Status: COMPLETED | OUTPATIENT
Start: 2021-06-15 | End: 2021-06-15

## 2021-06-15 RX ORDER — LAMOTRIGINE 100 MG/1
100 TABLET ORAL DAILY
Status: DISCONTINUED | OUTPATIENT
Start: 2021-06-16 | End: 2021-06-16 | Stop reason: HOSPADM

## 2021-06-15 RX ORDER — OXYCODONE AND ACETAMINOPHEN 5; 325 MG/1; MG/1
1 TABLET ORAL
Status: COMPLETED | OUTPATIENT
Start: 2021-06-15 | End: 2021-06-15

## 2021-06-15 RX ORDER — LEVOTHYROXINE SODIUM 25 UG/1
25 TABLET ORAL DAILY
Status: DISCONTINUED | OUTPATIENT
Start: 2021-06-16 | End: 2021-06-16 | Stop reason: HOSPADM

## 2021-06-15 RX ORDER — LISINOPRIL 10 MG/1
20 TABLET ORAL EVERY MORNING
Status: DISCONTINUED | OUTPATIENT
Start: 2021-06-16 | End: 2021-06-16

## 2021-06-15 RX ORDER — MORPHINE SULFATE 4 MG/ML
4 INJECTION, SOLUTION INTRAMUSCULAR; INTRAVENOUS
Status: COMPLETED | OUTPATIENT
Start: 2021-06-15 | End: 2021-06-15

## 2021-06-15 RX ORDER — SODIUM CHLORIDE 0.9 % (FLUSH) 0.9 %
10 SYRINGE (ML) INJECTION
Status: DISCONTINUED | OUTPATIENT
Start: 2021-06-15 | End: 2021-06-16 | Stop reason: HOSPADM

## 2021-06-15 RX ORDER — GABAPENTIN 400 MG/1
400 CAPSULE ORAL 3 TIMES DAILY
Status: DISCONTINUED | OUTPATIENT
Start: 2021-06-15 | End: 2021-06-16 | Stop reason: HOSPADM

## 2021-06-15 RX ORDER — TAMSULOSIN HYDROCHLORIDE 0.4 MG/1
0.4 CAPSULE ORAL DAILY
Status: DISCONTINUED | OUTPATIENT
Start: 2021-06-16 | End: 2021-06-16 | Stop reason: HOSPADM

## 2021-06-15 RX ORDER — ACAMPROSATE CALCIUM 333 MG/1
666 TABLET, DELAYED RELEASE ORAL 3 TIMES DAILY
Status: DISCONTINUED | OUTPATIENT
Start: 2021-06-15 | End: 2021-06-16 | Stop reason: HOSPADM

## 2021-06-15 RX ORDER — TRAZODONE HYDROCHLORIDE 50 MG/1
50 TABLET ORAL NIGHTLY PRN
Status: DISCONTINUED | OUTPATIENT
Start: 2021-06-15 | End: 2021-06-16 | Stop reason: HOSPADM

## 2021-06-15 RX ORDER — BUSPIRONE HYDROCHLORIDE 5 MG/1
15 TABLET ORAL 3 TIMES DAILY
Status: DISCONTINUED | OUTPATIENT
Start: 2021-06-15 | End: 2021-06-16 | Stop reason: HOSPADM

## 2021-06-15 RX ORDER — HYDROCHLOROTHIAZIDE 25 MG/1
25 TABLET ORAL DAILY
Status: DISCONTINUED | OUTPATIENT
Start: 2021-06-16 | End: 2021-06-16 | Stop reason: HOSPADM

## 2021-06-15 RX ORDER — TALC
6 POWDER (GRAM) TOPICAL NIGHTLY PRN
Status: DISCONTINUED | OUTPATIENT
Start: 2021-06-15 | End: 2021-06-16 | Stop reason: HOSPADM

## 2021-06-15 RX ADMIN — HYDROMORPHONE HYDROCHLORIDE 1 MG: 1 INJECTION, SOLUTION INTRAMUSCULAR; INTRAVENOUS; SUBCUTANEOUS at 09:06

## 2021-06-15 RX ADMIN — OXYCODONE HYDROCHLORIDE AND ACETAMINOPHEN 1 TABLET: 5; 325 TABLET ORAL at 05:06

## 2021-06-15 RX ADMIN — GABAPENTIN 400 MG: 400 CAPSULE ORAL at 09:06

## 2021-06-15 RX ADMIN — HYDROMORPHONE HYDROCHLORIDE 1 MG: 1 INJECTION, SOLUTION INTRAMUSCULAR; INTRAVENOUS; SUBCUTANEOUS at 07:06

## 2021-06-15 RX ADMIN — MORPHINE SULFATE 4 MG: 4 INJECTION INTRAVENOUS at 02:06

## 2021-06-15 RX ADMIN — ALUMINUM HYDROXIDE, MAGNESIUM HYDROXIDE, AND SIMETHICONE 30 ML: 200; 200; 20 SUSPENSION ORAL at 09:06

## 2021-06-15 RX ADMIN — BUSPIRONE HYDROCHLORIDE 15 MG: 5 TABLET ORAL at 10:06

## 2021-06-15 RX ADMIN — ACAMPROSATE CALCIUM 666 MG: 333 TABLET, DELAYED RELEASE ORAL at 10:06

## 2021-06-16 ENCOUNTER — TELEPHONE (OUTPATIENT)
Dept: ORTHOPEDICS | Facility: CLINIC | Age: 56
End: 2021-06-16

## 2021-06-16 VITALS
BODY MASS INDEX: 30.31 KG/M2 | TEMPERATURE: 98 F | HEART RATE: 68 BPM | WEIGHT: 200 LBS | HEIGHT: 68 IN | RESPIRATION RATE: 16 BRPM | SYSTOLIC BLOOD PRESSURE: 142 MMHG | OXYGEN SATURATION: 97 % | DIASTOLIC BLOOD PRESSURE: 83 MMHG

## 2021-06-16 PROCEDURE — 97161 PT EVAL LOW COMPLEX 20 MIN: CPT

## 2021-06-16 PROCEDURE — 63600175 PHARM REV CODE 636 W HCPCS: Performed by: PHYSICIAN ASSISTANT

## 2021-06-16 PROCEDURE — 96375 TX/PRO/DX INJ NEW DRUG ADDON: CPT

## 2021-06-16 PROCEDURE — 96376 TX/PRO/DX INJ SAME DRUG ADON: CPT

## 2021-06-16 PROCEDURE — G0378 HOSPITAL OBSERVATION PER HR: HCPCS

## 2021-06-16 PROCEDURE — 25000003 PHARM REV CODE 250: Performed by: PHYSICIAN ASSISTANT

## 2021-06-16 RX ORDER — ACETAMINOPHEN 500 MG
1000 TABLET ORAL
Status: COMPLETED | OUTPATIENT
Start: 2021-06-16 | End: 2021-06-16

## 2021-06-16 RX ORDER — HYDROMORPHONE HYDROCHLORIDE 1 MG/ML
1 INJECTION, SOLUTION INTRAMUSCULAR; INTRAVENOUS; SUBCUTANEOUS
Status: COMPLETED | OUTPATIENT
Start: 2021-06-16 | End: 2021-06-16

## 2021-06-16 RX ORDER — KETOROLAC TROMETHAMINE 30 MG/ML
10 INJECTION, SOLUTION INTRAMUSCULAR; INTRAVENOUS
Status: COMPLETED | OUTPATIENT
Start: 2021-06-16 | End: 2021-06-16

## 2021-06-16 RX ORDER — METHOCARBAMOL 500 MG/1
1500 TABLET, FILM COATED ORAL
Status: COMPLETED | OUTPATIENT
Start: 2021-06-16 | End: 2021-06-16

## 2021-06-16 RX ORDER — METHOCARBAMOL 500 MG/1
500-1000 TABLET, FILM COATED ORAL 2 TIMES DAILY PRN
Qty: 20 TABLET | Refills: 0 | Status: SHIPPED | OUTPATIENT
Start: 2021-06-16 | End: 2021-06-21

## 2021-06-16 RX ORDER — MELOXICAM 15 MG/1
15 TABLET ORAL DAILY
Qty: 30 TABLET | Refills: 0 | Status: SHIPPED | OUTPATIENT
Start: 2021-06-16 | End: 2021-08-18

## 2021-06-16 RX ORDER — HYDROMORPHONE HYDROCHLORIDE 1 MG/ML
1 INJECTION, SOLUTION INTRAMUSCULAR; INTRAVENOUS; SUBCUTANEOUS ONCE AS NEEDED
Status: DISCONTINUED | OUTPATIENT
Start: 2021-06-17 | End: 2021-06-16 | Stop reason: HOSPADM

## 2021-06-16 RX ADMIN — TAMSULOSIN HYDROCHLORIDE 0.4 MG: 0.4 CAPSULE ORAL at 09:06

## 2021-06-16 RX ADMIN — LEVOTHYROXINE SODIUM 25 MCG: 25 TABLET ORAL at 08:06

## 2021-06-16 RX ADMIN — METHOCARBAMOL 1500 MG: 500 TABLET ORAL at 09:06

## 2021-06-16 RX ADMIN — BUSPIRONE HYDROCHLORIDE 15 MG: 5 TABLET ORAL at 09:06

## 2021-06-16 RX ADMIN — SUCRALFATE 1 G: 1 SUSPENSION ORAL at 02:06

## 2021-06-16 RX ADMIN — HYDROMORPHONE HYDROCHLORIDE 1 MG: 1 INJECTION, SOLUTION INTRAMUSCULAR; INTRAVENOUS; SUBCUTANEOUS at 02:06

## 2021-06-16 RX ADMIN — ALUMINUM HYDROXIDE, MAGNESIUM HYDROXIDE, AND SIMETHICONE 30 ML: 200; 200; 20 SUSPENSION ORAL at 08:06

## 2021-06-16 RX ADMIN — LAMOTRIGINE 100 MG: 100 TABLET ORAL at 09:06

## 2021-06-16 RX ADMIN — HYDROCHLOROTHIAZIDE 25 MG: 25 TABLET ORAL at 09:06

## 2021-06-16 RX ADMIN — ACETAMINOPHEN 1000 MG: 500 TABLET, FILM COATED ORAL at 09:06

## 2021-06-16 RX ADMIN — GABAPENTIN 400 MG: 400 CAPSULE ORAL at 09:06

## 2021-06-16 RX ADMIN — ACAMPROSATE CALCIUM 666 MG: 333 TABLET, DELAYED RELEASE ORAL at 09:06

## 2021-06-16 RX ADMIN — KETOROLAC TROMETHAMINE 10 MG: 30 INJECTION, SOLUTION INTRAMUSCULAR; INTRAVENOUS at 09:06

## 2021-06-17 ENCOUNTER — PATIENT MESSAGE (OUTPATIENT)
Dept: PSYCHIATRY | Facility: CLINIC | Age: 56
End: 2021-06-17

## 2021-06-17 ENCOUNTER — PES CALL (OUTPATIENT)
Dept: ADMINISTRATIVE | Facility: CLINIC | Age: 56
End: 2021-06-17

## 2021-06-17 ENCOUNTER — OFFICE VISIT (OUTPATIENT)
Dept: ORTHOPEDICS | Facility: CLINIC | Age: 56
End: 2021-06-17
Payer: COMMERCIAL

## 2021-06-17 VITALS — HEIGHT: 68 IN | BODY MASS INDEX: 30.43 KG/M2 | WEIGHT: 200.75 LBS

## 2021-06-17 DIAGNOSIS — M87.052 AVASCULAR NECROSIS OF LEFT FEMUR: Primary | ICD-10-CM

## 2021-06-17 DIAGNOSIS — M87.052 AVASCULAR NECROSIS OF BONES OF BOTH HIPS: ICD-10-CM

## 2021-06-17 DIAGNOSIS — M87.051 AVASCULAR NECROSIS OF BONES OF BOTH HIPS: ICD-10-CM

## 2021-06-17 PROCEDURE — 99999 PR PBB SHADOW E&M-EST. PATIENT-LVL III: ICD-10-PCS | Mod: PBBFAC,,, | Performed by: ORTHOPAEDIC SURGERY

## 2021-06-17 PROCEDURE — 99999 PR PBB SHADOW E&M-EST. PATIENT-LVL III: CPT | Mod: PBBFAC,,, | Performed by: ORTHOPAEDIC SURGERY

## 2021-06-17 PROCEDURE — 99203 OFFICE O/P NEW LOW 30 MIN: CPT | Mod: S$GLB,,, | Performed by: ORTHOPAEDIC SURGERY

## 2021-06-17 PROCEDURE — 99203 PR OFFICE/OUTPT VISIT, NEW, LEVL III, 30-44 MIN: ICD-10-PCS | Mod: S$GLB,,, | Performed by: ORTHOPAEDIC SURGERY

## 2021-06-18 ENCOUNTER — TELEPHONE (OUTPATIENT)
Dept: PAIN MEDICINE | Facility: CLINIC | Age: 56
End: 2021-06-18

## 2021-06-18 ENCOUNTER — PATIENT MESSAGE (OUTPATIENT)
Dept: PAIN MEDICINE | Facility: CLINIC | Age: 56
End: 2021-06-18

## 2021-06-21 ENCOUNTER — OFFICE VISIT (OUTPATIENT)
Dept: PAIN MEDICINE | Facility: CLINIC | Age: 56
End: 2021-06-21
Payer: COMMERCIAL

## 2021-06-21 VITALS
WEIGHT: 200.63 LBS | BODY MASS INDEX: 30.41 KG/M2 | SYSTOLIC BLOOD PRESSURE: 114 MMHG | RESPIRATION RATE: 18 BRPM | HEART RATE: 89 BPM | HEIGHT: 68 IN | DIASTOLIC BLOOD PRESSURE: 82 MMHG

## 2021-06-21 DIAGNOSIS — M25.552 LEFT HIP PAIN: ICD-10-CM

## 2021-06-21 DIAGNOSIS — M87.052 AVASCULAR NECROSIS OF LEFT FEMUR: ICD-10-CM

## 2021-06-21 DIAGNOSIS — M87.052 AVASCULAR NECROSIS OF BONES OF BOTH HIPS: Primary | ICD-10-CM

## 2021-06-21 DIAGNOSIS — M87.051 AVASCULAR NECROSIS OF BONES OF BOTH HIPS: Primary | ICD-10-CM

## 2021-06-21 PROCEDURE — 99204 OFFICE O/P NEW MOD 45 MIN: CPT | Mod: S$GLB,,, | Performed by: ANESTHESIOLOGY

## 2021-06-21 PROCEDURE — 99999 PR PBB SHADOW E&M-EST. PATIENT-LVL IV: CPT | Mod: PBBFAC,,, | Performed by: ANESTHESIOLOGY

## 2021-06-21 PROCEDURE — 99999 PR PBB SHADOW E&M-EST. PATIENT-LVL IV: ICD-10-PCS | Mod: PBBFAC,,, | Performed by: ANESTHESIOLOGY

## 2021-06-21 PROCEDURE — 99204 PR OFFICE/OUTPT VISIT, NEW, LEVL IV, 45-59 MIN: ICD-10-PCS | Mod: S$GLB,,, | Performed by: ANESTHESIOLOGY

## 2021-06-22 ENCOUNTER — PATIENT MESSAGE (OUTPATIENT)
Dept: PSYCHIATRY | Facility: CLINIC | Age: 56
End: 2021-06-22

## 2021-06-23 ENCOUNTER — PATIENT MESSAGE (OUTPATIENT)
Dept: PSYCHIATRY | Facility: CLINIC | Age: 56
End: 2021-06-23

## 2021-06-24 ENCOUNTER — PATIENT MESSAGE (OUTPATIENT)
Dept: PAIN MEDICINE | Facility: OTHER | Age: 56
End: 2021-06-24

## 2021-06-24 DIAGNOSIS — M25.552 LEFT HIP PAIN: Primary | ICD-10-CM

## 2021-06-24 DIAGNOSIS — M87.052 AVASCULAR NECROSIS OF LEFT FEMUR: ICD-10-CM

## 2021-06-25 ENCOUNTER — TELEPHONE (OUTPATIENT)
Dept: PAIN MEDICINE | Facility: CLINIC | Age: 56
End: 2021-06-25

## 2021-06-25 ENCOUNTER — PATIENT MESSAGE (OUTPATIENT)
Dept: PAIN MEDICINE | Facility: OTHER | Age: 56
End: 2021-06-25

## 2021-06-25 RX ORDER — HYDROCODONE BITARTRATE AND ACETAMINOPHEN 7.5; 325 MG/1; MG/1
1 TABLET ORAL 2 TIMES DAILY PRN
Qty: 14 TABLET | Refills: 0 | Status: SHIPPED | OUTPATIENT
Start: 2021-06-25 | End: 2021-08-18

## 2021-06-27 ENCOUNTER — PATIENT MESSAGE (OUTPATIENT)
Dept: ORTHOPEDICS | Facility: CLINIC | Age: 56
End: 2021-06-27

## 2021-06-29 ENCOUNTER — OFFICE VISIT (OUTPATIENT)
Dept: PSYCHIATRY | Facility: CLINIC | Age: 56
End: 2021-06-29
Payer: COMMERCIAL

## 2021-06-29 ENCOUNTER — PATIENT MESSAGE (OUTPATIENT)
Dept: PAIN MEDICINE | Facility: OTHER | Age: 56
End: 2021-06-29

## 2021-06-29 DIAGNOSIS — F31.9 BIPOLAR AFFECTIVE DISORDER, REMISSION STATUS UNSPECIFIED: Primary | ICD-10-CM

## 2021-06-29 DIAGNOSIS — G47.00 INSOMNIA, UNSPECIFIED TYPE: ICD-10-CM

## 2021-06-29 PROCEDURE — 99213 OFFICE O/P EST LOW 20 MIN: CPT | Mod: 95,,, | Performed by: NURSE PRACTITIONER

## 2021-06-29 PROCEDURE — 99213 PR OFFICE/OUTPT VISIT, EST, LEVL III, 20-29 MIN: ICD-10-PCS | Mod: 95,,, | Performed by: NURSE PRACTITIONER

## 2021-06-30 ENCOUNTER — PATIENT MESSAGE (OUTPATIENT)
Dept: PAIN MEDICINE | Facility: OTHER | Age: 56
End: 2021-06-30

## 2021-06-30 ENCOUNTER — NURSE TRIAGE (OUTPATIENT)
Dept: ADMINISTRATIVE | Facility: CLINIC | Age: 56
End: 2021-06-30

## 2021-07-01 ENCOUNTER — HOSPITAL ENCOUNTER (OUTPATIENT)
Facility: OTHER | Age: 56
Discharge: HOME | End: 2021-07-01
Attending: ANESTHESIOLOGY | Admitting: ANESTHESIOLOGY
Payer: COMMERCIAL

## 2021-07-01 VITALS
RESPIRATION RATE: 16 BRPM | DIASTOLIC BLOOD PRESSURE: 78 MMHG | HEART RATE: 72 BPM | TEMPERATURE: 98 F | OXYGEN SATURATION: 95 % | SYSTOLIC BLOOD PRESSURE: 129 MMHG

## 2021-07-01 DIAGNOSIS — M87.052 AVASCULAR NECROSIS OF LEFT FEMUR: ICD-10-CM

## 2021-07-01 DIAGNOSIS — G89.29 CHRONIC PAIN: ICD-10-CM

## 2021-07-01 DIAGNOSIS — M25.552 LEFT HIP PAIN: Primary | ICD-10-CM

## 2021-07-01 PROCEDURE — 63600175 PHARM REV CODE 636 W HCPCS: Performed by: ANESTHESIOLOGY

## 2021-07-01 PROCEDURE — 64447 NJX AA&/STRD FEMORAL NRV IMG: CPT | Mod: LT | Performed by: ANESTHESIOLOGY

## 2021-07-01 PROCEDURE — 25000003 PHARM REV CODE 250: Performed by: ANESTHESIOLOGY

## 2021-07-01 PROCEDURE — 64450 NJX AA&/STRD OTHER PN/BRANCH: CPT | Mod: LT,,, | Performed by: ANESTHESIOLOGY

## 2021-07-01 PROCEDURE — 64450 PR NERVE BLOCK INJ, ANES/STEROID, OTHER PERIPHERAL: ICD-10-PCS | Mod: LT,,, | Performed by: ANESTHESIOLOGY

## 2021-07-01 PROCEDURE — 25000003 PHARM REV CODE 250: Performed by: STUDENT IN AN ORGANIZED HEALTH CARE EDUCATION/TRAINING PROGRAM

## 2021-07-01 PROCEDURE — 25500020 PHARM REV CODE 255: Performed by: ANESTHESIOLOGY

## 2021-07-01 PROCEDURE — 64450 NJX AA&/STRD OTHER PN/BRANCH: CPT | Mod: LT | Performed by: ANESTHESIOLOGY

## 2021-07-01 RX ORDER — FENTANYL CITRATE 50 UG/ML
INJECTION, SOLUTION INTRAMUSCULAR; INTRAVENOUS
Status: DISCONTINUED | OUTPATIENT
Start: 2021-07-01 | End: 2021-07-01 | Stop reason: HOSPADM

## 2021-07-01 RX ORDER — LIDOCAINE HYDROCHLORIDE 10 MG/ML
INJECTION INFILTRATION; PERINEURAL
Status: DISCONTINUED | OUTPATIENT
Start: 2021-07-01 | End: 2021-07-01 | Stop reason: HOSPADM

## 2021-07-01 RX ORDER — SODIUM CHLORIDE 9 MG/ML
INJECTION, SOLUTION INTRAVENOUS CONTINUOUS
Status: DISCONTINUED | OUTPATIENT
Start: 2021-07-01 | End: 2021-07-01 | Stop reason: HOSPADM

## 2021-07-01 RX ORDER — BUPIVACAINE HYDROCHLORIDE 2.5 MG/ML
INJECTION, SOLUTION EPIDURAL; INFILTRATION; INTRACAUDAL
Status: DISCONTINUED | OUTPATIENT
Start: 2021-07-01 | End: 2021-07-01 | Stop reason: HOSPADM

## 2021-07-01 RX ORDER — MIDAZOLAM HYDROCHLORIDE 1 MG/ML
INJECTION INTRAMUSCULAR; INTRAVENOUS
Status: DISCONTINUED | OUTPATIENT
Start: 2021-07-01 | End: 2021-07-01 | Stop reason: HOSPADM

## 2021-07-01 RX ADMIN — SODIUM CHLORIDE: 0.9 INJECTION, SOLUTION INTRAVENOUS at 08:07

## 2021-07-02 ENCOUNTER — PATIENT MESSAGE (OUTPATIENT)
Dept: PAIN MEDICINE | Facility: CLINIC | Age: 56
End: 2021-07-02

## 2021-07-08 ENCOUNTER — OFFICE VISIT (OUTPATIENT)
Dept: ORTHOPEDICS | Facility: CLINIC | Age: 56
End: 2021-07-08
Payer: COMMERCIAL

## 2021-07-08 ENCOUNTER — PATIENT MESSAGE (OUTPATIENT)
Dept: ORTHOPEDICS | Facility: CLINIC | Age: 56
End: 2021-07-08

## 2021-07-08 VITALS — BODY MASS INDEX: 30.62 KG/M2 | WEIGHT: 202 LBS | HEIGHT: 68 IN

## 2021-07-08 DIAGNOSIS — M87.052 AVASCULAR NECROSIS OF LEFT FEMUR: Primary | ICD-10-CM

## 2021-07-08 PROCEDURE — 99213 OFFICE O/P EST LOW 20 MIN: CPT | Mod: S$GLB,,, | Performed by: ORTHOPAEDIC SURGERY

## 2021-07-08 PROCEDURE — 99999 PR PBB SHADOW E&M-EST. PATIENT-LVL III: CPT | Mod: PBBFAC,,, | Performed by: ORTHOPAEDIC SURGERY

## 2021-07-08 PROCEDURE — 99999 PR PBB SHADOW E&M-EST. PATIENT-LVL III: ICD-10-PCS | Mod: PBBFAC,,, | Performed by: ORTHOPAEDIC SURGERY

## 2021-07-08 PROCEDURE — 99213 PR OFFICE/OUTPT VISIT, EST, LEVL III, 20-29 MIN: ICD-10-PCS | Mod: S$GLB,,, | Performed by: ORTHOPAEDIC SURGERY

## 2021-07-08 RX ORDER — OXYCODONE AND ACETAMINOPHEN 10; 325 MG/1; MG/1
1 TABLET ORAL EVERY 4 HOURS PRN
COMMUNITY
End: 2021-08-18

## 2021-07-08 RX ORDER — PREGABALIN 75 MG/1
75 CAPSULE ORAL 2 TIMES DAILY
COMMUNITY
End: 2021-08-18

## 2021-07-09 ENCOUNTER — OFFICE VISIT (OUTPATIENT)
Dept: PSYCHIATRY | Facility: CLINIC | Age: 56
End: 2021-07-09
Payer: COMMERCIAL

## 2021-07-09 ENCOUNTER — PATIENT MESSAGE (OUTPATIENT)
Dept: PSYCHIATRY | Facility: CLINIC | Age: 56
End: 2021-07-09

## 2021-07-09 DIAGNOSIS — G47.00 INSOMNIA, UNSPECIFIED TYPE: ICD-10-CM

## 2021-07-09 DIAGNOSIS — F31.9 BIPOLAR AFFECTIVE DISORDER, REMISSION STATUS UNSPECIFIED: Primary | ICD-10-CM

## 2021-07-09 PROCEDURE — 99213 OFFICE O/P EST LOW 20 MIN: CPT | Mod: 95,,, | Performed by: NURSE PRACTITIONER

## 2021-07-09 PROCEDURE — 99213 PR OFFICE/OUTPT VISIT, EST, LEVL III, 20-29 MIN: ICD-10-PCS | Mod: 95,,, | Performed by: NURSE PRACTITIONER

## 2021-07-12 ENCOUNTER — TELEPHONE (OUTPATIENT)
Dept: ORTHOPEDICS | Facility: CLINIC | Age: 56
End: 2021-07-12

## 2021-07-13 ENCOUNTER — PATIENT MESSAGE (OUTPATIENT)
Dept: ORTHOPEDICS | Facility: CLINIC | Age: 56
End: 2021-07-13

## 2021-07-15 ENCOUNTER — OFFICE VISIT (OUTPATIENT)
Dept: ORTHOPEDICS | Facility: CLINIC | Age: 56
End: 2021-07-15
Payer: COMMERCIAL

## 2021-07-15 VITALS — WEIGHT: 213.06 LBS | HEIGHT: 68 IN | BODY MASS INDEX: 32.29 KG/M2

## 2021-07-15 DIAGNOSIS — M87.052 AVASCULAR NECROSIS OF LEFT FEMORAL HEAD: Primary | ICD-10-CM

## 2021-07-15 DIAGNOSIS — M87.052 AVASCULAR NECROSIS OF BONES OF BOTH HIPS: Primary | ICD-10-CM

## 2021-07-15 DIAGNOSIS — M87.051 AVASCULAR NECROSIS OF BONES OF BOTH HIPS: Primary | ICD-10-CM

## 2021-07-15 DIAGNOSIS — M87.052 AVASCULAR NECROSIS OF LEFT FEMUR: ICD-10-CM

## 2021-07-15 PROCEDURE — 99214 OFFICE O/P EST MOD 30 MIN: CPT | Mod: S$GLB,,, | Performed by: ORTHOPAEDIC SURGERY

## 2021-07-15 PROCEDURE — 99999 PR PBB SHADOW E&M-EST. PATIENT-LVL III: CPT | Mod: PBBFAC,,, | Performed by: ORTHOPAEDIC SURGERY

## 2021-07-15 PROCEDURE — 99214 PR OFFICE/OUTPT VISIT, EST, LEVL IV, 30-39 MIN: ICD-10-PCS | Mod: S$GLB,,, | Performed by: ORTHOPAEDIC SURGERY

## 2021-07-15 PROCEDURE — 99999 PR PBB SHADOW E&M-EST. PATIENT-LVL III: ICD-10-PCS | Mod: PBBFAC,,, | Performed by: ORTHOPAEDIC SURGERY

## 2021-07-19 ENCOUNTER — PATIENT MESSAGE (OUTPATIENT)
Dept: ADMINISTRATIVE | Facility: OTHER | Age: 56
End: 2021-07-19

## 2021-07-24 ENCOUNTER — PATIENT MESSAGE (OUTPATIENT)
Dept: ADMINISTRATIVE | Facility: OTHER | Age: 56
End: 2021-07-24

## 2021-07-27 ENCOUNTER — PATIENT MESSAGE (OUTPATIENT)
Dept: ADMINISTRATIVE | Facility: OTHER | Age: 56
End: 2021-07-27

## 2021-07-28 DIAGNOSIS — M87.052 AVASCULAR NECROSIS OF LEFT FEMORAL HEAD: Primary | ICD-10-CM

## 2021-08-03 ENCOUNTER — TELEPHONE (OUTPATIENT)
Dept: PREADMISSION TESTING | Facility: HOSPITAL | Age: 56
End: 2021-08-03

## 2021-08-03 ENCOUNTER — PATIENT MESSAGE (OUTPATIENT)
Dept: ORTHOPEDICS | Facility: CLINIC | Age: 56
End: 2021-08-03

## 2021-08-03 DIAGNOSIS — M79.606 PAIN OF LOWER EXTREMITY, UNSPECIFIED LATERALITY: ICD-10-CM

## 2021-08-03 DIAGNOSIS — Z01.818 PRE-OP TESTING: Primary | ICD-10-CM

## 2021-08-03 DIAGNOSIS — E03.9 HYPOTHYROIDISM, UNSPECIFIED TYPE: Primary | ICD-10-CM

## 2021-08-08 ENCOUNTER — PATIENT MESSAGE (OUTPATIENT)
Dept: ADMINISTRATIVE | Facility: OTHER | Age: 56
End: 2021-08-08

## 2021-08-09 ENCOUNTER — PATIENT MESSAGE (OUTPATIENT)
Dept: ADMINISTRATIVE | Facility: OTHER | Age: 56
End: 2021-08-09

## 2021-08-10 ENCOUNTER — OFFICE VISIT (OUTPATIENT)
Dept: ORTHOPEDICS | Facility: CLINIC | Age: 56
End: 2021-08-10
Payer: COMMERCIAL

## 2021-08-10 ENCOUNTER — HOSPITAL ENCOUNTER (OUTPATIENT)
Dept: RADIOLOGY | Facility: HOSPITAL | Age: 56
Discharge: HOME | End: 2021-08-10
Attending: ORTHOPAEDIC SURGERY
Payer: COMMERCIAL

## 2021-08-10 VITALS — WEIGHT: 212.94 LBS | BODY MASS INDEX: 32.27 KG/M2 | HEIGHT: 68 IN

## 2021-08-10 VITALS — HEIGHT: 68 IN | WEIGHT: 212 LBS | BODY MASS INDEX: 32.13 KG/M2

## 2021-08-10 DIAGNOSIS — M87.052 AVASCULAR NECROSIS OF LEFT FEMORAL HEAD: Primary | ICD-10-CM

## 2021-08-10 DIAGNOSIS — Z01.818 PRE-OP TESTING: ICD-10-CM

## 2021-08-10 DIAGNOSIS — M87.052 AVASCULAR NECROSIS OF LEFT FEMORAL HEAD: ICD-10-CM

## 2021-08-10 PROCEDURE — 99499 NO LOS: ICD-10-PCS | Mod: S$GLB,,, | Performed by: ORTHOPAEDIC SURGERY

## 2021-08-10 PROCEDURE — 99499 UNLISTED E&M SERVICE: CPT | Mod: S$GLB,,, | Performed by: PHYSICIAN ASSISTANT

## 2021-08-10 PROCEDURE — 72170 X-RAY EXAM OF PELVIS: CPT | Mod: 26,,, | Performed by: RADIOLOGY

## 2021-08-10 PROCEDURE — 99999 PR PBB SHADOW E&M-EST. PATIENT-LVL III: ICD-10-PCS | Mod: PBBFAC,,, | Performed by: ORTHOPAEDIC SURGERY

## 2021-08-10 PROCEDURE — 99499 UNLISTED E&M SERVICE: CPT | Mod: S$GLB,,, | Performed by: ORTHOPAEDIC SURGERY

## 2021-08-10 PROCEDURE — 72170 X-RAY EXAM OF PELVIS: CPT | Mod: TC

## 2021-08-10 PROCEDURE — 99499 NO LOS: ICD-10-PCS | Mod: S$GLB,,, | Performed by: PHYSICIAN ASSISTANT

## 2021-08-10 PROCEDURE — 99999 PR PBB SHADOW E&M-EST. PATIENT-LVL III: ICD-10-PCS | Mod: PBBFAC,,, | Performed by: PHYSICIAN ASSISTANT

## 2021-08-10 PROCEDURE — 72170 XR PELVIS ROUTINE AP: ICD-10-PCS | Mod: 26,,, | Performed by: RADIOLOGY

## 2021-08-10 PROCEDURE — 99999 PR PBB SHADOW E&M-EST. PATIENT-LVL III: CPT | Mod: PBBFAC,,, | Performed by: PHYSICIAN ASSISTANT

## 2021-08-10 PROCEDURE — 99999 PR PBB SHADOW E&M-EST. PATIENT-LVL III: CPT | Mod: PBBFAC,,, | Performed by: ORTHOPAEDIC SURGERY

## 2021-08-11 RX ORDER — FAMOTIDINE 20 MG/1
20 TABLET, FILM COATED ORAL 2 TIMES DAILY
Status: CANCELLED | OUTPATIENT
Start: 2021-08-11

## 2021-08-11 RX ORDER — ACETAMINOPHEN 500 MG
1000 TABLET ORAL EVERY 6 HOURS
Status: CANCELLED | OUTPATIENT
Start: 2021-08-11

## 2021-08-11 RX ORDER — CEFAZOLIN SODIUM 2 G/50ML
2 SOLUTION INTRAVENOUS
Status: CANCELLED | OUTPATIENT
Start: 2021-08-11 | End: 2021-08-12

## 2021-08-11 RX ORDER — MORPHINE SULFATE 2 MG/ML
2 INJECTION, SOLUTION INTRAMUSCULAR; INTRAVENOUS
Status: CANCELLED | OUTPATIENT
Start: 2021-08-11

## 2021-08-11 RX ORDER — AMOXICILLIN 250 MG
1 CAPSULE ORAL 2 TIMES DAILY
Status: CANCELLED | OUTPATIENT
Start: 2021-08-11

## 2021-08-11 RX ORDER — PROCHLORPERAZINE EDISYLATE 5 MG/ML
5 INJECTION INTRAMUSCULAR; INTRAVENOUS EVERY 6 HOURS PRN
Status: CANCELLED | OUTPATIENT
Start: 2021-08-11

## 2021-08-11 RX ORDER — POLYETHYLENE GLYCOL 3350 17 G/17G
17 POWDER, FOR SOLUTION ORAL DAILY
Status: CANCELLED | OUTPATIENT
Start: 2021-08-11

## 2021-08-11 RX ORDER — ASPIRIN 81 MG/1
81 TABLET ORAL 2 TIMES DAILY
Status: CANCELLED | OUTPATIENT
Start: 2021-08-11

## 2021-08-11 RX ORDER — CELECOXIB 200 MG/1
200 CAPSULE ORAL DAILY
Status: CANCELLED | OUTPATIENT
Start: 2021-08-11

## 2021-08-11 RX ORDER — MUPIROCIN 20 MG/G
1 OINTMENT TOPICAL
Status: CANCELLED | OUTPATIENT
Start: 2021-08-11

## 2021-08-11 RX ORDER — LIDOCAINE HYDROCHLORIDE 10 MG/ML
1 INJECTION, SOLUTION EPIDURAL; INFILTRATION; INTRACAUDAL; PERINEURAL
Status: CANCELLED | OUTPATIENT
Start: 2021-08-11

## 2021-08-11 RX ORDER — SODIUM CHLORIDE 9 MG/ML
INJECTION, SOLUTION INTRAVENOUS CONTINUOUS
Status: CANCELLED | OUTPATIENT
Start: 2021-08-11 | End: 2021-08-12

## 2021-08-11 RX ORDER — MIDAZOLAM HYDROCHLORIDE 1 MG/ML
1 INJECTION INTRAMUSCULAR; INTRAVENOUS EVERY 5 MIN PRN
Status: CANCELLED | OUTPATIENT
Start: 2021-08-11

## 2021-08-11 RX ORDER — CELECOXIB 200 MG/1
400 CAPSULE ORAL
Status: CANCELLED | OUTPATIENT
Start: 2021-08-11

## 2021-08-11 RX ORDER — ACETAMINOPHEN 500 MG
1000 TABLET ORAL
Status: CANCELLED | OUTPATIENT
Start: 2021-08-11

## 2021-08-11 RX ORDER — FENTANYL CITRATE 50 UG/ML
25 INJECTION, SOLUTION INTRAMUSCULAR; INTRAVENOUS EVERY 5 MIN PRN
Status: CANCELLED | OUTPATIENT
Start: 2021-08-11

## 2021-08-11 RX ORDER — SODIUM CHLORIDE 9 MG/ML
INJECTION, SOLUTION INTRAVENOUS
Status: CANCELLED | OUTPATIENT
Start: 2021-08-11

## 2021-08-11 RX ORDER — CEFAZOLIN SODIUM 2 G/50ML
2 SOLUTION INTRAVENOUS
Status: CANCELLED | OUTPATIENT
Start: 2021-08-11

## 2021-08-11 RX ORDER — POLYETHYLENE GLYCOL 3350 17 G/17G
17 POWDER, FOR SOLUTION ORAL DAILY PRN
Status: CANCELLED | OUTPATIENT
Start: 2021-08-11

## 2021-08-11 RX ORDER — OXYCODONE HYDROCHLORIDE 5 MG/1
10 TABLET ORAL
Status: CANCELLED | OUTPATIENT
Start: 2021-08-11

## 2021-08-11 RX ORDER — OXYCODONE HYDROCHLORIDE 5 MG/1
5 TABLET ORAL
Status: CANCELLED | OUTPATIENT
Start: 2021-08-11

## 2021-08-11 RX ORDER — PREGABALIN 75 MG/1
75 CAPSULE ORAL NIGHTLY
Status: CANCELLED | OUTPATIENT
Start: 2021-08-11

## 2021-08-11 RX ORDER — ONDANSETRON 2 MG/ML
4 INJECTION INTRAMUSCULAR; INTRAVENOUS EVERY 8 HOURS PRN
Status: CANCELLED | OUTPATIENT
Start: 2021-08-11

## 2021-08-11 RX ORDER — MUPIROCIN 20 MG/G
1 OINTMENT TOPICAL 2 TIMES DAILY
Status: CANCELLED | OUTPATIENT
Start: 2021-08-11 | End: 2021-08-16

## 2021-08-11 RX ORDER — PREGABALIN 75 MG/1
75 CAPSULE ORAL
Status: CANCELLED | OUTPATIENT
Start: 2021-08-11

## 2021-08-11 RX ORDER — NALOXONE HCL 0.4 MG/ML
0.02 VIAL (ML) INJECTION
Status: CANCELLED | OUTPATIENT
Start: 2021-08-11

## 2021-08-16 ENCOUNTER — PATIENT MESSAGE (OUTPATIENT)
Dept: ADMINISTRATIVE | Facility: OTHER | Age: 56
End: 2021-08-16

## 2021-08-18 ENCOUNTER — HOSPITAL ENCOUNTER (OUTPATIENT)
Dept: CARDIOLOGY | Facility: CLINIC | Age: 56
Discharge: HOME | End: 2021-08-18
Payer: COMMERCIAL

## 2021-08-18 ENCOUNTER — OFFICE VISIT (OUTPATIENT)
Dept: INTERNAL MEDICINE | Facility: CLINIC | Age: 56
End: 2021-08-18
Payer: COMMERCIAL

## 2021-08-18 VITALS
TEMPERATURE: 98 F | BODY MASS INDEX: 31.52 KG/M2 | HEART RATE: 68 BPM | OXYGEN SATURATION: 98 % | HEIGHT: 68 IN | WEIGHT: 208 LBS | DIASTOLIC BLOOD PRESSURE: 84 MMHG | SYSTOLIC BLOOD PRESSURE: 151 MMHG

## 2021-08-18 DIAGNOSIS — F11.90 CHRONIC, CONTINUOUS USE OF OPIOIDS: ICD-10-CM

## 2021-08-18 DIAGNOSIS — E66.9 CLASS 1 OBESITY WITH SERIOUS COMORBIDITY AND BODY MASS INDEX (BMI) OF 31.0 TO 31.9 IN ADULT, UNSPECIFIED OBESITY TYPE: ICD-10-CM

## 2021-08-18 DIAGNOSIS — M48.02 CERVICAL STENOSIS OF SPINAL CANAL: ICD-10-CM

## 2021-08-18 DIAGNOSIS — N13.8 BPH WITH URINARY OBSTRUCTION: ICD-10-CM

## 2021-08-18 DIAGNOSIS — E78.00 HYPERCHOLESTEREMIA: ICD-10-CM

## 2021-08-18 DIAGNOSIS — Z01.818 PRE-OP TESTING: ICD-10-CM

## 2021-08-18 DIAGNOSIS — N40.1 BPH WITH URINARY OBSTRUCTION: ICD-10-CM

## 2021-08-18 DIAGNOSIS — F31.70 BIPOLAR AFFECTIVE DISORDER IN REMISSION: ICD-10-CM

## 2021-08-18 DIAGNOSIS — E03.9 HYPOTHYROIDISM, UNSPECIFIED TYPE: ICD-10-CM

## 2021-08-18 DIAGNOSIS — D50.9 IRON DEFICIENCY ANEMIA, UNSPECIFIED IRON DEFICIENCY ANEMIA TYPE: ICD-10-CM

## 2021-08-18 DIAGNOSIS — G47.30 SLEEP APNEA, UNSPECIFIED TYPE: ICD-10-CM

## 2021-08-18 DIAGNOSIS — I10 ESSENTIAL HYPERTENSION: ICD-10-CM

## 2021-08-18 DIAGNOSIS — K21.9 GASTROESOPHAGEAL REFLUX DISEASE, UNSPECIFIED WHETHER ESOPHAGITIS PRESENT: ICD-10-CM

## 2021-08-18 DIAGNOSIS — R74.01 ELEVATED AST (SGOT): ICD-10-CM

## 2021-08-18 PROBLEM — E66.811 CLASS 1 OBESITY WITH BODY MASS INDEX (BMI) OF 31.0 TO 31.9 IN ADULT: Status: ACTIVE | Noted: 2021-08-18

## 2021-08-18 PROCEDURE — 93005 EKG 12-LEAD: ICD-10-PCS | Mod: S$GLB,,, | Performed by: ANESTHESIOLOGY

## 2021-08-18 PROCEDURE — 99204 PR OFFICE/OUTPT VISIT, NEW, LEVL IV, 45-59 MIN: ICD-10-PCS | Mod: S$GLB,,, | Performed by: NURSE PRACTITIONER

## 2021-08-18 PROCEDURE — 93005 ELECTROCARDIOGRAM TRACING: CPT | Mod: S$GLB,,, | Performed by: ANESTHESIOLOGY

## 2021-08-18 PROCEDURE — 93010 EKG 12-LEAD: ICD-10-PCS | Mod: S$GLB,,, | Performed by: INTERNAL MEDICINE

## 2021-08-18 PROCEDURE — 99999 PR PBB SHADOW E&M-EST. PATIENT-LVL III: ICD-10-PCS | Mod: PBBFAC,,, | Performed by: NURSE PRACTITIONER

## 2021-08-18 PROCEDURE — 93010 ELECTROCARDIOGRAM REPORT: CPT | Mod: S$GLB,,, | Performed by: INTERNAL MEDICINE

## 2021-08-18 PROCEDURE — 99204 OFFICE O/P NEW MOD 45 MIN: CPT | Mod: S$GLB,,, | Performed by: NURSE PRACTITIONER

## 2021-08-18 PROCEDURE — 99999 PR PBB SHADOW E&M-EST. PATIENT-LVL III: CPT | Mod: PBBFAC,,, | Performed by: NURSE PRACTITIONER

## 2021-08-18 RX ORDER — ACETAMINOPHEN 500 MG
500 TABLET ORAL EVERY 6 HOURS PRN
COMMUNITY
End: 2021-08-20 | Stop reason: HOSPADM

## 2021-08-19 ENCOUNTER — TELEPHONE (OUTPATIENT)
Dept: ORTHOPEDICS | Facility: CLINIC | Age: 56
End: 2021-08-19

## 2021-08-20 ENCOUNTER — PATIENT MESSAGE (OUTPATIENT)
Dept: ORTHOPEDICS | Facility: CLINIC | Age: 56
End: 2021-08-20

## 2021-08-20 ENCOUNTER — LAB VISIT (OUTPATIENT)
Dept: SPORTS MEDICINE | Facility: CLINIC | Age: 56
End: 2021-08-20
Payer: COMMERCIAL

## 2021-08-20 ENCOUNTER — TELEPHONE (OUTPATIENT)
Dept: ORTHOPEDICS | Facility: CLINIC | Age: 56
End: 2021-08-20

## 2021-08-20 ENCOUNTER — ANESTHESIA EVENT (OUTPATIENT)
Dept: SURGERY | Facility: HOSPITAL | Age: 56
DRG: 470 | End: 2021-08-20
Payer: COMMERCIAL

## 2021-08-20 DIAGNOSIS — Z01.818 PRE-OP TESTING: ICD-10-CM

## 2021-08-20 PROCEDURE — U0005 INFEC AGEN DETEC AMPLI PROBE: HCPCS | Performed by: ORTHOPAEDIC SURGERY

## 2021-08-20 PROCEDURE — U0003 INFECTIOUS AGENT DETECTION BY NUCLEIC ACID (DNA OR RNA); SEVERE ACUTE RESPIRATORY SYNDROME CORONAVIRUS 2 (SARS-COV-2) (CORONAVIRUS DISEASE [COVID-19]), AMPLIFIED PROBE TECHNIQUE, MAKING USE OF HIGH THROUGHPUT TECHNOLOGIES AS DESCRIBED BY CMS-2020-01-R: HCPCS | Performed by: ORTHOPAEDIC SURGERY

## 2021-08-20 RX ORDER — DOCUSATE SODIUM 100 MG/1
100 CAPSULE, LIQUID FILLED ORAL 2 TIMES DAILY PRN
Qty: 60 CAPSULE | Refills: 0 | Status: SHIPPED | OUTPATIENT
Start: 2021-08-20 | End: 2021-10-11

## 2021-08-20 RX ORDER — CELECOXIB 200 MG/1
200 CAPSULE ORAL DAILY
Qty: 30 CAPSULE | Refills: 0 | Status: SHIPPED | OUTPATIENT
Start: 2021-08-20 | End: 2021-09-23

## 2021-08-20 RX ORDER — DEXTROMETHORPHAN HYDROBROMIDE, GUAIFENESIN 5; 100 MG/5ML; MG/5ML
650 LIQUID ORAL
Qty: 120 TABLET | Refills: 0 | Status: SHIPPED | OUTPATIENT
Start: 2021-08-20 | End: 2021-12-30

## 2021-08-20 RX ORDER — ASPIRIN 81 MG/1
81 TABLET ORAL 2 TIMES DAILY
Qty: 60 TABLET | Refills: 0 | Status: SHIPPED | OUTPATIENT
Start: 2021-08-20 | End: 2021-09-23

## 2021-08-21 LAB
SARS-COV-2 RNA RESP QL NAA+PROBE: NOT DETECTED
SARS-COV-2- CYCLE NUMBER: -1

## 2021-08-23 ENCOUNTER — ANESTHESIA (OUTPATIENT)
Dept: SURGERY | Facility: HOSPITAL | Age: 56
DRG: 470 | End: 2021-08-23
Payer: COMMERCIAL

## 2021-08-23 ENCOUNTER — HOSPITAL ENCOUNTER (INPATIENT)
Facility: HOSPITAL | Age: 56
LOS: 1 days | Discharge: HOME HEALTH | DRG: 470 | End: 2021-08-24
Attending: ORTHOPAEDIC SURGERY | Admitting: ORTHOPAEDIC SURGERY
Payer: COMMERCIAL

## 2021-08-23 DIAGNOSIS — M87.052 AVASCULAR NECROSIS OF LEFT FEMORAL HEAD: ICD-10-CM

## 2021-08-23 PROCEDURE — 71000039 HC RECOVERY, EACH ADD'L HOUR: Performed by: ORTHOPAEDIC SURGERY

## 2021-08-23 PROCEDURE — 36000711: Performed by: ORTHOPAEDIC SURGERY

## 2021-08-23 PROCEDURE — 27130 TOTAL HIP ARTHROPLASTY: CPT | Mod: LT,,, | Performed by: ORTHOPAEDIC SURGERY

## 2021-08-23 PROCEDURE — 63600175 PHARM REV CODE 636 W HCPCS: Performed by: ORTHOPAEDIC SURGERY

## 2021-08-23 PROCEDURE — 63600175 PHARM REV CODE 636 W HCPCS: Performed by: PHYSICIAN ASSISTANT

## 2021-08-23 PROCEDURE — D9220A PRA ANESTHESIA: Mod: ANES,,, | Performed by: ANESTHESIOLOGY

## 2021-08-23 PROCEDURE — 25000003 PHARM REV CODE 250: Performed by: ANESTHESIOLOGY

## 2021-08-23 PROCEDURE — 63600175 PHARM REV CODE 636 W HCPCS: Performed by: NURSE ANESTHETIST, CERTIFIED REGISTERED

## 2021-08-23 PROCEDURE — 25000003 PHARM REV CODE 250: Performed by: STUDENT IN AN ORGANIZED HEALTH CARE EDUCATION/TRAINING PROGRAM

## 2021-08-23 PROCEDURE — 25000003 PHARM REV CODE 250: Performed by: ORTHOPAEDIC SURGERY

## 2021-08-23 PROCEDURE — 63600175 PHARM REV CODE 636 W HCPCS: Performed by: ANESTHESIOLOGY

## 2021-08-23 PROCEDURE — 99900035 HC TECH TIME PER 15 MIN (STAT)

## 2021-08-23 PROCEDURE — C1776 JOINT DEVICE (IMPLANTABLE): HCPCS | Performed by: ORTHOPAEDIC SURGERY

## 2021-08-23 PROCEDURE — 37000009 HC ANESTHESIA EA ADD 15 MINS: Performed by: ORTHOPAEDIC SURGERY

## 2021-08-23 PROCEDURE — 94799 UNLISTED PULMONARY SVC/PX: CPT

## 2021-08-23 PROCEDURE — D9220A PRA ANESTHESIA: ICD-10-PCS | Mod: CRNA,,, | Performed by: NURSE ANESTHETIST, CERTIFIED REGISTERED

## 2021-08-23 PROCEDURE — 27201423 OPTIME MED/SURG SUP & DEVICES STERILE SUPPLY: Performed by: ORTHOPAEDIC SURGERY

## 2021-08-23 PROCEDURE — 11000001 HC ACUTE MED/SURG PRIVATE ROOM

## 2021-08-23 PROCEDURE — 88311 DECALCIFY TISSUE: CPT | Mod: 26,,, | Performed by: PATHOLOGY

## 2021-08-23 PROCEDURE — 94761 N-INVAS EAR/PLS OXIMETRY MLT: CPT

## 2021-08-23 PROCEDURE — 27130 PR TOTAL HIP ARTHROPLASTY: ICD-10-PCS | Mod: LT,,, | Performed by: ORTHOPAEDIC SURGERY

## 2021-08-23 PROCEDURE — 25000003 PHARM REV CODE 250: Performed by: NURSE PRACTITIONER

## 2021-08-23 PROCEDURE — 97116 GAIT TRAINING THERAPY: CPT

## 2021-08-23 PROCEDURE — 27130 PR TOTAL HIP ARTHROPLASTY: ICD-10-PCS | Mod: AS,LT,, | Performed by: PHYSICIAN ASSISTANT

## 2021-08-23 PROCEDURE — 36000710: Performed by: ORTHOPAEDIC SURGERY

## 2021-08-23 PROCEDURE — 27100025 HC TUBING, SET FLUID WARMER: Performed by: ANESTHESIOLOGY

## 2021-08-23 PROCEDURE — 25000003 PHARM REV CODE 250: Performed by: PHYSICIAN ASSISTANT

## 2021-08-23 PROCEDURE — 71000033 HC RECOVERY, INTIAL HOUR: Performed by: ORTHOPAEDIC SURGERY

## 2021-08-23 PROCEDURE — 27130 TOTAL HIP ARTHROPLASTY: CPT | Mod: AS,LT,, | Performed by: PHYSICIAN ASSISTANT

## 2021-08-23 PROCEDURE — 88304 TISSUE EXAM BY PATHOLOGIST: CPT | Performed by: PATHOLOGY

## 2021-08-23 PROCEDURE — 25000003 PHARM REV CODE 250: Performed by: NURSE ANESTHETIST, CERTIFIED REGISTERED

## 2021-08-23 PROCEDURE — 88311 DECALCIFY TISSUE: CPT | Performed by: PATHOLOGY

## 2021-08-23 PROCEDURE — D9220A PRA ANESTHESIA: ICD-10-PCS | Mod: ANES,,, | Performed by: ANESTHESIOLOGY

## 2021-08-23 PROCEDURE — 37000008 HC ANESTHESIA 1ST 15 MINUTES: Performed by: ORTHOPAEDIC SURGERY

## 2021-08-23 PROCEDURE — D9220A PRA ANESTHESIA: Mod: CRNA,,, | Performed by: NURSE ANESTHETIST, CERTIFIED REGISTERED

## 2021-08-23 PROCEDURE — C1713 ANCHOR/SCREW BN/BN,TIS/BN: HCPCS | Performed by: ORTHOPAEDIC SURGERY

## 2021-08-23 PROCEDURE — 88304 TISSUE EXAM BY PATHOLOGIST: CPT | Mod: 26,,, | Performed by: PATHOLOGY

## 2021-08-23 PROCEDURE — 88304 PR  SURG PATH,LEVEL III: ICD-10-PCS | Mod: 26,,, | Performed by: PATHOLOGY

## 2021-08-23 PROCEDURE — 97161 PT EVAL LOW COMPLEX 20 MIN: CPT

## 2021-08-23 PROCEDURE — 88311 PR  DECALCIFY TISSUE: ICD-10-PCS | Mod: 26,,, | Performed by: PATHOLOGY

## 2021-08-23 DEVICE — LINE ACET PINN 36X54 ALTRX: Type: IMPLANTABLE DEVICE | Site: HIP | Status: FUNCTIONAL

## 2021-08-23 DEVICE — STEM ACTIS FEM COLLARED HIGH 6: Type: IMPLANTABLE DEVICE | Site: HIP | Status: FUNCTIONAL

## 2021-08-23 DEVICE — IMPLANTABLE DEVICE: Type: IMPLANTABLE DEVICE | Site: HIP | Status: FUNCTIONAL

## 2021-08-23 DEVICE — CUP 54MM: Type: IMPLANTABLE DEVICE | Site: HIP | Status: FUNCTIONAL

## 2021-08-23 DEVICE — SCREW PINNACLE 6.5 X 20: Type: IMPLANTABLE DEVICE | Site: HIP | Status: FUNCTIONAL

## 2021-08-23 RX ORDER — FENTANYL CITRATE 50 UG/ML
25 INJECTION, SOLUTION INTRAMUSCULAR; INTRAVENOUS EVERY 5 MIN PRN
Status: DISCONTINUED | OUTPATIENT
Start: 2021-08-23 | End: 2021-08-23

## 2021-08-23 RX ORDER — CELECOXIB 200 MG/1
400 CAPSULE ORAL
Status: COMPLETED | OUTPATIENT
Start: 2021-08-23 | End: 2021-08-23

## 2021-08-23 RX ORDER — PROCHLORPERAZINE EDISYLATE 5 MG/ML
5 INJECTION INTRAMUSCULAR; INTRAVENOUS EVERY 6 HOURS PRN
Status: DISCONTINUED | OUTPATIENT
Start: 2021-08-23 | End: 2021-08-24 | Stop reason: HOSPADM

## 2021-08-23 RX ORDER — POLYETHYLENE GLYCOL 3350 17 G/17G
17 POWDER, FOR SOLUTION ORAL DAILY
Status: DISCONTINUED | OUTPATIENT
Start: 2021-08-24 | End: 2021-08-24 | Stop reason: HOSPADM

## 2021-08-23 RX ORDER — POLYETHYLENE GLYCOL 3350 17 G/17G
17 POWDER, FOR SOLUTION ORAL DAILY PRN
Status: DISCONTINUED | OUTPATIENT
Start: 2021-08-23 | End: 2021-08-24 | Stop reason: HOSPADM

## 2021-08-23 RX ORDER — DEXAMETHASONE SODIUM PHOSPHATE 4 MG/ML
INJECTION, SOLUTION INTRA-ARTICULAR; INTRALESIONAL; INTRAMUSCULAR; INTRAVENOUS; SOFT TISSUE
Status: DISCONTINUED | OUTPATIENT
Start: 2021-08-23 | End: 2021-08-23

## 2021-08-23 RX ORDER — SODIUM CHLORIDE 0.9 % (FLUSH) 0.9 %
10 SYRINGE (ML) INJECTION
Status: DISCONTINUED | OUTPATIENT
Start: 2021-08-23 | End: 2021-08-23 | Stop reason: HOSPADM

## 2021-08-23 RX ORDER — ASPIRIN 81 MG/1
81 TABLET ORAL 2 TIMES DAILY
Status: DISCONTINUED | OUTPATIENT
Start: 2021-08-23 | End: 2021-08-24 | Stop reason: HOSPADM

## 2021-08-23 RX ORDER — GABAPENTIN 400 MG/1
400 CAPSULE ORAL 3 TIMES DAILY
Status: DISCONTINUED | OUTPATIENT
Start: 2021-08-23 | End: 2021-08-24 | Stop reason: HOSPADM

## 2021-08-23 RX ORDER — SODIUM CHLORIDE 9 MG/ML
INJECTION, SOLUTION INTRAVENOUS
Status: DISCONTINUED | OUTPATIENT
Start: 2021-08-23 | End: 2021-08-23 | Stop reason: HOSPADM

## 2021-08-23 RX ORDER — OXYCODONE HYDROCHLORIDE 5 MG/1
5 TABLET ORAL
Status: DISCONTINUED | OUTPATIENT
Start: 2021-08-23 | End: 2021-08-23 | Stop reason: HOSPADM

## 2021-08-23 RX ORDER — ONDANSETRON 2 MG/ML
4 INJECTION INTRAMUSCULAR; INTRAVENOUS EVERY 8 HOURS PRN
Status: DISCONTINUED | OUTPATIENT
Start: 2021-08-23 | End: 2021-08-24 | Stop reason: HOSPADM

## 2021-08-23 RX ORDER — HALOPERIDOL 5 MG/ML
0.5 INJECTION INTRAMUSCULAR EVERY 10 MIN PRN
Status: DISCONTINUED | OUTPATIENT
Start: 2021-08-23 | End: 2021-08-23 | Stop reason: HOSPADM

## 2021-08-23 RX ORDER — BUSPIRONE HYDROCHLORIDE 5 MG/1
15 TABLET ORAL 3 TIMES DAILY
Status: DISCONTINUED | OUTPATIENT
Start: 2021-08-23 | End: 2021-08-24 | Stop reason: HOSPADM

## 2021-08-23 RX ORDER — KETAMINE HCL IN 0.9 % NACL 50 MG/5 ML
SYRINGE (ML) INTRAVENOUS
Status: DISCONTINUED | OUTPATIENT
Start: 2021-08-23 | End: 2021-08-23

## 2021-08-23 RX ORDER — LORAZEPAM 0.5 MG/1
1 TABLET ORAL ONCE
Status: COMPLETED | OUTPATIENT
Start: 2021-08-23 | End: 2021-08-23

## 2021-08-23 RX ORDER — FENTANYL CITRATE 50 UG/ML
INJECTION, SOLUTION INTRAMUSCULAR; INTRAVENOUS
Status: DISCONTINUED | OUTPATIENT
Start: 2021-08-23 | End: 2021-08-23

## 2021-08-23 RX ORDER — SODIUM CHLORIDE 9 MG/ML
INJECTION, SOLUTION INTRAVENOUS CONTINUOUS
Status: DISCONTINUED | OUTPATIENT
Start: 2021-08-23 | End: 2021-08-24 | Stop reason: HOSPADM

## 2021-08-23 RX ORDER — NALOXONE HCL 0.4 MG/ML
0.02 VIAL (ML) INJECTION
Status: DISCONTINUED | OUTPATIENT
Start: 2021-08-23 | End: 2021-08-24 | Stop reason: HOSPADM

## 2021-08-23 RX ORDER — LAMOTRIGINE 100 MG/1
100 TABLET ORAL DAILY
Status: DISCONTINUED | OUTPATIENT
Start: 2021-08-24 | End: 2021-08-24 | Stop reason: HOSPADM

## 2021-08-23 RX ORDER — ACETAMINOPHEN 500 MG
1000 TABLET ORAL
Status: COMPLETED | OUTPATIENT
Start: 2021-08-23 | End: 2021-08-23

## 2021-08-23 RX ORDER — CELECOXIB 200 MG/1
400 CAPSULE ORAL ONCE
Status: DISCONTINUED | OUTPATIENT
Start: 2021-08-23 | End: 2021-08-24 | Stop reason: HOSPADM

## 2021-08-23 RX ORDER — MIDAZOLAM HYDROCHLORIDE 1 MG/ML
INJECTION INTRAMUSCULAR; INTRAVENOUS
Status: DISCONTINUED | OUTPATIENT
Start: 2021-08-23 | End: 2021-08-23

## 2021-08-23 RX ORDER — CEFAZOLIN SODIUM 1 G/3ML
2 INJECTION, POWDER, FOR SOLUTION INTRAMUSCULAR; INTRAVENOUS
Status: COMPLETED | OUTPATIENT
Start: 2021-08-23 | End: 2021-08-23

## 2021-08-23 RX ORDER — ACETAMINOPHEN 500 MG
1000 TABLET ORAL ONCE
Status: DISCONTINUED | OUTPATIENT
Start: 2021-08-23 | End: 2021-08-24 | Stop reason: HOSPADM

## 2021-08-23 RX ORDER — CEFAZOLIN SODIUM 1 G/3ML
2 INJECTION, POWDER, FOR SOLUTION INTRAMUSCULAR; INTRAVENOUS
Status: COMPLETED | OUTPATIENT
Start: 2021-08-23 | End: 2021-08-24

## 2021-08-23 RX ORDER — MIDAZOLAM HYDROCHLORIDE 1 MG/ML
1 INJECTION INTRAMUSCULAR; INTRAVENOUS EVERY 5 MIN PRN
Status: DISCONTINUED | OUTPATIENT
Start: 2021-08-23 | End: 2021-08-23 | Stop reason: HOSPADM

## 2021-08-23 RX ORDER — FAMOTIDINE 20 MG/1
20 TABLET, FILM COATED ORAL 2 TIMES DAILY
Status: DISCONTINUED | OUTPATIENT
Start: 2021-08-23 | End: 2021-08-24 | Stop reason: HOSPADM

## 2021-08-23 RX ORDER — TRANEXAMIC ACID 100 MG/ML
1000 INJECTION, SOLUTION INTRAVENOUS
Status: DISCONTINUED | OUTPATIENT
Start: 2021-08-23 | End: 2021-08-23 | Stop reason: HOSPADM

## 2021-08-23 RX ORDER — ONDANSETRON 2 MG/ML
INJECTION INTRAMUSCULAR; INTRAVENOUS
Status: DISCONTINUED | OUTPATIENT
Start: 2021-08-23 | End: 2021-08-23

## 2021-08-23 RX ORDER — PREGABALIN 75 MG/1
75 CAPSULE ORAL NIGHTLY
Status: DISCONTINUED | OUTPATIENT
Start: 2021-08-23 | End: 2021-08-23

## 2021-08-23 RX ORDER — PROPOFOL 10 MG/ML
INJECTION, EMULSION INTRAVENOUS CONTINUOUS PRN
Status: DISCONTINUED | OUTPATIENT
Start: 2021-08-23 | End: 2021-08-23

## 2021-08-23 RX ORDER — MUPIROCIN 20 MG/G
1 OINTMENT TOPICAL
Status: COMPLETED | OUTPATIENT
Start: 2021-08-23 | End: 2021-08-23

## 2021-08-23 RX ORDER — LIDOCAINE HYDROCHLORIDE 10 MG/ML
1 INJECTION, SOLUTION EPIDURAL; INFILTRATION; INTRACAUDAL; PERINEURAL
Status: DISCONTINUED | OUTPATIENT
Start: 2021-08-23 | End: 2021-08-23 | Stop reason: HOSPADM

## 2021-08-23 RX ORDER — OXYCODONE HYDROCHLORIDE 10 MG/1
10 TABLET ORAL
Status: DISCONTINUED | OUTPATIENT
Start: 2021-08-23 | End: 2021-08-24 | Stop reason: HOSPADM

## 2021-08-23 RX ORDER — LEVOTHYROXINE SODIUM 25 UG/1
25 TABLET ORAL DAILY
Status: DISCONTINUED | OUTPATIENT
Start: 2021-08-24 | End: 2021-08-24 | Stop reason: HOSPADM

## 2021-08-23 RX ORDER — AMOXICILLIN 250 MG
1 CAPSULE ORAL 2 TIMES DAILY
Status: DISCONTINUED | OUTPATIENT
Start: 2021-08-23 | End: 2021-08-24 | Stop reason: HOSPADM

## 2021-08-23 RX ORDER — PROPOFOL 10 MG/ML
INJECTION, EMULSION INTRAVENOUS
Status: DISCONTINUED | OUTPATIENT
Start: 2021-08-23 | End: 2021-08-23

## 2021-08-23 RX ORDER — TAMSULOSIN HYDROCHLORIDE 0.4 MG/1
0.4 CAPSULE ORAL DAILY
Status: DISCONTINUED | OUTPATIENT
Start: 2021-08-24 | End: 2021-08-24 | Stop reason: HOSPADM

## 2021-08-23 RX ORDER — TRANEXAMIC ACID 100 MG/ML
INJECTION, SOLUTION INTRAVENOUS
Status: DISCONTINUED | OUTPATIENT
Start: 2021-08-23 | End: 2021-08-23 | Stop reason: HOSPADM

## 2021-08-23 RX ORDER — PREGABALIN 75 MG/1
75 CAPSULE ORAL
Status: COMPLETED | OUTPATIENT
Start: 2021-08-23 | End: 2021-08-23

## 2021-08-23 RX ORDER — MUPIROCIN 20 MG/G
1 OINTMENT TOPICAL 2 TIMES DAILY
Status: DISCONTINUED | OUTPATIENT
Start: 2021-08-23 | End: 2021-08-24 | Stop reason: HOSPADM

## 2021-08-23 RX ORDER — ACETAMINOPHEN 500 MG
1000 TABLET ORAL EVERY 6 HOURS
Status: DISCONTINUED | OUTPATIENT
Start: 2021-08-23 | End: 2021-08-24 | Stop reason: HOSPADM

## 2021-08-23 RX ORDER — VANCOMYCIN HYDROCHLORIDE 1 G/20ML
INJECTION, POWDER, LYOPHILIZED, FOR SOLUTION INTRAVENOUS
Status: DISCONTINUED | OUTPATIENT
Start: 2021-08-23 | End: 2021-08-23 | Stop reason: HOSPADM

## 2021-08-23 RX ORDER — FAMOTIDINE 10 MG/ML
INJECTION INTRAVENOUS
Status: DISCONTINUED | OUTPATIENT
Start: 2021-08-23 | End: 2021-08-23

## 2021-08-23 RX ORDER — TRANEXAMIC ACID 100 MG/ML
1000 INJECTION, SOLUTION INTRAVENOUS
Status: COMPLETED | OUTPATIENT
Start: 2021-08-23 | End: 2021-08-23

## 2021-08-23 RX ORDER — OXYCODONE HYDROCHLORIDE 5 MG/1
5 TABLET ORAL
Status: DISCONTINUED | OUTPATIENT
Start: 2021-08-23 | End: 2021-08-23

## 2021-08-23 RX ORDER — ROPIVACAINE/EPI/CLONIDINE/KET 2.46-0.005
SYRINGE (ML) INJECTION
Status: DISCONTINUED | OUTPATIENT
Start: 2021-08-23 | End: 2021-08-23 | Stop reason: HOSPADM

## 2021-08-23 RX ORDER — MORPHINE SULFATE 2 MG/ML
2 INJECTION, SOLUTION INTRAMUSCULAR; INTRAVENOUS
Status: DISCONTINUED | OUTPATIENT
Start: 2021-08-23 | End: 2021-08-24 | Stop reason: HOSPADM

## 2021-08-23 RX ORDER — FENTANYL CITRATE 50 UG/ML
25 INJECTION, SOLUTION INTRAMUSCULAR; INTRAVENOUS EVERY 5 MIN PRN
Status: DISCONTINUED | OUTPATIENT
Start: 2021-08-23 | End: 2021-08-23 | Stop reason: HOSPADM

## 2021-08-23 RX ORDER — LIDOCAINE HCL/PF 100 MG/5ML
SYRINGE (ML) INTRAVENOUS
Status: DISCONTINUED | OUTPATIENT
Start: 2021-08-23 | End: 2021-08-23

## 2021-08-23 RX ORDER — TRAZODONE HYDROCHLORIDE 50 MG/1
100 TABLET ORAL NIGHTLY
Status: DISCONTINUED | OUTPATIENT
Start: 2021-08-23 | End: 2021-08-24 | Stop reason: HOSPADM

## 2021-08-23 RX ADMIN — OXYCODONE HYDROCHLORIDE 10 MG: 10 TABLET ORAL at 07:08

## 2021-08-23 RX ADMIN — GABAPENTIN 400 MG: 400 CAPSULE ORAL at 09:08

## 2021-08-23 RX ADMIN — Medication 20 MG: at 01:08

## 2021-08-23 RX ADMIN — FENTANYL CITRATE 25 MCG: 50 INJECTION INTRAMUSCULAR; INTRAVENOUS at 04:08

## 2021-08-23 RX ADMIN — ASPIRIN 81 MG: 81 TABLET, COATED ORAL at 09:08

## 2021-08-23 RX ADMIN — OXYCODONE HYDROCHLORIDE 10 MG: 10 TABLET ORAL at 04:08

## 2021-08-23 RX ADMIN — SODIUM CHLORIDE: 9 INJECTION, SOLUTION INTRAVENOUS at 10:08

## 2021-08-23 RX ADMIN — FAMOTIDINE 20 MG: 10 INJECTION, SOLUTION INTRAVENOUS at 01:08

## 2021-08-23 RX ADMIN — SODIUM CHLORIDE: 0.9 INJECTION, SOLUTION INTRAVENOUS at 03:08

## 2021-08-23 RX ADMIN — BUSPIRONE HYDROCHLORIDE 15 MG: 5 TABLET ORAL at 09:08

## 2021-08-23 RX ADMIN — ONDANSETRON 4 MG: 2 INJECTION, SOLUTION INTRAMUSCULAR; INTRAVENOUS at 01:08

## 2021-08-23 RX ADMIN — MEPIVACAINE HYDROCHLORIDE 5 ML/HR: 15 INJECTION, SOLUTION EPIDURAL; INFILTRATION at 03:08

## 2021-08-23 RX ADMIN — MIDAZOLAM HYDROCHLORIDE 2 MG: 1 INJECTION, SOLUTION INTRAMUSCULAR; INTRAVENOUS at 01:08

## 2021-08-23 RX ADMIN — VANCOMYCIN HYDROCHLORIDE 1500 MG: 1.5 INJECTION, POWDER, LYOPHILIZED, FOR SOLUTION INTRAVENOUS at 11:08

## 2021-08-23 RX ADMIN — FAMOTIDINE 20 MG: 20 TABLET, FILM COATED ORAL at 09:08

## 2021-08-23 RX ADMIN — FENTANYL CITRATE 25 MCG: 50 INJECTION, SOLUTION INTRAMUSCULAR; INTRAVENOUS at 01:08

## 2021-08-23 RX ADMIN — ACETAMINOPHEN 1000 MG: 500 TABLET ORAL at 11:08

## 2021-08-23 RX ADMIN — MUPIROCIN 1 G: 20 OINTMENT TOPICAL at 09:08

## 2021-08-23 RX ADMIN — DOCUSATE SODIUM 50 MG AND SENNOSIDES 8.6 MG 1 TABLET: 8.6; 5 TABLET, FILM COATED ORAL at 09:08

## 2021-08-23 RX ADMIN — ACETAMINOPHEN 1000 MG: 500 TABLET ORAL at 06:08

## 2021-08-23 RX ADMIN — LORAZEPAM 1 MG: 0.5 TABLET ORAL at 11:08

## 2021-08-23 RX ADMIN — PROPOFOL 100 MCG/KG/MIN: 10 INJECTION, EMULSION INTRAVENOUS at 01:08

## 2021-08-23 RX ADMIN — CEFAZOLIN 2 G: 330 INJECTION, POWDER, FOR SOLUTION INTRAMUSCULAR; INTRAVENOUS at 01:08

## 2021-08-23 RX ADMIN — LIDOCAINE HYDROCHLORIDE AND EPINEPHRINE 3 MG: 15; 5 INJECTION, SOLUTION EPIDURAL at 02:08

## 2021-08-23 RX ADMIN — CELECOXIB 400 MG: 200 CAPSULE ORAL at 11:08

## 2021-08-23 RX ADMIN — SODIUM CHLORIDE: 0.9 INJECTION, SOLUTION INTRAVENOUS at 11:08

## 2021-08-23 RX ADMIN — PREGABALIN 75 MG: 75 CAPSULE ORAL at 11:08

## 2021-08-23 RX ADMIN — MUPIROCIN 1 G: 20 OINTMENT TOPICAL at 11:08

## 2021-08-23 RX ADMIN — TRANEXAMIC ACID 1000 MG: 100 INJECTION, SOLUTION INTRAVENOUS at 02:08

## 2021-08-23 RX ADMIN — DEXAMETHASONE SODIUM PHOSPHATE 8 MG: 4 INJECTION, SOLUTION INTRAMUSCULAR; INTRAVENOUS at 01:08

## 2021-08-23 RX ADMIN — TRAZODONE HYDROCHLORIDE 100 MG: 50 TABLET ORAL at 11:08

## 2021-08-23 RX ADMIN — PROPOFOL 40 MG: 10 INJECTION, EMULSION INTRAVENOUS at 01:08

## 2021-08-23 RX ADMIN — TRANEXAMIC ACID 1000 MG: 100 INJECTION, SOLUTION INTRAVENOUS at 01:08

## 2021-08-23 RX ADMIN — OXYCODONE HYDROCHLORIDE 10 MG: 10 TABLET ORAL at 10:08

## 2021-08-23 RX ADMIN — MEPIVACAINE HYDROCHLORIDE 3.5 ML: 15 INJECTION, SOLUTION EPIDURAL; INFILTRATION at 01:08

## 2021-08-23 RX ADMIN — LIDOCAINE HYDROCHLORIDE 60 MG: 20 INJECTION, SOLUTION INTRAVENOUS at 01:08

## 2021-08-23 RX ADMIN — CEFAZOLIN 2 G: 330 INJECTION, POWDER, FOR SOLUTION INTRAMUSCULAR; INTRAVENOUS at 09:08

## 2021-08-24 ENCOUNTER — PATIENT MESSAGE (OUTPATIENT)
Dept: ADMINISTRATIVE | Facility: OTHER | Age: 56
End: 2021-08-24

## 2021-08-24 VITALS
BODY MASS INDEX: 31.07 KG/M2 | SYSTOLIC BLOOD PRESSURE: 112 MMHG | TEMPERATURE: 97 F | HEART RATE: 83 BPM | WEIGHT: 205 LBS | OXYGEN SATURATION: 95 % | RESPIRATION RATE: 18 BRPM | DIASTOLIC BLOOD PRESSURE: 56 MMHG | HEIGHT: 68 IN

## 2021-08-24 PROCEDURE — 82565 ASSAY OF CREATININE: CPT

## 2021-08-24 PROCEDURE — 94761 N-INVAS EAR/PLS OXIMETRY MLT: CPT

## 2021-08-24 PROCEDURE — 82330 ASSAY OF CALCIUM: CPT

## 2021-08-24 PROCEDURE — 25000003 PHARM REV CODE 250: Performed by: STUDENT IN AN ORGANIZED HEALTH CARE EDUCATION/TRAINING PROGRAM

## 2021-08-24 PROCEDURE — 99232 PR SUBSEQUENT HOSPITAL CARE,LEVL II: ICD-10-PCS | Mod: ,,, | Performed by: NURSE PRACTITIONER

## 2021-08-24 PROCEDURE — 97116 GAIT TRAINING THERAPY: CPT | Mod: CQ

## 2021-08-24 PROCEDURE — 25000003 PHARM REV CODE 250: Performed by: PHYSICIAN ASSISTANT

## 2021-08-24 PROCEDURE — 99900035 HC TECH TIME PER 15 MIN (STAT)

## 2021-08-24 PROCEDURE — 63600175 PHARM REV CODE 636 W HCPCS: Performed by: PHYSICIAN ASSISTANT

## 2021-08-24 PROCEDURE — 84295 ASSAY OF SERUM SODIUM: CPT

## 2021-08-24 PROCEDURE — 85014 HEMATOCRIT: CPT

## 2021-08-24 PROCEDURE — 97535 SELF CARE MNGMENT TRAINING: CPT

## 2021-08-24 PROCEDURE — 84132 ASSAY OF SERUM POTASSIUM: CPT

## 2021-08-24 PROCEDURE — 82803 BLOOD GASES ANY COMBINATION: CPT

## 2021-08-24 PROCEDURE — 97165 OT EVAL LOW COMPLEX 30 MIN: CPT

## 2021-08-24 PROCEDURE — 99232 SBSQ HOSP IP/OBS MODERATE 35: CPT | Mod: ,,, | Performed by: NURSE PRACTITIONER

## 2021-08-24 PROCEDURE — 97530 THERAPEUTIC ACTIVITIES: CPT | Mod: CQ

## 2021-08-24 RX ORDER — CELECOXIB 200 MG/1
200 CAPSULE ORAL DAILY
Status: DISCONTINUED | OUTPATIENT
Start: 2021-08-24 | End: 2021-08-24 | Stop reason: HOSPADM

## 2021-08-24 RX ORDER — NALOXONE HYDROCHLORIDE 4 MG/.1ML
1 SPRAY NASAL ONCE
Qty: 1 EACH | Refills: 0 | Status: SHIPPED | OUTPATIENT
Start: 2021-08-24 | End: 2021-08-24

## 2021-08-24 RX ORDER — NALOXONE HYDROCHLORIDE 4 MG/.1ML
1 SPRAY NASAL ONCE
Qty: 1 EACH | Refills: 0 | Status: SHIPPED | OUTPATIENT
Start: 2021-08-24 | End: 2021-08-24 | Stop reason: SDUPTHER

## 2021-08-24 RX ORDER — OXYCODONE HYDROCHLORIDE 5 MG/1
5 TABLET ORAL EVERY 6 HOURS PRN
Qty: 30 TABLET | Refills: 0 | Status: SHIPPED | OUTPATIENT
Start: 2021-08-24 | End: 2021-10-11

## 2021-08-24 RX ORDER — OXYCODONE HYDROCHLORIDE 5 MG/1
5 TABLET ORAL EVERY 6 HOURS PRN
Qty: 30 TABLET | Refills: 0 | Status: SHIPPED | OUTPATIENT
Start: 2021-08-24 | End: 2021-08-24 | Stop reason: SDUPTHER

## 2021-08-24 RX ADMIN — TAMSULOSIN HYDROCHLORIDE 0.4 MG: 0.4 CAPSULE ORAL at 09:08

## 2021-08-24 RX ADMIN — POLYETHYLENE GLYCOL 3350 17 G: 17 POWDER, FOR SOLUTION ORAL at 09:08

## 2021-08-24 RX ADMIN — DOCUSATE SODIUM 50 MG AND SENNOSIDES 8.6 MG 1 TABLET: 8.6; 5 TABLET, FILM COATED ORAL at 09:08

## 2021-08-24 RX ADMIN — LAMOTRIGINE 100 MG: 100 TABLET ORAL at 09:08

## 2021-08-24 RX ADMIN — ASPIRIN 81 MG: 81 TABLET, COATED ORAL at 09:08

## 2021-08-24 RX ADMIN — LEVOTHYROXINE SODIUM 25 MCG: 25 TABLET ORAL at 09:08

## 2021-08-24 RX ADMIN — OXYCODONE HYDROCHLORIDE 10 MG: 10 TABLET ORAL at 09:08

## 2021-08-24 RX ADMIN — ACETAMINOPHEN 1000 MG: 500 TABLET ORAL at 06:08

## 2021-08-24 RX ADMIN — GABAPENTIN 400 MG: 400 CAPSULE ORAL at 09:08

## 2021-08-24 RX ADMIN — CEFAZOLIN 2 G: 330 INJECTION, POWDER, FOR SOLUTION INTRAMUSCULAR; INTRAVENOUS at 05:08

## 2021-08-24 RX ADMIN — OXYCODONE HYDROCHLORIDE 10 MG: 10 TABLET ORAL at 02:08

## 2021-08-24 RX ADMIN — OXYCODONE HYDROCHLORIDE 10 MG: 10 TABLET ORAL at 05:08

## 2021-08-24 RX ADMIN — BUSPIRONE HYDROCHLORIDE 15 MG: 5 TABLET ORAL at 09:08

## 2021-08-24 RX ADMIN — FAMOTIDINE 20 MG: 20 TABLET, FILM COATED ORAL at 09:08

## 2021-08-24 RX ADMIN — CELECOXIB 200 MG: 200 CAPSULE ORAL at 09:08

## 2021-08-24 RX ADMIN — MUPIROCIN 1 G: 20 OINTMENT TOPICAL at 09:08

## 2021-08-25 ENCOUNTER — CLINICAL SUPPORT (OUTPATIENT)
Dept: ORTHOPEDICS | Facility: CLINIC | Age: 56
End: 2021-08-25
Payer: COMMERCIAL

## 2021-08-25 ENCOUNTER — PATIENT MESSAGE (OUTPATIENT)
Dept: ADMINISTRATIVE | Facility: OTHER | Age: 56
End: 2021-08-25

## 2021-08-25 DIAGNOSIS — Z96.649 STATUS POST HIP REPLACEMENT, UNSPECIFIED LATERALITY: Primary | ICD-10-CM

## 2021-08-25 PROCEDURE — G0180 MD CERTIFICATION HHA PATIENT: HCPCS | Mod: ,,, | Performed by: ORTHOPAEDIC SURGERY

## 2021-08-25 PROCEDURE — 99499 NO LOS: ICD-10-PCS | Mod: 95,,, | Performed by: ORTHOPAEDIC SURGERY

## 2021-08-25 PROCEDURE — G0180 PR HOME HEALTH MD CERTIFICATION: ICD-10-PCS | Mod: ,,, | Performed by: ORTHOPAEDIC SURGERY

## 2021-08-25 PROCEDURE — 99499 UNLISTED E&M SERVICE: CPT | Mod: 95,,, | Performed by: ORTHOPAEDIC SURGERY

## 2021-08-26 ENCOUNTER — PATIENT MESSAGE (OUTPATIENT)
Dept: ADMINISTRATIVE | Facility: OTHER | Age: 56
End: 2021-08-26

## 2021-08-27 ENCOUNTER — TELEPHONE (OUTPATIENT)
Dept: ORTHOPEDICS | Facility: CLINIC | Age: 56
End: 2021-08-27

## 2021-08-30 LAB
FINAL PATHOLOGIC DIAGNOSIS: NORMAL
GROSS: NORMAL
Lab: NORMAL

## 2021-08-31 ENCOUNTER — PATIENT MESSAGE (OUTPATIENT)
Dept: ADMINISTRATIVE | Facility: OTHER | Age: 56
End: 2021-08-31

## 2021-09-02 ENCOUNTER — OFFICE VISIT (OUTPATIENT)
Dept: ORTHOPEDICS | Facility: CLINIC | Age: 56
End: 2021-09-02
Payer: COMMERCIAL

## 2021-09-02 ENCOUNTER — HOSPITAL ENCOUNTER (OUTPATIENT)
Dept: RADIOLOGY | Facility: HOSPITAL | Age: 56
Discharge: HOME | End: 2021-09-02
Attending: ORTHOPAEDIC SURGERY
Payer: COMMERCIAL

## 2021-09-02 DIAGNOSIS — Z96.649 STATUS POST HIP REPLACEMENT, UNSPECIFIED LATERALITY: Primary | ICD-10-CM

## 2021-09-02 DIAGNOSIS — Z96.649 STATUS POST HIP REPLACEMENT, UNSPECIFIED LATERALITY: ICD-10-CM

## 2021-09-02 DIAGNOSIS — Z96.642 PRESENCE OF LEFT ARTIFICIAL HIP JOINT: Primary | ICD-10-CM

## 2021-09-02 PROCEDURE — 99024 PR POST-OP FOLLOW-UP VISIT: ICD-10-PCS | Mod: S$GLB,,, | Performed by: NURSE PRACTITIONER

## 2021-09-02 PROCEDURE — 73552 X-RAY EXAM OF FEMUR 2/>: CPT | Mod: 26,LT,, | Performed by: RADIOLOGY

## 2021-09-02 PROCEDURE — 99024 POSTOP FOLLOW-UP VISIT: CPT | Mod: S$GLB,,, | Performed by: NURSE PRACTITIONER

## 2021-09-02 PROCEDURE — 73552 XR FEMUR 2 VIEW LEFT: ICD-10-PCS | Mod: 26,LT,, | Performed by: RADIOLOGY

## 2021-09-02 PROCEDURE — 99999 PR PBB SHADOW E&M-EST. PATIENT-LVL II: CPT | Mod: PBBFAC,,, | Performed by: NURSE PRACTITIONER

## 2021-09-02 PROCEDURE — 72170 XR PELVIS ROUTINE AP: ICD-10-PCS | Mod: 26,,, | Performed by: RADIOLOGY

## 2021-09-02 PROCEDURE — 99999 PR PBB SHADOW E&M-EST. PATIENT-LVL II: ICD-10-PCS | Mod: PBBFAC,,, | Performed by: NURSE PRACTITIONER

## 2021-09-02 PROCEDURE — 73552 X-RAY EXAM OF FEMUR 2/>: CPT | Mod: TC,PN,LT

## 2021-09-02 PROCEDURE — 72170 X-RAY EXAM OF PELVIS: CPT | Mod: TC,PN

## 2021-09-02 PROCEDURE — 72170 X-RAY EXAM OF PELVIS: CPT | Mod: 26,,, | Performed by: RADIOLOGY

## 2021-09-02 RX ORDER — OXYCODONE HYDROCHLORIDE 5 MG/1
5 TABLET ORAL EVERY 6 HOURS PRN
Qty: 30 TABLET | Refills: 0 | Status: SHIPPED | OUTPATIENT
Start: 2021-09-02 | End: 2021-10-11

## 2021-09-02 RX ORDER — METHOCARBAMOL 750 MG/1
750 TABLET, FILM COATED ORAL 4 TIMES DAILY PRN
Qty: 60 TABLET | Refills: 0 | Status: SHIPPED | OUTPATIENT
Start: 2021-09-02 | End: 2021-10-11

## 2021-09-07 ENCOUNTER — OFFICE VISIT (OUTPATIENT)
Dept: ORTHOPEDICS | Facility: CLINIC | Age: 56
End: 2021-09-07
Payer: COMMERCIAL

## 2021-09-07 DIAGNOSIS — G62.9 NEUROPATHY: Primary | ICD-10-CM

## 2021-09-07 PROCEDURE — 99999 PR PBB SHADOW E&M-EST. PATIENT-LVL II: ICD-10-PCS | Mod: PBBFAC,,, | Performed by: NURSE PRACTITIONER

## 2021-09-07 PROCEDURE — 99999 PR PBB SHADOW E&M-EST. PATIENT-LVL II: CPT | Mod: PBBFAC,,, | Performed by: NURSE PRACTITIONER

## 2021-09-07 PROCEDURE — 99024 PR POST-OP FOLLOW-UP VISIT: ICD-10-PCS | Mod: S$GLB,,, | Performed by: NURSE PRACTITIONER

## 2021-09-07 PROCEDURE — 99024 POSTOP FOLLOW-UP VISIT: CPT | Mod: S$GLB,,, | Performed by: NURSE PRACTITIONER

## 2021-09-07 RX ORDER — PREGABALIN 75 MG/1
75 CAPSULE ORAL 2 TIMES DAILY
Qty: 60 CAPSULE | Refills: 1 | Status: SHIPPED | OUTPATIENT
Start: 2021-09-07 | End: 2021-10-11

## 2021-09-15 ENCOUNTER — PATIENT MESSAGE (OUTPATIENT)
Dept: ORTHOPEDICS | Facility: CLINIC | Age: 56
End: 2021-09-15

## 2021-09-15 DIAGNOSIS — Z96.649 STATUS POST HIP REPLACEMENT, UNSPECIFIED LATERALITY: Primary | ICD-10-CM

## 2021-09-17 ENCOUNTER — DOCUMENT SCAN (OUTPATIENT)
Dept: HOME HEALTH SERVICES | Facility: HOSPITAL | Age: 56
End: 2021-09-17
Payer: COMMERCIAL

## 2021-10-05 ENCOUNTER — HOSPITAL ENCOUNTER (OUTPATIENT)
Dept: RADIOLOGY | Facility: HOSPITAL | Age: 56
Discharge: HOME | End: 2021-10-05
Attending: ORTHOPAEDIC SURGERY
Payer: COMMERCIAL

## 2021-10-05 ENCOUNTER — PATIENT MESSAGE (OUTPATIENT)
Dept: ADMINISTRATIVE | Facility: OTHER | Age: 56
End: 2021-10-05

## 2021-10-05 ENCOUNTER — OFFICE VISIT (OUTPATIENT)
Dept: ORTHOPEDICS | Facility: CLINIC | Age: 56
End: 2021-10-05
Payer: COMMERCIAL

## 2021-10-05 VITALS — BODY MASS INDEX: 30.59 KG/M2 | HEIGHT: 68 IN | WEIGHT: 201.81 LBS

## 2021-10-05 DIAGNOSIS — Z96.649 STATUS POST HIP REPLACEMENT, UNSPECIFIED LATERALITY: ICD-10-CM

## 2021-10-05 DIAGNOSIS — Z96.642 PRESENCE OF LEFT ARTIFICIAL HIP JOINT: Primary | ICD-10-CM

## 2021-10-05 PROCEDURE — 99999 PR PBB SHADOW E&M-EST. PATIENT-LVL III: CPT | Mod: PBBFAC,,, | Performed by: ORTHOPAEDIC SURGERY

## 2021-10-05 PROCEDURE — 73502 XR HIP WITH PELVIS WHEN PERFORMED, 2 OR 3 VIEWS LEFT: ICD-10-PCS | Mod: 26,LT,, | Performed by: RADIOLOGY

## 2021-10-05 PROCEDURE — 99024 POSTOP FOLLOW-UP VISIT: CPT | Mod: S$GLB,,, | Performed by: ORTHOPAEDIC SURGERY

## 2021-10-05 PROCEDURE — 73502 X-RAY EXAM HIP UNI 2-3 VIEWS: CPT | Mod: 26,LT,, | Performed by: RADIOLOGY

## 2021-10-05 PROCEDURE — 99024 PR POST-OP FOLLOW-UP VISIT: ICD-10-PCS | Mod: S$GLB,,, | Performed by: ORTHOPAEDIC SURGERY

## 2021-10-05 PROCEDURE — 99999 PR PBB SHADOW E&M-EST. PATIENT-LVL III: ICD-10-PCS | Mod: PBBFAC,,, | Performed by: ORTHOPAEDIC SURGERY

## 2021-10-05 PROCEDURE — 73502 X-RAY EXAM HIP UNI 2-3 VIEWS: CPT | Mod: TC,LT

## 2021-10-11 ENCOUNTER — OFFICE VISIT (OUTPATIENT)
Dept: PSYCHIATRY | Facility: CLINIC | Age: 56
End: 2021-10-11
Payer: COMMERCIAL

## 2021-10-11 DIAGNOSIS — F41.9 ANXIETY: ICD-10-CM

## 2021-10-11 DIAGNOSIS — G47.00 INSOMNIA, UNSPECIFIED TYPE: ICD-10-CM

## 2021-10-11 DIAGNOSIS — F31.9 BIPOLAR AFFECTIVE DISORDER, REMISSION STATUS UNSPECIFIED: Primary | ICD-10-CM

## 2021-10-11 PROCEDURE — 99214 PR OFFICE/OUTPT VISIT, EST, LEVL IV, 30-39 MIN: ICD-10-PCS | Mod: 95,,, | Performed by: NURSE PRACTITIONER

## 2021-10-11 PROCEDURE — 99214 OFFICE O/P EST MOD 30 MIN: CPT | Mod: 95,,, | Performed by: NURSE PRACTITIONER

## 2021-10-11 RX ORDER — BUSPIRONE HYDROCHLORIDE 15 MG/1
15 TABLET ORAL 3 TIMES DAILY
Qty: 90 TABLET | Refills: 3 | Status: SHIPPED | OUTPATIENT
Start: 2021-10-11 | End: 2021-12-15 | Stop reason: SDUPTHER

## 2021-10-11 RX ORDER — LAMOTRIGINE 100 MG/1
100 TABLET ORAL DAILY
Qty: 30 TABLET | Refills: 3 | Status: SHIPPED | OUTPATIENT
Start: 2021-10-11 | End: 2021-12-15 | Stop reason: SDUPTHER

## 2021-10-11 RX ORDER — PREGABALIN 75 MG/1
75 CAPSULE ORAL DAILY
COMMUNITY
End: 2021-12-30

## 2021-10-11 RX ORDER — DOXEPIN 3 MG/1
TABLET, FILM COATED ORAL
Qty: 60 TABLET | Refills: 3 | Status: SHIPPED | OUTPATIENT
Start: 2021-10-11 | End: 2021-10-13

## 2021-10-13 DIAGNOSIS — G47.00 INSOMNIA, UNSPECIFIED TYPE: Primary | ICD-10-CM

## 2021-10-13 RX ORDER — DOXEPIN HYDROCHLORIDE 50 MG/1
50 CAPSULE ORAL NIGHTLY PRN
Qty: 30 CAPSULE | Refills: 0 | Status: SHIPPED | OUTPATIENT
Start: 2021-10-13 | End: 2021-11-09

## 2021-10-18 ENCOUNTER — EXTERNAL HOME HEALTH (OUTPATIENT)
Dept: HOME HEALTH SERVICES | Facility: HOSPITAL | Age: 56
End: 2021-10-18
Payer: COMMERCIAL

## 2021-10-21 ENCOUNTER — OFFICE VISIT (OUTPATIENT)
Dept: PODIATRY | Facility: CLINIC | Age: 56
End: 2021-10-21
Payer: COMMERCIAL

## 2021-10-21 VITALS
HEART RATE: 69 BPM | DIASTOLIC BLOOD PRESSURE: 73 MMHG | WEIGHT: 201.94 LBS | BODY MASS INDEX: 30.71 KG/M2 | SYSTOLIC BLOOD PRESSURE: 115 MMHG

## 2021-10-21 DIAGNOSIS — M21.611 BILATERAL BUNIONS: ICD-10-CM

## 2021-10-21 DIAGNOSIS — S90.229A SUBUNGUAL HEMATOMA OF SECOND TOE: Primary | ICD-10-CM

## 2021-10-21 DIAGNOSIS — M21.612 BILATERAL BUNIONS: ICD-10-CM

## 2021-10-21 PROCEDURE — 99999 PR PBB SHADOW E&M-EST. PATIENT-LVL III: ICD-10-PCS | Mod: PBBFAC,,, | Performed by: PODIATRIST

## 2021-10-21 PROCEDURE — 99203 OFFICE O/P NEW LOW 30 MIN: CPT | Mod: S$GLB,,, | Performed by: PODIATRIST

## 2021-10-21 PROCEDURE — 99999 PR PBB SHADOW E&M-EST. PATIENT-LVL III: CPT | Mod: PBBFAC,,, | Performed by: PODIATRIST

## 2021-10-21 PROCEDURE — 99203 PR OFFICE/OUTPT VISIT, NEW, LEVL III, 30-44 MIN: ICD-10-PCS | Mod: S$GLB,,, | Performed by: PODIATRIST

## 2021-10-22 ENCOUNTER — PATIENT MESSAGE (OUTPATIENT)
Dept: ADMINISTRATIVE | Facility: OTHER | Age: 56
End: 2021-10-22
Payer: COMMERCIAL

## 2021-10-29 ENCOUNTER — PATIENT MESSAGE (OUTPATIENT)
Dept: ORTHOPEDICS | Facility: CLINIC | Age: 56
End: 2021-10-29

## 2021-10-29 ENCOUNTER — TELEPHONE (OUTPATIENT)
Dept: ORTHOPEDICS | Facility: CLINIC | Age: 56
End: 2021-10-29
Payer: COMMERCIAL

## 2021-10-29 DIAGNOSIS — Z96.649 STATUS POST HIP REPLACEMENT, UNSPECIFIED LATERALITY: Primary | ICD-10-CM

## 2021-11-01 ENCOUNTER — HOSPITAL ENCOUNTER (OUTPATIENT)
Dept: RADIOLOGY | Facility: HOSPITAL | Age: 56
Discharge: HOME | End: 2021-11-01
Attending: PHYSICIAN ASSISTANT
Payer: COMMERCIAL

## 2021-11-01 ENCOUNTER — OFFICE VISIT (OUTPATIENT)
Dept: ORTHOPEDICS | Facility: CLINIC | Age: 56
End: 2021-11-01
Payer: COMMERCIAL

## 2021-11-01 ENCOUNTER — TELEPHONE (OUTPATIENT)
Dept: ORTHOPEDICS | Facility: CLINIC | Age: 56
End: 2021-11-01
Payer: COMMERCIAL

## 2021-11-01 VITALS — HEIGHT: 68 IN | BODY MASS INDEX: 30.46 KG/M2 | WEIGHT: 201 LBS

## 2021-11-01 DIAGNOSIS — Z96.642 PRESENCE OF LEFT ARTIFICIAL HIP JOINT: ICD-10-CM

## 2021-11-01 DIAGNOSIS — Z96.649 STATUS POST HIP REPLACEMENT, UNSPECIFIED LATERALITY: ICD-10-CM

## 2021-11-01 DIAGNOSIS — Z96.642 PRESENCE OF LEFT ARTIFICIAL HIP JOINT: Primary | ICD-10-CM
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
## 2021-11-01 PROCEDURE — 99024 POSTOP FOLLOW-UP VISIT: CPT | Mod: S$GLB,,, | Performed by: PHYSICIAN ASSISTANT

## 2021-11-01 PROCEDURE — 99999 PR PBB SHADOW E&M-EST. PATIENT-LVL IV: CPT | Mod: PBBFAC,,, | Performed by: PHYSICIAN ASSISTANT

## 2021-11-01 PROCEDURE — 73502 XR HIP WITH PELVIS WHEN PERFORMED, 2 OR 3 VIEWS LEFT: ICD-10-PCS | Mod: 26,LT,, | Performed by: RADIOLOGY

## 2021-11-01 PROCEDURE — 72192 CT PELVIS W/O DYE: CPT | Mod: 26,,, | Performed by: INTERNAL MEDICINE

## 2021-11-01 PROCEDURE — 76377 3D RENDER W/INTRP POSTPROCES: CPT | Mod: 26,,, | Performed by: INTERNAL MEDICINE

## 2021-11-01 PROCEDURE — 72192 CT PELVIS WITHOUT CONTRAST: ICD-10-PCS | Mod: 26,,, | Performed by: INTERNAL MEDICINE

## 2021-11-01 PROCEDURE — 76377 CT 3D RECON WITH INDEPENDENT WS: ICD-10-PCS | Mod: 26,,, | Performed by: INTERNAL MEDICINE

## 2021-11-01 PROCEDURE — 99213 OFFICE O/P EST LOW 20 MIN: CPT | Mod: 24,S$GLB,, | Performed by: PHYSICIAN ASSISTANT

## 2021-11-01 PROCEDURE — 99024 PR POST-OP FOLLOW-UP VISIT: ICD-10-PCS | Mod: S$GLB,,, | Performed by: PHYSICIAN ASSISTANT

## 2021-11-01 PROCEDURE — 73502 X-RAY EXAM HIP UNI 2-3 VIEWS: CPT | Mod: TC,LT

## 2021-11-01 PROCEDURE — 73502 X-RAY EXAM HIP UNI 2-3 VIEWS: CPT | Mod: 26,LT,, | Performed by: RADIOLOGY

## 2021-11-01 PROCEDURE — 72192 CT PELVIS W/O DYE: CPT | Mod: TC

## 2021-11-01 PROCEDURE — 76377 3D RENDER W/INTRP POSTPROCES: CPT | Mod: TC

## 2021-11-01 PROCEDURE — 99999 PR PBB SHADOW E&M-EST. PATIENT-LVL IV: ICD-10-PCS | Mod: PBBFAC,,, | Performed by: PHYSICIAN ASSISTANT

## 2021-11-01 PROCEDURE — 99213 PR OFFICE/OUTPT VISIT, EST, LEVL III, 20-29 MIN: ICD-10-PCS | Mod: 24,S$GLB,, | Performed by: PHYSICIAN ASSISTANT

## 2021-11-02 ENCOUNTER — TELEPHONE (OUTPATIENT)
Dept: ORTHOPEDICS | Facility: CLINIC | Age: 56
End: 2021-11-02
Payer: COMMERCIAL

## 2021-11-02 DIAGNOSIS — Z96.642 PRESENCE OF LEFT ARTIFICIAL HIP JOINT: ICD-10-CM

## 2021-11-02 DIAGNOSIS — Z96.649 STATUS POST HIP REPLACEMENT, UNSPECIFIED LATERALITY: Primary | ICD-10-CM

## 2021-11-16 ENCOUNTER — PATIENT MESSAGE (OUTPATIENT)
Dept: ADMINISTRATIVE | Facility: OTHER | Age: 56
End: 2021-11-16
Payer: COMMERCIAL

## 2021-11-16 ENCOUNTER — HOSPITAL ENCOUNTER (OUTPATIENT)
Dept: RADIOLOGY | Facility: HOSPITAL | Age: 56
Discharge: HOME | End: 2021-11-16
Attending: ORTHOPAEDIC SURGERY
Payer: COMMERCIAL

## 2021-11-16 ENCOUNTER — OFFICE VISIT (OUTPATIENT)
Dept: ORTHOPEDICS | Facility: CLINIC | Age: 56
End: 2021-11-16
Payer: COMMERCIAL

## 2021-11-16 VITALS — BODY MASS INDEX: 30.31 KG/M2 | WEIGHT: 200 LBS | HEIGHT: 68 IN

## 2021-11-16 DIAGNOSIS — Z96.649 STATUS POST HIP REPLACEMENT, UNSPECIFIED LATERALITY: Primary | ICD-10-CM

## 2021-11-16 DIAGNOSIS — Z96.642 PRESENCE OF LEFT ARTIFICIAL HIP JOINT: Primary | ICD-10-CM

## 2021-11-16 DIAGNOSIS — Z96.642 PRESENCE OF LEFT ARTIFICIAL HIP JOINT: ICD-10-CM

## 2021-11-16 DIAGNOSIS — Z96.649 STATUS POST HIP REPLACEMENT, UNSPECIFIED LATERALITY: ICD-10-CM

## 2021-11-16 PROCEDURE — 72190 X-RAY EXAM OF PELVIS: CPT | Mod: TC

## 2021-11-16 PROCEDURE — 72190 XR PELVIS COMPLETE MIN 3 VIEWS: ICD-10-PCS | Mod: 26,,, | Performed by: RADIOLOGY

## 2021-11-16 PROCEDURE — 99024 PR POST-OP FOLLOW-UP VISIT: ICD-10-PCS | Mod: S$GLB,,, | Performed by: ORTHOPAEDIC SURGERY

## 2021-11-16 PROCEDURE — 99999 PR PBB SHADOW E&M-EST. PATIENT-LVL III: CPT | Mod: PBBFAC,,, | Performed by: ORTHOPAEDIC SURGERY

## 2021-11-16 PROCEDURE — 72190 X-RAY EXAM OF PELVIS: CPT | Mod: 26,,, | Performed by: RADIOLOGY

## 2021-11-16 PROCEDURE — 99999 PR PBB SHADOW E&M-EST. PATIENT-LVL III: ICD-10-PCS | Mod: PBBFAC,,, | Performed by: ORTHOPAEDIC SURGERY

## 2021-11-16 PROCEDURE — 99024 POSTOP FOLLOW-UP VISIT: CPT | Mod: S$GLB,,, | Performed by: ORTHOPAEDIC SURGERY

## 2021-11-17 ENCOUNTER — PATIENT MESSAGE (OUTPATIENT)
Dept: PSYCHIATRY | Facility: CLINIC | Age: 56
End: 2021-11-17
Payer: COMMERCIAL

## 2021-11-24 ENCOUNTER — PATIENT MESSAGE (OUTPATIENT)
Dept: ADMINISTRATIVE | Facility: OTHER | Age: 56
End: 2021-11-24
Payer: COMMERCIAL

## 2021-12-09 ENCOUNTER — PATIENT MESSAGE (OUTPATIENT)
Dept: ORTHOPEDICS | Facility: CLINIC | Age: 56
End: 2021-12-09
Payer: COMMERCIAL

## 2021-12-14 ENCOUNTER — PATIENT MESSAGE (OUTPATIENT)
Dept: PSYCHIATRY | Facility: CLINIC | Age: 56
End: 2021-12-14
Payer: COMMERCIAL

## 2021-12-15 ENCOUNTER — OFFICE VISIT (OUTPATIENT)
Dept: PSYCHIATRY | Facility: CLINIC | Age: 56
End: 2021-12-15
Payer: COMMERCIAL

## 2021-12-15 DIAGNOSIS — F31.70 BIPOLAR AFFECTIVE DISORDER IN REMISSION: ICD-10-CM

## 2021-12-15 DIAGNOSIS — G47.00 INSOMNIA, UNSPECIFIED TYPE: Primary | ICD-10-CM

## 2021-12-15 DIAGNOSIS — F41.9 ANXIETY: ICD-10-CM

## 2021-12-15 PROCEDURE — 99214 OFFICE O/P EST MOD 30 MIN: CPT | Mod: 95,,, | Performed by: NURSE PRACTITIONER

## 2021-12-15 PROCEDURE — 99214 PR OFFICE/OUTPT VISIT, EST, LEVL IV, 30-39 MIN: ICD-10-PCS | Mod: 95,,, | Performed by: NURSE PRACTITIONER

## 2021-12-15 RX ORDER — BUSPIRONE HYDROCHLORIDE 15 MG/1
15 TABLET ORAL 3 TIMES DAILY
Qty: 90 TABLET | Refills: 3 | Status: SHIPPED | OUTPATIENT
Start: 2021-12-15 | End: 2022-01-12 | Stop reason: SDUPTHER

## 2021-12-15 RX ORDER — LAMOTRIGINE 100 MG/1
100 TABLET ORAL DAILY
Qty: 30 TABLET | Refills: 3 | Status: SHIPPED | OUTPATIENT
Start: 2021-12-15 | End: 2022-01-12 | Stop reason: SDUPTHER

## 2021-12-15 RX ORDER — ALPRAZOLAM 0.5 MG/1
0.5 TABLET ORAL NIGHTLY PRN
Qty: 15 TABLET | Refills: 0 | Status: SHIPPED | OUTPATIENT
Start: 2021-12-15 | End: 2021-12-30

## 2021-12-17 ENCOUNTER — PATIENT MESSAGE (OUTPATIENT)
Dept: ORTHOPEDICS | Facility: CLINIC | Age: 56
End: 2021-12-17
Payer: COMMERCIAL

## 2021-12-19 ENCOUNTER — PATIENT MESSAGE (OUTPATIENT)
Dept: PSYCHIATRY | Facility: CLINIC | Age: 56
End: 2021-12-19
Payer: COMMERCIAL

## 2021-12-30 ENCOUNTER — OFFICE VISIT (OUTPATIENT)
Dept: SLEEP MEDICINE | Facility: CLINIC | Age: 56
End: 2021-12-30
Payer: COMMERCIAL

## 2021-12-30 VITALS
BODY MASS INDEX: 30.25 KG/M2 | HEART RATE: 99 BPM | DIASTOLIC BLOOD PRESSURE: 93 MMHG | WEIGHT: 198.94 LBS | SYSTOLIC BLOOD PRESSURE: 139 MMHG

## 2021-12-30 DIAGNOSIS — G47.8 HYPNOTIC-DEPENDENT SLEEP DISORDER: ICD-10-CM

## 2021-12-30 DIAGNOSIS — G47.33 OSA (OBSTRUCTIVE SLEEP APNEA): Primary | ICD-10-CM

## 2021-12-30 DIAGNOSIS — G47.01 INSOMNIA DUE TO MEDICAL CONDITION: ICD-10-CM

## 2021-12-30 PROCEDURE — 99214 PR OFFICE/OUTPT VISIT, EST, LEVL IV, 30-39 MIN: ICD-10-PCS | Mod: S$GLB,,, | Performed by: INTERNAL MEDICINE

## 2021-12-30 PROCEDURE — 99999 PR PBB SHADOW E&M-EST. PATIENT-LVL IV: ICD-10-PCS | Mod: PBBFAC,,, | Performed by: INTERNAL MEDICINE

## 2021-12-30 PROCEDURE — 99999 PR PBB SHADOW E&M-EST. PATIENT-LVL IV: CPT | Mod: PBBFAC,,, | Performed by: INTERNAL MEDICINE

## 2021-12-30 PROCEDURE — 99214 OFFICE O/P EST MOD 30 MIN: CPT | Mod: S$GLB,,, | Performed by: INTERNAL MEDICINE

## 2022-01-12 ENCOUNTER — OFFICE VISIT (OUTPATIENT)
Dept: PSYCHIATRY | Facility: CLINIC | Age: 57
End: 2022-01-12
Payer: COMMERCIAL

## 2022-01-12 DIAGNOSIS — F31.70 BIPOLAR AFFECTIVE DISORDER IN REMISSION: ICD-10-CM

## 2022-01-12 DIAGNOSIS — G47.00 INSOMNIA, UNSPECIFIED TYPE: Primary | ICD-10-CM

## 2022-01-12 DIAGNOSIS — F41.9 ANXIETY: ICD-10-CM

## 2022-01-12 PROCEDURE — 99214 PR OFFICE/OUTPT VISIT, EST, LEVL IV, 30-39 MIN: ICD-10-PCS | Mod: 95,,, | Performed by: NURSE PRACTITIONER

## 2022-01-12 PROCEDURE — 99214 OFFICE O/P EST MOD 30 MIN: CPT | Mod: 95,,, | Performed by: NURSE PRACTITIONER

## 2022-01-12 RX ORDER — ESZOPICLONE 2 MG/1
2 TABLET, FILM COATED ORAL NIGHTLY PRN
Qty: 30 TABLET | Refills: 0 | Status: SHIPPED | OUTPATIENT
Start: 2022-01-12 | End: 2022-02-25 | Stop reason: SDUPTHER

## 2022-01-12 RX ORDER — DEXTROMETHORPHAN HYDROBROMIDE, GUAIFENESIN 5; 100 MG/5ML; MG/5ML
650 LIQUID ORAL DAILY PRN
COMMUNITY
Start: 2021-08-23 | End: 2022-02-25

## 2022-01-12 RX ORDER — LAMOTRIGINE 100 MG/1
100 TABLET ORAL DAILY
Qty: 30 TABLET | Refills: 3 | Status: SHIPPED | OUTPATIENT
Start: 2022-01-12 | End: 2022-05-02 | Stop reason: SDUPTHER

## 2022-01-12 RX ORDER — BUSPIRONE HYDROCHLORIDE 15 MG/1
15 TABLET ORAL 3 TIMES DAILY
Qty: 90 TABLET | Refills: 3 | Status: SHIPPED | OUTPATIENT
Start: 2022-01-12 | End: 2022-05-02 | Stop reason: SDUPTHER

## 2022-01-12 NOTE — PROGRESS NOTES
1/12/2022 11:17 AM  Tyrone Santos  1965  [id number]    Outpatient Psychiatry Follow-Up Visit (MD/NP) - Telemedicine Visit      The patient location is: Patient reported that his location at the time of this visit was in the [facility]   Address: documented in [facility]      Visit type: audiovisual    Each patient to whom he or she provides medical services by telemedicine is:  (1) informed of the relationship between the physician and patient and the respective role of any other health care provider with respect to management of the patient; and (2) notified that he or she may decline to receive medical services by telemedicine and may withdraw from such care at any time.      Chief Complaint:  Tyrone Santos, a 56 y.o. male,who presents today for follow up of   Chief Complaint   Patient presents with    Anxiety    Mood Swings    Insomnia   .  Met with patient.        Interim Events/Subjective Report/Content of Current Session:     Pt is a 56 y.o. male with a hx of HTN, HLD, ED, BPH, hypothyroidism, ERIC, s/p cervical spine fusion, spondylosis, and bipolar d/o.    Pt logged in on time for this appt.    Today he reports he is still not sleeping well. Reports he slept very well with Xanax but we avoiding using a benzo long-term. Discussed Lunesta/Sonata/Ambien. He saw sleep medicine. He plans to get an oral appliance for his ERIC. He is also scheduled for a sinus procedure next month that will hopefully help him breathe easier and improve his sleep. He plans to obtain a CDS licence and knows that he has to get control of his ERIC.  Mood is good. Appetite is good. Energy level and motivation are good. Anxiety is well controlled.  He denies and s/s of ochoa or hypomania. He denies elevated, expansive, or irritable mood with increased energy or activity; with inflated self-esteem or grandiosity, decreased need for sleep, increased rate of speech, racing thoughts, distractibility, increased goal directed  activity or risky/disinhibited behavior.  States his job is going well and that he is enjoying it.    Pt denies recurrent thoughts of death and denies SI/HI. Denies any sxs of ochoa. Denies AVH, paranoia and delusions. No objective s/sx of psychosis or ochoa. Denies any ASE from his psych meds.    Discussed risks, benefits and SE profile of medication options.       Psychotherapy:  · Target symptoms: depression, anxiety , insomnia  · Why chosen therapy is appropriate versus another modality: relevant to diagnosis, patient responds to this modality  · Outcome monitoring methods: self-report, observation  · Therapeutic intervention type: supportive psychotherapy  · Topics discussed/themes: work stress, building skills sets for symptom management  · The patient's response to the intervention is accepting. The patient's progress toward treatment goals is fair.   · Duration of intervention: 5 minutes.      Psychotropic medication review  Previous Trials-  Wellbutrin   Valium  Xanax  Trileptal  Seroquel - heart racing  Trazodone - worked for a few years and then gave out  Depakote - worked for a while  Doxepin  Hydroxyzine - ineffective  Naltrexone  Campral  Trazodone    Current meds-  Buspar   Lamictal        Review of Systems       Review of Systems   Constitutional: Negative for chills, fever, malaise/fatigue and weight loss.   Respiratory: Negative for shortness of breath and wheezing.    Cardiovascular: Negative for chest pain and palpitations.   Gastrointestinal: Negative for diarrhea and vomiting.   Musculoskeletal: Negative for falls and myalgias.   Skin: Negative for rash.   Neurological: Negative for tremors, seizures and headaches.   Endo/Heme/Allergies: Does not bruise/bleed easily.   Psychiatric/Behavioral:        See HPI         Past Medical, Family and Social History: The patient's past medical, family and social history have been reviewed and updated as appropriate within the electronic medical record -  see encounter notes.    Compliance: yes    Side effects: None    Risk Parameters:  Patient reports no suicidal ideation  Patient reports no homicidal ideation  Patient reports no self-injurious behavior  Patient reports no violent behavior    Exam (detailed: at least 9 elements; comprehensive: all 15 elements)   Constitutional  Vitals:  Most recent vital signs, dated less than 90 days prior to this appointment, were reviewed.   There were no vitals filed for this visit.     General:  unremarkable, age appropriate, well nourished, casually dressed, neatly groomed, overweight     Musculoskeletal  Muscle Strength/Tone:  no tremor, no tic   Gait & Station:  ADITYA due to virtual visit     Psychiatric      Appearance:  unremarkable, age appropriate, well nourished, casually dressed, neatly groomed, overweight   Behavior:  normal, friendly and cooperative, eye contact normal     Speech:  no latency; no press   Mood & Affect:  euthymic  congruent and appropriate   Thought Process:  normal and logical   Associations:  intact   Thought Content:  normal, no suicidality, no homicidality, delusions, or paranoia   Insight:  intact   Judgement: behavior is adequate to circumstances, age appropriate   Orientation:  grossly intact   Memory: intact for content of interview   Language: grossly intact   Attention Span & Concentration:  able to focus   Fund of Knowledge:  intact and appropriate to age and level of education     Medications:  Outpatient Encounter Medications as of 1/12/2022   Medication Sig Dispense Refill    aspirin (ECOTRIN) 81 MG EC tablet Take 1 tablet (81 mg total) by mouth 2 (two) times daily. 60 tablet 0    busPIRone (BUSPAR) 15 MG tablet Take 1 tablet (15 mg total) by mouth 3 (three) times daily. 90 tablet 3    calcium-vitamin D3 (OS-JALYN 500 + D3) 500 mg(1,250mg) -200 unit per tablet Take 1 tablet by mouth 2 (two) times daily with meals.      cyanocobalamin (VITAMIN B-12) 100 MCG tablet Take 100 mcg by mouth  once daily.      folic acid (FOLVITE) 1 MG tablet Take 1 mg by mouth once daily.      gabapentin (NEURONTIN) 400 MG capsule Take 1 capsule (400 mg total) by mouth 3 (three) times daily. 90 capsule 11    hydroCHLOROthiazide (HYDRODIURIL) 25 MG tablet Take 25 mg by mouth once daily.      lamoTRIgine (LAMICTAL) 100 MG tablet Take 1 tablet (100 mg total) by mouth once daily. 30 tablet 3    levothyroxine (SYNTHROID) 25 MCG tablet Take 25 mcg by mouth once daily.      omeprazole (PRILOSEC) 40 MG capsule Take 40 mg by mouth once daily.      tadalafiL (CIALIS) 20 MG Tab Take 1 tablet (20 mg total) by mouth once daily. Take 1 hour before intercourse 10 tablet 11    tamsulosin (FLOMAX) 0.4 mg Cap Take 1 capsule (0.4 mg total) by mouth once daily. 30 capsule 11     No facility-administered encounter medications on file as of 1/12/2022.       Allergy:  Review of patient's allergies indicates:   Allergen Reactions    Codeine Nausea Only    Seroquel [quetiapine]      Heart racing     Tizanidine Other (See Comments)     Caused increased pain and muscle pain         Assessment and Diagnosis   Status/Progress: Based on the examination today, the patient's problem(s) is/are adequately but not ideally controlled.  New problems have been presented today.   Co-morbidities are not complicating management of the primary condition.  The working differential for this patient includes Bipolar I vs Bipolar II vs MDD with anxiety.         General Impression:       ICD-10-CM ICD-9-CM   1. Insomnia, unspecified type  G47.00 780.52   2. Bipolar affective disorder in remission  F31.70 296.80   3. Anxiety  F41.9 300.00        Diff Dx  Bipolar I vs Bipolar II vs MDD with anxiety      Intervention/Counseling/Treatment Plan     · Medication Management:  · Continue Lamictal 100 mg q day  · Continue Buspar 15 mg TID  · Discontinue Xanax and Doxepin  · Start a trial of Lunesta 2 mg q hs prn insomnia. Pt was told not drive or to take this med  with ETOH or other sedating meds/substance. Pt verbalized understanding.  · Labs: reviewed most recent  Lamictal: Discussed with patient the possibility of developing Awad-Yfn Syndrome (SJS) with the use of Lamictal. Pt was told to STOP taking the Lamictal immediately if any rash develops. Pt was told not to use any new products that may cause a rash (ex. changing laundry detergent and soap, skin creams/lotions, perfume, etc). Also informed the pt of the most common side effects of the medication including dizziness, ataxia, somnolence, headache, blurred vision and nausea. The patient expresses understanding of the above and displays the capacity to agree with this treatment given said understanding. Patient also agrees that, currently, the benefits outweigh the risks and would like to pursue treatment at this time.   Buspar: This medication may take 2-4 weeks to see a full clinical response. The medication does not ususally cause weight gain or sexual dysfunction. The most common side effects of the medication include headache, nausea, diziness, and (rarely) insomnia.   Sedative hypnotic: Pt was informed of the Black Box Warning on medications such as eszopiclone (Lunesta), zaleplon (Sonata), and zolpidem (Ambien, Ambien CR, Edluar, Intermezzo, Zolpimist) than other prescription medicines used for sleep. Pt was also informed of the following:  The Food and Drug Administration (FDA) is advising that rare but serious injuries have happened with certain common prescription insomnia medicines because of sleep behaviors, including sleepwalking, sleep driving, and engaging in other activities while not fully awake. These complex sleep behaviors have also resulted in deaths.   Serious injuries and death from complex sleep behaviors have occurred in patients with and without a history of such behaviors, even at the lowest recommended doses, and the behaviors can occur after just one dose. These behaviors can occur  after taking these medicines with or without alcohol or other central nervous system depressants that may be sedating such as tranquilizers, opioids, and anti-anxiety medicines. Other possible side effects of these medications include they may cause some people, especially older persons, to become drowsy, dizzy, lightheaded, clumsy or unsteady, or less alert than they are normally, which may lead to falls. Even though zolpidem is taken at bedtime, it may cause some people to feel drowsy or less alert on arising. Also, this medicine may cause double vision or other vision problems, or severe injuries (eg, hip fractures, severe bleeding in the head).  · Discussed with patient informed consent, risks vs. benefits, alternative treatments, side effect profile and the inherent unpredictability of individual responses to these treatments. The patient expresses understanding of the above and displays the capacity to agree with this current plan and had no other questions.  · Encouraged Patient to keep future appointments.   · Take medications as prescribed and abstain from substance abuse.   · Present to ED or call 911 for SI/HI plan or intent, psychosis, or medical emergency.      Return to Clinic: 3 months, or sooner if needed      Face-to-face time with the patient: 25 minutes  Total time:  35 minutes of total time spent on the encounter, which includes face to face time and non-face to face time preparing to see the patient (eg, review of tests), Obtaining and/or reviewing separately obtained history, Documenting clinical information in the electronic or other health record, Independently interpreting results (not separately reported) and communicating results to the patient/family/caregiver, or Care coordination (not separately reported).       Lisa Alejo, MSN, APRN, PMHNP-BC [facility]

## 2022-01-28 ENCOUNTER — PATIENT MESSAGE (OUTPATIENT)
Dept: ORTHOPEDICS | Facility: CLINIC | Age: 57
End: 2022-01-28
Payer: COMMERCIAL

## 2022-01-31 NOTE — PROGRESS NOTES
DATE: 1/31/2022  PATIENT: Tyrone Santos    Supervising Physician: Bart Frye M.D.    CHIEF COMPLAINT: low back pain    HISTORY:  Tyrone Santos is a 56 y.o. male with a pmhx of HTN s/p left NATALIYA (8/23/21 Dr Lam) here for initial evaluation of low back pain (Back - 6, Leg - 0).  The pain in the lower back is what bothers him most.  The pain has been present for years, worsening 2 weeks ago. Pt works fixing motors for sound systems and says he is bending and heavy lifting daily. The patient describes the pain as stiff and aching.  The pain is worse with bending and lifting and improved by laying flat. There is negative associated numbness and tingling. There is negative subjective weakness. Prior treatments have included no hx of PT, ESIs, surgery.    The patient denies myelopathic symptoms such as handwriting changes or difficulty with buttons/coins/keys. Denies perineal paresthesias, bowel/bladder dysfunction.    PAST MEDICAL/SURGICAL HISTORY:  Past Medical History:   Diagnosis Date    Addiction to drug     Alcohol abuse     Anxiety     Bipolar disorder     Depression     Difficulty urinating     GERD (gastroesophageal reflux disease)     Hx of psychiatric care     Hyperlipidemia     Hypertension     Ade     Psychiatric problem     S/P cervical spinal fusion 12/6/2018    S/P laparoscopic appendectomy 1/3/2017    Therapy     Thyroid disease      Past Surgical History:   Procedure Laterality Date    ANTERIOR CERVICAL DISCECTOMY W/ FUSION N/A 10/23/2018    Procedure: DISCECTOMY, SPINE, CERVICAL, ANTERIOR APPROACH, WITH FUSION C3-4;  Surgeon: Jw Anne MD;  Location: [address];  Service: Neurosurgery;  Laterality: N/A;    APPENDECTOMY      around 2012    ARTHROPLASTY OF HIP BY ANTERIOR APPROACH Left 8/23/2021    Procedure: ARTHROPLASTY, HIP, TOTAL, ANTERIOR APPROACH:LEFT:DPEUY-ACTIS+PINNACLE;  Surgeon: Ángel Lam III, MD;  Location[facility];  Service: Orthopedics;   Laterality: Left;    INJECTION OF ANESTHETIC AGENT AROUND NERVE Left 7/1/2021    Procedure: BLOCK, NERVE, OBTURATOR and FEMORAL;  Surgeon: Josefina Lee MD;  Location[facility];  Service: Pain Management;  Laterality: Left;    SCAPHOID FRACTURE SURGERY      had to have a bone graft -early  1990's     WRIST SURGERY      6 years ago       Medications:   Current Outpatient Medications on File Prior to Visit   Medication Sig Dispense Refill    acetaminophen (TYLENOL ARTHRITIS PAIN) 650 MG TbSR Take 650 mg by mouth daily as needed.      aspirin 81 mg Cap 1 tablet 2 TIMES DAILY (route: oral)      busPIRone (BUSPAR) 15 MG tablet Take 1 tablet (15 mg total) by mouth 3 (three) times daily. 90 tablet 3    calcium-vitamin D3 (OS-JALYN 500 + D3) 500 mg(1,250mg) -200 unit per tablet Take 1 tablet by mouth 2 (two) times daily with meals.      cyanocobalamin (VITAMIN B-12) 100 MCG tablet Take 100 mcg by mouth once daily.      eszopiclone (LUNESTA) 2 MG Tab Take 1 tablet (2 mg total) by mouth nightly as needed (insomnia). 30 tablet 0    folic acid (FOLVITE) 1 MG tablet Take 1 mg by mouth once daily.      gabapentin (NEURONTIN) 400 MG capsule Take 1 capsule (400 mg total) by mouth 3 (three) times daily. 90 capsule 11    hydroCHLOROthiazide (HYDRODIURIL) 25 MG tablet Take 25 mg by mouth once daily.      lamoTRIgine (LAMICTAL) 100 MG tablet Take 1 tablet (100 mg total) by mouth once daily. 30 tablet 3    levothyroxine (SYNTHROID) 25 MCG tablet Take 25 mcg by mouth once daily.      omeprazole (PRILOSEC) 40 MG capsule Take 40 mg by mouth once daily.      tadalafiL (CIALIS) 20 MG Tab Take 1 tablet (20 mg total) by mouth once daily. Take 1 hour before intercourse 10 tablet 11    tamsulosin (FLOMAX) 0.4 mg Cap Take 1 capsule (0.4 mg total) by mouth once daily. 30 capsule 11     No current facility-administered medications on file prior to visit.       Social History:   Social History     Socioeconomic History     Marital status:     Number of children: 1   Occupational History    Occupation: [employer]   Tobacco Use    Smoking status: Former Smoker     Quit date:      Years since quittin.0    Smokeless tobacco: Former User   Substance and Sexual Activity    Alcohol use: Not Currently    Drug use: Not Currently     Types: Marijuana, Methamphetamines    Sexual activity: Never       REVIEW OF SYSTEMS:  Constitution: Negative. Negative for chills, fever and night sweats.   Cardiovascular: Negative for chest pain and syncope.   Respiratory: Negative for cough and shortness of breath.   Gastrointestinal: See HPI. Negative for nausea/vomiting. Negative for abdominal pain.  Genitourinary: See HPI. Negative for discoloration or dysuria.  Skin: Negative for dry skin, itching and rash.   Hematologic/Lymphatic: Negative for bleeding problem. Does not bruise/bleed easily.   Musculoskeletal: Negative for falls and muscle weakness.   Neurological: See HPI. No seizures.   Endocrine: Negative for polydipsia, polyphagia and polyuria.   Allergic/Immunologic: Negative for hives and persistent infections.     EXAM:  There were no vitals taken for this visit.    General: The patient is a very pleasant 56 y.o. male in no apparent distress, the patient is oriented to person, place and time.  Psych: Normal mood and affect  HEENT: Vision grossly intact, hearing intact to the spoken word.  Lungs: Respirations unlabored.  Gait: Normal station and gait, no difficulty with toe or heel walk.   Skin: Dorsal lumbar skin negative for rashes, lesions, hairy patches and surgical scars. There is mild lumbar tenderness to palpation.  Range of motion: Lumbar range of motion is acceptable.  Spinal Balance: Global saggital and coronal spinal balance acceptable, not significant for scoliosis and kyphosis.  Musculoskeletal: No pain with the range of motion of the bilateral hips. No trochanteric tenderness to palpation.  Vascular: Bilateral lower  extremities warm and well perfused, dorsalis pedis pulses 2+ bilaterally.  Neurological: Normal strength and tone in all major motor groups in the bilateral lower extremities. Normal sensation to light touch in the L2-S1 dermatomes bilaterally.  Deep tendon reflexes symmetric 2+ in the bilateral lower extremities.  Negative Babinski bilaterally. Straight leg raise negative bilaterally.    IMAGING:      Today I personally reviewed AP, Lat and Flex/Ex  upright L-spine films that demonstrate trace retrolisthesis of L4 on L5.      There is no height or weight on file to calculate BMI.    No results found for: HGBA1C        ASSESSMENT/PLAN:    There are no diagnoses linked to this encounter.    Today we discussed at length all of the different treatment options including anti-inflammatories, acetaminophen, rest, ice, heat, physical therapy including strengthening and stretching exercises, home exercises, ROM, aerobic conditioning, aqua therapy, other modalities including ultrasound, massage, and dry needling, epidural steroid injections and finally surgical intervention.      Pt presents with acute on chronic low back pain without radiculopathy. Will send mobic and flexeril to pharmacy and PT orders to [place]. Pt will fu if pain persists.

## 2022-02-02 ENCOUNTER — OFFICE VISIT (OUTPATIENT)
Dept: ORTHOPEDICS | Facility: CLINIC | Age: 57
End: 2022-02-02
Payer: COMMERCIAL

## 2022-02-02 ENCOUNTER — HOSPITAL ENCOUNTER (OUTPATIENT)
Dept: RADIOLOGY | Facility: HOSPITAL | Age: 57
Discharge: HOME | End: 2022-02-02
Attending: ORTHOPAEDIC SURGERY
Payer: COMMERCIAL

## 2022-02-02 VITALS — HEIGHT: 68 IN | BODY MASS INDEX: 30.44 KG/M2 | WEIGHT: 200.81 LBS

## 2022-02-02 DIAGNOSIS — M54.50 CHRONIC BILATERAL LOW BACK PAIN WITHOUT SCIATICA: Primary | ICD-10-CM

## 2022-02-02 DIAGNOSIS — G89.29 CHRONIC BILATERAL LOW BACK PAIN WITHOUT SCIATICA: Primary | ICD-10-CM

## 2022-02-02 DIAGNOSIS — M51.36 DDD (DEGENERATIVE DISC DISEASE), LUMBAR: ICD-10-CM

## 2022-02-02 PROCEDURE — 72110 X-RAY EXAM L-2 SPINE 4/>VWS: CPT | Mod: TC

## 2022-02-02 PROCEDURE — 72110 XR LUMBAR SPINE AP AND LAT WITH FLEX/EXT: ICD-10-PCS | Mod: 26,,, | Performed by: RADIOLOGY

## 2022-02-02 PROCEDURE — 99214 OFFICE O/P EST MOD 30 MIN: CPT | Mod: S$GLB,,, | Performed by: ORTHOPAEDIC SURGERY

## 2022-02-02 PROCEDURE — 99214 PR OFFICE/OUTPT VISIT, EST, LEVL IV, 30-39 MIN: ICD-10-PCS | Mod: S$GLB,,, | Performed by: ORTHOPAEDIC SURGERY

## 2022-02-02 PROCEDURE — 99999 PR PBB SHADOW E&M-EST. PATIENT-LVL III: ICD-10-PCS | Mod: PBBFAC,,, | Performed by: ORTHOPAEDIC SURGERY

## 2022-02-02 PROCEDURE — 99999 PR PBB SHADOW E&M-EST. PATIENT-LVL III: CPT | Mod: PBBFAC,,, | Performed by: ORTHOPAEDIC SURGERY

## 2022-02-02 PROCEDURE — 72110 X-RAY EXAM L-2 SPINE 4/>VWS: CPT | Mod: 26,,, | Performed by: RADIOLOGY

## 2022-02-02 RX ORDER — MELOXICAM 15 MG/1
15 TABLET ORAL DAILY
Qty: 30 TABLET | Refills: 0 | Status: SHIPPED | OUTPATIENT
Start: 2022-02-02 | End: 2022-03-02

## 2022-02-02 RX ORDER — CYCLOBENZAPRINE HCL 5 MG
5 TABLET ORAL 3 TIMES DAILY PRN
Qty: 90 TABLET | Refills: 0 | Status: SHIPPED | OUTPATIENT
Start: 2022-02-02 | End: 2022-02-12

## 2022-02-04 ENCOUNTER — CLINICAL SUPPORT (OUTPATIENT)
Dept: REHABILITATION | Facility: HOSPITAL | Age: 57
End: 2022-02-04
Payer: COMMERCIAL

## 2022-02-04 DIAGNOSIS — G89.29 CHRONIC BILATERAL LOW BACK PAIN WITHOUT SCIATICA: ICD-10-CM

## 2022-02-04 DIAGNOSIS — M54.50 CHRONIC BILATERAL LOW BACK PAIN WITHOUT SCIATICA: ICD-10-CM

## 2022-02-04 DIAGNOSIS — M62.81 WEAKNESS OF TRUNK MUSCULATURE: Primary | ICD-10-CM

## 2022-02-04 PROCEDURE — 97110 THERAPEUTIC EXERCISES: CPT | Mod: PO

## 2022-02-04 PROCEDURE — 97161 PT EVAL LOW COMPLEX 20 MIN: CPT | Mod: PO

## 2022-02-04 NOTE — PLAN OF CARE
[facility]   Physical Therapy Initial Evaluation     Date: 2/4/2022   Name: Tyrone Santos  Clinic Number: [id number]    Therapy Diagnosis:   Encounter Diagnoses   Name Primary?    Chronic bilateral low back pain without sciatica     Weakness of trunk musculature Yes     Physician: Sanna Aguiar,*    Physician Orders: PT Eval and Treat   Medical Diagnosis from Referral: M54.50,G89.29 (ICD-10-CM) - Chronic bilateral low back pain without sciatica   Evaluation Date: 2/4/2022  Authorization Period Expiration: 12/31/2022   Plan of Care Expiration: 3/31/2022  Progress Note Due: 3/04/2022  Visit # / Visits authorized: 1/ 1   FOTO: 1 / 3      Precautions: Standard     Time In: 820  Time Out: 900  Total Appointment Time (timed & untimed codes): 40 minutes      SUBJECTIVE     Date of onset: chronic but recent exacerbation ~2-3 weeks      History of current condition - Tyrone reports: recent onset of low back pain. Currently 5/10. At worst 9/10. bending over to touch toes and tie shoes and bending backwards. He denies numbness/tingling and weakness in legs. Denies pain with bowel movement or bowel/bladder changes. No injections. He was prescribed muscle relaxer a few days ago but he hasn't noticed any relief. He also wakes up every morning with heel/foot pain.         Falls: none      Imaging, XR Lumbar Spine:   Impression: No significant abnormality, with note made of minor retrolisthesis of L4 in relation to L5 and of spurring of the anterosuperior endplates of L4 and L5.  No translational instability.  No evidence of compression fracture.         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   Prior Level of Function: independent   Current Level of Function: uses a  for hunting - he endorses pain bending forward to put on hunting gear, pain with unilateral carrying and fishes       Pain:  Current  5/10, worst 9/10, best 4/10   Location: bilateral back - lumbar  Description: Tight, Aching, and Stiff  Aggravating Factors: Bending, Coughing/Sneezing and Lifting   Easing Factors: massage, heating pad and stretches       Patients goals: to return to recreational activities (hunting/fishing) and learn proper lifting techniques without back pain     Medical History:   Past Medical History:   Diagnosis Date    Addiction to drug     Alcohol abuse     Anxiety     Bipolar disorder     Depression     Difficulty urinating     GERD (gastroesophageal reflux disease)     Hx of psychiatric care     Hyperlipidemia     Hypertension     Ade     Psychiatric problem     S/P cervical spinal fusion 12/6/2018    S/P laparoscopic appendectomy 1/3/2017    Therapy     Thyroid disease        Surgical History:   Tyrone Santos  has a past surgical history that includes Wrist surgery; Appendectomy; Scaphoid fracture surgery; Anterior cervical discectomy w/ fusion (N/A, 10/23/2018); Injection of anesthetic agent around nerve (Left, 7/1/2021); and Arthroplasty of hip by anterior approach (Left, 8/23/2021).    Medications:   Tyrone has a current medication list which includes the following prescription(s): acetaminophen, aspirin, buspirone, calcium-vitamin d3, cyanocobalamin, cyclobenzaprine, eszopiclone, folic acid, gabapentin, hydrochlorothiazide, lamotrigine, levothyroxine, meloxicam, omeprazole, tadalafil, and tamsulosin.    Allergies:   Review of patient's allergies indicates:   Allergen Reactions    Codeine Nausea Only    Seroquel [quetiapine]      Heart racing     Tizanidine Other (See Comments)     Caused increased pain and muscle pain          OBJECTIVE       Sensation:  Sensation is intact to light touch in L2-S1 distribution     Reflexes:   Right Left   Patellar (L3-L4) 2+ 2+       Palpation: Increased tone and tenderness noted with palpation to: lumbar paraspinals       Gait Analysis: The patient ambulated  with the following assistive device: none; the pt presents with the following gait abnormalities: decreased step length and hip extension bilaterally, decreased pelvic rotation, right trendelenburg       RANGE OF MOTION:    Lumbar AROM Percent (%)   Comments     Lumbar Flexion  45% *  he describes pain at stretch; flattened lumbar spine   Lumbar Extension  10% * Increased pain   Right Side Bending 25%* Increased pain   Left Side bending  25%* Increased pain     * = denotes pain      Hip AROM Right Left     Hip Flexion [phone] 82       Hip PROM: moderate restriction into hip IR mobs at 90/90 and extension bilaterally       STRENGTH:    L/E MMT Right     Hip Flexion  4-/5 *   Hip Extension  3+/5   Hip Abduction  4/5   Knee Flexion 5/5   Knee Extension 5/5   Ankle DF 5/5   Ankle PF 5/5       L/E MMT Left     Hip Flexion  4-/5   Hip Extension  4-/5   Hip Abduction  4-/5   Knee Flexion 5/5   Knee Extension 5/5   Ankle DF 5/5   Ankle PF 5/5       Flexibility: (min, mod, severe limited)   Hamstrings: 38 degrees (right), 50 degrees (left)  Rectus Femoris (ely's test (-/+)): moderate   Iliopspas (tony test (+/-)): moderate       JOINT MOBILITY (GRADED 0-6 OUT OF 6):       Joint Motion Tested Force Direction End Feel - Right   Symptoms   Upper Thoracic Spine P/A Central, UPA Hypomobile No change    Mid Thoracic Spine P/A Central, UPA Hypomobile No change   Lower Thoracic Spine P/A Central, UPA Hypomobile Increased pain   L1-L5 P/A Central, UPA Hypomobile Increased pain     Pt reporting relief of symptoms with grade III sacral flexion mob      SPECIAL TESTS:    Lumbar Spine Right   Left      SLR Test negative negative   Slump Test negative negative   FADIR Test positive NT   NANCY Test positive NT   Compression Test positive positive    Test Positive, reproduction of back pain NT     Repeated Flexion: increased pain  Repeated Extension: increased pain      Movement Analysis:     Functional Test  Outcome   Double Leg  "Squat Early heel rise, narrow RACHELLE, poor dissociation between pelvis and lumbar spine   Double Leg Bridge  Pelvic instability        FOTO = not administered       TREATMENT     Total Treatment time (time-based codes) separate from Evaluation: 08 minutes      Tyrone received the treatments listed below:      therapeutic exercises to develop ROM for 08 minutes including:  Supine PPT: 10x5"  Seated APT/PPT: x10      PATIENT EDUCATION AND HOME EXERCISES     Education provided:   - HEP    Written Home Exercises Provided: yes. Exercises were reviewed and Tyrone was able to demonstrate them prior to the end of the session.  Tyrone demonstrated good  understanding of the education provided. See EMR under Patient Instructions for exercises provided during therapy sessions.    ASSESSMENT     Tyrone is a 56 y.o. male referred to outpatient Physical Therapy with a medical diagnosis of M54.50,G89.29 (ICD-10-CM) - Chronic bilateral low back pain without sciatica. Patient presents with chronic history of low back pain with recent exacerbation. Lumbar and hip mobility deficits, weakness in lumbopelvic stabilizers, and muscular restrictions causing facet compression. He tolerated initial treatment session well but did have difficulty transitioning from anterior to posterior pelvic tilt.     Patient prognosis is Good.   Patient will benefit from skilled outpatient Physical Therapy to address the deficits stated above and in the chart below, provide patient /family education, and to maximize patientt's level of independence.     Plan of care discussed with patient: Yes  Patient's spiritual, cultural and educational needs considered and patient is agreeable to the plan of care and goals as stated below:     Anticipated Barriers for therapy: none anticipated     Medical Necessity is demonstrated by the following  History  Co-morbidities and personal factors that may impact the plan of care Co-morbidities:   Anxiety, Depression, HTN, cervical " surgery (2018), left hip surgery (2021)    Personal Factors:   lifestyle     moderate   Examination  Body Structures and Functions, activity limitations and participation restrictions that may impact the plan of care Body Regions:   back  lower extremities  trunk    Body Systems:    gross symmetry  ROM  strength  gross coordinated movement    Participation Restrictions:   none    Activity limitations:   Learning and applying knowledge  no deficits    General Tasks and Commands  no deficits    Communication  no deficits    Mobility  lifting and carrying objects  walking    Self care  no deficits    Domestic Life  no deficits    Interactions/Relationships  no deficits    Life Areas  no deficits    Community and Social Life  recreation and leisure         moderate   Clinical Presentation stable and uncomplicated low   Decision Making/ Complexity Score: low     Goals:  Short Term Goals: 4 weeks   1. Patient will demonstrate proper performance of home exercise program of active exercises to improve carryover between sessions.  2. The patient will perform transverse abdominis contraction isomerically for 5 seconds to improve spinal stability.  3. Patient will be able to transition from APT to PPT without reports of low back pain.  4. Patient demonstrates increased spine flexion mobility with 50% or greater reduction of pain to improve functional mobility and tolerance to functional activities.  5. The patient will demonstrate an increase in bilateral gluteus medius strength by 1/2 MMT grade in order to improve pelvic stability during transitional movements.        Long Term Goals: 8 weeks   1. Patient will be independent with all home exercises and progressions for management of symptoms.  2. The patient will perform a transverse abdominis contraction while performing dynamic LE/UE movements with good control to demonstrate improved spinal stability.   3. The patient will demonstrate the ability to perform farmer carry  >/25# without low back pain in order to return to recreational activities.   4. Patient will report no pain during lumbar AROM in all planes to promote functional mobility.   5. Patient will demonstrate ability to lift >/= 45# from floor to waist with good body mechanics within the clinic in order to simulate RTW goals.     PLAN   Plan of care Certification: 2/4/2022 to 3/31/2022.    Outpatient Physical Therapy 2 times weekly for 8 weeks to include the following interventions: Cervical/Lumbar Traction, Manual Therapy, Moist Heat/ Ice, Neuromuscular Re-ed, Patient Education, Therapeutic Activities, Therapeutic Exercise and Dry Needling.     Trish Johnson, PT      I CERTIFY THE NEED FOR THESE SERVICES FURNISHED UNDER THIS PLAN OF TREATMENT AND WHILE UNDER MY CARE   Physician's comments:     Physician's Signature: ___________________________________________________

## 2022-02-14 ENCOUNTER — CLINICAL SUPPORT (OUTPATIENT)
Dept: REHABILITATION | Facility: HOSPITAL | Age: 57
End: 2022-02-14
Payer: COMMERCIAL

## 2022-02-14 DIAGNOSIS — M62.81 WEAKNESS OF TRUNK MUSCULATURE: ICD-10-CM

## 2022-02-14 PROCEDURE — 97140 MANUAL THERAPY 1/> REGIONS: CPT | Mod: PO

## 2022-02-14 PROCEDURE — 97110 THERAPEUTIC EXERCISES: CPT | Mod: PO

## 2022-02-14 NOTE — PROGRESS NOTES
"[facility]   Physical Therapy Treatment Note     Name: Tyrone Santos  Clinic Number: [id number]    Therapy Diagnosis:   Encounter Diagnosis   Name Primary?    Weakness of trunk musculature      Physician: Sanna Aguiar,*    Visit Date: 2/14/2022    Physician Orders: PT Eval and Treat   Medical Diagnosis from Referral: M54.50,G89.29 (ICD-10-CM) - Chronic bilateral low back pain without sciatica   Evaluation Date: 2/4/2022  Authorization Period Expiration: 12/31/2022   Plan of Care Expiration: 3/31/2022  Progress Note Due: 3/04/2022  Visit # / Visits authorized: 1 / 20   FOTO: 1 / 3        Precautions: Standard        PTA Visit #: 0/5     Time In: 605  Time Out: 651  Total Billable Time: 46 minutes    SUBJECTIVE     Pt reports: that he has good and bad days.    He was compliant with home exercise program.  Response to previous treatment: none  Functional change: none    Pain: 2/10  Location: bilateral back      OBJECTIVE     Objective Measures updated at progress report unless specified.     Treatment     Tyrone received the treatments listed below:      therapeutic exercises to develop strength, ROM, flexibility and core stabilization for 40 minutes including:    Upright bike, level 2: 6 minutes    sidelying open books: 20x5"/each  Hamstring stretch at stairs: 2x30"/each  Cat cow: 2x10  Supine PPT: 20x5"  Seated APT/PPT: x10  Bridges with ball: 3x10        manual therapy techniques: Joint mobilizations were applied to the: thoracic and lumbar spine for 11 minutes, including:  Grade IV P/A and UPA mobs to T4-T10  Grade III P/A and UPA mobs to L2-L3      Patient Education and Home Exercises     Home Exercises Provided and Patient Education Provided     Education provided:   - HEP    Written Home Exercises Provided: yes. Exercises were reviewed and Tyrone was able to demonstrate them prior to the end of the session.  Tyrone demonstrated good  understanding of the education provided. See " EMR under Patient Instructions for exercises provided during therapy sessions    Access Code: [email or web address]  URL: [email or web address]  Date: 02/14/2022  Prepared by: Trish Johnson    Exercises  Sidelying Thoracic Rotation with Open Book - 1 x daily - 7 x weekly - 1 sets - 20 reps - 5 seconds hold  Standing Hamstring Stretch with Step - 2 x daily - 7 x weekly - 1 sets - 3 reps - 30 seconds hold  Supine Bridge with Mini Swiss Ball Between Knees - 4 x weekly - 3 sets - 10 reps - 2 seconds hold  Supine Posterior Pelvic Tilt - 1 x daily - 7 x weekly - 3 sets - 10 reps - 3 seconds hold  Quadruped Cat with Posterior Pelvic Tilt - 1 x daily - 7 x weekly - 3 sets - 10 reps    ASSESSMENT     Tyrone has limited thoracic joint mobility which improved with manual techniques today. Sidelying open books followed up after thoracic mobilizations to facilitate thoracic rotation. Continues with poor motor control with difficulty transitioning from APT to PPT, but did improve with verbal and tactile cues.     Tyrone Is progressing well towards his goals.   Pt prognosis is Good.     Pt will continue to benefit from skilled outpatient physical therapy to address the deficits listed in the problem list box on initial evaluation, provide pt/family education and to maximize pt's level of independence in the home and community environment.     Pt's spiritual, cultural and educational needs considered and pt agreeable to plan of care and goals.     Anticipated barriers to physical therapy: none    Goals:   Short Term Goals: 4 weeks   1. Patient will demonstrate proper performance of home exercise program of active exercises to improve carryover between sessions.  2. The patient will perform transverse abdominis contraction isomerically for 5 seconds to improve spinal stability.  3. Patient will be able to transition from APT to PPT without reports of low back pain.  4. Patient demonstrates increased spine flexion mobility with 50% or  greater reduction of pain to improve functional mobility and tolerance to functional activities.  5. The patient will demonstrate an increase in bilateral gluteus medius strength by 1/2 MMT grade in order to improve pelvic stability during transitional movements.        Long Term Goals: 8 weeks   1. Patient will be independent with all home exercises and progressions for management of symptoms.  2. The patient will perform a transverse abdominis contraction while performing dynamic LE/UE movements with good control to demonstrate improved spinal stability.   3. The patient will demonstrate the ability to perform farmer carry >/25# without low back pain in order to return to recreational activities.   4. Patient will report no pain during lumbar AROM in all planes to promote functional mobility.   5. Patient will demonstrate ability to lift >/= 45# from floor to waist with good body mechanics within the clinic in order to simulate RTW goals.     PLAN     Add standing hip extension and TA with physioball     Trish Johnson, PT

## 2022-02-15 ENCOUNTER — TELEPHONE (OUTPATIENT)
Dept: ORTHOPEDICS | Facility: CLINIC | Age: 57
End: 2022-02-15
Payer: COMMERCIAL

## 2022-02-15 NOTE — TELEPHONE ENCOUNTER
I called and spoke to the patient to confirm his appointment with Dr. Lam. The patient has not tested positive for Covid in the past 10 days. The patient is understanding and has no further questions.

## 2022-02-17 ENCOUNTER — HOSPITAL ENCOUNTER (OUTPATIENT)
Dept: RADIOLOGY | Facility: HOSPITAL | Age: 57
Discharge: HOME | End: 2022-02-17
Attending: ORTHOPAEDIC SURGERY
Payer: COMMERCIAL

## 2022-02-17 ENCOUNTER — OFFICE VISIT (OUTPATIENT)
Dept: ORTHOPEDICS | Facility: CLINIC | Age: 57
End: 2022-02-17
Payer: COMMERCIAL

## 2022-02-17 VITALS — HEIGHT: 70 IN | BODY MASS INDEX: 28.23 KG/M2 | WEIGHT: 197.19 LBS

## 2022-02-17 DIAGNOSIS — Z96.649 STATUS POST HIP REPLACEMENT, UNSPECIFIED LATERALITY: ICD-10-CM

## 2022-02-17 DIAGNOSIS — Z96.642 PRESENCE OF LEFT ARTIFICIAL HIP JOINT: Primary | ICD-10-CM

## 2022-02-17 PROCEDURE — 72170 X-RAY EXAM OF PELVIS: CPT | Mod: 26,,, | Performed by: RADIOLOGY

## 2022-02-17 PROCEDURE — 72170 XR PELVIS ROUTINE AP: ICD-10-PCS | Mod: 26,,, | Performed by: RADIOLOGY

## 2022-02-17 PROCEDURE — 99999 PR PBB SHADOW E&M-EST. PATIENT-LVL III: ICD-10-PCS | Mod: PBBFAC,,, | Performed by: ORTHOPAEDIC SURGERY

## 2022-02-17 PROCEDURE — 72170 X-RAY EXAM OF PELVIS: CPT | Mod: TC

## 2022-02-17 PROCEDURE — 99213 OFFICE O/P EST LOW 20 MIN: CPT | Mod: S$GLB,,, | Performed by: ORTHOPAEDIC SURGERY

## 2022-02-17 PROCEDURE — 99999 PR PBB SHADOW E&M-EST. PATIENT-LVL III: CPT | Mod: PBBFAC,,, | Performed by: ORTHOPAEDIC SURGERY

## 2022-02-17 PROCEDURE — 99213 PR OFFICE/OUTPT VISIT, EST, LEVL III, 20-29 MIN: ICD-10-PCS | Mod: S$GLB,,, | Performed by: ORTHOPAEDIC SURGERY

## 2022-02-17 NOTE — PROGRESS NOTES
"[facility]   Physical Therapy Treatment Note     Name: Tyrone Santos  Clinic Number: [id number]    Therapy Diagnosis:   Encounter Diagnosis   Name Primary?    Weakness of trunk musculature      Physician: Sanna Aguiar,*    Visit Date: 2/18/2022    Physician Orders: PT Eval and Treat   Medical Diagnosis from Referral: M54.50,G89.29 (ICD-10-CM) - Chronic bilateral low back pain without sciatica   Evaluation Date: 2/4/2022  Authorization Period Expiration: 12/31/2022   Plan of Care Expiration: 3/31/2022  Progress Note Due: 3/04/2022  Visit # / Visits authorized: 2 / 20   FOTO: 1 / 3        Precautions: Standard        PTA Visit #: 1/5     Time In: 8:30 am  Time Out: 9:15 am  Total Billable Time: 45 minutes    SUBJECTIVE     Pt reports: he's sore from lifting heavy things at work    He was compliant with home exercise program.  Response to previous treatment: none  Functional change: none    Pain: 3/10  Location: bilateral back      OBJECTIVE     Objective Measures updated at progress report unless specified.     Treatment     Tyrone received the treatments listed below:      therapeutic exercises to develop strength, ROM, flexibility and core stabilization for 45 minutes including:     Upright bike, level 2: 7 minutes    sidelying open books: 20x5"/each  Hamstring stretch at stairs: 2x30"/each  Cat cow: 2x10   Supine PPT: 20x5"   Seated APT/PPT: x10  Bridges with ball: 3x10            manual therapy techniques: Joint mobilizations were applied to the: thoracic and lumbar spine for 00 minutes, including:  Grade IV P/A and UPA mobs to T4-T10  Grade III P/A and UPA mobs to L2-L3      Patient Education and Home Exercises     Home Exercises Provided and Patient Education Provided     Education provided:   - HEP    Written Home Exercises Provided: yes. Exercises were reviewed and Tyrone was able to demonstrate them prior to the end of the session.  Tyrone demonstrated good  understanding of " the education provided. See EMR under Patient Instructions for exercises provided during therapy sessions    Access Code: [email or web address]  URL: [email or web address]  Date: 02/14/2022  Prepared by: Trish Johnson    Exercises  Sidelying Thoracic Rotation with Open Book - 1 x daily - 7 x weekly - 1 sets - 20 reps - 5 seconds hold  Standing Hamstring Stretch with Step - 2 x daily - 7 x weekly - 1 sets - 3 reps - 30 seconds hold  Supine Bridge with Mini Swiss Ball Between Knees - 4 x weekly - 3 sets - 10 reps - 2 seconds hold  Supine Posterior Pelvic Tilt - 1 x daily - 7 x weekly - 3 sets - 10 reps - 3 seconds hold  Quadruped Cat with Posterior Pelvic Tilt - 1 x daily - 7 x weekly - 3 sets - 10 reps    ASSESSMENT     Tyrone presents with slight increase in pain due to lifting at work. He reports difficulty following rahul to complete HEP. Exercises and use of rahul were reviewed. Good tolerance to exercises with patient requiring verbal and tactile cueing for completing with proper technique. Continue to progress patient as tolerated next session..     Tyrone Is progressing well towards his goals.   Pt prognosis is Good.     Pt will continue to benefit from skilled outpatient physical therapy to address the deficits listed in the problem list box on initial evaluation, provide pt/family education and to maximize pt's level of independence in the home and community environment.     Pt's spiritual, cultural and educational needs considered and pt agreeable to plan of care and goals.     Anticipated barriers to physical therapy: none    Goals:   Short Term Goals: 4 weeks   1. Patient will demonstrate proper performance of home exercise program of active exercises to improve carryover between sessions.  2. The patient will perform transverse abdominis contraction isomerically for 5 seconds to improve spinal stability.  3. Patient will be able to transition from APT to PPT without reports of low back pain.  4. Patient demonstrates  increased spine flexion mobility with 50% or greater reduction of pain to improve functional mobility and tolerance to functional activities.  5. The patient will demonstrate an increase in bilateral gluteus medius strength by 1/2 MMT grade in order to improve pelvic stability during transitional movements.        Long Term Goals: 8 weeks   1. Patient will be independent with all home exercises and progressions for management of symptoms.  2. The patient will perform a transverse abdominis contraction while performing dynamic LE/UE movements with good control to demonstrate improved spinal stability.   3. The patient will demonstrate the ability to perform farmer carry >/25# without low back pain in order to return to recreational activities.   4. Patient will report no pain during lumbar AROM in all planes to promote functional mobility.   5. Patient will demonstrate ability to lift >/= 45# from floor to waist with good body mechanics within the clinic in order to simulate RTW goals.     PLAN     Add TA activation with physioball and Standing hip ext    Tisha Saunders PTA

## 2022-02-17 NOTE — PROGRESS NOTES
Subjective:     HPI:   Tyrone Santos is a 57 y.o. male who presents for annual exam s/p left NATALIYA     Date of surgery: 8/23/21    Medications: none    Assistive Devices: none    Limitations: none    Doing PT for low back  Hip doing well, no issues, no more falls       Objective:   Body mass index is 28.7 kg/m².  Exam:    Gait: limp/antalgic/trendelenburg none    Incision: healed    Active straight leg raise: good strength, no discomfort    Extension: 0    Flexion: 110    Abduction: 40    Adduction: 30    External rotation: 30    Internal rotation: 30    LLD: 0 cm supine       Imaging:  Indication:  Exam status post left total hip arthroplasty  Exam Ordered: Radiographs taken today include an anteroposterior pelvis  Details of Examination: Today's exam show a well fixed, well positioned total hip arthroplasty with no evidence of wear, osteolysis, or loosening.  Impression:  Status post left total hip arthroplasty, implant in good position with no abnormality    No change acetabulum      Assessment:       ICD-10-CM ICD-9-CM   1. Presence of left artificial hip joint  Z96.642 V43.64      Doing well     Plan:       Patient is doing very well with the hip replacement at this time.  They will continue routine care of their hip and see me for their routine follow-up.  If there are problems in the interim they will see me back sooner.    Return to work, full duty, no restrictions    6 month = 1 year    No orders of the defined types were placed in this encounter.            Past Medical History:   Diagnosis Date    Addiction to drug     Alcohol abuse     Anxiety     Bipolar disorder     Depression     Difficulty urinating     GERD (gastroesophageal reflux disease)     Hx of psychiatric care     Hyperlipidemia     Hypertension     Ade     Psychiatric problem     S/P cervical spinal fusion 12/6/2018    S/P laparoscopic appendectomy 1/3/2017    Therapy     Thyroid disease        Past Surgical History:    Procedure Laterality Date    ANTERIOR CERVICAL DISCECTOMY W/ FUSION N/A 10/23/2018    Procedure: DISCECTOMY, SPINE, CERVICAL, ANTERIOR APPROACH, WITH FUSION C3-4;  Surgeon: Jw Anne MD;  Location: [address];  Service: Neurosurgery;  Laterality: N/A;    APPENDECTOMY      around     ARTHROPLASTY OF HIP BY ANTERIOR APPROACH Left 2021    Procedure: ARTHROPLASTY, HIP, TOTAL, ANTERIOR APPROACH:LEFT:DPEUY-ACTIS+PINNACLE;  Surgeon: Ángel Lam III, MD;  Location: [facility];  Service: Orthopedics;  Laterality: Left;    INJECTION OF ANESTHETIC AGENT AROUND NERVE Left 2021    Procedure: BLOCK, NERVE, OBTURATOR and FEMORAL;  Surgeon: Josefina Lee MD;  Location: [facility];  Service: Pain Management;  Laterality: Left;    SCAPHOID FRACTURE SURGERY      had to have a bone graft -early       WRIST SURGERY      6 years ago       Family History   Problem Relation Age of Onset    Alcohol abuse Mother     Alcohol abuse Maternal Uncle     Heart disease Father     No Known Problems Sister     No Known Problems Brother     No Known Problems Son     No Known Problems Sister     Cancer Sister     No Known Problems Brother     No Known Problems Brother        Social History     Socioeconomic History    Marital status:     Number of children: 1   Occupational History    Occupation:    Tobacco Use    Smoking status: Former Smoker     Quit date:      Years since quittin.1    Smokeless tobacco: Former User   Substance and Sexual Activity    Alcohol use: Not Currently    Drug use: Not Currently     Types: Marijuana, Methamphetamines    Sexual activity: Never

## 2022-02-18 ENCOUNTER — CLINICAL SUPPORT (OUTPATIENT)
Dept: REHABILITATION | Facility: HOSPITAL | Age: 57
End: 2022-02-18
Payer: COMMERCIAL

## 2022-02-18 DIAGNOSIS — M62.81 WEAKNESS OF TRUNK MUSCULATURE: ICD-10-CM

## 2022-02-18 PROCEDURE — 97110 THERAPEUTIC EXERCISES: CPT | Mod: PO,CQ

## 2022-02-21 ENCOUNTER — CLINICAL SUPPORT (OUTPATIENT)
Dept: REHABILITATION | Facility: HOSPITAL | Age: 57
End: 2022-02-21
Payer: COMMERCIAL

## 2022-02-21 DIAGNOSIS — M62.81 WEAKNESS OF TRUNK MUSCULATURE: Primary | ICD-10-CM

## 2022-02-21 PROCEDURE — 97110 THERAPEUTIC EXERCISES: CPT | Mod: PO

## 2022-02-21 PROCEDURE — 97140 MANUAL THERAPY 1/> REGIONS: CPT | Mod: PO

## 2022-02-22 NOTE — PROGRESS NOTES
"[facility]   Physical Therapy Treatment Note     Name: Tyrone Santos  Clinic Number: [id number]    Therapy Diagnosis:   Encounter Diagnosis   Name Primary?    Weakness of trunk musculature Yes     Physician: Sanna Aguiar,*    Visit Date: 2/21/2022    Physician Orders: PT Eval and Treat   Medical Diagnosis from Referral: M54.50,G89.29 (ICD-10-CM) - Chronic bilateral low back pain without sciatica   Evaluation Date: 2/4/2022  Authorization Period Expiration: 12/31/2022   Plan of Care Expiration: 3/31/2022  Progress Note Due: 3/04/2022  Visit # / Visits authorized: 3 / 20   FOTO: 1 / 3        Precautions: Standard        PTA Visit #: 0/5     Time In: 600  Time Out: 645  Total Billable Time: 45 minutes    SUBJECTIVE     Pt reports:  that after heavy lifting last week, he wasn't as sore as he usually would be    He was compliant with home exercise program.  Response to previous treatment: none  Functional change: none    Pain: 2/10  Location: bilateral back      OBJECTIVE     Objective Measures updated at progress report unless specified.     Treatment     Tyrone received the treatments listed below:      therapeutic exercises to develop strength, ROM, flexibility and core stabilization for 37 minutes including:     Upright bike, level 2: 7 minutes    sidelying open books: 20x5"/each  Hamstring stretch at stairs: 2x30"/each  Cat cow: 2x10   Supine PPT with alternating march: 20  Seated APT/PPT: x10  Bridges with ball: 3x10  +standing hip extension on plinth: 1x15/each      manual therapy techniques: Joint mobilizations were applied to the: thoracic and lumbar spine for 08 minutes, including:  STM to thoracic and lumbar paraspinals using hypervolt massage therapy gun  Grade IV P/A and UPA mobs to T4-T10      Patient Education and Home Exercises     Home Exercises Provided and Patient Education Provided     Education provided:   - HEP    Written Home Exercises Provided: Patient " instructed to cont prior HEP. Exercises were reviewed and Tyrone was able to demonstrate them prior to the end of the session.  Tyrone demonstrated good  understanding of the education provided. See EMR under Patient Instructions for exercises provided during therapy sessions    Access Code: [email or web address]  URL: [email or web address]  Date: 02/14/2022  Prepared by: Trish Johnson    Exercises  Sidelying Thoracic Rotation with Open Book - 1 x daily - 7 x weekly - 1 sets - 20 reps - 5 seconds hold  Standing Hamstring Stretch with Step - 2 x daily - 7 x weekly - 1 sets - 3 reps - 30 seconds hold  Supine Bridge with Mini Swiss Ball Between Knees - 4 x weekly - 3 sets - 10 reps - 2 seconds hold  Supine Posterior Pelvic Tilt - 1 x daily - 7 x weekly - 3 sets - 10 reps - 3 seconds hold  Quadruped Cat with Posterior Pelvic Tilt - 1 x daily - 7 x weekly - 3 sets - 10 reps     ASSESSMENT     Tyrone with decreased thoracic and hip extension with increased compensation from lumbar spine. Progressed core strengthening to lower extremity dynamic movement with pt challenged well.       Tyrone Is progressing well towards his goals.   Pt prognosis is Good.     Pt will continue to benefit from skilled outpatient physical therapy to address the deficits listed in the problem list box on initial evaluation, provide pt/family education and to maximize pt's level of independence in the home and community environment.     Pt's spiritual, cultural and educational needs considered and pt agreeable to plan of care and goals.     Anticipated barriers to physical therapy: none    Goals:   Short Term Goals: 4 weeks   1. Patient will demonstrate proper performance of home exercise program of active exercises to improve carryover between sessions.  2. The patient will perform transverse abdominis contraction isomerically for 5 seconds to improve spinal stability.  3. Patient will be able to transition from APT to PPT without reports of low back  pain.  4. Patient demonstrates increased spine flexion mobility with 50% or greater reduction of pain to improve functional mobility and tolerance to functional activities.  5. The patient will demonstrate an increase in bilateral gluteus medius strength by 1/2 MMT grade in order to improve pelvic stability during transitional movements.        Long Term Goals: 8 weeks   1. Patient will be independent with all home exercises and progressions for management of symptoms.  2. The patient will perform a transverse abdominis contraction while performing dynamic LE/UE movements with good control to demonstrate improved spinal stability.   3. The patient will demonstrate the ability to perform farmer carry >/25# without low back pain in order to return to recreational activities.   4. Patient will report no pain during lumbar AROM in all planes to promote functional mobility.   5. Patient will demonstrate ability to lift >/= 45# from floor to waist with good body mechanics within the clinic in order to simulate RTW goals.     PLAN     Add TA activation with physioball     Trish Johnson, PT

## 2022-02-24 ENCOUNTER — CLINICAL SUPPORT (OUTPATIENT)
Dept: REHABILITATION | Facility: HOSPITAL | Age: 57
End: 2022-02-24
Payer: COMMERCIAL

## 2022-02-24 DIAGNOSIS — M62.81 WEAKNESS OF TRUNK MUSCULATURE: Primary | ICD-10-CM

## 2022-02-24 PROCEDURE — 97110 THERAPEUTIC EXERCISES: CPT | Mod: PO

## 2022-02-24 NOTE — PROGRESS NOTES
"[facility]   Physical Therapy Treatment Note     Name: Tyrone Santos  Clinic Number: [id number]    Therapy Diagnosis:   No diagnosis found.  Physician: Sanna Aguiar,*    Visit Date: 2/24/2022    Physician Orders: PT Eval and Treat   Medical Diagnosis from Referral: M54.50,G89.29 (ICD-10-CM) - Chronic bilateral low back pain without sciatica   Evaluation Date: 2/4/2022  Authorization Period Expiration: 12/31/2022   Plan of Care Expiration: 3/31/2022  Progress Note Due: 3/04/2022  Visit # / Visits authorized: 5/ 20   FOTO: 1 / 3        Precautions: Standard        PTA Visit #: 0/5     Time In: 1100  Time Out: 1145  Total Billable Time: 45 minutes    SUBJECTIVE     Pt reports: Pt states that he's talking to his NP to increase his medication for his bipolar disorder. Pt states that lifting for work is "getting better" and is "a lot less painful."    He was compliant with home exercise program.  Response to previous treatment: none  Functional change: none    Pain: 2/10  Location: bilateral back      OBJECTIVE     Objective Measures updated at progress report unless specified.     Treatment     Tyrone received the treatments listed below:      therapeutic exercises to develop strength, ROM, flexibility and core stabilization for 45 minutes including:     Upright bike, level 2: 7 minutes    sidelying open books: 20x5"/each  Hamstring stretch at stairs: 2x30"/each  Cat cow: 2x10 3" holds  Supine PPT with alternating march: 3x10  Seated APT/PPT: x10  Bridges with ball: 3x10  +standing hip extension on plinth: 1x15/each      manual therapy techniques: Joint mobilizations were applied to the: thoracic and lumbar spine for 00 minutes, including:  STM to thoracic and lumbar paraspinals using hypervolt massage therapy gun  Grade IV P/A and UPA mobs to T4-T10      Patient Education and Home Exercises     Home Exercises Provided and Patient Education Provided     Education provided:   - " HEP    Written Home Exercises Provided: Patient instructed to cont prior HEP. Exercises were reviewed and Tyrone was able to demonstrate them prior to the end of the session.  Tyrone demonstrated good  understanding of the education provided. See EMR under Patient Instructions for exercises provided during therapy sessions    Access Code: [email or web address]  URL: [email or web address]  Date: 02/14/2022  Prepared by: Trish Johnson    Exercises  Sidelying Thoracic Rotation with Open Book - 1 x daily - 7 x weekly - 1 sets - 20 reps - 5 seconds hold  Standing Hamstring Stretch with Step - 2 x daily - 7 x weekly - 1 sets - 3 reps - 30 seconds hold  Supine Bridge with Mini Swiss Ball Between Knees - 4 x weekly - 3 sets - 10 reps - 2 seconds hold  Supine Posterior Pelvic Tilt - 1 x daily - 7 x weekly - 3 sets - 10 reps - 3 seconds hold  Quadruped Cat with Posterior Pelvic Tilt - 1 x daily - 7 x weekly - 3 sets - 10 reps     ASSESSMENT     Tyrone with good training effect to glute max and TA with exercises. Progressed core strengthening to lower extremity dynamic movement with pt challenged well. Continue to progress.      Tyrone Is progressing well towards his goals.   Pt prognosis is Good.     Pt will continue to benefit from skilled outpatient physical therapy to address the deficits listed in the problem list box on initial evaluation, provide pt/family education and to maximize pt's level of independence in the home and community environment.     Pt's spiritual, cultural and educational needs considered and pt agreeable to plan of care and goals.     Anticipated barriers to physical therapy: none    Goals:   Short Term Goals: 4 weeks   1. Patient will demonstrate proper performance of home exercise program of active exercises to improve carryover between sessions.  2. The patient will perform transverse abdominis contraction isomerically for 5 seconds to improve spinal stability.  3. Patient will be able to transition from APT  to PPT without reports of low back pain.  4. Patient demonstrates increased spine flexion mobility with 50% or greater reduction of pain to improve functional mobility and tolerance to functional activities.  5. The patient will demonstrate an increase in bilateral gluteus medius strength by 1/2 MMT grade in order to improve pelvic stability during transitional movements.        Long Term Goals: 8 weeks   1. Patient will be independent with all home exercises and progressions for management of symptoms.  2. The patient will perform a transverse abdominis contraction while performing dynamic LE/UE movements with good control to demonstrate improved spinal stability.   3. The patient will demonstrate the ability to perform farmer carry >/25# without low back pain in order to return to recreational activities.   4. Patient will report no pain during lumbar AROM in all planes to promote functional mobility.   5. Patient will demonstrate ability to lift >/= 45# from floor to waist with good body mechanics within the clinic in order to simulate RTW goals.     PLAN    Continue to progress core and glute exercises DEUCE Wogn, PT

## 2022-02-25 ENCOUNTER — OFFICE VISIT (OUTPATIENT)
Dept: PSYCHIATRY | Facility: CLINIC | Age: 57
End: 2022-02-25
Payer: COMMERCIAL

## 2022-02-25 DIAGNOSIS — F19.11 HISTORY OF DRUG ABUSE: ICD-10-CM

## 2022-02-25 DIAGNOSIS — F31.70 BIPOLAR AFFECTIVE DISORDER IN REMISSION: ICD-10-CM

## 2022-02-25 DIAGNOSIS — G47.00 INSOMNIA, UNSPECIFIED TYPE: ICD-10-CM

## 2022-02-25 DIAGNOSIS — F41.9 ANXIETY: Primary | ICD-10-CM

## 2022-02-25 PROCEDURE — 99214 PR OFFICE/OUTPT VISIT, EST, LEVL IV, 30-39 MIN: ICD-10-PCS | Mod: 95,,, | Performed by: NURSE PRACTITIONER

## 2022-02-25 PROCEDURE — 99214 OFFICE O/P EST MOD 30 MIN: CPT | Mod: 95,,, | Performed by: NURSE PRACTITIONER

## 2022-02-25 RX ORDER — HYDROXYZINE HYDROCHLORIDE 10 MG/1
TABLET, FILM COATED ORAL
Qty: 120 TABLET | Refills: 0 | Status: SHIPPED | OUTPATIENT
Start: 2022-02-25 | End: 2022-05-02 | Stop reason: SINTOL

## 2022-02-25 RX ORDER — ESZOPICLONE 2 MG/1
2 TABLET, FILM COATED ORAL NIGHTLY PRN
Qty: 30 TABLET | Refills: 3 | Status: SHIPPED | OUTPATIENT
Start: 2022-02-25 | End: 2022-05-02 | Stop reason: SDUPTHER

## 2022-02-25 RX ORDER — CYCLOBENZAPRINE HCL 5 MG
TABLET ORAL
COMMUNITY
End: 2022-07-28

## 2022-02-25 RX ORDER — ROSUVASTATIN CALCIUM 10 MG/1
TABLET, COATED ORAL
COMMUNITY
End: 2022-11-18

## 2022-02-25 NOTE — PROGRESS NOTES
2/25/2022 11:17 AM  Tyrone Santos  1965  [id number]    Outpatient Psychiatry Follow-Up Visit (MD/NP) - Telemedicine Visit      The patient location is: Patient reported that his location at the time of this visit was in the [facility]   Address: documented in [facility]      Visit type: audiovisual    Each patient to whom he or she provides medical services by telemedicine is:  (1) informed of the relationship between the physician and patient and the respective role of any other health care provider with respect to management of the patient; and (2) notified that he or she may decline to receive medical services by telemedicine and may withdraw from such care at any time.      Chief Complaint:  Tyrone Santos, a 57 y.o. male,who presents today for follow up of anxiety and mood swings.  Met with patient.        Interim Events/Subjective Report/Content of Current Session:     Pt is a 57 y.o. male with a hx of HTN, HLD, ED, BPH, hypothyroidism, ERIC, s/p cervical spine fusion, spondylosis, and bipolar d/o.    Pt logged in on time for this appt.    Today he reports he has been under an increased amount of stress at work for the past week. States they are short staffed so his workload has increased. He is having increased anxiety with problems prioritizing and staying focused. His mind is racing and he is easily distracted. However he denies elevated, expansive, or irritable mood with increased energy or activity; with inflated self-esteem or grandiosity, decreased need for sleep, increased rate of speech, increased goal directed activity or risky/disinhibited behavior. Denies depressed mood and anhedonia.    He had his nasal deviated septum surgically corrected and has been sleeping better since then. He is also waiting for his oral appliance for his ERIC to be delivered. Lunesta is effective. Has only been sleeping about 4-5 hours/night for the past few nights 2/2 to his long work hours.    Has had 2-3 beers  in the past week. Mostly drinks non-alcoholic beer.    Pt denies recurrent thoughts of death and denies SI/HI. Denies any sxs of ochoa. Denies AVH, paranoia and delusions. No objective s/sx of psychosis or ochoa. Denies any ASE from his psych meds.    Discussed risks, benefits and SE profile of medication options.       Psychotherapy:  · Target symptoms: depression, anxiety , work stress  · Why chosen therapy is appropriate versus another modality: relevant to diagnosis, patient responds to this modality  · Outcome monitoring methods: self-report, observation  · Therapeutic intervention type: supportive psychotherapy  · Topics discussed/themes: work stress, building skills sets for symptom management  · The patient's response to the intervention is accepting. The patient's progress toward treatment goals is good.   · Duration of intervention: 5 minutes.      Psychotropic medication review  Previous Trials-  Wellbutrin   Valium  Xanax  Trileptal  Seroquel - heart racing  Trazodone - worked for a few years and then gave out  Depakote - worked for a while  Doxepin  Hydroxyzine - ineffective  Naltrexone  Campral  Trazodone    Current meds-  Buspar   Lamictal  Lunesta        Review of Systems       Review of Systems   Constitutional: Negative for chills, fever, malaise/fatigue and weight loss.   Respiratory: Negative for shortness of breath and wheezing.    Cardiovascular: Negative for chest pain and palpitations.   Gastrointestinal: Negative for diarrhea and vomiting.   Musculoskeletal: Negative for falls and myalgias.   Skin: Negative for rash.   Neurological: Negative for tremors, seizures and headaches.   Endo/Heme/Allergies: Does not bruise/bleed easily.   Psychiatric/Behavioral:        See HPI         Past Medical, Family and Social History: The patient's past medical, family and social history have been reviewed and updated as appropriate within the electronic medical record - see encounter notes.    Compliance:  yes    Side effects: None    Risk Parameters:  Patient reports no suicidal ideation  Patient reports no homicidal ideation  Patient reports no self-injurious behavior  Patient reports no violent behavior    Exam (detailed: at least 9 elements; comprehensive: all 15 elements)   Constitutional  Vitals:  Most recent vital signs, dated less than 90 days prior to this appointment, were reviewed.   There were no vitals filed for this visit.     General:  unremarkable, age appropriate, well nourished, casually dressed, neatly groomed, overweight     Musculoskeletal  Muscle Strength/Tone:  ADITYA due to virtual visit   Gait & Station:  ADITYA due to virtual visit     Psychiatric      Appearance:  unremarkable, age appropriate, well nourished, casually dressed, neatly groomed, overweight   Behavior:  normal, friendly and cooperative, eye contact normal     Speech:  no latency; no press   Mood & Affect:  euthymic  congruent and appropriate   Thought Process:  normal and logical   Associations:  intact   Thought Content:  normal, no suicidality, no homicidality, delusions, or paranoia   Insight:  intact   Judgement: behavior is adequate to circumstances, age appropriate   Orientation:  grossly intact   Memory: intact for content of interview   Language: grossly intact   Attention Span & Concentration:  able to focus   Fund of Knowledge:  intact and appropriate to age and level of education     Medications:  Outpatient Encounter Medications as of 2/25/2022   Medication Sig Dispense Refill    acetaminophen (TYLENOL) 650 MG TbSR Take 650 mg by mouth daily as needed.      aspirin 81 mg Cap 1 tablet 2 TIMES DAILY (route: oral)      busPIRone (BUSPAR) 15 MG tablet Take 1 tablet (15 mg total) by mouth 3 (three) times daily. 90 tablet 3    calcium-vitamin D3 (OS-JALYN 500 + D3) 500 mg(1,250mg) -200 unit per tablet Take 1 tablet by mouth 2 (two) times daily with meals.      cyanocobalamin (VITAMIN B-12) 100 MCG tablet Take 100 mcg by  mouth once daily.      folic acid (FOLVITE) 1 MG tablet Take 1 mg by mouth once daily.      gabapentin (NEURONTIN) 400 MG capsule Take 1 capsule (400 mg total) by mouth 3 (three) times daily. 90 capsule 11    hydroCHLOROthiazide (HYDRODIURIL) 25 MG tablet Take 25 mg by mouth once daily.      lamoTRIgine (LAMICTAL) 100 MG tablet Take 1 tablet (100 mg total) by mouth once daily. 30 tablet 3    levothyroxine (SYNTHROID) 25 MCG tablet Take 25 mcg by mouth once daily.      meloxicam (MOBIC) 15 MG tablet Take 1 tablet (15 mg total) by mouth once daily. 30 tablet 0    omeprazole (PRILOSEC) 40 MG capsule Take 40 mg by mouth once daily.      tadalafiL (CIALIS) 20 MG Tab Take 1 tablet (20 mg total) by mouth once daily. Take 1 hour before intercourse 10 tablet 11    tamsulosin (FLOMAX) 0.4 mg Cap Take 1 capsule (0.4 mg total) by mouth once daily. 30 capsule 11     No facility-administered encounter medications on file as of 2/25/2022.       Allergy:  Review of patient's allergies indicates:   Allergen Reactions    Codeine Nausea Only    Seroquel [quetiapine]      Heart racing     Tizanidine Other (See Comments)     Caused increased pain and muscle pain         Assessment and Diagnosis   Status/Progress: Based on the examination today, the patient's problem(s) is/are adequately but not ideally controlled.  New problems have been presented today.   Co-morbidities are not complicating management of the primary condition.  The working differential for this patient includes Bipolar I vs Bipolar II vs MDD with anxiety.         General Impression:       ICD-10-CM ICD-9-CM   1. Anxiety  F41.9 300.00   2. Bipolar affective disorder in remission  F31.70 296.80   3. Insomnia, unspecified type  G47.00 780.52   4. History of drug abuse  F19.11 305.93        Diff Dx  Bipolar I vs Bipolar II vs MDD with anxiety      Intervention/Counseling/Treatment Plan     · Medication Management:  · Continue Lamictal 100 mg q day  · Continue  Buspar 15 mg TID  · Continue Lunesta 2 mg q hs prn insomnia. Pt was told not drive or to take this med with ETOH or other sedating meds/substance. Pt verbalized understanding.  · Start hydroxyzine 10-20 mg BID prn anxiety. Pt was told not drive or to take this med with ETOH or other sedating meds/substance. Pt verbalized understanding.  · Labs: reviewed most recent  Lamictal: Discussed with patient the possibility of developing Awad-Yfn Syndrome (SJS) with the use of Lamictal. Pt was told to STOP taking the Lamictal immediately if any rash develops. Pt was told not to use any new products that may cause a rash (ex. changing laundry detergent and soap, skin creams/lotions, perfume, etc). Also informed the pt of the most common side effects of the medication including dizziness, ataxia, somnolence, headache, blurred vision and nausea. The patient expresses understanding of the above and displays the capacity to agree with this treatment given said understanding. Patient also agrees that, currently, the benefits outweigh the risks and would like to pursue treatment at this time.    Pt was informed of the Black Box Warning on medications such as eszopiclone (Lunesta), zaleplon (Sonata), and zolpidem (Ambien, Ambien CR, Edluar, Intermezzo, Zolpimist) than other prescription medicines used for sleep. Pt was also informed of the following:  The Food and Drug Administration (FDA) is advising that rare but serious injuries have happened with certain common prescription insomnia medicines because of sleep behaviors, including sleepwalking, sleep driving, and engaging in other activities while not fully awake. These complex sleep behaviors have also resulted in deaths.   Serious injuries and death from complex sleep behaviors have occurred in patients with and without a history of such behaviors, even at the lowest recommended doses, and the behaviors can occur after just one dose. These behaviors can occur after taking  these medicines with or without alcohol or other central nervous system depressants that may be sedating such as tranquilizers, opioids, and anti-anxiety medicines. Other possible side effects of these medications include they may cause some people, especially older persons, to become drowsy, dizzy, lightheaded, clumsy or unsteady, or less alert than they are normally, which may lead to falls. Even though zolpidem is taken at bedtime, it may cause some people to feel drowsy or less alert on arising. Also, this medicine may cause double vision or other vision problems, or severe injuries (eg, hip fractures, severe bleeding in the head).  · Discussed with patient informed consent, risks vs. benefits, alternative treatments, side effect profile and the inherent unpredictability of individual responses to these treatments. The patient expresses understanding of the above and displays the capacity to agree with this current plan and had no other questions.  · Encouraged Patient to keep future appointments.   · Take medications as prescribed and abstain from substance abuse.   · Present to ED or call 911 for SI/HI plan or intent, psychosis, or medical emergency.      Return to Clinic: 3 months, or sooner if needed      Face-to-face time with the patient: 22 minutes  Total time:  35 minutes of total time spent on the encounter, which includes face to face time and non-face to face time preparing to see the patient (eg, review of tests), Obtaining and/or reviewing separately obtained history, Documenting clinical information in the electronic or other health record, Independently interpreting results (not separately reported) and communicating results to the patient/family/caregiver, or Care coordination (not separately reported).       Lisa Alejo, MSN, APRN, PMHNP-BC [facility]

## 2022-02-28 ENCOUNTER — CLINICAL SUPPORT (OUTPATIENT)
Dept: REHABILITATION | Facility: HOSPITAL | Age: 57
End: 2022-02-28
Payer: COMMERCIAL

## 2022-02-28 DIAGNOSIS — M62.81 WEAKNESS OF TRUNK MUSCULATURE: Primary | ICD-10-CM

## 2022-02-28 PROCEDURE — 97110 THERAPEUTIC EXERCISES: CPT | Mod: PO,CQ

## 2022-02-28 NOTE — PROGRESS NOTES
"[facility]   Physical Therapy Treatment Note     Name: Tyrone Santos  Clinic Number: [id number]    Therapy Diagnosis:   Encounter Diagnosis   Name Primary?    Weakness of trunk musculature Yes     Physician: Sanna Aguiar,*    Visit Date: 2/28/2022    Physician Orders: PT Eval and Treat   Medical Diagnosis from Referral: M54.50,G89.29 (ICD-10-CM) - Chronic bilateral low back pain without sciatica   Evaluation Date: 2/4/2022  Authorization Period Expiration: 12/31/2022   Plan of Care Expiration: 3/31/2022  Progress Note Due: 3/04/2022  Visit # / Visits authorized: 5/ 20   FOTO: 1 / 3        Precautions: Standard        PTA Visit #: 1/5     Time In: 9:45 am  Time Out: 10:28 am  Total Billable Time: 43 minutes    SUBJECTIVE     Pt reports: when his work gets busy like it is now he is sore    He was compliant with home exercise program.  Response to previous treatment: tolerated  Functional change: able to bend to touch his toes in standing    Pain: 3/10  Location: bilateral back      OBJECTIVE     Objective Measures updated at progress report unless specified.     Treatment     Tyrone received the treatments listed below:      therapeutic exercises to develop strength, ROM, flexibility and core stabilization for 43 minutes including:     Upright bike, level 3: 7 minutes    sidelying open books: 20x5"/each  Hamstring stretch at stairs: 2x30"/each  Cat cow: 2x10 3" holds  Supine PPT with alternating march: 3x10  Seated APT/PPT on blue physioball: 2x10  Bridges with ball: 3x10  standing hip extension on plinth: 1x15/each      manual therapy techniques: Joint mobilizations were applied to the: thoracic and lumbar spine for 00 minutes, including:  STM to thoracic and lumbar paraspinals using hypervolt massage therapy gun  Grade IV P/A and UPA mobs to T4-T10       Patient Education and Home Exercises     Home Exercises Provided and Patient Education Provided     Education provided:   - " HEP    Written Home Exercises Provided: Patient instructed to cont prior HEP. Exercises were reviewed and Tyrone was able to demonstrate them prior to the end of the session.  Tyrone demonstrated good  understanding of the education provided. See EMR under Patient Instructions for exercises provided during therapy sessions    Access Code: [email or web address]  URL: [email or web address]  Date: 02/14/2022  Prepared by: Trish Johnson    Exercises  Sidelying Thoracic Rotation with Open Book - 1 x daily - 7 x weekly - 1 sets - 20 reps - 5 seconds hold  Standing Hamstring Stretch with Step - 2 x daily - 7 x weekly - 1 sets - 3 reps - 30 seconds hold  Supine Bridge with Mini Swiss Ball Between Knees - 4 x weekly - 3 sets - 10 reps - 2 seconds hold  Supine Posterior Pelvic Tilt - 1 x daily - 7 x weekly - 3 sets - 10 reps - 3 seconds hold  Quadruped Cat with Posterior Pelvic Tilt - 1 x daily - 7 x weekly - 3 sets - 10 reps     ASSESSMENT     Patient presents to treatment with slight increase in pain that he attributes to increased work lately. Good tolerance to exercises today with no increase in pain. Patient continues to be cautious during transitional movements with cueing for core activation.  Continue to progress with glute and core strength as tolerated.       Tyrone Is progressing well towards his goals.   Pt prognosis is Good.     Pt will continue to benefit from skilled outpatient physical therapy to address the deficits listed in the problem list box on initial evaluation, provide pt/family education and to maximize pt's level of independence in the home and community environment.     Pt's spiritual, cultural and educational needs considered and pt agreeable to plan of care and goals.     Anticipated barriers to physical therapy: none    Goals:   Short Term Goals: 4 weeks   1. Patient will demonstrate proper performance of home exercise program of active exercises to improve carryover between sessions.  2. The patient will  perform transverse abdominis contraction isomerically for 5 seconds to improve spinal stability.  3. Patient will be able to transition from APT to PPT without reports of low back pain.  4. Patient demonstrates increased spine flexion mobility with 50% or greater reduction of pain to improve functional mobility and tolerance to functional activities.  5. The patient will demonstrate an increase in bilateral gluteus medius strength by 1/2 MMT grade in order to improve pelvic stability during transitional movements.        Long Term Goals: 8 weeks    1. Patient will be independent with all home exercises and progressions for management of symptoms.  2. The patient will perform a transverse abdominis contraction while performing dynamic LE/UE movements with good control to demonstrate improved spinal stability.   3. The patient will demonstrate the ability to perform farmer carry >/25# without low back pain in order to return to recreational activities.   4. Patient will report no pain during lumbar AROM in all planes to promote functional mobility.   5. Patient will demonstrate ability to lift >/= 45# from floor to waist with good body mechanics within the clinic in order to simulate RTW goals.     PLAN    Continue to progress core and glute exercises as tolerated    Tisha Saunders PTA

## 2022-03-02 ENCOUNTER — PATIENT MESSAGE (OUTPATIENT)
Dept: PSYCHIATRY | Facility: CLINIC | Age: 57
End: 2022-03-02
Payer: COMMERCIAL

## 2022-03-04 ENCOUNTER — CLINICAL SUPPORT (OUTPATIENT)
Dept: REHABILITATION | Facility: HOSPITAL | Age: 57
End: 2022-03-04
Payer: COMMERCIAL

## 2022-03-04 DIAGNOSIS — M62.81 WEAKNESS OF TRUNK MUSCULATURE: Primary | ICD-10-CM

## 2022-03-04 PROCEDURE — 97110 THERAPEUTIC EXERCISES: CPT | Mod: PO

## 2022-03-04 NOTE — PROGRESS NOTES
[facility]   Physical Therapy Treatment Note/Progress Note    Name: Tyrone Santos  Clinic Number: [id number]    Therapy Diagnosis:   Encounter Diagnosis   Name Primary?    Weakness of trunk musculature Yes     Physician: Sanna Aguiar,*    Visit Date: 3/4/2022    Physician Orders: PT Eval and Treat   Medical Diagnosis from Referral: M54.50,G89.29 (ICD-10-CM) - Chronic bilateral low back pain without sciatica   Evaluation Date: 2/4/2022  Authorization Period Expiration: 12/31/2022   Plan of Care Expiration: 3/31/2022  Progress Note Due: 3/04/2022  Visit # / Visits authorized: 5/ 20   FOTO: 1 / 3        Precautions: Standard        PTA Visit #: 1/5     Time In: 8:15 am  Time Out: 9:00 am  Total Billable Time: 45 minutes    SUBJECTIVE     Pt reports: He is starting to bike during his breaks and states that he is able to work 12-13 hour shifts with no major changes in pain. Pt can touch his toes without pain.    He was compliant with home exercise program.  Response to previous treatment: tolerated  Functional change: able to bend to touch his toes in standing    Pain: 3/10  Location: bilateral back      OBJECTIVE     Objective Measures updated at progress report unless specified.     Palpation: Increased tone and tenderness noted with palpation to: lumbar paraspinals         Gait Analysis: The patient ambulated with the following assistive device: none; the pt presents with the following gait abnormalities: decreased step length and hip extension bilaterally, decreased pelvic rotation, right trendelenburg         RANGE OF MOTION:     Lumbar AROM Percent (%)    Comments      Lumbar Flexion  45% *  he describes pain at stretch; flattened lumbar spine   Lumbar Extension  35% Increased pain   Right Side Bending 35%* Increased pain   Left Side bending  35%* Increased pain      * = denotes pain        Hip AROM Right Left      Hip Flexion (120) 102 100         Hip PROM: moderate  "restriction into hip IR mobs at 90/90 and extension bilaterally         STRENGTH:     L/E MMT Right      Hip Flexion  4/5   Hip Extension  4-/5   Hip Abduction  4/5   Knee Flexion 5/5   Knee Extension 5/5   Ankle DF 5/5   Ankle PF 5/5         L/E MMT Left      Hip Flexion  4-/5*   Hip Extension  4-/5   Hip Abduction  4-/5   Knee Flexion 5/5   Knee Extension 5/5   Ankle DF 5/5   Ankle PF 5/5         Flexibility: (min, mod, severe limited)   Hamstrings: 38 degrees (right), 50 degrees (left)  Rectus Femoris (ely's test (-/+)): moderate   Iliopspas (tony test (+/-)): moderate         JOINT MOBILITY (GRADED 0-6 OUT OF 6):         Joint Motion Tested Force Direction End Feel - Right    Symptoms   Upper Thoracic Spine P/A Central, UPA Hypomobile No change    Mid Thoracic Spine P/A Central, UPA Hypomobile No change   Lower Thoracic Spine P/A Central, UPA Hypomobile Increased pain   L1-L5 P/A Central, UPA Hypomobile Increased pain      Pt reporting relief of symptoms with grade III sacral flexion mob        SPECIAL TESTS:     Lumbar Spine Right    Left       SLR Test negative negative   Slump Test negative negative   FADIR Test positive NT   NANCY Test positive NT   Compression Test positive positive    Test Positive, reproduction of back pain NT      Repeated Flexion: increased pain  Repeated Extension: increased pain       Movement Analysis:      Functional Test  Outcome   Double Leg Squat Early heel rise, narrow RACHELLE, poor dissociation between pelvis and lumbar spine   Double Leg Bridge  Pelvic instability           Treatment     Tyrone received the treatments listed below:      therapeutic exercises to develop strength, ROM, flexibility and core stabilization for 45 minutes including:     Upright bike, level 3: 7 minutes  sidelying open books: 20x5"/each  Half kneeling hip flexor stretch 2x1'  Cat cow: 2x10 3" holds  Hamstring stretch at stairs: 2x30"/each  Supine PPT with alternating march: 3x10    Bridges with 15# " dumbbell: 3x10  standing hip extension on plinth: 1x15/each      Seated APT/PPT on blue physioball: 2x10  manual therapy techniques: Joint mobilizations were applied to the: thoracic and lumbar spine for 00 minutes, including:  STM to thoracic and lumbar paraspinals using hypervolt massage therapy gun  Grade IV P/A and UPA mobs to T4-T10       Patient Education and Home Exercises     Home Exercises Provided and Patient Education Provided     Education provided:   - HEP    Written Home Exercises Provided: Patient instructed to cont prior HEP. Exercises were reviewed and Tyrone was able to demonstrate them prior to the end of the session.  Tyrone demonstrated good  understanding of the education provided. See EMR under Patient Instructions for exercises provided during therapy sessions    Access Code: [email or web address]  URL: [email or web address]  Date: 02/14/2022  Prepared by: Trish Johnson    Exercises  Sidelying Thoracic Rotation with Open Book - 1 x daily - 7 x weekly - 1 sets - 20 reps - 5 seconds hold  Standing Hamstring Stretch with Step - 2 x daily - 7 x weekly - 1 sets - 3 reps - 30 seconds hold  Supine Bridge with Mini Swiss Ball Between Knees - 4 x weekly - 3 sets - 10 reps - 2 seconds hold  Supine Posterior Pelvic Tilt - 1 x daily - 7 x weekly - 3 sets - 10 reps - 3 seconds hold  Quadruped Cat with Posterior Pelvic Tilt - 1 x daily - 7 x weekly - 3 sets - 10 reps     ASSESSMENT     Patient presents to treatment subjective remarks on tolerance to work demands. Pt hip flexor length and mobility remains limited to PT added hip flexor stretches to increase psoas extensibility and reduce stresses on lumbar spine. Progressed glute bridges with addition of weight to continue strengthening. Pt demonstrates progression with LE strength since start of intial evaluation, but has room for improvement in multiple areas including hip extension and flexion. Continue to progress with glute and core strength as tolerated.        Tyrone Is progressing well towards his goals.   Pt prognosis is Good.     Pt will continue to benefit from skilled outpatient physical therapy to address the deficits listed in the problem list box on initial evaluation, provide pt/family education and to maximize pt's level of independence in the home and community environment.     Pt's spiritual, cultural and educational needs considered and pt agreeable to plan of care and goals.     Anticipated barriers to physical therapy: none    Goals:   Short Term Goals: 4 weeks   1. Patient will demonstrate proper performance of home exercise program of active exercises to improve carryover between sessions.  2. The patient will perform transverse abdominis contraction isomerically for 5 seconds to improve spinal stability.  3. Patient will be able to transition from APT to PPT without reports of low back pain.  4. Patient demonstrates increased spine flexion mobility with 50% or greater reduction of pain to improve functional mobility and tolerance to functional activities.  5. The patient will demonstrate an increase in bilateral gluteus medius strength by 1/2 MMT grade in order to improve pelvic stability during transitional movements.        Long Term Goals: 8 weeks    1. Patient will be independent with all home exercises and progressions for management of symptoms.  2. The patient will perform a transverse abdominis contraction while performing dynamic LE/UE movements with good control to demonstrate improved spinal stability.   3. The patient will demonstrate the ability to perform farmer carry >/25# without low back pain in order to return to recreational activities.   4. Patient will report no pain during lumbar AROM in all planes to promote functional mobility.   5. Patient will demonstrate ability to lift >/= 45# from floor to waist with good body mechanics within the clinic in order to simulate RTW goals.     PLAN    Continue to progress core and glute  exercises as tolerated    Antonio Wong, PT

## 2022-03-08 ENCOUNTER — CLINICAL SUPPORT (OUTPATIENT)
Dept: REHABILITATION | Facility: HOSPITAL | Age: 57
End: 2022-03-08
Payer: COMMERCIAL

## 2022-03-08 DIAGNOSIS — M62.81 WEAKNESS OF TRUNK MUSCULATURE: Primary | ICD-10-CM

## 2022-03-08 PROCEDURE — 97110 THERAPEUTIC EXERCISES: CPT | Mod: PO

## 2022-03-08 NOTE — PROGRESS NOTES
[facility]   Physical Therapy Treatment Note    Name: Tyrone Santos  [facility] Number: [id number]    Therapy Diagnosis:   Encounter Diagnosis   Name Primary?    Weakness of trunk musculature Yes     Physician: Sanna Aguiar,*    Visit Date: 3/8/2022    Physician Orders: PT Eval and Treat   Medical Diagnosis from Referral: M54.50,G89.29 (ICD-10-CM) - Chronic bilateral low back pain without sciatica   Evaluation Date: 2/4/2022  Authorization Period Expiration: 12/31/2022   Plan of Care Expiration: 3/31/2022  Progress Note Due: 4/04/2022  Visit # / Visits authorized: 8/ 20   FOTO: 1 / 3        Precautions: Standard        PTA Visit #: 1/5     Time In: 12:15 pm  Time Out: 1:00 pm  Total Billable Time: 45 minutes    SUBJECTIVE     Pt reports: He is starting to bike during his breaks and states that he is able to work 12-13 hour shifts with no major changes in pain. Pt can touch his toes without pain.    He was compliant with home exercise program.  Response to previous treatment: tolerated  Functional change: able to bend to touch his toes in standing    Pain: 3/10  Location: bilateral back      OBJECTIVE     Objective Measures updated at progress report unless specified.     Palpation: Increased tone and tenderness noted with palpation to: lumbar paraspinals         Gait Analysis: The patient ambulated with the following assistive device: none; the pt presents with the following gait abnormalities: decreased step length and hip extension bilaterally, decreased pelvic rotation, right trendelenburg         RANGE OF MOTION:     Lumbar AROM Percent (%)    Comments      Lumbar Flexion  45% *  he describes pain at stretch; flattened lumbar spine   Lumbar Extension  35% Increased pain   Right Side Bending 35%* Increased pain   Left Side bending  35%* Increased pain      * = denotes pain        Hip AROM Right Left      Hip Flexion (120) 102 100         Hip PROM: moderate restriction into hip  "IR mobs at 90/90 and extension bilaterally         STRENGTH:     L/E MMT Right      Hip Flexion  4/5   Hip Extension  4-/5   Hip Abduction  4/5   Knee Flexion 5/5   Knee Extension 5/5   Ankle DF 5/5   Ankle PF 5/5         L/E MMT Left      Hip Flexion  4-/5*   Hip Extension  4-/5   Hip Abduction  4-/5   Knee Flexion 5/5   Knee Extension 5/5   Ankle DF 5/5   Ankle PF 5/5         Flexibility: (min, mod, severe limited)   Hamstrings: 38 degrees (right), 50 degrees (left)  Rectus Femoris (ely's test (-/+)): moderate   Iliopspas (tony test (+/-)): moderate         JOINT MOBILITY (GRADED 0-6 OUT OF 6):         Joint Motion Tested Force Direction End Feel - Right    Symptoms   Upper Thoracic Spine P/A Central, UPA Hypomobile No change    Mid Thoracic Spine P/A Central, UPA Hypomobile No change   Lower Thoracic Spine P/A Central, UPA Hypomobile Increased pain   L1-L5 P/A Central, UPA Hypomobile Increased pain      Pt reporting relief of symptoms with grade III sacral flexion mob        SPECIAL TESTS:     Lumbar Spine Right    Left       SLR Test negative negative   Slump Test negative negative   FADIR Test positive NT   NANCY Test positive NT   Compression Test positive positive    Test Positive, reproduction of back pain NT      Repeated Flexion: increased pain  Repeated Extension: increased pain       Movement Analysis:      Functional Test  Outcome   Double Leg Squat Early heel rise, narrow RACHELLE, poor dissociation between pelvis and lumbar spine   Double Leg Bridge  Pelvic instability           Treatment     Tyroen received the treatments listed below:      therapeutic exercises to develop strength, ROM, flexibility and core stabilization for 45 minutes including:     Upright bike, level 3: 10 minutes  sidelying open books: 20x5"/each  Half kneeling hip flexor stretch 2x1'  Cat cow: 2x10 3" holds  Hamstring stretch at stairs: 2x30"/each  Supine PPT with alternating march: 3x10    Bridges with 15# dumbbell: " 3x10  standing hip extension on plinth: 1x15/each  Seated APT/PPT on blue physioball: 2x10    manual therapy techniques: Joint mobilizations were applied to the: thoracic and lumbar spine for 00 minutes, including:  STM to thoracic and lumbar paraspinals using hypervolt massage therapy gun  Grade IV P/A and UPA mobs to T4-T10       Patient Education and Home Exercises     Home Exercises Provided and Patient Education Provided     Education provided:   - HEP    Written Home Exercises Provided: Patient instructed to cont prior HEP. Exercises were reviewed and Tyrone was able to demonstrate them prior to the end of the session.  Tyrone demonstrated good  understanding of the education provided. See EMR under Patient Instructions for exercises provided during therapy sessions    Access Code: [email or web address]  URL: [email or web address]  Date: 02/14/2022  Prepared by: Trish Johnson    Exercises  Sidelying Thoracic Rotation with Open Book - 1 x daily - 7 x weekly - 1 sets - 20 reps - 5 seconds hold  Standing Hamstring Stretch with Step - 2 x daily - 7 x weekly - 1 sets - 3 reps - 30 seconds hold  Supine Bridge with Mini Swiss Ball Between Knees - 4 x weekly - 3 sets - 10 reps - 2 seconds hold  Supine Posterior Pelvic Tilt - 1 x daily - 7 x weekly - 3 sets - 10 reps - 3 seconds hold  Quadruped Cat with Posterior Pelvic Tilt - 1 x daily - 7 x weekly - 3 sets - 10 reps     ASSESSMENT     Patient presents to treatment subjective remarks on tolerance to work demands. Pt hip flexor length and mobility remains limited to PT added hip flexor stretches to increase psoas extensibility and reduce stresses on lumbar spine. Progressed glute bridges with addition of weight to continue strengthening. Pt demonstrates progression with LE strength since start of intial evaluation, but has room for improvement in multiple areas including hip extension and flexion. Continue to progress with glute and core strength as tolerated.      Tyrone Is  progressing well towards his goals.   Pt prognosis is Good.     Pt will continue to benefit from skilled outpatient physical therapy to address the deficits listed in the problem list box on initial evaluation, provide pt/family education and to maximize pt's level of independence in the home and community environment.     Pt's spiritual, cultural and educational needs considered and pt agreeable to plan of care and goals.     Anticipated barriers to physical therapy: none    Goals:   Short Term Goals: 4 weeks   1. Patient will demonstrate proper performance of home exercise program of active exercises to improve carryover between sessions.  2. The patient will perform transverse abdominis contraction isomerically for 5 seconds to improve spinal stability.  3. Patient will be able to transition from APT to PPT without reports of low back pain.  4. Patient demonstrates increased spine flexion mobility with 50% or greater reduction of pain to improve functional mobility and tolerance to functional activities.  5. The patient will demonstrate an increase in bilateral gluteus medius strength by 1/2 MMT grade in order to improve pelvic stability during transitional movements.        Long Term Goals: 8 weeks    1. Patient will be independent with all home exercises and progressions for management of symptoms.  2. The patient will perform a transverse abdominis contraction while performing dynamic LE/UE movements with good control to demonstrate improved spinal stability.   3. The patient will demonstrate the ability to perform farmer carry >/25# without low back pain in order to return to recreational activities.   4. Patient will report no pain during lumbar AROM in all planes to promote functional mobility.   5. Patient will demonstrate ability to lift >/= 45# from floor to waist with good body mechanics within the clinic in order to simulate RTW goals.     PLAN    Continue to progress core and glute exercises as  tolerated    Antonio Wong, PT

## 2022-03-11 ENCOUNTER — CLINICAL SUPPORT (OUTPATIENT)
Dept: REHABILITATION | Facility: HOSPITAL | Age: 57
End: 2022-03-11
Payer: COMMERCIAL

## 2022-03-11 DIAGNOSIS — M62.81 WEAKNESS OF TRUNK MUSCULATURE: Primary | ICD-10-CM

## 2022-03-11 PROCEDURE — 97110 THERAPEUTIC EXERCISES: CPT | Mod: PO,CQ

## 2022-03-11 NOTE — PROGRESS NOTES
"[facility] OUTPATIENT THERAPY AND WELLNESS   Physical Therapy Treatment Note    Name: Tyrone Santos  Clinic Number: [id number]    Therapy Diagnosis:   Encounter Diagnosis   Name Primary?    Weakness of trunk musculature Yes     Physician: Sanna Aguiar,*    Visit Date: 3/11/2022    Physician Orders: PT Eval and Treat   Medical Diagnosis from Referral: M54.50,G89.29 (ICD-10-CM) - Chronic bilateral low back pain without sciatica   Evaluation Date: 2/4/2022  Authorization Period Expiration: 12/31/2022   Plan of Care Expiration: 3/31/2022  Progress Note Due: 4/04/2022  Visit # / Visits authorized: 8/ 20   FOTO: 1 / 3        Precautions: Standard        PTA Visit #: 1/5     Time In: 7:42 am  Time Out: 8:28 pm  Total Billable Time: 46 minutes    SUBJECTIVE     Pt reports: he goes home sore every day from work    He was compliant with home exercise program.  Response to previous treatment: tolerated  Functional change: able to bend to touch his toes in standing    Pain: 3/10  Location: bilateral back      OBJECTIVE     Objective Measures updated at progress report unless specified.         Treatment     Tyrone received the treatments listed below:      therapeutic exercises to develop strength, ROM, flexibility and core stabilization for 46 minutes including:     Upright bike, level 3: 10 minutes  sidelying open books: 20x5"/each  Half kneeling hip flexor stretch 2x1'  Cat cow: 2x10 3" holds  Hamstring stretch at stairs: 2x30"/each  Supine PPT with alternating march: 3x10    Bridges with 15# dumbbell: 3x10  standing hip extension on plinth: 1x15/each  Seated APT/PPT on blue physioball: 2x10  Seated marching on blue physioball 2x10    manual therapy techniques: Joint mobilizations were applied to the: thoracic and lumbar spine for 00 minutes, including:  STM to thoracic and lumbar paraspinals using hypervolt massage therapy gun  Grade IV P/A and UPA mobs to T4-T10       Patient Education and Home Exercises     Home " Exercises Provided and Patient Education Provided     Education provided:   - HEP    Written Home Exercises Provided: Patient instructed to cont prior HEP. Exercises were reviewed and Tyrone was able to demonstrate them prior to the end of the session.  Tyrone demonstrated good  understanding of the education provided. See EMR under Patient Instructions for exercises provided during therapy sessions    Access Code: [email or web address]  URL: [email or web address]  Date: 02/14/2022  Prepared by: Trish Johnson    Exercises  Sidelying Thoracic Rotation with Open Book - 1 x daily - 7 x weekly - 1 sets - 20 reps - 5 seconds hold  Standing Hamstring Stretch with Step - 2 x daily - 7 x weekly - 1 sets - 3 reps - 30 seconds hold  Supine Bridge with Mini Swiss Ball Between Knees - 4 x weekly - 3 sets - 10 reps - 2 seconds hold  Supine Posterior Pelvic Tilt - 1 x daily - 7 x weekly - 3 sets - 10 reps - 3 seconds hold  Quadruped Cat with Posterior Pelvic Tilt - 1 x daily - 7 x weekly - 3 sets - 10 reps     ASSESSMENT     Patient continues with soreness due to increased work demands recently. Good tolerance to exercises including addition of core stability exercises on physioball. Continue to progress patient as tolerated to improve glute and core strength.     Tyrone Is progressing well towards his goals.   Pt prognosis is Good.     Pt will continue to benefit from skilled outpatient physical therapy to address the deficits listed in the problem list box on initial evaluation, provide pt/family education and to maximize pt's level of independence in the home and community environment.     Pt's spiritual, cultural and educational needs considered and pt agreeable to plan of care and goals.     Anticipated barriers to physical therapy: none    Goals:   Short Term Goals: 4 weeks   1. Patient will demonstrate proper performance of home exercise program of active exercises to improve carryover between sessions.  2. The patient will perform  transverse abdominis contraction isomerically for 5 seconds to improve spinal stability.  3. Patient will be able to transition from APT to PPT without reports of low back pain.  4. Patient demonstrates increased spine flexion mobility with 50% or greater reduction of pain to improve functional mobility and tolerance to functional activities.  5. The patient will demonstrate an increase in bilateral gluteus medius strength by 1/2 MMT grade in order to improve pelvic stability during transitional movements.        Long Term Goals: 8 weeks    1. Patient will be independent with all home exercises and progressions for management of symptoms.  2. The patient will perform a transverse abdominis contraction while performing dynamic LE/UE movements with good control to demonstrate improved spinal stability.   3. The patient will demonstrate the ability to perform farmer carry >/25# without low back pain in order to return to recreational activities.   4. Patient will report no pain during lumbar AROM in all planes to promote functional mobility.   5. Patient will demonstrate ability to lift >/= 45# from floor to waist with good body mechanics within the clinic in order to simulate RTW goals.     PLAN    Continue to progress core and glute exercises as tolerated    Tisha Saunders PTA

## 2022-05-02 ENCOUNTER — OFFICE VISIT (OUTPATIENT)
Dept: PSYCHIATRY | Facility: CLINIC | Age: 57
End: 2022-05-02
Payer: COMMERCIAL

## 2022-05-02 DIAGNOSIS — F41.1 GENERALIZED ANXIETY DISORDER: Primary | ICD-10-CM

## 2022-05-02 DIAGNOSIS — F19.11 HISTORY OF DRUG ABUSE: ICD-10-CM

## 2022-05-02 DIAGNOSIS — F31.70 BIPOLAR AFFECTIVE DISORDER IN REMISSION: ICD-10-CM

## 2022-05-02 DIAGNOSIS — G47.00 INSOMNIA, UNSPECIFIED TYPE: ICD-10-CM

## 2022-05-02 PROCEDURE — 99214 OFFICE O/P EST MOD 30 MIN: CPT | Mod: 95,,, | Performed by: NURSE PRACTITIONER

## 2022-05-02 PROCEDURE — 99214 PR OFFICE/OUTPT VISIT, EST, LEVL IV, 30-39 MIN: ICD-10-PCS | Mod: 95,,, | Performed by: NURSE PRACTITIONER

## 2022-05-02 RX ORDER — BUSPIRONE HYDROCHLORIDE 15 MG/1
TABLET ORAL
Qty: 360 TABLET | Refills: 0 | Status: SHIPPED | OUTPATIENT
Start: 2022-05-02 | End: 2022-09-20 | Stop reason: SDUPTHER

## 2022-05-02 RX ORDER — LAMOTRIGINE 100 MG/1
100 TABLET ORAL DAILY
Qty: 90 TABLET | Refills: 0 | Status: SHIPPED | OUTPATIENT
Start: 2022-05-02 | End: 2022-09-20 | Stop reason: SDUPTHER

## 2022-05-02 RX ORDER — ESZOPICLONE 2 MG/1
2 TABLET, FILM COATED ORAL NIGHTLY PRN
Qty: 90 TABLET | Refills: 0 | Status: SHIPPED | OUTPATIENT
Start: 2022-05-02 | End: 2022-06-28

## 2022-05-02 RX ORDER — ESCITALOPRAM OXALATE 10 MG/1
10 TABLET ORAL DAILY
Qty: 90 TABLET | Refills: 0 | Status: SHIPPED | OUTPATIENT
Start: 2022-05-02 | End: 2022-09-20 | Stop reason: SDUPTHER

## 2022-05-02 NOTE — PROGRESS NOTES
5/2/2022 11:17 AM  Tyrone Santos  1965  [id number]    Outpatient Psychiatry Follow-Up Visit (MD/NP) - Telemedicine Visit      The patient location is: Patient reported that his location at the time of this visit was in the [facility]   Address: documented in [facility]      Visit type: audiovisual    Each patient to whom he or she provides medical services by telemedicine is:  (1) informed of the relationship between the physician and patient and the respective role of any other health care provider with respect to management of the patient; and (2) notified that he or she may decline to receive medical services by telemedicine and may withdraw from such care at any time.      Chief Complaint:  Tyrone Santos, a 57 y.o. male,who presents today for follow up of anxiety and mood swings.  Met with patient.        Interim Events/Subjective Report/Content of Current Session:     Pt is a 57 y.o. male with a hx of HTN, HLD, ED, BPH, hypothyroidism, ERIC, s/p cervical spine fusion, spondylosis, and bipolar d/o.    Today he reports he put in his 2 week notice at work, and his last day is Friday. He is very relieved about this. He has a new job lined up in the [employer] industry where he used to work. He is happy but very nervous and would like something for his anxiety. States hydroxyzine made him angry and irritable.     He denies elevated, expansive, or irritable mood with increased energy or activity; with inflated self-esteem or grandiosity, decreased need for sleep, increased rate of speech, increased goal directed activity or risky/disinhibited behavior. Denies depressed mood, decreased energy and motivation, and anhedonia.    He had his nasal deviated septum surgically corrected and has been sleeping better since then. He is also waiting for his oral appliance for his ERIC to be delivered. Lunesta is effective. Has only been sleeping about 6-8 hours/night.    Had two beers about 2 weeks ago. Mostly drinks  non-alcoholic beer.    Pt denies recurrent thoughts of death and denies SI/HI. Denies any sxs of ochoa. Denies AVH, paranoia and delusions. No objective s/sx of psychosis or ochoa. Denies any ASE from his psych meds.    Discussed risks, benefits and SE profile of medication options.       Psychotherapy:  · Target symptoms: depression, anxiety , work stress  · Why chosen therapy is appropriate versus another modality: relevant to diagnosis, patient responds to this modality  · Outcome monitoring methods: self-report, observation  · Therapeutic intervention type: supportive psychotherapy  · Topics discussed/themes: work stress, building skills sets for symptom management  · The patient's response to the intervention is accepting. The patient's progress toward treatment goals is good.   · Duration of intervention: 5 minutes.      Psychotropic medication review  Previous Trials-  Wellbutrin   Valium  Xanax  Trileptal  Seroquel - heart racing  Trazodone - worked for a few years and then gave out  Depakote - worked for a while  Doxepin  Hydroxyzine - ineffective; made him angry  Naltrexone  Campral  Trazodone    Current meds-  Buspar   Lamictal  Lunesta        Review of Systems       Review of Systems   Constitutional: Negative for chills, fever, malaise/fatigue and weight loss.   Respiratory: Negative for shortness of breath and wheezing.    Cardiovascular: Negative for chest pain and palpitations.   Gastrointestinal: Negative for diarrhea and vomiting.   Musculoskeletal: Negative for falls and myalgias.   Skin: Negative for rash.   Neurological: Negative for tremors, seizures and headaches.   Endo/Heme/Allergies: Does not bruise/bleed easily.   Psychiatric/Behavioral:        See HPI         Past Medical, Family and Social History: The patient's past medical, family and social history have been reviewed and updated as appropriate within the electronic medical record - see encounter notes.    Compliance: yes    Side  effects: None    Risk Parameters:  Patient reports no suicidal ideation  Patient reports no homicidal ideation  Patient reports no self-injurious behavior  Patient reports no violent behavior    Exam (detailed: at least 9 elements; comprehensive: all 15 elements)   Constitutional  Vitals:  Most recent vital signs, dated greater than 90 days prior to this appointment, were reviewed.   There were no vitals filed for this visit.     General:  unremarkable, age appropriate, well nourished, casually dressed, neatly groomed, obese     Musculoskeletal  Muscle Strength/Tone:  ADITYA due to virtual visit   Gait & Station:  ADITYA due to virtual visit     Psychiatric      Appearance:  unremarkable, age appropriate, well nourished, casually dressed, neatly groomed, obese   Behavior:  normal, friendly and cooperative, eye contact normal     Speech:  no latency; no press   Mood & Affect:  euthymic  congruent and appropriate   Thought Process:  normal and logical   Associations:  intact   Thought Content:  normal, no suicidality, no homicidality, delusions, or paranoia   Insight:  intact   Judgement: behavior is adequate to circumstances, age appropriate   Orientation:  grossly intact   Memory: intact for content of interview   Language: grossly intact   Attention Span & Concentration:  able to focus   Fund of Knowledge:  intact and appropriate to age and level of education     Medications:  Outpatient Encounter Medications as of 5/2/2022   Medication Sig Dispense Refill    aspirin 81 mg Cap 1 tablet 2 TIMES DAILY (route: oral)      busPIRone (BUSPAR) 15 MG tablet Take 1 tablet (15 mg total) by mouth 3 (three) times daily. 90 tablet 3    calcium-vitamin D3 (OS-JALYN 500 + D3) 500 mg(1,250mg) -200 unit per tablet Take 1 tablet by mouth 2 (two) times daily with meals.      cyanocobalamin (VITAMIN B-12) 100 MCG tablet Take 100 mcg by mouth once daily.      cyclobenzaprine (FLEXERIL) 5 MG tablet cyclobenzaprine 5 mg tablet   TAKE 1  TABLET BY MOUTH 3 TIMES DAILY AS NEEDED.      eszopiclone (LUNESTA) 2 MG Tab Take 1 tablet (2 mg total) by mouth nightly as needed (insomnia). 30 tablet 3    folic acid (FOLVITE) 1 MG tablet Take 1 mg by mouth once daily.      gabapentin (NEURONTIN) 400 MG capsule Take 1 capsule (400 mg total) by mouth 3 (three) times daily. 90 capsule 11    hydroCHLOROthiazide (HYDRODIURIL) 25 MG tablet Take 25 mg by mouth once daily.      hydrOXYzine HCL (ATARAX) 10 MG Tab Take 1-2 tablets po BID prn anxiety 120 tablet 0    lamoTRIgine (LAMICTAL) 100 MG tablet Take 1 tablet (100 mg total) by mouth once daily. 30 tablet 3    levothyroxine (SYNTHROID) 25 MCG tablet Take 25 mcg by mouth once daily.      meloxicam (MOBIC) 15 MG tablet TAKE 1 TABLET BY MOUTH EVERY DAY 30 tablet 0    omeprazole (PRILOSEC) 40 MG capsule Take 40 mg by mouth once daily.      rosuvastatin (CRESTOR) 10 MG tablet rosuvastatin 10 mg tablet   TAKE 1 TABLET BY MOUTH EVERY DAY      tadalafiL (CIALIS) 20 MG Tab Take 1 tablet (20 mg total) by mouth once daily. Take 1 hour before intercourse 10 tablet 11    tamsulosin (FLOMAX) 0.4 mg Cap Take 1 capsule (0.4 mg total) by mouth once daily. 30 capsule 11     No facility-administered encounter medications on file as of 5/2/2022.       Allergy:  Review of patient's allergies indicates:   Allergen Reactions    Codeine Nausea Only    Seroquel [quetiapine]      Heart racing     Tizanidine Other (See Comments)     Caused increased pain and muscle pain         Assessment and Diagnosis   Status/Progress: Based on the examination today, the patient's problem(s) is/are adequately but not ideally controlled.  New problems have been presented today.   Co-morbidities are not complicating management of the primary condition.          General Impression:       ICD-10-CM ICD-9-CM   1. Generalized anxiety disorder  F41.1 300.02   2. Bipolar affective disorder in remission  F31.70 296.80   3. Insomnia, unspecified type   G47.00 780.52   4. History of drug abuse  F19.11 305.93        Diff Dx  Bipolar I vs Bipolar II vs MDD with anxiety      Intervention/Counseling/Treatment Plan     · Medication Management:  · Continue Lamictal 100 mg q day  · Increase Buspar to 30 mg q am, 15 mg at noon, and 15 mg q hs  · Continue Lunesta 2 mg q hs prn insomnia. Pt was told not drive or to take this med with ETOH or other sedating meds/substance. Pt verbalized understanding.  · Start Lexapro 10 mg q am to target anxiety  · Labs: reviewed most recent  Lamictal: Discussed with patient the possibility of developing Awad-Yfn Syndrome (SJS) with the use of Lamictal. Pt was told to STOP taking the Lamictal immediately if any rash develops. Pt was told not to use any new products that may cause a rash (ex. changing laundry detergent and soap, skin creams/lotions, perfume, etc). Also informed the pt of the most common side effects of the medication including dizziness, ataxia, somnolence, headache, blurred vision and nausea. The patient expresses understanding of the above and displays the capacity to agree with this treatment given said understanding. Patient also agrees that, currently, the benefits outweigh the risks and would like to pursue treatment at this time.    Pt was informed of the Black Box Warning on medications such as eszopiclone (Lunesta), zaleplon (Sonata), and zolpidem (Ambien, Ambien CR, Edluar, Intermezzo, Zolpimist) than other prescription medicines used for sleep. Pt was also informed of the following:  The Food and Drug Administration (FDA) is advising that rare but serious injuries have happened with certain common prescription insomnia medicines because of sleep behaviors, including sleepwalking, sleep driving, and engaging in other activities while not fully awake. These complex sleep behaviors have also resulted in deaths.   Serious injuries and death from complex sleep behaviors have occurred in patients with and without  a history of such behaviors, even at the lowest recommended doses, and the behaviors can occur after just one dose. These behaviors can occur after taking these medicines with or without alcohol or other central nervous system depressants that may be sedating such as tranquilizers, opioids, and anti-anxiety medicines. Other possible side effects of these medications include they may cause some people, especially older persons, to become drowsy, dizzy, lightheaded, clumsy or unsteady, or less alert than they are normally, which may lead to falls. Even though zolpidem is taken at bedtime, it may cause some people to feel drowsy or less alert on arising. Also, this medicine may cause double vision or other vision problems, or severe injuries (eg, hip fractures, severe bleeding in the head).  · Discussed with patient informed consent, risks vs. benefits, alternative treatments, side effect profile and the inherent unpredictability of individual responses to these treatments. The patient expresses understanding of the above and displays the capacity to agree with this current plan and had no other questions.  · Encouraged Patient to keep future appointments.   · Take medications as prescribed and abstain from substance abuse.   · Present to ED or call 911 for SI/HI plan or intent, psychosis, or medical emergency.      Return to Clinic: 3 months, or sooner if needed      Face-to-face time with the patient: 21 minutes  Total time:  30 minutes of total time spent on the encounter, which includes face to face time and non-face to face time preparing to see the patient (eg, review of tests), Obtaining and/or reviewing separately obtained history, Documenting clinical information in the electronic or other health record, Independently interpreting results (not separately reported) and communicating results to the patient/family/caregiver, or Care coordination (not separately reported).       Lisa Alejo, MSN, APRN,  PMHNP-BC [facility]

## 2022-05-04 ENCOUNTER — PATIENT MESSAGE (OUTPATIENT)
Dept: UROLOGY | Facility: CLINIC | Age: 57
End: 2022-05-04
Payer: COMMERCIAL

## 2022-05-05 ENCOUNTER — OFFICE VISIT (OUTPATIENT)
Dept: UROLOGY | Facility: CLINIC | Age: 57
End: 2022-05-05
Payer: COMMERCIAL

## 2022-05-05 ENCOUNTER — LAB VISIT (OUTPATIENT)
Dept: LAB | Facility: HOSPITAL | Age: 57
End: 2022-05-05
Attending: UROLOGY
Payer: COMMERCIAL

## 2022-05-05 VITALS
HEART RATE: 81 BPM | DIASTOLIC BLOOD PRESSURE: 87 MMHG | HEIGHT: 68 IN | SYSTOLIC BLOOD PRESSURE: 125 MMHG | WEIGHT: 197.56 LBS | BODY MASS INDEX: 29.94 KG/M2

## 2022-05-05 DIAGNOSIS — N13.8 BPH WITH URINARY OBSTRUCTION: ICD-10-CM

## 2022-05-05 DIAGNOSIS — A63.0 CONDYLOMA: ICD-10-CM

## 2022-05-05 DIAGNOSIS — I10 ESSENTIAL HYPERTENSION: ICD-10-CM

## 2022-05-05 DIAGNOSIS — N52.01 ERECTILE DYSFUNCTION DUE TO ARTERIAL INSUFFICIENCY: Primary | ICD-10-CM

## 2022-05-05 DIAGNOSIS — N40.1 BPH WITH URINARY OBSTRUCTION: ICD-10-CM

## 2022-05-05 DIAGNOSIS — E78.00 HYPERCHOLESTEREMIA: ICD-10-CM

## 2022-05-05 LAB — COMPLEXED PSA SERPL-MCNC: 1.3 NG/ML (ref 0–4)

## 2022-05-05 PROCEDURE — 99214 OFFICE O/P EST MOD 30 MIN: CPT | Mod: S$GLB,,, | Performed by: UROLOGY

## 2022-05-05 PROCEDURE — 99999 PR PBB SHADOW E&M-EST. PATIENT-LVL IV: CPT | Mod: PBBFAC,,, | Performed by: UROLOGY

## 2022-05-05 PROCEDURE — 84153 ASSAY OF PSA TOTAL: CPT | Performed by: UROLOGY

## 2022-05-05 PROCEDURE — 36415 COLL VENOUS BLD VENIPUNCTURE: CPT | Performed by: UROLOGY

## 2022-05-05 PROCEDURE — 99999 PR PBB SHADOW E&M-EST. PATIENT-LVL IV: ICD-10-PCS | Mod: PBBFAC,,, | Performed by: UROLOGY

## 2022-05-05 PROCEDURE — 99214 PR OFFICE/OUTPT VISIT, EST, LEVL IV, 30-39 MIN: ICD-10-PCS | Mod: S$GLB,,, | Performed by: UROLOGY

## 2022-05-05 RX ORDER — TAMSULOSIN HYDROCHLORIDE 0.4 MG/1
0.8 CAPSULE ORAL DAILY
Qty: 60 CAPSULE | Refills: 11 | Status: SHIPPED | OUTPATIENT
Start: 2022-05-05 | End: 2023-05-05

## 2022-05-05 RX ORDER — PODOFILOX 5 MG/G
GEL TOPICAL 2 TIMES DAILY
Qty: 3.5 G | Refills: 1 | Status: SHIPPED | OUTPATIENT
Start: 2022-05-05 | End: 2022-08-01

## 2022-05-05 NOTE — PROGRESS NOTES
CHIEF COMPLAINT:    Mr. Santos is a 57 y.o. male presenting with LUTS.    PRESENTING ILLNESS:    Tyrone Santos is a 57 y.o. male who c/o LUTS.  He has nocturia x 2, + decreased FOS, + straining, + intermittency, + frequency.  Was started on flomax with some improvement in his symptoms, but they have recently worsened.    He has ED.  His T is normal.  He is on lexapro. He's tried and failed Viagra and cialis.    REVIEW OF SYSTEMS:    Tyrone Santos denies headache, blurred vision, fever, nausea, vomiting, chills, abdominal pain, chest pain, sore throat, bleeding per rectum, cough, SOB, recent loss of consciousness, recent mental status changes, seizures, dizziness, or upper or lower extremity weakness.    RICCO  1. 1  2. 2  3. 1  4. 1  5. 1      PATIENT HISTORY:    Past Medical History:   Diagnosis Date    Addiction to drug     Alcohol abuse     Anxiety     Bipolar disorder     Depression     Difficulty urinating     GERD (gastroesophageal reflux disease)     Hx of psychiatric care     Hyperlipidemia     Hypertension     Ade     Psychiatric problem     S/P cervical spinal fusion 12/6/2018    S/P laparoscopic appendectomy 1/3/2017    Therapy     Thyroid disease        Past Surgical History:   Procedure Laterality Date    ANTERIOR CERVICAL DISCECTOMY W/ FUSION N/A 10/23/2018    Procedure: DISCECTOMY, SPINE, CERVICAL, ANTERIOR APPROACH, WITH FUSION C3-4;  Surgeon: Jw Anne MD;  Location: [address];  Service: Neurosurgery;  Laterality: N/A;    APPENDECTOMY      around 2012    ARTHROPLASTY OF HIP BY ANTERIOR APPROACH Left 8/23/2021    Procedure: ARTHROPLASTY, HIP, TOTAL, ANTERIOR APPROACH:LEFT:DPEUY-ACTIS+PINNACLE;  Surgeon: Ángel Lam III, MD;  Location: [facility] OR;  Service: Orthopedics;  Laterality: Left;    INJECTION OF ANESTHETIC AGENT AROUND NERVE Left 7/1/2021    Procedure: BLOCK, NERVE, OBTURATOR and FEMORAL;  Surgeon: Josefina Lee MD;  Location: [facility] PAIN  MGT;  Service: Pain Management;  Laterality: Left;    SCAPHOID FRACTURE SURGERY      had to have a bone graft -early       WRIST SURGERY      6 years ago       Family History   Problem Relation Age of Onset    Alcohol abuse Mother     Alcohol abuse Maternal Uncle     Heart disease Father     No Known Problems Sister     No Known Problems Brother     No Known Problems Son     No Known Problems Sister     Cancer Sister     No Known Problems Brother     No Known Problems Brother        Social History     Socioeconomic History    Marital status:     Number of children: 1   Occupational History    Occupation:    Tobacco Use    Smoking status: Former Smoker     Quit date:      Years since quittin.3    Smokeless tobacco: Former User   Substance and Sexual Activity    Alcohol use: Not Currently    Drug use: Not Currently     Types: Marijuana, Methamphetamines    Sexual activity: Never       Allergies:  Codeine, Seroquel [quetiapine], and Tizanidine    Medications:    Current Outpatient Medications:     aspirin 81 mg Cap, 1 tablet 2 TIMES DAILY (route: oral), Disp: , Rfl:     busPIRone (BUSPAR) 15 MG tablet, Take 2 tablets po q am, 1 tablet po at noon and 1 tablet po q hs, Disp: 360 tablet, Rfl: 0    calcium-vitamin D3 (OS-JALYN 500 + D3) 500 mg(1,250mg) -200 unit per tablet, Take 1 tablet by mouth 2 (two) times daily with meals., Disp: , Rfl:     cyanocobalamin (VITAMIN B-12) 100 MCG tablet, Take 100 mcg by mouth once daily., Disp: , Rfl:     cyclobenzaprine (FLEXERIL) 5 MG tablet, cyclobenzaprine 5 mg tablet  TAKE 1 TABLET BY MOUTH 3 TIMES DAILY AS NEEDED., Disp: , Rfl:     EScitalopram oxalate (LEXAPRO) 10 MG tablet, Take 1 tablet (10 mg total) by mouth once daily., Disp: 90 tablet, Rfl: 0    eszopiclone (LUNESTA) 2 MG Tab, Take 1 tablet (2 mg total) by mouth nightly as needed (insomnia)., Disp: 90 tablet, Rfl: 0    folic acid (FOLVITE) 1 MG tablet, Take 1 mg by mouth  once daily., Disp: , Rfl:     gabapentin (NEURONTIN) 400 MG capsule, Take 1 capsule (400 mg total) by mouth 3 (three) times daily., Disp: 90 capsule, Rfl: 11    hydroCHLOROthiazide (HYDRODIURIL) 25 MG tablet, Take 25 mg by mouth once daily., Disp: , Rfl:     lamoTRIgine (LAMICTAL) 100 MG tablet, Take 1 tablet (100 mg total) by mouth once daily., Disp: 90 tablet, Rfl: 0    levothyroxine (SYNTHROID) 25 MCG tablet, Take 25 mcg by mouth once daily., Disp: , Rfl:     omeprazole (PRILOSEC) 40 MG capsule, Take 40 mg by mouth once daily., Disp: , Rfl:     rosuvastatin (CRESTOR) 10 MG tablet, rosuvastatin 10 mg tablet  TAKE 1 TABLET BY MOUTH EVERY DAY, Disp: , Rfl:     tadalafiL (CIALIS) 20 MG Tab, Take 1 tablet (20 mg total) by mouth once daily. Take 1 hour before intercourse, Disp: 10 tablet, Rfl: 11    tamsulosin (FLOMAX) 0.4 mg Cap, Take 1 capsule (0.4 mg total) by mouth once daily., Disp: 30 capsule, Rfl: 11    PHYSICAL EXAMINATION:    The patient generally appears in good health, is appropriately interactive, and is in no apparent distress.     Eyes: anicteric sclerae, moist conjunctivae; no lid-lag; PERRLA     HENT: Atraumatic; oropharynx clear with moist mucous membranes and no mucosal ulcerations;normal hard and soft palate.  No evidence of lymphadenopathy.    Neck: Trachea midline.  No thyromegaly.    Musculoskeletal: No abnormal gait.    Skin: No lesions.    Mental: Cooperative with normal affect.  Is oriented to time, place, and person.    Neuro: Grossly intact.    Chest: Normal inspiratory effort.   No accessory muscles.  No audible wheezes.  Respirations symmetric on inspiration and expiration.    Heart: Regular rhythm.      Abdomen:  Soft, non-tender. No masses or organomegaly. Bladder is not palpable. No evidence of flank discomfort. No evidence of inguinal hernia.    Genitourinary: The penis is circumcised with no evidence of plaques or induration. The urethral meatus is normal. The testes,  epididymides, and cord structures are normal in size and contour bilaterally. The scrotum is normal in size and contour. + condyloma    Normal anal sphincter tone. No rectal mass.    The prostate is 40 g. Normal landmarks. Lateral sulci. Median furrow intact.  No nodularity or induration. Seminal vesicles are normal.    Extremities: No clubbing, cyanosis, or edema      LABS:    Lab Results   Component Value Date    PSADIAG 1.1 03/31/2021       IMPRESSION:    Encounter Diagnoses   Name Primary?    Erectile dysfunction due to arterial insufficiency Yes    BPH with urinary obstruction     Essential hypertension     Hypercholesteremia    HTN, stable  Hyperlipidemia, stable      PLAN:    1. Will draw a PSA as he's past due.  2.  Discussed options for his LUTS.  He'd like to consider rezum, but he's about to switch insurances.  3. Will increase his flomax to BID. Side effects discussed.  A new Rx was given   4. Discussed options for his ED (affected by above comorbidities).  He'd like to observe it for now.  5. Will use condylox for the condyloma. Side effects discussed.  A new Rx was given   6. RTC 3 months when he has new insurance to discuss rezum.      Copy to:

## 2022-05-14 ENCOUNTER — PATIENT MESSAGE (OUTPATIENT)
Dept: UROLOGY | Facility: CLINIC | Age: 57
End: 2022-05-14
Payer: COMMERCIAL

## 2022-05-20 ENCOUNTER — PATIENT MESSAGE (OUTPATIENT)
Dept: ADMINISTRATIVE | Facility: OTHER | Age: 57
End: 2022-05-20
Payer: COMMERCIAL

## 2022-05-24 ENCOUNTER — PATIENT MESSAGE (OUTPATIENT)
Dept: ORTHOPEDICS | Facility: CLINIC | Age: 57
End: 2022-05-24
Payer: COMMERCIAL

## 2022-06-24 ENCOUNTER — PATIENT MESSAGE (OUTPATIENT)
Dept: ORTHOPEDICS | Facility: CLINIC | Age: 57
End: 2022-06-24
Payer: COMMERCIAL

## 2022-06-28 DIAGNOSIS — G47.00 INSOMNIA, UNSPECIFIED TYPE: Primary | ICD-10-CM

## 2022-06-28 RX ORDER — TRAZODONE HYDROCHLORIDE 50 MG/1
TABLET ORAL
Qty: 60 TABLET | Refills: 1 | Status: SHIPPED | OUTPATIENT
Start: 2022-06-28 | End: 2022-09-01

## 2022-07-05 ENCOUNTER — PATIENT MESSAGE (OUTPATIENT)
Dept: ORTHOPEDICS | Facility: CLINIC | Age: 57
End: 2022-07-05
Payer: MEDICAID

## 2022-07-06 ENCOUNTER — PATIENT MESSAGE (OUTPATIENT)
Dept: ADMINISTRATIVE | Facility: OTHER | Age: 57
End: 2022-07-06
Payer: MEDICAID

## 2022-07-06 ENCOUNTER — NURSE TRIAGE (OUTPATIENT)
Dept: ADMINISTRATIVE | Facility: CLINIC | Age: 57
End: 2022-07-06
Payer: MEDICAID

## 2022-07-06 ENCOUNTER — HOSPITAL ENCOUNTER (EMERGENCY)
Facility: HOSPITAL | Age: 57
Discharge: HOME | End: 2022-07-07
Attending: EMERGENCY MEDICINE
Payer: MEDICAID

## 2022-07-06 VITALS
HEIGHT: 68 IN | OXYGEN SATURATION: 98 % | BODY MASS INDEX: 30.31 KG/M2 | DIASTOLIC BLOOD PRESSURE: 86 MMHG | TEMPERATURE: 99 F | RESPIRATION RATE: 20 BRPM | SYSTOLIC BLOOD PRESSURE: 148 MMHG | WEIGHT: 200 LBS | HEART RATE: 82 BPM

## 2022-07-06 DIAGNOSIS — R07.9 CHEST PAIN: ICD-10-CM

## 2022-07-06 DIAGNOSIS — U07.1 COVID-19: Primary | ICD-10-CM

## 2022-07-06 DIAGNOSIS — R06.02 SOB (SHORTNESS OF BREATH): ICD-10-CM

## 2022-07-06 LAB
ANION GAP SERPL CALC-SCNC: 9 MMOL/L (ref 8–16)
BASOPHILS # BLD AUTO: 0.03 K/UL (ref 0–0.2)
BASOPHILS NFR BLD: 0.9 % (ref 0–1.9)
BUN SERPL-MCNC: 8 MG/DL (ref 6–20)
CALCIUM SERPL-MCNC: 9.3 MG/DL (ref 8.7–10.5)
CHLORIDE SERPL-SCNC: 104 MMOL/L (ref 95–110)
CO2 SERPL-SCNC: 25 MMOL/L (ref 23–29)
CREAT SERPL-MCNC: 1.1 MG/DL (ref 0.5–1.4)
CTP QC/QA: YES
DIFFERENTIAL METHOD: ABNORMAL
EOSINOPHIL # BLD AUTO: 0 K/UL (ref 0–0.5)
EOSINOPHIL NFR BLD: 0 % (ref 0–8)
ERYTHROCYTE [DISTWIDTH] IN BLOOD BY AUTOMATED COUNT: 13.2 % (ref 11.5–14.5)
EST. GFR  (AFRICAN AMERICAN): >60 ML/MIN/1.73 M^2
EST. GFR  (NON AFRICAN AMERICAN): >60 ML/MIN/1.73 M^2
GLUCOSE SERPL-MCNC: 101 MG/DL (ref 70–110)
HCT VFR BLD AUTO: 42 % (ref 40–54)
HGB BLD-MCNC: 13.2 G/DL (ref 14–18)
IMM GRANULOCYTES # BLD AUTO: 0.01 K/UL (ref 0–0.04)
IMM GRANULOCYTES NFR BLD AUTO: 0.3 % (ref 0–0.5)
LYMPHOCYTES # BLD AUTO: 0.9 K/UL (ref 1–4.8)
LYMPHOCYTES NFR BLD: 26.3 % (ref 18–48)
MCH RBC QN AUTO: 28.5 PG (ref 27–31)
MCHC RBC AUTO-ENTMCNC: 31.4 G/DL (ref 32–36)
MCV RBC AUTO: 91 FL (ref 82–98)
MONOCYTES # BLD AUTO: 0.6 K/UL (ref 0.3–1)
MONOCYTES NFR BLD: 17.8 % (ref 4–15)
NEUTROPHILS # BLD AUTO: 1.8 K/UL (ref 1.8–7.7)
NEUTROPHILS NFR BLD: 54.7 % (ref 38–73)
NRBC BLD-RTO: 0 /100 WBC
PLATELET # BLD AUTO: 276 K/UL (ref 150–450)
PMV BLD AUTO: 9.5 FL (ref 9.2–12.9)
POTASSIUM SERPL-SCNC: 4.3 MMOL/L (ref 3.5–5.1)
RBC # BLD AUTO: 4.63 M/UL (ref 4.6–6.2)
SARS-COV-2 RDRP RESP QL NAA+PROBE: POSITIVE
SODIUM SERPL-SCNC: 138 MMOL/L (ref 136–145)
TROPONIN I SERPL DL<=0.01 NG/ML-MCNC: <0.006 NG/ML (ref 0–0.03)
WBC # BLD AUTO: 3.31 K/UL (ref 3.9–12.7)

## 2022-07-06 PROCEDURE — 99285 EMERGENCY DEPT VISIT HI MDM: CPT | Mod: 25

## 2022-07-06 PROCEDURE — 99284 EMERGENCY DEPT VISIT MOD MDM: CPT | Mod: CR,CS,, | Performed by: EMERGENCY MEDICINE

## 2022-07-06 PROCEDURE — 93010 ELECTROCARDIOGRAM REPORT: CPT | Mod: ,,, | Performed by: INTERNAL MEDICINE

## 2022-07-06 PROCEDURE — 85025 COMPLETE CBC W/AUTO DIFF WBC: CPT | Performed by: EMERGENCY MEDICINE

## 2022-07-06 PROCEDURE — 25000242 PHARM REV CODE 250 ALT 637 W/ HCPCS: Performed by: EMERGENCY MEDICINE

## 2022-07-06 PROCEDURE — 63600175 PHARM REV CODE 636 W HCPCS: Performed by: EMERGENCY MEDICINE

## 2022-07-06 PROCEDURE — 96372 THER/PROPH/DIAG INJ SC/IM: CPT | Performed by: EMERGENCY MEDICINE

## 2022-07-06 PROCEDURE — U0002 COVID-19 LAB TEST NON-CDC: HCPCS | Performed by: EMERGENCY MEDICINE

## 2022-07-06 PROCEDURE — 93010 EKG 12-LEAD: ICD-10-PCS | Mod: ,,, | Performed by: INTERNAL MEDICINE

## 2022-07-06 PROCEDURE — 84484 ASSAY OF TROPONIN QUANT: CPT | Performed by: EMERGENCY MEDICINE

## 2022-07-06 PROCEDURE — 93005 ELECTROCARDIOGRAM TRACING: CPT

## 2022-07-06 PROCEDURE — 80048 BASIC METABOLIC PNL TOTAL CA: CPT | Performed by: EMERGENCY MEDICINE

## 2022-07-06 PROCEDURE — 99284 PR EMERGENCY DEPT VISIT,LEVEL IV: ICD-10-PCS | Mod: CR,CS,, | Performed by: EMERGENCY MEDICINE

## 2022-07-06 RX ORDER — KETOROLAC TROMETHAMINE 30 MG/ML
15 INJECTION, SOLUTION INTRAMUSCULAR; INTRAVENOUS
Status: COMPLETED | OUTPATIENT
Start: 2022-07-06 | End: 2022-07-06

## 2022-07-06 RX ORDER — ALBUTEROL SULFATE 90 UG/1
2 AEROSOL, METERED RESPIRATORY (INHALATION) EVERY 6 HOURS PRN
Status: DISCONTINUED | OUTPATIENT
Start: 2022-07-06 | End: 2022-07-07 | Stop reason: HOSPADM

## 2022-07-06 RX ADMIN — ALBUTEROL SULFATE 2 PUFF: 108 INHALANT RESPIRATORY (INHALATION) at 10:07

## 2022-07-06 RX ADMIN — KETOROLAC TROMETHAMINE 15 MG: 30 INJECTION, SOLUTION INTRAMUSCULAR; INTRAVENOUS at 10:07

## 2022-07-07 ENCOUNTER — PATIENT MESSAGE (OUTPATIENT)
Dept: ORTHOPEDICS | Facility: CLINIC | Age: 57
End: 2022-07-07
Payer: MEDICAID

## 2022-07-07 DIAGNOSIS — U07.1 COVID-19 VIRUS DETECTED: ICD-10-CM

## 2022-07-07 NOTE — PROVIDER PROGRESS NOTES - EMERGENCY DEPT.
Encounter Date: 7/6/2022    ED Physician Progress Notes         EKG - STEMI Decision  Initial Reading: No STEMI present.  Response: No Action Needed.

## 2022-07-07 NOTE — FIRST PROVIDER EVALUATION
Emergency Department TeleTriage Encounter Note      CHIEF COMPLAINT    Chief Complaint   Patient presents with    COVID+     Pt c/o rhinitis, body aches, chills, fevers, cough, CP hurting worse with deep breathing and coughing and SOB worsening since last night. He tested positive for COVID this am at home. Last tylenol at 1 pm.        VITAL SIGNS   Initial Vitals [07/06/22 2025]   BP Pulse Resp Temp SpO2   133/89 81 20 99.1 °F (37.3 °C) 99 %      MAP       --            ALLERGIES    Review of patient's allergies indicates:   Allergen Reactions    Codeine Nausea Only    Seroquel [quetiapine]      Heart racing     Tizanidine Other (See Comments)     Caused increased pain and muscle pain       PROVIDER TRIAGE NOTE  57-year-old male to the ER for evaluation of shortness of breath with associated productive cough.  Patient reports a positive at home COVID test today.  Afebrile here.  Oxygen 99% at rest.      ORDERS  Labs Reviewed - No data to display    ED Orders (720h ago, onward)    Start Ordered     Status Ordering Provider    07/06/22 2126 07/06/22 2125  X-Ray Chest AP Portable  1 time imaging         Ordered BALDO GAINES    07/06/22 2126 07/06/22 2125  EKG 12-lead  Once         Ordered BALDO GAINES    07/06/22 2049 07/06/22 2048  EKG 12-lead  Once         Completed by IZABELA BROWN on 7/6/2022 at  8:54 PM PARUL PEARL            Virtual Visit Note: The provider triage portion of this emergency department evaluation and documentation was performed via [employer], a HIPAA-compliant telemedicine application, in concert with a tele-presenter in the room. A face to face patient evaluation with one of my colleagues will occur once the patient is placed in an emergency department room.      DISCLAIMER: This note was prepared with [employer] voice recognition transcription software. Garbled syntax, mangled pronouns, and other bizarre constructions may be attributed to that software system.

## 2022-07-07 NOTE — ED NOTES
Pt. dc'd home all dc information reviewed with Pt. Verbalized understanding. Assisted Pt. To exit via wheel chair

## 2022-07-07 NOTE — TELEPHONE ENCOUNTER
Pt calling stating that he took at home COVID test and it was +. Reports fever of 102F, SOB, muscle and body aches, and HA. Reports SOB when sitting and chest pain when breathing. Per protocol advised to ED NOW. verbalized understanding. Denies any further questions or concerns at this time, advised to call back if they have any that come up. Advised pt to call back with any other concerns or worsening symptoms. Verbalized understanding and will route message to provider.       Reason for Disposition   SEVERE or constant chest pain or pressure  (Exception: Mild central chest pain, present only when coughing.)    Additional Information   Negative: SEVERE difficulty breathing (e.g., struggling for each breath, speaks in single words)   Negative: Difficult to awaken or acting confused (e.g., disoriented, slurred speech)   Negative: Bluish (or gray) lips or face now   Negative: Shock suspected (e.g., cold/pale/clammy skin, too weak to stand, low BP, rapid pulse)   Negative: Sounds like a life-threatening emergency to the triager    Protocols used: CORONAVIRUS (COVID-19) DIAGNOSED OR [id number]

## 2022-07-07 NOTE — DISCHARGE INSTRUCTIONS
You may use your albuterol inhaler, 1-2 puffs every 4 hours as needed for shortness of breath, chest tightness.     Please complete the entire course of Paxlovid as prescribed. Please note that this medication can cause a metallic taste in your mouth, and DO NOT take Cialis while taking the Paxlovid.     You may take Tylenol (acetaminophen) 1-2 tabs, every 4- hours, as needed for pain to a MAXIMUM of 3,000 mg in 24 hours.     You may take Ibuprofen, 200-600 mg every 4-6 hours as needed for pain. This can be very irritating to your stomach, so we recommend you take this with food or with an antacid.     Return to the ED immediately for any new chest pain, increased shortness of breath, severe abdominal pain, abdominal pain that is constant, nausea/and vomiting that does not stop on its own, severe headache, new numbness, tingling, or weakness anywhere, fever greater than 101F or any additional concerns.

## 2022-07-07 NOTE — ED NOTES
"Tyrone Santos, an 57 y.o. male presents to the ED complaining of chills, body aches, sore throat, and SOB that is worsening with deep breaths. Took two doses of Tylenol today that did not help his pain. States he is "scared to close his eyes and not wake up".      LOC: The patient is awake, alert and aware of environment with an appropriate affect, the patient is oriented x 3 and speaking appropriately.   APPEARANCE: Patient appears comfortable and in no acute distress, patient is clean and well groomed.  SKIN: The skin is warm and dry, color consistent with ethnicity.   MUSCULOSKELETAL: Patient moving all extremities spontaneously, no swelling noted.  RESPIRATORY: Airway is open and patent, respirations are spontaneous, patient has a normal effort and rate, no accessory muscle use noted.  CARDIAC: Patient has a normal rate and regular rhythm, no edema noted, capillary refill < 3 seconds.   GASTRO: Soft and non tender to palpation, no distention noted.   : Pt denies any pain or frequency with urination.  NEURO: Pt opens eyes spontaneously, behavior appropriate to situation, follows commands.        Chief Complaint   Patient presents with    COVID+     Pt c/o rhinitis, body aches, chills, fevers, cough, CP hurting worse with deep breathing and coughing and SOB worsening since last night. He tested positive for COVID this am at home. Last tylenol at 1 pm.      Review of patient's allergies indicates:   Allergen Reactions    Codeine Nausea Only    Seroquel [quetiapine]      Heart racing     Tizanidine Other (See Comments)     Caused increased pain and muscle pain     Past Medical History:   Diagnosis Date    Addiction to drug     Alcohol abuse     Anxiety     Bipolar disorder     Depression     Difficulty urinating     GERD (gastroesophageal reflux disease)     Hx of psychiatric care     Hyperlipidemia     Hypertension     Ade     Psychiatric problem     S/P cervical spinal fusion 12/6/2018    " S/P laparoscopic appendectomy 1/3/2017    Therapy     Thyroid disease

## 2022-07-08 ENCOUNTER — PATIENT MESSAGE (OUTPATIENT)
Dept: ORTHOPEDICS | Facility: CLINIC | Age: 57
End: 2022-07-08
Payer: MEDICAID

## 2022-07-08 DIAGNOSIS — M25.551 RIGHT HIP PAIN: Primary | ICD-10-CM

## 2022-07-08 NOTE — ED PROVIDER NOTES
Encounter Date: 7/6/2022       History     Chief Complaint   Patient presents with    COVID+     Pt c/o rhinitis, body aches, chills, fevers, cough, CP hurting worse with deep breathing and coughing and SOB worsening since last night. He tested positive for COVID this am at home. Last tylenol at 1 pm.      HPI   Patient is a 57-year-old male with a past medical history of polysubstance abuse, hypertension, hyperlipidemia, MHI who presents the emergency department with rhinorrhea, myalgias, fever chills, cough not productive of sputum, chest tightness worse with deep inspiration x2 days.  The patient explains that he did test positive for COVID at home this morning.  Patient denies any lower extremity edema, no nausea vomiting diarrhea.    Review of patient's allergies indicates:   Allergen Reactions    Codeine Nausea Only    Seroquel [quetiapine]      Heart racing     Tizanidine Other (See Comments)     Caused increased pain and muscle pain     Past Medical History:   Diagnosis Date    Addiction to drug     Alcohol abuse     Anxiety     Bipolar disorder     Depression     Difficulty urinating     GERD (gastroesophageal reflux disease)     Hx of psychiatric care     Hyperlipidemia     Hypertension     Ade     Psychiatric problem     S/P cervical spinal fusion 12/6/2018    S/P laparoscopic appendectomy 1/3/2017    Therapy     Thyroid disease      Past Surgical History:   Procedure Laterality Date    ANTERIOR CERVICAL DISCECTOMY W/ FUSION N/A 10/23/2018    Procedure: DISCECTOMY, SPINE, CERVICAL, ANTERIOR APPROACH, WITH FUSION C3-4;  Surgeon: Jw Anne MD;  Location: [facility] OR [address] [place] [address];  Service: Neurosurgery;  Laterality: N/A;    APPENDECTOMY      around 2012    ARTHROPLASTY OF HIP BY ANTERIOR APPROACH Left 8/23/2021    Procedure: ARTHROPLASTY, HIP, TOTAL, ANTERIOR APPROACH:LEFT:DPEUY-ACTIS+PINNACLE;  Surgeon: Ángel Lam III, MD;  Location: [facility] OR;  Service: Orthopedics;   Laterality: Left;    INJECTION OF ANESTHETIC AGENT AROUND NERVE Left 2021    Procedure: BLOCK, NERVE, OBTURATOR and FEMORAL;  Surgeon: Josefina Lee MD;  Location: [facility];  Service: Pain Management;  Laterality: Left;    SCAPHOID FRACTURE SURGERY      had to have a bone graft -early       WRIST SURGERY      6 years ago     Family History   Problem Relation Age of Onset    Alcohol abuse Mother     Alcohol abuse Maternal Uncle     Heart disease Father     No Known Problems Sister     No Known Problems Brother     No Known Problems Son     No Known Problems Sister     Cancer Sister     No Known Problems Brother     No Known Problems Brother      Social History     Tobacco Use    Smoking status: Former Smoker     Quit date:      Years since quittin.5    Smokeless tobacco: Former User   Substance Use Topics    Alcohol use: Not Currently    Drug use: Not Currently     Types: Marijuana, Methamphetamines     Review of Systems  10 point review of systems reviewed with patient otherwise negative.     Physical Exam     Initial Vitals [22]   BP Pulse Resp Temp SpO2   133/89 81 20 99.1 °F (37.3 °C) 99 %      MAP       --         Physical Exam  Physical Exam     Nursing note and vitals reviewed.  Constitutional: Patient appears well-developed and well-nourished. No distress.   HENT:   Head: Normocephalic and atraumatic.   Eyes: Conjunctivae and EOM are normal. Pupils are equal, round, and reactive to light.   Neck: Neck supple.   Normal range of motion.  Cardiovascular: Normal rate, regular rhythm, normal heart sounds and intact distal pulses.   Pulmonary/Chest: Breath sounds normal.   Abdominal: Abdomen is soft. Patient exhibits no distension. There is no abdominal tenderness.   Musculoskeletal:      Cervical back: Normal range of motion and neck supple.   Neurological: Patient is alert and oriented to person, place, and time. No cranial nerve deficit. Gait normal. GCS  score is 15.    Skin: Skin is warm and dry.  Psych: Normal mood/affect    ED Course   Procedures  Labs Reviewed   CBC W/ AUTO DIFFERENTIAL - Abnormal; Notable for the following components:       Result Value    WBC 3.31 (*)     Hemoglobin 13.2 (*)     MCHC 31.4 (*)     Lymph # 0.9 (*)     Mono % 17.8 (*)     All other components within normal limits   SARS-COV-2 RDRP GENE - Abnormal; Notable for the following components:    POC Rapid COVID Positive (*)     All other components within normal limits   BASIC METABOLIC PANEL   TROPONIN I        ECG Results          EKG 12-lead (Final result)  Result time 07/07/22 16:01:00    Final result by Interface, Lab In [facility] (07/07/22 16:01:00)                 Narrative:    Test Reason : COVID    Vent. Rate : 065 BPM     Atrial Rate : 065 BPM     P-R Int : 178 ms          QRS Dur : 088 ms      QT Int : 396 ms       P-R-T Axes : 040 054 048 degrees     QTc Int : 411 ms    Normal sinus rhythm  Normal ECG  When compared with ECG of 18-AUG-2021 12:03,  No significant change was found  Confirmed by ALYSSA EVANS MD ([id number]) on 7/7/2022 4:00:51 PM    Referred By: [email or web address]   SELF           Confirmed By:ALYSSA EVANS MD                            Imaging Results          X-Ray Chest AP Portable (Final result)  Result time 07/06/22 23:42:31    Final result by Kranthi Antunez MD (07/06/22 23:42:31)                 Impression:      No evidence of acute chest disease radiographically.  Is      Electronically signed by: Kranthi Antunez  Date:    07/06/2022  Time:    23:42             Narrative:    EXAMINATION:  XR CHEST AP PORTABLE    CLINICAL HISTORY:  Shortness of breath    TECHNIQUE:  Single frontal view of the chest was performed.    COMPARISON:  None    FINDINGS:  The heart is not enlarged and the trachea is midline.  Clear.  Bony thorax is intact.                                 Medications   ketorolac injection 15 mg (15 mg Intramuscular Given 7/6/22 2225)     Trop neg  EKG: No  evidence of ischemia, nor dysrhythmia  CXR: I have personally reviewed the CXR. No evidence of consolidation, cardiomegaly, pulmonary edema, pneumothroax  COVID+     ED Course:   Patient with normal vital signs throughout ED stay, symptoms significantly improved with albuterol MDI and IM ketorolac.    Discussed with patient that symptoms are most consistent with known COVID 19. On review of patient's laboratory work symptoms and home medications the patient is a candidate for Paxlovid.  Josefina the patient while he is taking the medication he cannot take today his Cialis because of significant drug drug interaction.  Patient notes that he does not take this medication anymore.    Patient was discharged home with albuterol MDI, Paxlovid, PMD follow up as needed with strict return precautions        Clinical Impression:   Final diagnoses:  [R07.9] Chest pain  [R06.02] SOB (shortness of breath)  [U07.1] COVID-19 (Primary)          ED Disposition Condition    Discharge Stable        ED Prescriptions     Medication Sig Dispense Start Date End Date Auth. Provider    nirmatrelvir-ritonavir 150 mg x 2- 100 mg copackaged tablets (EUA) Take 3 tablets by mouth 2 (two) times daily for 5 days. Each dose contains 2 nirmatrelvir (pink tablets) and 1 ritonavir (white tablet). Take all 3 tablets together 30 tablet 7/7/2022 7/12/2022 Ana Jordan MD        Follow-up Information    None          Ana Jordan MD  07/08/22 [id number]

## 2022-07-13 ENCOUNTER — TELEPHONE (OUTPATIENT)
Dept: ORTHOPEDICS | Facility: CLINIC | Age: 57
End: 2022-07-13
Payer: MEDICAID

## 2022-07-13 NOTE — TELEPHONE ENCOUNTER
I called and spoke to the patient to confirm his xray appointment at . The patient verbalized understanding and has no further questions.

## 2022-07-14 ENCOUNTER — OFFICE VISIT (OUTPATIENT)
Dept: ORTHOPEDICS | Facility: CLINIC | Age: 57
End: 2022-07-14
Payer: COMMERCIAL

## 2022-07-14 ENCOUNTER — HOSPITAL ENCOUNTER (OUTPATIENT)
Dept: RADIOLOGY | Facility: HOSPITAL | Age: 57
Discharge: HOME | End: 2022-07-14
Attending: ORTHOPAEDIC SURGERY
Payer: COMMERCIAL

## 2022-07-14 VITALS — HEIGHT: 68 IN | WEIGHT: 187.06 LBS | BODY MASS INDEX: 28.35 KG/M2

## 2022-07-14 DIAGNOSIS — M87.051 AVASCULAR NECROSIS OF HIP, RIGHT: Primary | ICD-10-CM

## 2022-07-14 DIAGNOSIS — M25.551 RIGHT HIP PAIN: ICD-10-CM

## 2022-07-14 DIAGNOSIS — Z96.642 PRESENCE OF LEFT ARTIFICIAL HIP JOINT: ICD-10-CM

## 2022-07-14 PROCEDURE — 99999 PR PBB SHADOW E&M-EST. PATIENT-LVL III: CPT | Mod: PBBFAC,,, | Performed by: ORTHOPAEDIC SURGERY

## 2022-07-14 PROCEDURE — 73502 X-RAY EXAM HIP UNI 2-3 VIEWS: CPT | Mod: 26,RT,, | Performed by: RADIOLOGY

## 2022-07-14 PROCEDURE — 3008F PR BODY MASS INDEX (BMI) DOCUMENTED: ICD-10-PCS | Mod: CPTII,,, | Performed by: ORTHOPAEDIC SURGERY

## 2022-07-14 PROCEDURE — 73502 X-RAY EXAM HIP UNI 2-3 VIEWS: CPT | Mod: TC,RT

## 2022-07-14 PROCEDURE — 99215 PR OFFICE/OUTPT VISIT, EST, LEVL V, 40-54 MIN: ICD-10-PCS | Mod: S$PBB,,, | Performed by: ORTHOPAEDIC SURGERY

## 2022-07-14 PROCEDURE — 3008F BODY MASS INDEX DOCD: CPT | Mod: CPTII,,, | Performed by: ORTHOPAEDIC SURGERY

## 2022-07-14 PROCEDURE — 73502 XR HIP WITH PELVIS WHEN PERFORMED, 2 OR 3  VIEWS RIGHT: ICD-10-PCS | Mod: 26,RT,, | Performed by: RADIOLOGY

## 2022-07-14 PROCEDURE — 99215 OFFICE O/P EST HI 40 MIN: CPT | Mod: S$PBB,,, | Performed by: ORTHOPAEDIC SURGERY

## 2022-07-14 PROCEDURE — 99999 PR PBB SHADOW E&M-EST. PATIENT-LVL III: ICD-10-PCS | Mod: PBBFAC,,, | Performed by: ORTHOPAEDIC SURGERY

## 2022-07-14 NOTE — PROGRESS NOTES
Subjective:     HPI:   Tyrone Santos is a 57 y.o. 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  He has had progressive pain now moderate to severe and C sign distribution around the hip he says this feels just like his left hip did prior to hip replacement.  It is activity related and relieved with rest.  He has had to stop working.     To review:   Medications: Percocet (10-325mg) TID from pain management in the past, off now after L NATALIYA, marco antonio, mobic     Injections: 7/1/2021 Left Hip Genicular Nerve Block with Dr. Lee, did not provide any relief in the area where he was having pain.     Physical Therapy: None, not indicated for AVN with collapse, would make pain/symptoms worse     Assistive Devices: Walker and Cane (for short distances)     Walking: < 1 block      Limitations: General walking, difficulty rising from sitting and taking his first step, socks and shoes, ADLs        Occupation: The patient worked  for [employer]. 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 Got back to work after L NATALIYA but now not able to work due to pain/limitations from R hip     Social support: The patient stated that he lives at home with his fiance. The patient stated that his fiance would be able to help take care of him if he were to have surgery.          Objective:   Body mass index is 28.44 kg/m².  Exam:    Physical examination included assessment of the patient's general appearance with particular attention to development, nutrition, body habitus, attention to grooming, and any evidence of distress.  Constitutional: The patient is a well-developed, well-nourished patient in no acute distress.   Cardiovascular: Vascular examination included warmth and capillary refill as well inspection for edema and assessment of pedal pulses. Pulses are palpable and  regular.  Musculoskeletal: Gait was assessed as to whether it was steady, non-antalgic, and/or required the use of an assist device. The patient was also asked to walk independently and get onto the examination table.  Skin: The skin was examined for any obvious rashes or lesions in the extremity.  Neurologic: Sensation is intact to light touch in the extremity. The patient has good coordination without hyperreflexia and is alert and oriented to person, place and time and has normal mood and affect.      All of the above were examined and found to be within normal limits except for the following pertinent clinical findings:     Severe antalgic gait with a limp favoring the right hip.  Negative Trendelenburg.  Mild tenderness over the greater trochanter.  Groin pain with active straight leg raise 0-90 of flexion 30 abduction 20 abduction 30 external 20 internal rotation which recreates his symptoms.  Skin is intact the anterior hip.   1cm LLD R side short supine, does not notice      Imaging:  AP pelvis lateral right hip x-rays  Show progressive osteonecrosis of his right femoral head now with femoral head collapse on the lateral x-ray and subchondral sclerosis        Assessment:       ICD-10-CM ICD-9-CM   1. Avascular necrosis of hip, right  M87.051 733.42   2. Presence of left artificial hip joint  Z96.642 V43.64      Bipolar  Hx of etoh, drug, nicotine, meth, MJ use in the past, admits to history of heavy drug use with rock and roll lifestyle, has weaned off everything over the past 4-6 months. Used naltrexone to help and now off that as well. Now off narcotics.   C-spine fusion     Plan:       This patient has significant symptoms in their hip that are affecting their quality of life and daily activities.  They have tried non-operative treatment including analgesics, an exercise program, and activity modification, but the symptoms have persisted. I believe they make a good candidate for hip arthroplasty.     The  risks and benefits of hip arthroplasty have been discussed with the patient which include, but are not limited to infections (including severe sequelae), potential component failure, fracture, DVT, pulmonary embolus, nerve palsy, dislocation, leg length inequality, persistent pain, wound healing complications, transfusions, medical complications and death.     We discussed surgical options including implant type and why I believe the implant selected is a good match for the patient's needs. The patient expressed understanding and agrees to proceed with the planned surgery.     Pre-operative planning will include the following:  A pre-surgical evaluation will be arranged.  Pre-op orders will be placed.  We will make arrangements with the operating room for proper time and staffing.  We will make Social Service arrangements for the patient.    Approach: Anterior    Implants:   Company: [employer]  Cup: [place]  Stem: [employer]    DVT prophylaxis: ASA 81mg BID x1 month  Dispo:  PT if covered v phase 1 HEP    Admission status: Outpatient/23hr OBS                   [facility]: [facility] NATALIYA next available    No orders of the defined types were placed in this encounter.            Past Medical History:   Diagnosis Date    Addiction to drug     Alcohol abuse     Anxiety     Bipolar disorder     Depression     Difficulty urinating     GERD (gastroesophageal reflux disease)     Hx of psychiatric care     Hyperlipidemia     Hypertension     Ade     Psychiatric problem     S/P cervical spinal fusion 12/6/2018    S/P laparoscopic appendectomy 1/3/2017    Therapy     Thyroid disease        Past Surgical History:   Procedure Laterality Date    ANTERIOR CERVICAL DISCECTOMY W/ FUSION N/A 10/23/2018    Procedure: DISCECTOMY, SPINE, CERVICAL, ANTERIOR APPROACH, WITH FUSION C3-4;  Surgeon: Jw Anne MD;  Location: [facility] OR [address] [place] [address];  Service: Neurosurgery;  Laterality: N/A;     APPENDECTOMY      around     ARTHROPLASTY OF HIP BY ANTERIOR APPROACH Left 2021    Procedure: ARTHROPLASTY, HIP, TOTAL, ANTERIOR APPROACH:LEFT:DPEUY-ACTIS+PINNACLE;  Surgeon: Ángel Lam III, MD;  Location: [facility] OR;  Service: Orthopedics;  Laterality: Left;    INJECTION OF ANESTHETIC AGENT AROUND NERVE Left 2021    Procedure: BLOCK, NERVE, OBTURATOR and FEMORAL;  Surgeon: Josefina Lee MD;  Location: [facility];  Service: Pain Management;  Laterality: Left;    SCAPHOID FRACTURE SURGERY      had to have a bone graft -early       WRIST SURGERY      6 years ago       Family History   Problem Relation Age of Onset    Alcohol abuse Mother     Alcohol abuse Maternal Uncle     Heart disease Father     No Known Problems Sister     No Known Problems Brother     No Known Problems Son     No Known Problems Sister     Cancer Sister     No Known Problems Brother     No Known Problems Brother        Social History     Socioeconomic History    Marital status:     Number of children: 1   Occupational History    Occupation:    Tobacco Use    Smoking status: Former Smoker     Quit date:      Years since quittin.6    Smokeless tobacco: Former User   Substance and Sexual Activity    Alcohol use: Not Currently    Drug use: Not Currently     Types: Marijuana, Methamphetamines    Sexual activity: Never

## 2022-07-21 ENCOUNTER — PATIENT MESSAGE (OUTPATIENT)
Dept: PSYCHIATRY | Facility: CLINIC | Age: 57
End: 2022-07-21
Payer: MEDICAID

## 2022-07-21 ENCOUNTER — PATIENT MESSAGE (OUTPATIENT)
Dept: ORTHOPEDICS | Facility: CLINIC | Age: 57
End: 2022-07-21
Payer: MEDICAID

## 2022-07-25 ENCOUNTER — PATIENT MESSAGE (OUTPATIENT)
Dept: ORTHOPEDICS | Facility: CLINIC | Age: 57
End: 2022-07-25
Payer: MEDICAID

## 2022-07-26 DIAGNOSIS — E03.9 HYPOTHYROIDISM (ACQUIRED): ICD-10-CM

## 2022-07-26 DIAGNOSIS — M87.051 AVASCULAR NECROSIS OF RIGHT FEMORAL HEAD: Primary | ICD-10-CM

## 2022-07-26 DIAGNOSIS — M79.604 PAIN OF RIGHT LOWER EXTREMITY: Primary | ICD-10-CM

## 2022-07-26 NOTE — ANESTHESIA PAT ROS NOTE
07/26/2022  Tyrone Santos is a 57 y.o., male.      Pre-op Assessment          Review of Systems         Anesthesia Assessment: Preoperative EQUATION    Planned Procedure: Procedure(s) (LRB):  ARTHROPLASTY, HIP, TOTAL, ANTERIOR APPROACH: RIGHT: DEPUY-ACTIS+PINNACLE (Right)  Requested Anesthesia Type:Spinal/Epidural  Surgeon: Ángel Lam III, MD  Service: Orthopedics  Known or anticipated Date of Surgery:8/18/2022    Surgeon notes: reviewed    Previous anesthesia records:ARTHROPLASTY, HIP, TOTAL, ANTERIOR APPROACH:LEFT:DPEUY-ACTIS+PINNACLE (Left Hip) 8/23/21 CSE L2-3     DISCECTOMY, SPINE, CERVICAL, ANTERIOR APPROACH, WITH FUSION C3-4 10/23/18 GETA, DL Grade I, complicating factors: Large/Floppy epiglottis; Anterior larynx     Last PCP note: outside [facility]   Subspecialty notes: Neurosurgery, Pain Management, Psychiatry, Urology    Other important co-morbidities: GERD, HLD, HTN, Hypothyroid, ERIC (no CPAP), Bipolar Affective Disorder, Anxiety, Depression, BPH (POUR risk), Anemia, Former Smoker, Hx Alcoholism & Drug Use,  ACDF (C3-4 10/2018)     Tests already available:  BMP, CBC, EKG, CXR 7/6/22; XR Lumbar spine 2/2/22, XR Cervical spine 3/29/21             Plan:    Testing:  PT/INR and TSH   Pre-anesthesia  visit       Visit focus: possible regional anesthesia and/or nerve block      Consultation:POC NP for anesthesia and medical optimization      Navigation: Tests Scheduled.              Consults scheduled.             Results will be tracked by Preop Clinic.     Addendum 8/5/22 per GIANNA Espinoza:  Patient is ready for surgery./bg

## 2022-07-26 NOTE — PRE-PROCEDURE INSTRUCTIONS
Chart review; triage plan initiated.  Phone contact-medication reconciliation completed; instructed to hold vitamins, supplements and NSAIDs for one week prior to surgery. Instructed which medications to take the morning of surgery and informed pt he will receive a printed copy at the POC visit. Pt reminded of scheduled appointments on 8/1/22. Pt verbalized understanding.

## 2022-07-29 ENCOUNTER — PATIENT MESSAGE (OUTPATIENT)
Dept: ADMINISTRATIVE | Facility: OTHER | Age: 57
End: 2022-07-29
Payer: MEDICAID

## 2022-07-31 NOTE — ASSESSMENT & PLAN NOTE
Current labs:  Hgb-  13.2    Hct- 42.0; stable.      Denies symptoms of weakness, fatigue, exercise intolerance or CP/SOB  Recommend monitoring during perioperative period.

## 2022-07-31 NOTE — ASSESSMENT & PLAN NOTE
Currently being treated with Omeprazole- controlled.  Encouraged to elevate HOB and avoid laying down for 2-3 hours after meals.

## 2022-07-31 NOTE — ASSESSMENT & PLAN NOTE
S/P ACDF 10/2018  Denies radiculopathy or myelopathy  Seen by Neurosurgery 6/4/21:  Had EMG done; possible surgical intervention- additional lower ACDF vs. posterior cervical fusion.     C-Spine 3/29/21  FINDINGS:  Cervical spine four views:     There is ACDF with a plate vertebral body screws and disc graft material at C3-C4.  There is moderate DJD at C5-C6 and C6-C7 and C7-T1.  There is mild instability of C4 on C5.  No fracture dislocation bone destruction seen.     Impression:     Postoperative change DJD and mild instability as above.

## 2022-07-31 NOTE — ASSESSMENT & PLAN NOTE
Treated with: Flomax; followed per Urology - last seen 5/5/22.  LUTS:  nocturia x 2; + decreased FOS, + straining, + intermittency, + frequency.    Considering Opalm

## 2022-07-31 NOTE — ASSESSMENT & PLAN NOTE
Patient would benefit from weight loss and has not set  goals to achieve success. Lifestyle changes should be made by eating healthy, exercising at least 150 minutes weekly, and reducing sedentary behavior.

## 2022-07-31 NOTE — ASSESSMENT & PLAN NOTE
Not currently taking opioids.  No longer followed per pain management chronic neck and hip pain:  Dr. Jayme Warner.

## 2022-07-31 NOTE — ASSESSMENT & PLAN NOTE
Encouraged  healthy diet (DASH/Mediterranean) and exercise.   Patient should exercise 30 minutes at least five times weekly. Limit alcohol.  Treated with: Crestor

## 2022-07-31 NOTE — ASSESSMENT & PLAN NOTE
Compliant with oral appliance; S/P  nasal septum surgery per outside facility has helped as well.    Recommend caution with sedating medication that can cause respiratory depression.

## 2022-07-31 NOTE — ASSESSMENT & PLAN NOTE
Current BP  at goal today.    Taking: HCTZ  Encouraged keeping a healthy weight and BMI    Lifestyle changes to reduce systolic BP:   exercise 30 minutes per day,  5 days per week or 150 minutes weekly; sodium reduction and avoidance of high salt foods such as processed meats, frozen meals and  fast foods.     BP acceptable for surgery. I recommend monitoring BP during perioperative period as well as uncontrolled pain which can elevated blood pressure.

## 2022-07-31 NOTE — ASSESSMENT & PLAN NOTE
Treated with: Lexapro/buspirone/Lamictal; doing well. Followed per Psych; last seen 5/2/22.  Denies suicidal/homicidal ideations  Recommend good sleep (7-8 hrs), healthy eating, and continue abstinence from alcohol.

## 2022-07-31 NOTE — ASSESSMENT & PLAN NOTE
Currently treated with Levothyroxine 25 mcg daily.   TSH is elevated; T4 is normal.  Denies Hx of nodules. Denies heat/cold intolerance.   Requesting referral to Internal Medicine due to change in insurance and the need for follow-up of medication management.

## 2022-08-01 ENCOUNTER — OFFICE VISIT (OUTPATIENT)
Dept: INTERNAL MEDICINE | Facility: CLINIC | Age: 57
End: 2022-08-01
Payer: MEDICAID

## 2022-08-01 ENCOUNTER — PATIENT MESSAGE (OUTPATIENT)
Dept: ADMINISTRATIVE | Facility: OTHER | Age: 57
End: 2022-08-01
Payer: MEDICAID

## 2022-08-01 ENCOUNTER — HOSPITAL ENCOUNTER (OUTPATIENT)
Dept: RADIOLOGY | Facility: HOSPITAL | Age: 57
Discharge: HOME | End: 2022-08-01
Attending: PHYSICIAN ASSISTANT
Payer: MEDICAID

## 2022-08-01 ENCOUNTER — OFFICE VISIT (OUTPATIENT)
Dept: ORTHOPEDICS | Facility: CLINIC | Age: 57
End: 2022-08-01
Payer: MEDICAID

## 2022-08-01 VITALS
DIASTOLIC BLOOD PRESSURE: 79 MMHG | BODY MASS INDEX: 28.34 KG/M2 | WEIGHT: 187 LBS | SYSTOLIC BLOOD PRESSURE: 117 MMHG | OXYGEN SATURATION: 96 % | HEART RATE: 73 BPM | HEIGHT: 68 IN | TEMPERATURE: 98 F

## 2022-08-01 VITALS — BODY MASS INDEX: 28.4 KG/M2 | WEIGHT: 187.38 LBS | HEIGHT: 68 IN

## 2022-08-01 DIAGNOSIS — F11.90 CHRONIC, CONTINUOUS USE OF OPIOIDS: ICD-10-CM

## 2022-08-01 DIAGNOSIS — E78.00 HYPERCHOLESTEREMIA: ICD-10-CM

## 2022-08-01 DIAGNOSIS — M48.02 CERVICAL STENOSIS OF SPINAL CANAL: ICD-10-CM

## 2022-08-01 DIAGNOSIS — I10 ESSENTIAL HYPERTENSION: ICD-10-CM

## 2022-08-01 DIAGNOSIS — F31.70 BIPOLAR AFFECTIVE DISORDER IN REMISSION: ICD-10-CM

## 2022-08-01 DIAGNOSIS — G47.30 SLEEP APNEA, UNSPECIFIED TYPE: ICD-10-CM

## 2022-08-01 DIAGNOSIS — E66.9 CLASS 1 OBESITY WITH SERIOUS COMORBIDITY AND BODY MASS INDEX (BMI) OF 31.0 TO 31.9 IN ADULT, UNSPECIFIED OBESITY TYPE: ICD-10-CM

## 2022-08-01 DIAGNOSIS — D50.9 IRON DEFICIENCY ANEMIA, UNSPECIFIED IRON DEFICIENCY ANEMIA TYPE: ICD-10-CM

## 2022-08-01 DIAGNOSIS — E03.9 HYPOTHYROIDISM, UNSPECIFIED TYPE: ICD-10-CM

## 2022-08-01 DIAGNOSIS — M25.551 RIGHT HIP PAIN: Primary | ICD-10-CM

## 2022-08-01 DIAGNOSIS — D72.819 LEUKOPENIA, UNSPECIFIED TYPE: ICD-10-CM

## 2022-08-01 DIAGNOSIS — M87.051 AVASCULAR NECROSIS OF RIGHT FEMORAL HEAD: ICD-10-CM

## 2022-08-01 DIAGNOSIS — N13.8 BPH WITH URINARY OBSTRUCTION: ICD-10-CM

## 2022-08-01 DIAGNOSIS — M87.051 AVASCULAR NECROSIS OF RIGHT FEMORAL HEAD: Primary | ICD-10-CM

## 2022-08-01 DIAGNOSIS — N40.1 BPH WITH URINARY OBSTRUCTION: ICD-10-CM

## 2022-08-01 DIAGNOSIS — K21.9 GASTROESOPHAGEAL REFLUX DISEASE, UNSPECIFIED WHETHER ESOPHAGITIS PRESENT: ICD-10-CM

## 2022-08-01 DIAGNOSIS — M25.551 RIGHT HIP PAIN: ICD-10-CM

## 2022-08-01 PROCEDURE — 3008F BODY MASS INDEX DOCD: CPT | Mod: CPTII,,, | Performed by: PHYSICIAN ASSISTANT

## 2022-08-01 PROCEDURE — 99214 PR OFFICE/OUTPT VISIT, EST, LEVL IV, 30-39 MIN: ICD-10-PCS | Mod: S$PBB,,, | Performed by: NURSE PRACTITIONER

## 2022-08-01 PROCEDURE — 99999 PR PBB SHADOW E&M-EST. PATIENT-LVL III: CPT | Mod: PBBFAC,,, | Performed by: PHYSICIAN ASSISTANT

## 2022-08-01 PROCEDURE — 99213 OFFICE O/P EST LOW 20 MIN: CPT | Mod: PBBFAC,27 | Performed by: PHYSICIAN ASSISTANT

## 2022-08-01 PROCEDURE — 3078F DIAST BP <80 MM HG: CPT | Mod: CPTII,,, | Performed by: NURSE PRACTITIONER

## 2022-08-01 PROCEDURE — 99999 PR PBB SHADOW E&M-EST. PATIENT-LVL III: ICD-10-PCS | Mod: PBBFAC,,, | Performed by: PHYSICIAN ASSISTANT

## 2022-08-01 PROCEDURE — 3074F SYST BP LT 130 MM HG: CPT | Mod: CPTII,,, | Performed by: NURSE PRACTITIONER

## 2022-08-01 PROCEDURE — 99213 PR OFFICE/OUTPT VISIT, EST, LEVL III, 20-29 MIN: ICD-10-PCS | Mod: S$PBB,,, | Performed by: PHYSICIAN ASSISTANT

## 2022-08-01 PROCEDURE — 99213 OFFICE O/P EST LOW 20 MIN: CPT | Mod: S$PBB,,, | Performed by: PHYSICIAN ASSISTANT

## 2022-08-01 PROCEDURE — 3008F BODY MASS INDEX DOCD: CPT | Mod: CPTII,,, | Performed by: NURSE PRACTITIONER

## 2022-08-01 PROCEDURE — 99214 OFFICE O/P EST MOD 30 MIN: CPT | Mod: S$PBB,,, | Performed by: NURSE PRACTITIONER

## 2022-08-01 PROCEDURE — 99999 PR PBB SHADOW E&M-EST. PATIENT-LVL IV: CPT | Mod: PBBFAC,,, | Performed by: NURSE PRACTITIONER

## 2022-08-01 PROCEDURE — 1159F PR MEDICATION LIST DOCUMENTED IN MEDICAL RECORD: ICD-10-PCS | Mod: CPTII,,, | Performed by: NURSE PRACTITIONER

## 2022-08-01 PROCEDURE — 1160F RVW MEDS BY RX/DR IN RCRD: CPT | Mod: CPTII,,, | Performed by: NURSE PRACTITIONER

## 2022-08-01 PROCEDURE — 72170 XR PELVIS ROUTINE AP: ICD-10-PCS | Mod: 26,,, | Performed by: RADIOLOGY

## 2022-08-01 PROCEDURE — 72170 X-RAY EXAM OF PELVIS: CPT | Mod: 26,,, | Performed by: RADIOLOGY

## 2022-08-01 PROCEDURE — 3074F PR MOST RECENT SYSTOLIC BLOOD PRESSURE < 130 MM HG: ICD-10-PCS | Mod: CPTII,,, | Performed by: NURSE PRACTITIONER

## 2022-08-01 PROCEDURE — 1160F PR REVIEW ALL MEDS BY PRESCRIBER/CLIN PHARMACIST DOCUMENTED: ICD-10-PCS | Mod: CPTII,,, | Performed by: NURSE PRACTITIONER

## 2022-08-01 PROCEDURE — 99999 PR PBB SHADOW E&M-EST. PATIENT-LVL IV: ICD-10-PCS | Mod: PBBFAC,,, | Performed by: NURSE PRACTITIONER

## 2022-08-01 PROCEDURE — 3008F PR BODY MASS INDEX (BMI) DOCUMENTED: ICD-10-PCS | Mod: CPTII,,, | Performed by: PHYSICIAN ASSISTANT

## 2022-08-01 PROCEDURE — 3078F PR MOST RECENT DIASTOLIC BLOOD PRESSURE < 80 MM HG: ICD-10-PCS | Mod: CPTII,,, | Performed by: NURSE PRACTITIONER

## 2022-08-01 PROCEDURE — 99214 OFFICE O/P EST MOD 30 MIN: CPT | Mod: PBBFAC | Performed by: NURSE PRACTITIONER

## 2022-08-01 PROCEDURE — 72170 X-RAY EXAM OF PELVIS: CPT | Mod: TC

## 2022-08-01 PROCEDURE — 3008F PR BODY MASS INDEX (BMI) DOCUMENTED: ICD-10-PCS | Mod: CPTII,,, | Performed by: NURSE PRACTITIONER

## 2022-08-01 PROCEDURE — 1159F MED LIST DOCD IN RCRD: CPT | Mod: CPTII,,, | Performed by: NURSE PRACTITIONER

## 2022-08-01 NOTE — HPI
This is a 57 y.o. male  who presents today for a preoperative evaluation in preparation for an Orthopedic  procedure.  Scheduled for right hip arthroplasty.    Surgery is indicated for avascular necrosis of right femoral head . I have seen this patient before in August 2021 for preop evaluation before left hip surgery.   Details of current problem: The duration of problem is > one year. Denies trauma. S/P left hip arthroplasty without complications; now ready for right hip surgery.   Reports symptoms of  throbbing pain to right hip with right shin splints.  Aggravating factors include: turning movement, laying down, walking and decreased ADLs.   Relieving factors are  Tylenol/Advil/Aleve prn.       Reports pain to right hip 8/10; uses cane for assistance with mobility.    The history has been obtained by speaking with the patient and reviewing the electronic medical record and/or outside health information. Significant health conditions for the perioperative period are discussed below in assessment and plan.     Patient reports current health status to be Fair.  Denies any new symptoms before surgery.

## 2022-08-01 NOTE — PROGRESS NOTES
[facility]  Progress Note    Patient Name: Tyrone Santos  MRN: [id number]  Date of Evaluation- 08/02/2022  PCP- Primary Doctor No    Future cases for Tyrone Santos [[id number]]     Case ID Status Date Time [place] Procedure Provider Location    [id number] [facility] 8/18/2022  7:00  ARTHROPLASTY, HIP, TOTAL, ANTERIOR APPROACH: RIGHT: DEPUY-ACTIS+PINNACLE Ángel Lam III, MD [[id number]] [facility] OR          HPI:  This is a 57 y.o. male  who presents today for a preoperative evaluation in preparation for an Orthopedic  procedure.  Scheduled for right hip arthroplasty.    Surgery is indicated for avascular necrosis of right femoral head . I have seen this patient before in August 2021 for preop evaluation before left hip surgery.   Details of current problem: The duration of problem is > one year. Denies trauma. S/P left hip arthroplasty without complications; now ready for right hip surgery.   Reports symptoms of  throbbing pain to right hip with right shin splints.  Aggravating factors include: turning movement, laying down, walking and decreased ADLs.   Relieving factors are  Tylenol/Advil/Aleve prn.       Reports pain to right hip 8/10; uses cane for assistance with mobility.    The history has been obtained by speaking with the patient and reviewing the electronic medical record and/or outside health information. Significant health conditions for the perioperative period are discussed below in assessment and plan.     Patient reports current health status to be Fair.  Denies any new symptoms before surgery.          Subjective/ Objective:     Chief Complaint: Preoperative evaulation, perioperative medical management, and complication reduction plan.     Functional Capacity: Can walk two neighborhood blocks slowly and without CP/SOB.       Anesthesia issues: None    Difficulty mouth opening: No    Steroid use in the last 12 months:      Dental Issues:    Family anesthesia difficulty: None       Family Hx  of Thrombosis: None    Past Medical History:   Diagnosis Date    Addiction to drug     Alcohol abuse     Anxiety     Bipolar disorder     Depression     Difficulty urinating     GERD (gastroesophageal reflux disease)     Hx of psychiatric care     Hyperlipidemia     Hypertension     Ade     Psychiatric problem     S/P cervical spinal fusion 12/6/2018    S/P laparoscopic appendectomy 1/3/2017    Therapy     Thyroid disease          Past Medical History Pertinent Negatives:   Diagnosis Date Noted    Asthma 08/18/2021    COPD (chronic obstructive pulmonary disease) 08/18/2021    Coronary artery disease 08/18/2021    Deep vein thrombosis 10/19/2018    Diabetes mellitus, type 2 08/18/2021    Disorder of kidney and ureter 08/18/2021    Hallucination 03/30/2021    History of psychiatric hospitalization 03/30/2021    HIV infection 10/19/2018    Myocardial infarction 08/18/2021    Psychosis 03/30/2021    Pulmonary embolism 10/19/2018    Seizures 03/30/2021    Suicide attempt 03/30/2021         Past Surgical History:   Procedure Laterality Date    ANTERIOR CERVICAL DISCECTOMY W/ FUSION N/A 10/23/2018    Procedure: DISCECTOMY, SPINE, CERVICAL, ANTERIOR APPROACH, WITH FUSION C3-4;  Surgeon: Jw Anne MD;  Location: [address];  Service: Neurosurgery;  Laterality: N/A;    APPENDECTOMY      around 2012    ARTHROPLASTY OF HIP BY ANTERIOR APPROACH Left 8/23/2021    Procedure: ARTHROPLASTY, HIP, TOTAL, ANTERIOR APPROACH:LEFT:DPEUY-ACTIS+PINNACLE;  Surgeon: Ángel Lam III, MD;  Location: [facility] OR;  Service: Orthopedics;  Laterality: Left;    INJECTION OF ANESTHETIC AGENT AROUND NERVE Left 7/1/2021    Procedure: BLOCK, NERVE, OBTURATOR and FEMORAL;  Surgeon: Josefina Lee MD;  Location: [facility];  Service: Pain Management;  Laterality: Left;    SCAPHOID FRACTURE SURGERY      had to have a bone graft -early  1990's     WRIST SURGERY      6 years ago       Review of  "Systems   Constitutional: Negative for chills, fatigue, fever and unexpected weight change.   HENT: Negative for dental problem, hearing loss, postnasal drip, rhinorrhea, sore throat, tinnitus and trouble swallowing.    Eyes: Negative for photophobia, pain, discharge and visual disturbance.   Respiratory: Negative for apnea, cough, chest tightness, shortness of breath and wheezing.    Cardiovascular: Negative for chest pain, palpitations and leg swelling.   Gastrointestinal: Negative for abdominal pain, blood in stool, nausea and vomiting. Constipation: with opioids.        Reports Fatty liver   Endocrine: Negative for cold intolerance, heat intolerance, polydipsia, polyphagia and polyuria.   Genitourinary: Positive for urgency. Negative for decreased urine volume, difficulty urinating, dysuria and hematuria.        Nocturia x 2   Musculoskeletal: Positive for arthralgias (right hip). Negative for neck pain and neck stiffness. Back pain: lower- in the mornings.   Skin: Negative for rash and wound.   Allergic/Immunologic: Negative for immunocompromised state.   Neurological: Positive for numbness (RLE). Negative for dizziness, tremors, seizures, syncope, weakness and headaches.   Hematological: Negative for adenopathy. Does not bruise/bleed easily.   Psychiatric/Behavioral: Negative for confusion, hallucinations, sleep disturbance and suicidal ideas.              VITALS  Visit Vitals  /79 (BP Location: Left arm, Patient Position: Sitting)   Pulse 73   Temp 97.7 °F (36.5 °C) (Oral)   Ht 5' 8" (1.727 m)   Wt 84.8 kg (187 lb)   SpO2 96%   BMI 28.43 kg/m²          Physical Exam  Vitals reviewed.   Constitutional:       General: He is not in acute distress.     Appearance: He is well-developed.   HENT:      Head: Normocephalic.      Nose: Nose normal.      Mouth/Throat:      Pharynx: No oropharyngeal exudate.   Eyes:      General:         Right eye: No discharge.         Left eye: No discharge.      " Conjunctiva/sclera: Conjunctivae normal.      Pupils: Pupils are equal, round, and reactive to light.   Neck:      Thyroid: No thyromegaly.      Vascular: No carotid bruit or JVD.      Trachea: No tracheal deviation.   Cardiovascular:      Rate and Rhythm: Normal rate and regular rhythm.      Pulses:           Carotid pulses are 2+ on the right side and 2+ on the left side.       Dorsalis pedis pulses are 2+ on the right side and 2+ on the left side.        Posterior tibial pulses are 2+ on the right side and 2+ on the left side.      Heart sounds: Normal heart sounds. No murmur heard.  Pulmonary:      Effort: Pulmonary effort is normal. No respiratory distress.      Breath sounds: Normal breath sounds. No stridor. No wheezing, rhonchi or rales.   Abdominal:      General: Bowel sounds are normal. There is no distension.      Palpations: Abdomen is soft.      Tenderness: There is no abdominal tenderness. There is no guarding.   Musculoskeletal:      Cervical back: Normal range of motion.      Right lower leg: No edema.      Left lower leg: No edema.   Lymphadenopathy:      Cervical: No cervical adenopathy.   Skin:     General: Skin is warm and dry.      Capillary Refill: Capillary refill takes less than 2 seconds.      Findings: No erythema or rash.   Neurological:      Mental Status: He is alert and oriented to person, place, and time.      Coordination: Coordination normal.          Significant Labs:  Lab Results   Component Value Date    WBC 3.31 (L) 07/06/2022    HGB 13.2 (L) 07/06/2022    HCT 42.0 07/06/2022     07/06/2022    ALT 19 06/15/2021    AST 21 06/15/2021     07/06/2022    K 4.3 07/06/2022     07/06/2022    CREATININE 1.1 07/06/2022    BUN 8 07/06/2022    CO2 25 07/06/2022    TSH 6.110 (H) 08/01/2022    INR 0.9 08/01/2022       Diagnostic Studies: No relevant studies.    EKG:   Results for orders placed or performed during the hospital encounter of 07/06/22   EKG 12-lead    Collection  Time: 07/06/22  8:50 PM    Narrative    Test Reason : COVID    Vent. Rate : 065 BPM     Atrial Rate : 065 BPM     P-R Int : 178 ms          QRS Dur : 088 ms      QT Int : 396 ms       P-R-T Axes : 040 054 048 degrees     QTc Int : 411 ms    Normal sinus rhythm  Normal ECG  When compared with ECG of 18-AUG-2021 12:03,  No significant change was found  Confirmed by ALYSSA EVANS MD ([id number]) on 7/7/2022 4:00:51 PM    Referred By: [email or web address]   SELF           Confirmed By:ALYSSA EVANS MD           Imaging   MRI C-Spine 2/26/21    Impression:     C3-4 ACDF, as above.     Cervical spondylosis, resulting in mild/moderate spinal canal stenosis at C5-6 and C6-7, and moderate neural foraminal narrowing from C5-6 through C7-T1, as above.     Electronically signed by resident: Elly Wong  Date:                                            02/26/2021  Time:                                           08:06     Electronically signed by: Marquise Adams MD  Date:                                            02/26/2021  Time:                                           09:37          Active Cardiac Conditions: None      Revised Cardiac Risk Index   High -Risk Surgery  Intraperitoneal; Intrathoracic; suprainguinal vascular Yes- + 1 No- 0   History of Ischemic Heart Disease   (Hx of MI/positive exercise test/current chest pain due to ischemia/use of nitrate therapy/EKG with pathological Q waves) Yes- + 1 No- 0   History of CHF  (Pulmonary edema/bilateral rales or S3 gallop/PND/CXR showing pulmonary vascular redistribution) Yes- + 1 No- 0   History of CVA   (Prior stroke or TIA) Yes- + 1 No- 0   Pre-operative treatment with insulin Yes- + 1 No- 0   Pre-operative creatinine > 2mg/dl Yes- + 1 No- 0   Total:0      Risk Status:  Estimated risk of cardiac complications after non-cardiac surgery using the Revised Cardiac Risk Index for Preoperative risk is 3.9 %      ARISCAT (Canet) risk index: Low: 1.6% risk of post-op pulmonary complications;  Intermediate: 13.3% risk of post-op pulmonary complications if surgery is > 3 hours.     American Society of Anesthesiologists Physical Status classification (ASA): 2           No further cardiac workup needed prior to surgery.          Orders Placed This Encounter    Ambulatory referral/consult to Internal Medicine           Assessment/Plan:     Cervical stenosis of spinal canal  S/P ACDF 10/2018  Denies radiculopathy or myelopathy  Seen by Neurosurgery 6/4/21:  Had EMG done; possible surgical intervention- additional lower ACDF vs. posterior cervical fusion.     C-Spine 3/29/21  FINDINGS:  Cervical spine four views:     There is ACDF with a plate vertebral body screws and disc graft material at C3-C4.  There is moderate DJD at C5-C6 and C6-C7 and C7-T1.  There is mild instability of C4 on C5.  No fracture dislocation bone destruction seen.     Impression:     Postoperative change DJD and mild instability as above.    Chronic, continuous use of opioids  Not currently taking opioids.  No longer followed per pain management chronic neck and hip pain:  Dr. Jayme Warner.       Bipolar affective disorder in remission  Treated with: Lexapro/buspirone/Lamictal; doing well. Followed per Psych; last seen 5/2/22.  Denies suicidal/homicidal ideations  Recommend good sleep (7-8 hrs), healthy eating, and continue abstinence from alcohol.     Hypercholesteremia  Encouraged  healthy diet (DASH/Mediterranean) and exercise.   Patient should exercise 30 minutes at least five times weekly. Limit alcohol.  Treated with: Crestor    Essential hypertension  Current BP  at goal today.    Taking: HCTZ  Encouraged keeping a healthy weight and BMI    Lifestyle changes to reduce systolic BP:   exercise 30 minutes per day,  5 days per week or 150 minutes weekly; sodium reduction and avoidance of high salt foods such as processed meats, frozen meals and  fast foods.     BP acceptable for surgery. I recommend monitoring BP during perioperative  period as well as uncontrolled pain which can elevated blood pressure.           BPH with urinary obstruction  Treated with: Flomax; followed per Urology - last seen 5/5/22.  LUTS:  nocturia x 2; + decreased FOS, + straining, + intermittency, + frequency.    Considering Rezum         Anemia  Current labs:  Hgb-  13.2    Hct- 42.0; stable.      Denies symptoms of weakness, fatigue, exercise intolerance or CP/SOB  Recommend monitoring during perioperative period.     Hypothyroidism  Currently treated with Levothyroxine 25 mcg daily.   TSH is elevated; T4 is normal.  Denies Hx of nodules. Denies heat/cold intolerance.   Requesting referral to Internal Medicine due to change in insurance and the need for follow-up of medication management.     Class 1 obesity with body mass index (BMI) of 31.0 to 31.9 in adult  Patient would benefit from weight loss and has not set  goals to achieve success. Lifestyle changes should be made by eating healthy, exercising at least 150 minutes weekly, and reducing sedentary behavior.     Acid reflux  Currently being treated with Omeprazole- controlled.  Encouraged to elevate HOB and avoid laying down for 2-3 hours after meals.        Sleep apnea  Compliant with oral appliance; S/P  nasal septum surgery per outside facility has helped as well.    Recommend caution with sedating medication that can cause respiratory depression.           Avascular necrosis of right femoral head  Scheduled with Dr. Lam on 8/18/22 for right hip arthroplasty.     Leukopenia  WBC mildly decreased on 7/6/22 labs  Possibly related to Covid-19 infection at that time.  Denies recent fever/chills or new infection.  Recommend monitoring during perioperative period.        Discussion/Management of Perioperative Care    Thromboembolic prophylaxis (VTE) Care: Risk factors for thrombosis include: surgical procedure.  I recommend prophylaxis of thromboembolism with the use of compression stockings/pneumatic devices,  and/or pharmacologic agents. The benefits should outweigh the risks for pharmacologic prophylaxis in the perioperative period. I also encourage early ambulation if not contraindicated during the post-operative period.    Risk factors for post-operative pulmonary complications include:HTN. To reduce the risk of pulmonary complications, prophylactic recommendations include: incentive spirometry use/deep breathing, early ambulation and pain control.    I recommend monitoring sodium during the perioperative period. Pt. is currently on an SSRI which can be associated with SIADH. Surgical procedures are associated with hypersecretion of ADH as well.    Risk factors for renal complications include: HTN. To reduce the risk of postoperative renal complications, I recommend the patient maintain adequate fluid volume status by drinking 2 liters of water daily.  Avoid/reduce NSAIDS and CADE-2 inhibitors use as well as IV contrast for renal protection.    I recommend the use of appropriate prophylactic antibiotics to reduce the risk of surgical site infections.    The patient is at an increased risk for urinary retention due to : BPH/LUTS and possible regional anesthesia. I recommend to avoid/decrease the use of benzodiazepines, anticholinergics, and Benadryl in the perioperative period. I also recommend using opioids for the shortest period of time if possible.          This visit was focused on Preoperative evaluation, Perioperative Medical management, complication reduction plans. I suggest that the patient follows up with primary care or relevant sub specialists for ongoing health care.    I appreciate the opportunity to be involved in this patients care. Please feel free to contact me if there were any questions about this consultation.    Patient is ready for surgery.        Munir Michaud NP  Perioperative Medicine  [facility]

## 2022-08-01 NOTE — OUTPATIENT SUBJECTIVE & OBJECTIVE
Outpatient Subjective & Objective      Chief Complaint: Preoperative evaulation, perioperative medical management, and complication reduction plan.     Functional Capacity: Can walk two neighborhood blocks slowly and without CP/SOB.       Anesthesia issues: None    Difficulty mouth opening: No    Steroid use in the last 12 months:      Dental Issues:    Family anesthesia difficulty: None       Family Hx of Thrombosis: None    Past Medical History:   Diagnosis Date    Addiction to drug     Alcohol abuse     Anxiety     Bipolar disorder     Depression     Difficulty urinating     GERD (gastroesophageal reflux disease)     Hx of psychiatric care     Hyperlipidemia     Hypertension     Ade     Psychiatric problem     S/P cervical spinal fusion 12/6/2018    S/P laparoscopic appendectomy 1/3/2017    Therapy     Thyroid disease          Past Medical History Pertinent Negatives:   Diagnosis Date Noted    Asthma 08/18/2021    COPD (chronic obstructive pulmonary disease) 08/18/2021    Coronary artery disease 08/18/2021    Deep vein thrombosis 10/19/2018    Diabetes mellitus, type 2 08/18/2021    Disorder of kidney and ureter 08/18/2021    Hallucination 03/30/2021    History of psychiatric hospitalization 03/30/2021    HIV infection 10/19/2018    Myocardial infarction 08/18/2021    Psychosis 03/30/2021    Pulmonary embolism 10/19/2018    Seizures 03/30/2021    Suicide attempt 03/30/2021         Past Surgical History:   Procedure Laterality Date    ANTERIOR CERVICAL DISCECTOMY W/ FUSION N/A 10/23/2018    Procedure: DISCECTOMY, SPINE, CERVICAL, ANTERIOR APPROACH, WITH FUSION C3-4;  Surgeon: Jw Anne MD;  Location: [facility] OR [address] [place] [address];  Service: Neurosurgery;  Laterality: N/A;    APPENDECTOMY      around 2012    ARTHROPLASTY OF HIP BY ANTERIOR APPROACH Left 8/23/2021    Procedure: ARTHROPLASTY, HIP, TOTAL, ANTERIOR APPROACH:LEFT:DPEUY-ACTIS+PINNACLE;  Surgeon: Ángel Lam III, MD;  " Location: [facility] OR;  Service: Orthopedics;  Laterality: Left;    INJECTION OF ANESTHETIC AGENT AROUND NERVE Left 7/1/2021    Procedure: BLOCK, NERVE, OBTURATOR and FEMORAL;  Surgeon: Josefina Lee MD;  [facility];  Service: Pain Management;  Laterality: Left;    SCAPHOID FRACTURE SURGERY      had to have a bone graft -early  1990's     WRIST SURGERY      6 years ago       Review of Systems   Constitutional: Negative for chills, fatigue, fever and unexpected weight change.   HENT: Negative for dental problem, hearing loss, postnasal drip, rhinorrhea, sore throat, tinnitus and trouble swallowing.    Eyes: Negative for photophobia, pain, discharge and visual disturbance.   Respiratory: Negative for apnea, cough, chest tightness, shortness of breath and wheezing.    Cardiovascular: Negative for chest pain, palpitations and leg swelling.   Gastrointestinal: Negative for abdominal pain, blood in stool, nausea and vomiting. Constipation: with opioids.        Reports Fatty liver   Endocrine: Negative for cold intolerance, heat intolerance, polydipsia, polyphagia and polyuria.   Genitourinary: Positive for urgency. Negative for decreased urine volume, difficulty urinating, dysuria and hematuria.        Nocturia x 2   Musculoskeletal: Positive for arthralgias (right hip). Negative for neck pain and neck stiffness. Back pain: lower- in the mornings.   Skin: Negative for rash and wound.   Allergic/Immunologic: Negative for immunocompromised state.   Neurological: Positive for numbness (RLE). Negative for dizziness, tremors, seizures, syncope, weakness and headaches.   Hematological: Negative for adenopathy. Does not bruise/bleed easily.   Psychiatric/Behavioral: Negative for confusion, hallucinations, sleep disturbance and suicidal ideas.              VITALS  Visit Vitals  /79 (BP Location: Left arm, Patient Position: Sitting)   Pulse 73   Temp 97.7 °F (36.5 °C) (Oral)   Ht 5' 8" (1.727 m)   Wt 84.8 " kg (187 lb)   SpO2 96%   BMI 28.43 kg/m²          Physical Exam  Vitals reviewed.   Constitutional:       General: He is not in acute distress.     Appearance: He is well-developed.   HENT:      Head: Normocephalic.      Nose: Nose normal.      Mouth/Throat:      Pharynx: No oropharyngeal exudate.   Eyes:      General:         Right eye: No discharge.         Left eye: No discharge.      Conjunctiva/sclera: Conjunctivae normal.      Pupils: Pupils are equal, round, and reactive to light.   Neck:      Thyroid: No thyromegaly.      Vascular: No carotid bruit or JVD.      Trachea: No tracheal deviation.   Cardiovascular:      Rate and Rhythm: Normal rate and regular rhythm.      Pulses:           Carotid pulses are 2+ on the right side and 2+ on the left side.       Dorsalis pedis pulses are 2+ on the right side and 2+ on the left side.        Posterior tibial pulses are 2+ on the right side and 2+ on the left side.      Heart sounds: Normal heart sounds. No murmur heard.  Pulmonary:      Effort: Pulmonary effort is normal. No respiratory distress.      Breath sounds: Normal breath sounds. No stridor. No wheezing, rhonchi or rales.   Abdominal:      General: Bowel sounds are normal. There is no distension.      Palpations: Abdomen is soft.      Tenderness: There is no abdominal tenderness. There is no guarding.   Musculoskeletal:      Cervical back: Normal range of motion.      Right lower leg: No edema.      Left lower leg: No edema.   Lymphadenopathy:      Cervical: No cervical adenopathy.   Skin:     General: Skin is warm and dry.      Capillary Refill: Capillary refill takes less than 2 seconds.      Findings: No erythema or rash.   Neurological:      Mental Status: He is alert and oriented to person, place, and time.      Coordination: Coordination normal.          Significant Labs:  Lab Results   Component Value Date    WBC 3.31 (L) 07/06/2022    HGB 13.2 (L) 07/06/2022    HCT 42.0 07/06/2022      07/06/2022    ALT 19 06/15/2021    AST 21 06/15/2021     07/06/2022    K 4.3 07/06/2022     07/06/2022    CREATININE 1.1 07/06/2022    BUN 8 07/06/2022    CO2 25 07/06/2022    TSH 6.110 (H) 08/01/2022    INR 0.9 08/01/2022       Diagnostic Studies: No relevant studies.    EKG:   Results for orders placed or performed during the hospital encounter of 07/06/22   EKG 12-lead    Collection Time: 07/06/22  8:50 PM    Narrative    Test Reason : COVID    Vent. Rate : 065 BPM     Atrial Rate : 065 BPM     P-R Int : 178 ms          QRS Dur : 088 ms      QT Int : 396 ms       P-R-T Axes : 040 054 048 degrees     QTc Int : 411 ms    Normal sinus rhythm  Normal ECG  When compared with ECG of 18-AUG-2021 12:03,  No significant change was found  Confirmed by ALYSSA EVANS MD ([id number]) on 7/7/2022 4:00:51 PM    Referred By: [email or web address]   SELF           Confirmed By:ALYSSA EVANS MD           Imaging   MRI C-Spine 2/26/21    Impression:     C3-4 ACDF, as above.     Cervical spondylosis, resulting in mild/moderate spinal canal stenosis at C5-6 and C6-7, and moderate neural foraminal narrowing from C5-6 through C7-T1, as above.     Electronically signed by resident: Elly Wong  Date:                                            02/26/2021  Time:                                           08:06     Electronically signed by: Marquise Adams MD  Date:                                            02/26/2021  Time:                                           09:37          Active Cardiac Conditions: None      Revised Cardiac Risk Index   High -Risk Surgery  Intraperitoneal; Intrathoracic; suprainguinal vascular Yes- + 1 No- 0   History of Ischemic Heart Disease   (Hx of MI/positive exercise test/current chest pain due to ischemia/use of nitrate therapy/EKG with pathological Q waves) Yes- + 1 No- 0   History of CHF  (Pulmonary edema/bilateral rales or S3 gallop/PND/CXR showing pulmonary vascular redistribution) Yes- + 1 No- 0   History of  CVA   (Prior stroke or TIA) Yes- + 1 No- 0   Pre-operative treatment with insulin Yes- + 1 No- 0   Pre-operative creatinine > 2mg/dl Yes- + 1 No- 0   Total:0      Risk Status:  Estimated risk of cardiac complications after non-cardiac surgery using the Revised Cardiac Risk Index for Preoperative risk is 3.9 %      ARISCAT (Canet) risk index: Low: 1.6% risk of post-op pulmonary complications; Intermediate: 13.3% risk of post-op pulmonary complications if surgery is > 3 hours.     American Society of Anesthesiologists Physical Status classification (ASA): 2           No further cardiac workup needed prior to surgery.    Outpatient Subjective & Objective

## 2022-08-02 PROBLEM — D72.819 LEUKOPENIA: Status: ACTIVE | Noted: 2022-08-02

## 2022-08-02 NOTE — ASSESSMENT & PLAN NOTE
WBC mildly decreased on 7/6/22 labs  Possibly related to Covid-19 infection at that time.  Denies recent fever/chills or new infection.  Recommend monitoring during perioperative period.

## 2022-08-05 ENCOUNTER — TELEPHONE (OUTPATIENT)
Dept: ORTHOPEDICS | Facility: CLINIC | Age: 57
End: 2022-08-05
Payer: MEDICAID

## 2022-08-05 RX ORDER — PREGABALIN 150 MG/1
150 CAPSULE ORAL
Status: CANCELLED | OUTPATIENT
Start: 2022-08-05

## 2022-08-05 RX ORDER — POLYETHYLENE GLYCOL 3350 17 G/17G
17 POWDER, FOR SOLUTION ORAL DAILY
Status: CANCELLED | OUTPATIENT
Start: 2022-08-05

## 2022-08-05 RX ORDER — CELECOXIB 200 MG/1
200 CAPSULE ORAL DAILY
Status: CANCELLED | OUTPATIENT
Start: 2022-08-05

## 2022-08-05 RX ORDER — ONDANSETRON 2 MG/ML
4 INJECTION INTRAMUSCULAR; INTRAVENOUS EVERY 8 HOURS PRN
Status: CANCELLED | OUTPATIENT
Start: 2022-08-05

## 2022-08-05 RX ORDER — FENTANYL CITRATE 50 UG/ML
100 INJECTION, SOLUTION INTRAMUSCULAR; INTRAVENOUS
Status: CANCELLED | OUTPATIENT
Start: 2022-08-05 | End: 2022-08-06

## 2022-08-05 RX ORDER — OXYCODONE HYDROCHLORIDE 5 MG/1
5 TABLET ORAL
Status: CANCELLED | OUTPATIENT
Start: 2022-08-05

## 2022-08-05 RX ORDER — PROCHLORPERAZINE EDISYLATE 5 MG/ML
5 INJECTION INTRAMUSCULAR; INTRAVENOUS EVERY 6 HOURS PRN
Status: CANCELLED | OUTPATIENT
Start: 2022-08-05

## 2022-08-05 RX ORDER — FAMOTIDINE 20 MG/1
20 TABLET, FILM COATED ORAL 2 TIMES DAILY
Status: CANCELLED | OUTPATIENT
Start: 2022-08-05

## 2022-08-05 RX ORDER — OXYCODONE HYDROCHLORIDE 5 MG/1
10 TABLET ORAL
Status: CANCELLED | OUTPATIENT
Start: 2022-08-05

## 2022-08-05 RX ORDER — FENTANYL CITRATE 50 UG/ML
25 INJECTION, SOLUTION INTRAMUSCULAR; INTRAVENOUS EVERY 5 MIN PRN
Status: CANCELLED | OUTPATIENT
Start: 2022-08-05

## 2022-08-05 RX ORDER — POLYETHYLENE GLYCOL 3350 17 G/17G
17 POWDER, FOR SOLUTION ORAL DAILY PRN
Status: CANCELLED | OUTPATIENT
Start: 2022-08-05

## 2022-08-05 RX ORDER — ASPIRIN 81 MG/1
81 TABLET ORAL 2 TIMES DAILY
Status: CANCELLED | OUTPATIENT
Start: 2022-08-05

## 2022-08-05 RX ORDER — MUPIROCIN 20 MG/G
1 OINTMENT TOPICAL
Status: CANCELLED | OUTPATIENT
Start: 2022-08-05

## 2022-08-05 RX ORDER — AMOXICILLIN 250 MG
1 CAPSULE ORAL 2 TIMES DAILY
Status: CANCELLED | OUTPATIENT
Start: 2022-08-05

## 2022-08-05 RX ORDER — LIDOCAINE HYDROCHLORIDE 10 MG/ML
1 INJECTION, SOLUTION EPIDURAL; INFILTRATION; INTRACAUDAL; PERINEURAL
Status: CANCELLED | OUTPATIENT
Start: 2022-08-05

## 2022-08-05 RX ORDER — ACETAMINOPHEN 500 MG
1000 TABLET ORAL EVERY 6 HOURS
Status: CANCELLED | OUTPATIENT
Start: 2022-08-05

## 2022-08-05 RX ORDER — SODIUM CHLORIDE 9 MG/ML
INJECTION, SOLUTION INTRAVENOUS
Status: CANCELLED | OUTPATIENT
Start: 2022-08-05

## 2022-08-05 RX ORDER — MIDAZOLAM HYDROCHLORIDE 1 MG/ML
4 INJECTION INTRAMUSCULAR; INTRAVENOUS
Status: CANCELLED | OUTPATIENT
Start: 2022-08-05 | End: 2022-08-06

## 2022-08-05 RX ORDER — METHOCARBAMOL 750 MG/1
750 TABLET, FILM COATED ORAL 3 TIMES DAILY
Status: CANCELLED | OUTPATIENT
Start: 2022-08-05

## 2022-08-05 RX ORDER — MORPHINE SULFATE 2 MG/ML
2 INJECTION, SOLUTION INTRAMUSCULAR; INTRAVENOUS
Status: CANCELLED | OUTPATIENT
Start: 2022-08-05

## 2022-08-05 RX ORDER — PREGABALIN 150 MG/1
150 CAPSULE ORAL NIGHTLY
Status: CANCELLED | OUTPATIENT
Start: 2022-08-05

## 2022-08-05 RX ORDER — MUPIROCIN 20 MG/G
1 OINTMENT TOPICAL 2 TIMES DAILY
Status: CANCELLED | OUTPATIENT
Start: 2022-08-05 | End: 2022-08-10

## 2022-08-05 RX ORDER — SODIUM CHLORIDE 9 MG/ML
INJECTION, SOLUTION INTRAVENOUS CONTINUOUS
Status: CANCELLED | OUTPATIENT
Start: 2022-08-05 | End: 2022-08-06

## 2022-08-05 RX ORDER — NALOXONE HCL 0.4 MG/ML
0.02 VIAL (ML) INJECTION
Status: CANCELLED | OUTPATIENT
Start: 2022-08-05

## 2022-08-05 RX ORDER — CELECOXIB 200 MG/1
400 CAPSULE ORAL
Status: CANCELLED | OUTPATIENT
Start: 2022-08-05

## 2022-08-05 RX ORDER — ACETAMINOPHEN 500 MG
1000 TABLET ORAL
Status: CANCELLED | OUTPATIENT
Start: 2022-08-05

## 2022-08-05 NOTE — PROGRESS NOTES
Tyrone Santos is a 57 y.o. year old here today for pre surgery optimization visit  in preparation for a Right total hip arthroplasty to be performed by Dr. Lam  on 8/18/2022.  he was last seen and treated in the clinic on 7/14/2022. he will be medically optimized by the pre op center. There has been no significant change in medical status since last visit. No fever, chills, malaise, or unexplained weight change.      Allergies, Medications, past medical and surgical history reviewed.    Focused examination performed.    Patient declined to see surgeon today. All questions answered. Patient encouraged to call with questions. Contact information given.     Pre, teena, and post operative procedures and expectations discussed. Goals of successful surgery reviewed and include manageable pain levels, surgical site free of infection, medication management, and ambulation with PT/OT assistance. Healthy weight management discussed with patient and caregiver who were receptive to eduction of healthy diet and activity. No other necessary lifestyle changes identified. Educated patient about signs and symptoms of infection, medication management, anticoagulation therapy, risk of tobacco and alcohol use, and self-care to promote healing. Surgical guide given for future reference. Hibiclens given to patient with instructions. All questions were answered.     Tyrone Santos verbalized an understanding to the education and goals. Patient has displayed readiness to engage in care and is ready to proceed with surgery.  Patient reports his wife is able and ready to provide assistance at home after discharge.    Surgical and blood consents signed.    Tyrone Santos will contact us if there are any questions, concerns, or changes in medical status prior to surgery.       Joint class: prior cain    COVID-19 test date: vaccinated      Patient has discussed discharge planning with surgeon. Patient will be discharged to home  following surgery.   patient will be scheduled with Home Health during hospitalization.    30 minutes of time was spent on patient education, review of records, templating, H&P, , appointment scheduling and optimizing patient for surgery.

## 2022-08-05 NOTE — TELEPHONE ENCOUNTER
I called Suzy with [facility] regarding the patient's surgery authorization.       She said that we will be fine since I called her, she will continue to pend it until they receive the notes.     We agreed that we will try to get her the notes by August 9, 2022 to prevent the case going to the medical reviewer which would then need a peer to peer.

## 2022-08-05 NOTE — H&P (VIEW-ONLY)
CC: right hip pain    Tyrone Santos is a 57 y.o. male with right hip pain.  Pain is worse with activity and weight bearing.  Patient has experienced interference of activities of daily living due to increased pain and decreased range of motion. Patient has failed non-operative treatment including NSAIDs, RFA, as well as greater than 3 months of activity modification. Tyrone Santos ambulates using assistive device . Pt had L NATALIYA 8/2021 and did well.     Relevant medical conditions of significance in perioperative period:  H/o drug/etoh use: denies recent use, off of pain mediaction   ERIC: does not use cpap  Bipolar disorder: on medication     Past Medical History:   Diagnosis Date    Addiction to drug     Alcohol abuse     Anxiety     Bipolar disorder     Depression     Difficulty urinating     GERD (gastroesophageal reflux disease)     Hx of psychiatric care     Hyperlipidemia     Hypertension     Ade     Psychiatric problem     S/P cervical spinal fusion 12/6/2018    S/P laparoscopic appendectomy 1/3/2017    Therapy     Thyroid disease        Past Surgical History:   Procedure Laterality Date    ANTERIOR CERVICAL DISCECTOMY W/ FUSION N/A 10/23/2018    Procedure: DISCECTOMY, SPINE, CERVICAL, ANTERIOR APPROACH, WITH FUSION C3-4;  Surgeon: Jw Anne MD;  Location: [address];  Service: Neurosurgery;  Laterality: N/A;    APPENDECTOMY      around 2012    ARTHROPLASTY OF HIP BY ANTERIOR APPROACH Left 8/23/2021    Procedure: ARTHROPLASTY, HIP, TOTAL, ANTERIOR APPROACH:LEFT:DPEUY-ACTIS+PINNACLE;  Surgeon: Ángel Lam III, MD;  Location: [facility] OR;  Service: Orthopedics;  Laterality: Left;    INJECTION OF ANESTHETIC AGENT AROUND NERVE Left 7/1/2021    Procedure: BLOCK, NERVE, OBTURATOR and FEMORAL;  Surgeon: Josefina Lee MD;  Location: [facility] T;  Service: Pain Management;  Laterality: Left;    SCAPHOID FRACTURE SURGERY      had to have a bone graft -early   1990's     WRIST SURGERY      6 years ago       Family History   Problem Relation Age of Onset    Alcohol abuse Mother     Alcohol abuse Maternal Uncle     Heart disease Father     No Known Problems Sister     No Known Problems Brother     No Known Problems Son     No Known Problems Sister     Cancer Sister     No Known Problems Brother     No Known Problems Brother        Review of patient's allergies indicates:   Allergen Reactions    Codeine Nausea Only    Seroquel [quetiapine]      Heart racing     Tizanidine Other (See Comments)     Caused increased pain and muscle pain         Current Outpatient Medications:     busPIRone (BUSPAR) 15 MG tablet, Take 2 tablets po q am, 1 tablet po at noon and 1 tablet po q hs, Disp: 360 tablet, Rfl: 0    calcium-vitamin D3 (OS-JALYN 500 + D3) 500 mg(1,250mg) -200 unit per tablet, Take 1 tablet by mouth 2 (two) times daily with meals., Disp: , Rfl:     cyanocobalamin (VITAMIN B-12) 100 MCG tablet, Take 100 mcg by mouth once daily., Disp: , Rfl:     folic acid (FOLVITE) 1 MG tablet, Take 1 mg by mouth once daily., Disp: , Rfl:     hydroCHLOROthiazide (HYDRODIURIL) 25 MG tablet, Take 25 mg by mouth once daily., Disp: , Rfl:     levothyroxine (SYNTHROID) 25 MCG tablet, Take 25 mcg by mouth once daily., Disp: , Rfl:     omeprazole (PRILOSEC) 40 MG capsule, Take 40 mg by mouth once daily., Disp: , Rfl:     rosuvastatin (CRESTOR) 10 MG tablet, rosuvastatin 10 mg tablet  TAKE 1 TABLET BY MOUTH EVERY DAY, Disp: , Rfl:     tamsulosin (FLOMAX) 0.4 mg Cap, Take 2 capsules (0.8 mg total) by mouth once daily., Disp: 60 capsule, Rfl: 11    traZODone (DESYREL) 50 MG tablet, Take 1-2 tablets po q hs prn insomnia, Disp: 60 tablet, Rfl: 1    EScitalopram oxalate (LEXAPRO) 10 MG tablet, Take 1 tablet (10 mg total) by mouth once daily., Disp: 90 tablet, Rfl: 0    lamoTRIgine (LAMICTAL) 100 MG tablet, Take 1 tablet (100 mg total) by mouth once daily., Disp: 90 tablet, Rfl:  "0    Review of Systems:   Constitutional: no fever or chills  Eyes: no visual changes  ENT: no nasal congestion or sore throat  Respiratory: no cough or shortness of breath  Cardiovascular: no chest pain or palpitations  Gastrointestinal: no nausea or vomiting, tolerating diet  Genitourinary: no hematuria or dysuria  Integument/Breast: no rash or pruritis  Hematologic/Lymphatic: no easy bruising or lymphadenopathy  Musculoskeletal: positive for hip pain  Neurological: no seizures or tremors  Behavioral/Psych: no auditory or visual hallucinations  Endocrine: no heat or cold intolerance    PE:  Ht 5' 8" (1.727 m)   Wt 85 kg (187 lb 6.4 oz)   BMI 28.49 kg/m²   General: Pleasant, cooperative, NAD   Gait: antalgic  HEENT: NCAT, sclera nonicteric   Lungs: Respirations are clear, equal and unlabored.   CV: S1S2; 2+ bilateral upper and lower extremity pulses.   Skin: Intact throughout LE with no rashes, erythema, or lesions  Extremities: No LE edema, NVI lower extremities    Right Hip Exam:  90 degrees flexion  10 degrees extension   15 degrees internal rotation  30 degrees external rotation  15 degrees abduction  15 degrees adduction  There is pain with passive range of motion.     Radiographs: Radiographs reveal advanced degenerative changes including subchondral cyst formation, subchondral sclerosis, osteophyte formation, joint space narrowing.     Diagnosis: AVN right hip     Plan: Rightt anterior total hip arthroplasty     Due to the serious nature of total joint infection and the high prevalence of community acquired MRSA, vancomycin will be used perioperatively.            "

## 2022-08-05 NOTE — H&P
CC: right hip pain    Tyrone Santos is a 57 y.o. male with right hip pain.  Pain is worse with activity and weight bearing.  Patient has experienced interference of activities of daily living due to increased pain and decreased range of motion. Patient has failed non-operative treatment including NSAIDs, RFA, as well as greater than 3 months of activity modification. Tyrone Santos ambulates using assistive device . Pt had L NATALIYA 8/2021 and did well.     Relevant medical conditions of significance in perioperative period:  H/o drug/etoh use: denies recent use, off of pain mediaction   ERIC: does not use cpap  Bipolar disorder: on medication     Past Medical History:   Diagnosis Date    Addiction to drug     Alcohol abuse     Anxiety     Bipolar disorder     Depression     Difficulty urinating     GERD (gastroesophageal reflux disease)     Hx of psychiatric care     Hyperlipidemia     Hypertension     Ade     Psychiatric problem     S/P cervical spinal fusion 12/6/2018    S/P laparoscopic appendectomy 1/3/2017    Therapy     Thyroid disease        Past Surgical History:   Procedure Laterality Date    ANTERIOR CERVICAL DISCECTOMY W/ FUSION N/A 10/23/2018    Procedure: DISCECTOMY, SPINE, CERVICAL, ANTERIOR APPROACH, WITH FUSION C3-4;  Surgeon: Jw Anne MD;  Location: [address];  Service: Neurosurgery;  Laterality: N/A;    APPENDECTOMY      around 2012    ARTHROPLASTY OF HIP BY ANTERIOR APPROACH Left 8/23/2021    Procedure: ARTHROPLASTY, HIP, TOTAL, ANTERIOR APPROACH:LEFT:DPEUY-ACTIS+PINNACLE;  Surgeon: Ángel Lam III, MD;  Location: [facility] OR;  Service: Orthopedics;  Laterality: Left;    INJECTION OF ANESTHETIC AGENT AROUND NERVE Left 7/1/2021    Procedure: BLOCK, NERVE, OBTURATOR and FEMORAL;  Surgeon: Josefina Lee MD;  Location: [facility] PA[facility] T;  Service: Pain Management;  Laterality: Left;    SCAPHOID FRACTURE SURGERY      had to have a bone graft -early   1990's     WRIST SURGERY      6 years ago       Family History   Problem Relation Age of Onset    Alcohol abuse Mother     Alcohol abuse Maternal Uncle     Heart disease Father     No Known Problems Sister     No Known Problems Brother     No Known Problems Son     No Known Problems Sister     Cancer Sister     No Known Problems Brother     No Known Problems Brother        Review of patient's allergies indicates:   Allergen Reactions    Codeine Nausea Only    Seroquel [quetiapine]      Heart racing     Tizanidine Other (See Comments)     Caused increased pain and muscle pain         Current Outpatient Medications:     busPIRone (BUSPAR) 15 MG tablet, Take 2 tablets po q am, 1 tablet po at noon and 1 tablet po q hs, Disp: 360 tablet, Rfl: 0    calcium-vitamin D3 (OS-JALYN 500 + D3) 500 mg(1,250mg) -200 unit per tablet, Take 1 tablet by mouth 2 (two) times daily with meals., Disp: , Rfl:     cyanocobalamin (VITAMIN B-12) 100 MCG tablet, Take 100 mcg by mouth once daily., Disp: , Rfl:     folic acid (FOLVITE) 1 MG tablet, Take 1 mg by mouth once daily., Disp: , Rfl:     hydroCHLOROthiazide (HYDRODIURIL) 25 MG tablet, Take 25 mg by mouth once daily., Disp: , Rfl:     levothyroxine (SYNTHROID) 25 MCG tablet, Take 25 mcg by mouth once daily., Disp: , Rfl:     omeprazole (PRILOSEC) 40 MG capsule, Take 40 mg by mouth once daily., Disp: , Rfl:     rosuvastatin (CRESTOR) 10 MG tablet, rosuvastatin 10 mg tablet  TAKE 1 TABLET BY MOUTH EVERY DAY, Disp: , Rfl:     tamsulosin (FLOMAX) 0.4 mg Cap, Take 2 capsules (0.8 mg total) by mouth once daily., Disp: 60 capsule, Rfl: 11    traZODone (DESYREL) 50 MG tablet, Take 1-2 tablets po q hs prn insomnia, Disp: 60 tablet, Rfl: 1    EScitalopram oxalate (LEXAPRO) 10 MG tablet, Take 1 tablet (10 mg total) by mouth once daily., Disp: 90 tablet, Rfl: 0    lamoTRIgine (LAMICTAL) 100 MG tablet, Take 1 tablet (100 mg total) by mouth once daily., Disp: 90 tablet, Rfl:  "0    Review of Systems:   Constitutional: no fever or chills  Eyes: no visual changes  ENT: no nasal congestion or sore throat  Respiratory: no cough or shortness of breath  Cardiovascular: no chest pain or palpitations  Gastrointestinal: no nausea or vomiting, tolerating diet  Genitourinary: no hematuria or dysuria  Integument/Breast: no rash or pruritis  Hematologic/Lymphatic: no easy bruising or lymphadenopathy  Musculoskeletal: positive for hip pain  Neurological: no seizures or tremors  Behavioral/Psych: no auditory or visual hallucinations  Endocrine: no heat or cold intolerance    PE:  Ht 5' 8" (1.727 m)   Wt 85 kg (187 lb 6.4 oz)   BMI 28.49 kg/m²   General: Pleasant, cooperative, NAD   Gait: antalgic  HEENT: NCAT, sclera nonicteric   Lungs: Respirations are clear, equal and unlabored.   CV: S1S2; 2+ bilateral upper and lower extremity pulses.   Skin: Intact throughout LE with no rashes, erythema, or lesions  Extremities: No LE edema, NVI lower extremities    Right Hip Exam:  90 degrees flexion  10 degrees extension   15 degrees internal rotation  30 degrees external rotation  15 degrees abduction  15 degrees adduction  There is pain with passive range of motion.     Radiographs: Radiographs reveal advanced degenerative changes including subchondral cyst formation, subchondral sclerosis, osteophyte formation, joint space narrowing.     Diagnosis: AVN right hip     Plan: Rightt anterior total hip arthroplasty     Due to the serious nature of total joint infection and the high prevalence of community acquired MRSA, vancomycin will be used perioperatively.            "

## 2022-08-09 ENCOUNTER — PATIENT MESSAGE (OUTPATIENT)
Dept: SURGERY | Facility: HOSPITAL | Age: 57
End: 2022-08-09
Payer: MEDICAID

## 2022-08-09 ENCOUNTER — PATIENT MESSAGE (OUTPATIENT)
Dept: ADMINISTRATIVE | Facility: OTHER | Age: 57
End: 2022-08-09
Payer: MEDICAID

## 2022-08-12 ENCOUNTER — TELEPHONE (OUTPATIENT)
Dept: ORTHOPEDICS | Facility: CLINIC | Age: 57
End: 2022-08-12
Payer: MEDICAID

## 2022-08-12 NOTE — TELEPHONE ENCOUNTER
I called the patient today regarding surgery on 8/18/2022 with Dr. Ángel Lam. I informed the patient that his surgery will be at  [facility] ([facility] [place] [address]). I informed the patient they must arrive at 5:00am and their surgery will start at 7:00am.     I reminded the patient about his COVID-19 swab test. The patient has received the COVID-19 vaccine and records of the vaccine are in the patient's chart. Therefore this patient does not need to complete a pre-procedural Covid-19 test..    I reminded the patient that they cannot eat or drink after midnight, the night before surgery.     I reminded the patient to be careful of their skin over the next few days to make sure they do not get any cuts, scratches or scrapes.    The patient that they have a walker, bedside commode and shower chair from a previous surgery and do not need another order for them.     I informed the patient that he will do a [facility] Virtual Visit - Video  with our Orthopedic Nurse Navigator on 8/22/2022. I informed the patient to log into their virtual appointment 15 minutes before their scheduled time.    The patient verbalized understanding and has no further questions.

## 2022-08-17 ENCOUNTER — PATIENT MESSAGE (OUTPATIENT)
Dept: ADMINISTRATIVE | Facility: OTHER | Age: 57
End: 2022-08-17
Payer: MEDICAID

## 2022-08-17 RX ORDER — ASPIRIN 81 MG/1
81 TABLET ORAL 2 TIMES DAILY
Qty: 60 TABLET | Refills: 0 | Status: SHIPPED | OUTPATIENT
Start: 2022-08-17 | End: 2022-12-27

## 2022-08-17 RX ORDER — METHOCARBAMOL 750 MG/1
750 TABLET, FILM COATED ORAL 3 TIMES DAILY PRN
Qty: 30 TABLET | Refills: 0 | Status: SHIPPED | OUTPATIENT
Start: 2022-08-17 | End: 2022-09-20

## 2022-08-17 RX ORDER — CELECOXIB 200 MG/1
200 CAPSULE ORAL DAILY
Qty: 30 CAPSULE | Refills: 0 | Status: SHIPPED | OUTPATIENT
Start: 2022-08-17 | End: 2022-09-18

## 2022-08-17 RX ORDER — DEXTROMETHORPHAN HYDROBROMIDE, GUAIFENESIN 5; 100 MG/5ML; MG/5ML
650 LIQUID ORAL
Qty: 120 TABLET | Refills: 0 | Status: SHIPPED | OUTPATIENT
Start: 2022-08-17 | End: 2023-03-20

## 2022-08-17 RX ORDER — DOCUSATE SODIUM 100 MG/1
100 CAPSULE, LIQUID FILLED ORAL 2 TIMES DAILY PRN
Qty: 60 CAPSULE | Refills: 0 | Status: SHIPPED | OUTPATIENT
Start: 2022-08-17 | End: 2022-09-20

## 2022-08-17 RX ORDER — OXYCODONE HYDROCHLORIDE 5 MG/1
TABLET ORAL
Qty: 30 TABLET | Refills: 0 | Status: SHIPPED | OUTPATIENT
Start: 2022-08-17 | End: 2022-09-01 | Stop reason: SDUPTHER

## 2022-08-18 ENCOUNTER — ANESTHESIA (OUTPATIENT)
Dept: SURGERY | Facility: HOSPITAL | Age: 57
End: 2022-08-18
Payer: MEDICAID

## 2022-08-18 ENCOUNTER — HOSPITAL ENCOUNTER (OUTPATIENT)
Facility: HOSPITAL | Age: 57
Discharge: HOME | End: 2022-08-19
Attending: ORTHOPAEDIC SURGERY | Admitting: ORTHOPAEDIC SURGERY
Payer: MEDICAID

## 2022-08-18 ENCOUNTER — ANESTHESIA EVENT (OUTPATIENT)
Dept: SURGERY | Facility: HOSPITAL | Age: 57
End: 2022-08-18
Payer: MEDICAID

## 2022-08-18 DIAGNOSIS — M87.051 AVASCULAR NECROSIS OF RIGHT FEMORAL HEAD: ICD-10-CM

## 2022-08-18 PROCEDURE — 71000033 HC RECOVERY, INTIAL HOUR: Performed by: ORTHOPAEDIC SURGERY

## 2022-08-18 PROCEDURE — C1776 JOINT DEVICE (IMPLANTABLE): HCPCS | Performed by: ORTHOPAEDIC SURGERY

## 2022-08-18 PROCEDURE — 97161 PT EVAL LOW COMPLEX 20 MIN: CPT

## 2022-08-18 PROCEDURE — 25000003 PHARM REV CODE 250: Performed by: PHYSICIAN ASSISTANT

## 2022-08-18 PROCEDURE — 27130 TOTAL HIP ARTHROPLASTY: CPT | Mod: RT,,, | Performed by: ORTHOPAEDIC SURGERY

## 2022-08-18 PROCEDURE — 63600175 PHARM REV CODE 636 W HCPCS: Performed by: NURSE ANESTHETIST, CERTIFIED REGISTERED

## 2022-08-18 PROCEDURE — 94799 UNLISTED PULMONARY SVC/PX: CPT

## 2022-08-18 PROCEDURE — 25000003 PHARM REV CODE 250: Performed by: ORTHOPAEDIC SURGERY

## 2022-08-18 PROCEDURE — 36000711: Performed by: ORTHOPAEDIC SURGERY

## 2022-08-18 PROCEDURE — 01214 ANES OPEN PX TOT HIP ARTHRP: CPT | Performed by: ORTHOPAEDIC SURGERY

## 2022-08-18 PROCEDURE — 25000003 PHARM REV CODE 250: Performed by: NURSE ANESTHETIST, CERTIFIED REGISTERED

## 2022-08-18 PROCEDURE — 97165 OT EVAL LOW COMPLEX 30 MIN: CPT

## 2022-08-18 PROCEDURE — 25000003 PHARM REV CODE 250

## 2022-08-18 PROCEDURE — 63600175 PHARM REV CODE 636 W HCPCS: Performed by: ORTHOPAEDIC SURGERY

## 2022-08-18 PROCEDURE — 27201423 OPTIME MED/SURG SUP & DEVICES STERILE SUPPLY: Performed by: ORTHOPAEDIC SURGERY

## 2022-08-18 PROCEDURE — D9220A PRA ANESTHESIA: ICD-10-PCS | Mod: CRNA,,, | Performed by: NURSE ANESTHETIST, CERTIFIED REGISTERED

## 2022-08-18 PROCEDURE — 71000039 HC RECOVERY, EACH ADD'L HOUR: Performed by: ORTHOPAEDIC SURGERY

## 2022-08-18 PROCEDURE — D9220A PRA ANESTHESIA: Mod: CRNA,,, | Performed by: NURSE ANESTHETIST, CERTIFIED REGISTERED

## 2022-08-18 PROCEDURE — 36000710: Performed by: ORTHOPAEDIC SURGERY

## 2022-08-18 PROCEDURE — 94761 N-INVAS EAR/PLS OXIMETRY MLT: CPT | Mod: 59

## 2022-08-18 PROCEDURE — D9220A PRA ANESTHESIA: Mod: ANES,,, | Performed by: ANESTHESIOLOGY

## 2022-08-18 PROCEDURE — 99900035 HC TECH TIME PER 15 MIN (STAT)

## 2022-08-18 PROCEDURE — 27130 PR TOTAL HIP ARTHROPLASTY: ICD-10-PCS | Mod: RT,,, | Performed by: ORTHOPAEDIC SURGERY

## 2022-08-18 PROCEDURE — C1713 ANCHOR/SCREW BN/BN,TIS/BN: HCPCS | Performed by: ORTHOPAEDIC SURGERY

## 2022-08-18 PROCEDURE — 97535 SELF CARE MNGMENT TRAINING: CPT

## 2022-08-18 PROCEDURE — D9220A PRA ANESTHESIA: ICD-10-PCS | Mod: ANES,,, | Performed by: ANESTHESIOLOGY

## 2022-08-18 PROCEDURE — 63600175 PHARM REV CODE 636 W HCPCS: Performed by: PHYSICIAN ASSISTANT

## 2022-08-18 PROCEDURE — 37000009 HC ANESTHESIA EA ADD 15 MINS: Performed by: ORTHOPAEDIC SURGERY

## 2022-08-18 PROCEDURE — 27100025 HC TUBING, SET FLUID WARMER: Performed by: NURSE ANESTHETIST, CERTIFIED REGISTERED

## 2022-08-18 PROCEDURE — 97116 GAIT TRAINING THERAPY: CPT

## 2022-08-18 PROCEDURE — 37000008 HC ANESTHESIA 1ST 15 MINUTES: Performed by: ORTHOPAEDIC SURGERY

## 2022-08-18 PROCEDURE — C1751 CATH, INF, PER/CENT/MIDLINE: HCPCS | Performed by: NURSE ANESTHETIST, CERTIFIED REGISTERED

## 2022-08-18 PROCEDURE — 63600175 PHARM REV CODE 636 W HCPCS: Performed by: ANESTHESIOLOGY

## 2022-08-18 DEVICE — STEM ACTIS FEM COLLARED HIGH 6: Type: IMPLANTABLE DEVICE | Site: HIP | Status: FUNCTIONAL

## 2022-08-18 DEVICE — HEAD BIOLOX CERAMC FEM +5 36MM: Type: IMPLANTABLE DEVICE | Site: HIP | Status: FUNCTIONAL

## 2022-08-18 DEVICE — SCREW PINNACLE 6.5 X 20: Type: IMPLANTABLE DEVICE | Site: HIP | Status: FUNCTIONAL

## 2022-08-18 DEVICE — LINE ACET PINN 36X54 ALTRX: Type: IMPLANTABLE DEVICE | Site: HIP | Status: FUNCTIONAL

## 2022-08-18 DEVICE — CUP 54MM: Type: IMPLANTABLE DEVICE | Site: HIP | Status: FUNCTIONAL

## 2022-08-18 RX ORDER — POLYETHYLENE GLYCOL 3350 17 G/17G
17 POWDER, FOR SOLUTION ORAL DAILY
Status: DISCONTINUED | OUTPATIENT
Start: 2022-08-18 | End: 2022-08-19 | Stop reason: HOSPADM

## 2022-08-18 RX ORDER — TAMSULOSIN HYDROCHLORIDE 0.4 MG/1
0.8 CAPSULE ORAL DAILY
Status: DISCONTINUED | OUTPATIENT
Start: 2022-08-19 | End: 2022-08-19 | Stop reason: HOSPADM

## 2022-08-18 RX ORDER — PANTOPRAZOLE SODIUM 40 MG/1
40 TABLET, DELAYED RELEASE ORAL DAILY
Status: DISCONTINUED | OUTPATIENT
Start: 2022-08-18 | End: 2022-08-19 | Stop reason: HOSPADM

## 2022-08-18 RX ORDER — NALOXONE HCL 0.4 MG/ML
0.02 VIAL (ML) INJECTION
Status: DISCONTINUED | OUTPATIENT
Start: 2022-08-18 | End: 2022-08-19 | Stop reason: HOSPADM

## 2022-08-18 RX ORDER — ACETAMINOPHEN 500 MG
1000 TABLET ORAL EVERY 6 HOURS
Status: DISCONTINUED | OUTPATIENT
Start: 2022-08-18 | End: 2022-08-19 | Stop reason: HOSPADM

## 2022-08-18 RX ORDER — MUPIROCIN 20 MG/G
1 OINTMENT TOPICAL
Status: COMPLETED | OUTPATIENT
Start: 2022-08-18 | End: 2022-08-18

## 2022-08-18 RX ORDER — PROPOFOL 10 MG/ML
VIAL (ML) INTRAVENOUS CONTINUOUS PRN
Status: DISCONTINUED | OUTPATIENT
Start: 2022-08-18 | End: 2022-08-18

## 2022-08-18 RX ORDER — FOLIC ACID 1 MG/1
1 TABLET ORAL DAILY
Status: DISCONTINUED | OUTPATIENT
Start: 2022-08-18 | End: 2022-08-19 | Stop reason: HOSPADM

## 2022-08-18 RX ORDER — TRANEXAMIC ACID 100 MG/ML
INJECTION, SOLUTION INTRAVENOUS
Status: DISCONTINUED | OUTPATIENT
Start: 2022-08-18 | End: 2022-08-18

## 2022-08-18 RX ORDER — ROPIVACAINE/EPI/CLONIDINE/KET 2.46-0.005
SYRINGE (ML) INJECTION
Status: DISCONTINUED | OUTPATIENT
Start: 2022-08-18 | End: 2022-08-18 | Stop reason: HOSPADM

## 2022-08-18 RX ORDER — MUPIROCIN 20 MG/G
1 OINTMENT TOPICAL 2 TIMES DAILY
Status: DISCONTINUED | OUTPATIENT
Start: 2022-08-18 | End: 2022-08-19 | Stop reason: HOSPADM

## 2022-08-18 RX ORDER — FENTANYL CITRATE 50 UG/ML
INJECTION, SOLUTION INTRAMUSCULAR; INTRAVENOUS
Status: DISCONTINUED | OUTPATIENT
Start: 2022-08-18 | End: 2022-08-18

## 2022-08-18 RX ORDER — CEFAZOLIN SODIUM/D5W 2 G/100 ML
2 PLASTIC BAG, INJECTION (ML) INTRAVENOUS
Status: COMPLETED | OUTPATIENT
Start: 2022-08-18 | End: 2022-08-19

## 2022-08-18 RX ORDER — TRANEXAMIC ACID 100 MG/ML
1000 INJECTION, SOLUTION INTRAVENOUS
Status: DISCONTINUED | OUTPATIENT
Start: 2022-08-18 | End: 2022-08-18 | Stop reason: HOSPADM

## 2022-08-18 RX ORDER — PROCHLORPERAZINE EDISYLATE 5 MG/ML
5 INJECTION INTRAMUSCULAR; INTRAVENOUS EVERY 6 HOURS PRN
Status: DISCONTINUED | OUTPATIENT
Start: 2022-08-18 | End: 2022-08-19 | Stop reason: HOSPADM

## 2022-08-18 RX ORDER — LEVOTHYROXINE SODIUM 25 UG/1
25 TABLET ORAL DAILY
Status: DISCONTINUED | OUTPATIENT
Start: 2022-08-19 | End: 2022-08-19 | Stop reason: HOSPADM

## 2022-08-18 RX ORDER — METHOCARBAMOL 750 MG/1
750 TABLET, FILM COATED ORAL 3 TIMES DAILY
Status: DISCONTINUED | OUTPATIENT
Start: 2022-08-18 | End: 2022-08-19 | Stop reason: HOSPADM

## 2022-08-18 RX ORDER — MIDAZOLAM HYDROCHLORIDE 1 MG/ML
INJECTION, SOLUTION INTRAMUSCULAR; INTRAVENOUS
Status: DISCONTINUED | OUTPATIENT
Start: 2022-08-18 | End: 2022-08-18

## 2022-08-18 RX ORDER — AMOXICILLIN 250 MG
1 CAPSULE ORAL 2 TIMES DAILY
Status: DISCONTINUED | OUTPATIENT
Start: 2022-08-18 | End: 2022-08-19 | Stop reason: HOSPADM

## 2022-08-18 RX ORDER — LIDOCAINE HCL/PF 100 MG/5ML
SYRINGE (ML) INTRAVENOUS
Status: DISCONTINUED | OUTPATIENT
Start: 2022-08-18 | End: 2022-08-18

## 2022-08-18 RX ORDER — ONDANSETRON 2 MG/ML
INJECTION INTRAMUSCULAR; INTRAVENOUS
Status: DISCONTINUED | OUTPATIENT
Start: 2022-08-18 | End: 2022-08-18

## 2022-08-18 RX ORDER — FENTANYL CITRATE 50 UG/ML
100 INJECTION, SOLUTION INTRAMUSCULAR; INTRAVENOUS
Status: DISCONTINUED | OUTPATIENT
Start: 2022-08-18 | End: 2022-08-18 | Stop reason: HOSPADM

## 2022-08-18 RX ORDER — PREGABALIN 150 MG/1
150 CAPSULE ORAL NIGHTLY
Status: DISCONTINUED | OUTPATIENT
Start: 2022-08-18 | End: 2022-08-19 | Stop reason: HOSPADM

## 2022-08-18 RX ORDER — SODIUM CHLORIDE 9 MG/ML
INJECTION, SOLUTION INTRAVENOUS CONTINUOUS
Status: ACTIVE | OUTPATIENT
Start: 2022-08-18 | End: 2022-08-19

## 2022-08-18 RX ORDER — FENTANYL CITRATE 50 UG/ML
25 INJECTION, SOLUTION INTRAMUSCULAR; INTRAVENOUS EVERY 5 MIN PRN
Status: COMPLETED | OUTPATIENT
Start: 2022-08-18 | End: 2022-08-18

## 2022-08-18 RX ORDER — HYDROCHLOROTHIAZIDE 25 MG/1
25 TABLET ORAL DAILY
Status: DISCONTINUED | OUTPATIENT
Start: 2022-08-18 | End: 2022-08-19

## 2022-08-18 RX ORDER — MIDAZOLAM HYDROCHLORIDE 1 MG/ML
4 INJECTION INTRAMUSCULAR; INTRAVENOUS
Status: DISCONTINUED | OUTPATIENT
Start: 2022-08-18 | End: 2022-08-18 | Stop reason: HOSPADM

## 2022-08-18 RX ORDER — VANCOMYCIN HYDROCHLORIDE 1 G/20ML
INJECTION, POWDER, LYOPHILIZED, FOR SOLUTION INTRAVENOUS
Status: DISCONTINUED | OUTPATIENT
Start: 2022-08-18 | End: 2022-08-18 | Stop reason: HOSPADM

## 2022-08-18 RX ORDER — OXYCODONE HYDROCHLORIDE 5 MG/1
5 TABLET ORAL
Status: DISCONTINUED | OUTPATIENT
Start: 2022-08-18 | End: 2022-08-19 | Stop reason: HOSPADM

## 2022-08-18 RX ORDER — BUSPIRONE HYDROCHLORIDE 5 MG/1
5 TABLET ORAL 2 TIMES DAILY
Status: DISCONTINUED | OUTPATIENT
Start: 2022-08-18 | End: 2022-08-19 | Stop reason: HOSPADM

## 2022-08-18 RX ORDER — DEXAMETHASONE SODIUM PHOSPHATE 4 MG/ML
INJECTION, SOLUTION INTRA-ARTICULAR; INTRALESIONAL; INTRAMUSCULAR; INTRAVENOUS; SOFT TISSUE
Status: DISCONTINUED | OUTPATIENT
Start: 2022-08-18 | End: 2022-08-18

## 2022-08-18 RX ORDER — FERROUS SULFATE, DRIED 160(50) MG
1 TABLET, EXTENDED RELEASE ORAL 2 TIMES DAILY WITH MEALS
Status: DISCONTINUED | OUTPATIENT
Start: 2022-08-18 | End: 2022-08-19 | Stop reason: HOSPADM

## 2022-08-18 RX ORDER — CELECOXIB 200 MG/1
400 CAPSULE ORAL
Status: COMPLETED | OUTPATIENT
Start: 2022-08-18 | End: 2022-08-18

## 2022-08-18 RX ORDER — ESCITALOPRAM OXALATE 10 MG/1
10 TABLET ORAL DAILY
Status: DISCONTINUED | OUTPATIENT
Start: 2022-08-19 | End: 2022-08-19 | Stop reason: HOSPADM

## 2022-08-18 RX ORDER — LIDOCAINE HYDROCHLORIDE 10 MG/ML
1 INJECTION, SOLUTION EPIDURAL; INFILTRATION; INTRACAUDAL; PERINEURAL
Status: DISCONTINUED | OUTPATIENT
Start: 2022-08-18 | End: 2022-08-18 | Stop reason: HOSPADM

## 2022-08-18 RX ORDER — CEFAZOLIN SODIUM 2 G/50ML
2 SOLUTION INTRAVENOUS
Status: COMPLETED | OUTPATIENT
Start: 2022-08-18 | End: 2022-08-18

## 2022-08-18 RX ORDER — ATORVASTATIN CALCIUM 10 MG/1
10 TABLET, FILM COATED ORAL DAILY
Status: DISCONTINUED | OUTPATIENT
Start: 2022-08-19 | End: 2022-08-19 | Stop reason: HOSPADM

## 2022-08-18 RX ORDER — ONDANSETRON 2 MG/ML
4 INJECTION INTRAMUSCULAR; INTRAVENOUS EVERY 8 HOURS PRN
Status: DISCONTINUED | OUTPATIENT
Start: 2022-08-18 | End: 2022-08-19 | Stop reason: HOSPADM

## 2022-08-18 RX ORDER — ACETAMINOPHEN 500 MG
1000 TABLET ORAL
Status: COMPLETED | OUTPATIENT
Start: 2022-08-18 | End: 2022-08-18

## 2022-08-18 RX ORDER — TRANEXAMIC ACID 100 MG/ML
INJECTION, SOLUTION INTRAVENOUS
Status: DISCONTINUED | OUTPATIENT
Start: 2022-08-18 | End: 2022-08-18 | Stop reason: HOSPADM

## 2022-08-18 RX ORDER — SODIUM CHLORIDE 9 MG/ML
INJECTION, SOLUTION INTRAVENOUS
Status: COMPLETED | OUTPATIENT
Start: 2022-08-18 | End: 2022-08-18

## 2022-08-18 RX ORDER — KETAMINE HYDROCHLORIDE 10 MG/ML
INJECTION, SOLUTION INTRAMUSCULAR; INTRAVENOUS
Status: DISCONTINUED | OUTPATIENT
Start: 2022-08-18 | End: 2022-08-18

## 2022-08-18 RX ORDER — ASPIRIN 81 MG/1
81 TABLET ORAL 2 TIMES DAILY
Status: DISCONTINUED | OUTPATIENT
Start: 2022-08-18 | End: 2022-08-19 | Stop reason: HOSPADM

## 2022-08-18 RX ORDER — OXYCODONE HYDROCHLORIDE 10 MG/1
10 TABLET ORAL
Status: DISCONTINUED | OUTPATIENT
Start: 2022-08-18 | End: 2022-08-19 | Stop reason: HOSPADM

## 2022-08-18 RX ORDER — POLYETHYLENE GLYCOL 3350 17 G/17G
17 POWDER, FOR SOLUTION ORAL DAILY PRN
Status: DISCONTINUED | OUTPATIENT
Start: 2022-08-18 | End: 2022-08-19 | Stop reason: HOSPADM

## 2022-08-18 RX ORDER — FAMOTIDINE 10 MG/ML
INJECTION INTRAVENOUS
Status: DISCONTINUED | OUTPATIENT
Start: 2022-08-18 | End: 2022-08-18

## 2022-08-18 RX ORDER — LIDOCAINE HYDROCHLORIDE AND EPINEPHRINE 15; 5 MG/ML; UG/ML
INJECTION, SOLUTION EPIDURAL
Status: DISCONTINUED | OUTPATIENT
Start: 2022-08-18 | End: 2022-08-18

## 2022-08-18 RX ORDER — FAMOTIDINE 20 MG/1
20 TABLET, FILM COATED ORAL 2 TIMES DAILY
Status: DISCONTINUED | OUTPATIENT
Start: 2022-08-18 | End: 2022-08-19 | Stop reason: HOSPADM

## 2022-08-18 RX ORDER — LANOLIN ALCOHOL/MO/W.PET/CERES
1000 CREAM (GRAM) TOPICAL DAILY
Status: DISCONTINUED | OUTPATIENT
Start: 2022-08-19 | End: 2022-08-19 | Stop reason: HOSPADM

## 2022-08-18 RX ORDER — PREGABALIN 75 MG/1
150 CAPSULE ORAL
Status: COMPLETED | OUTPATIENT
Start: 2022-08-18 | End: 2022-08-18

## 2022-08-18 RX ORDER — MORPHINE SULFATE 2 MG/ML
2 INJECTION, SOLUTION INTRAMUSCULAR; INTRAVENOUS
Status: DISCONTINUED | OUTPATIENT
Start: 2022-08-18 | End: 2022-08-19 | Stop reason: HOSPADM

## 2022-08-18 RX ORDER — PROPOFOL 10 MG/ML
VIAL (ML) INTRAVENOUS
Status: DISCONTINUED | OUTPATIENT
Start: 2022-08-18 | End: 2022-08-18

## 2022-08-18 RX ORDER — LAMOTRIGINE 100 MG/1
100 TABLET ORAL DAILY
Status: DISCONTINUED | OUTPATIENT
Start: 2022-08-19 | End: 2022-08-19 | Stop reason: HOSPADM

## 2022-08-18 RX ADMIN — FENTANYL CITRATE 25 MCG: 50 INJECTION INTRAMUSCULAR; INTRAVENOUS at 11:08

## 2022-08-18 RX ADMIN — SODIUM CHLORIDE: 0.9 INJECTION, SOLUTION INTRAVENOUS at 06:08

## 2022-08-18 RX ADMIN — SODIUM CHLORIDE: 9 INJECTION, SOLUTION INTRAVENOUS at 06:08

## 2022-08-18 RX ADMIN — KETAMINE HYDROCHLORIDE 10 MG: 10 INJECTION INTRAMUSCULAR; INTRAVENOUS at 07:08

## 2022-08-18 RX ADMIN — VANCOMYCIN HYDROCHLORIDE 1500 MG: 1.5 INJECTION, POWDER, LYOPHILIZED, FOR SOLUTION INTRAVENOUS at 06:08

## 2022-08-18 RX ADMIN — METHOCARBAMOL 750 MG: 750 TABLET ORAL at 09:08

## 2022-08-18 RX ADMIN — ONDANSETRON 4 MG: 2 INJECTION INTRAMUSCULAR; INTRAVENOUS at 08:08

## 2022-08-18 RX ADMIN — PROPOFOL 80 MG: 10 INJECTION, EMULSION INTRAVENOUS at 07:08

## 2022-08-18 RX ADMIN — POLYETHYLENE GLYCOL 3350 17 G: 17 POWDER, FOR SOLUTION ORAL at 03:08

## 2022-08-18 RX ADMIN — OXYCODONE HYDROCHLORIDE 10 MG: 10 TABLET ORAL at 09:08

## 2022-08-18 RX ADMIN — SENNOSIDES AND DOCUSATE SODIUM 1 TABLET: 50; 8.6 TABLET ORAL at 09:08

## 2022-08-18 RX ADMIN — SODIUM CHLORIDE: 0.9 INJECTION, SOLUTION INTRAVENOUS at 09:08

## 2022-08-18 RX ADMIN — LIDOCAINE HYDROCHLORIDE 75 MG: 20 INJECTION, SOLUTION INTRAVENOUS at 07:08

## 2022-08-18 RX ADMIN — LIDOCAINE HYDROCHLORIDE,EPINEPHRINE BITARTRATE 3 ML: 15; .005 INJECTION, SOLUTION EPIDURAL; INFILTRATION; INTRACAUDAL; PERINEURAL at 08:08

## 2022-08-18 RX ADMIN — OXYCODONE HYDROCHLORIDE 10 MG: 10 TABLET ORAL at 06:08

## 2022-08-18 RX ADMIN — BUSPIRONE HYDROCHLORIDE 5 MG: 5 TABLET ORAL at 09:08

## 2022-08-18 RX ADMIN — ACETAMINOPHEN 1000 MG: 500 TABLET ORAL at 05:08

## 2022-08-18 RX ADMIN — Medication 1 TABLET: at 05:08

## 2022-08-18 RX ADMIN — SODIUM CHLORIDE: 0.9 INJECTION, SOLUTION INTRAVENOUS at 07:08

## 2022-08-18 RX ADMIN — MUPIROCIN 1 G: 20 OINTMENT TOPICAL at 09:08

## 2022-08-18 RX ADMIN — OXYCODONE 5 MG: 5 TABLET ORAL at 11:08

## 2022-08-18 RX ADMIN — PREGABALIN 150 MG: 150 CAPSULE ORAL at 09:08

## 2022-08-18 RX ADMIN — KETAMINE HYDROCHLORIDE 20 MG: 10 INJECTION INTRAMUSCULAR; INTRAVENOUS at 07:08

## 2022-08-18 RX ADMIN — MEPIVACAINE HYDROCHLORIDE 3.5 ML: 15 INJECTION, SOLUTION EPIDURAL; INFILTRATION at 07:08

## 2022-08-18 RX ADMIN — FAMOTIDINE 20 MG: 10 INJECTION, SOLUTION INTRAVENOUS at 06:08

## 2022-08-18 RX ADMIN — DEXAMETHASONE SODIUM PHOSPHATE 8 MG: 4 INJECTION, SOLUTION INTRAMUSCULAR; INTRAVENOUS at 07:08

## 2022-08-18 RX ADMIN — FENTANYL CITRATE 50 MCG: 50 INJECTION, SOLUTION INTRAMUSCULAR; INTRAVENOUS at 07:08

## 2022-08-18 RX ADMIN — MUPIROCIN 1 G: 20 OINTMENT TOPICAL at 05:08

## 2022-08-18 RX ADMIN — CELECOXIB 400 MG: 200 CAPSULE ORAL at 05:08

## 2022-08-18 RX ADMIN — ACETAMINOPHEN 1000 MG: 500 TABLET ORAL at 11:08

## 2022-08-18 RX ADMIN — TRANEXAMIC ACID 1000 MG: 100 INJECTION, SOLUTION INTRAVENOUS at 07:08

## 2022-08-18 RX ADMIN — PROPOFOL 100 MCG/KG/MIN: 10 INJECTION, EMULSION INTRAVENOUS at 07:08

## 2022-08-18 RX ADMIN — SODIUM CHLORIDE, SODIUM GLUCONATE, SODIUM ACETATE, POTASSIUM CHLORIDE, MAGNESIUM CHLORIDE, SODIUM PHOSPHATE, DIBASIC, AND POTASSIUM PHOSPHATE: .53; .5; .37; .037; .03; .012; .00082 INJECTION, SOLUTION INTRAVENOUS at 07:08

## 2022-08-18 RX ADMIN — OXYCODONE 5 MG: 5 TABLET ORAL at 09:08

## 2022-08-18 RX ADMIN — TRANEXAMIC ACID 1000 MG: 100 INJECTION, SOLUTION INTRAVENOUS at 08:08

## 2022-08-18 RX ADMIN — MIDAZOLAM HYDROCHLORIDE 2 MG: 1 INJECTION, SOLUTION INTRAMUSCULAR; INTRAVENOUS at 06:08

## 2022-08-18 RX ADMIN — METHOCARBAMOL 750 MG: 750 TABLET ORAL at 03:08

## 2022-08-18 RX ADMIN — PREGABALIN 150 MG: 75 CAPSULE ORAL at 05:08

## 2022-08-18 RX ADMIN — ASPIRIN 81 MG: 81 TABLET, COATED ORAL at 09:08

## 2022-08-18 RX ADMIN — OXYCODONE HYDROCHLORIDE 10 MG: 10 TABLET ORAL at 03:08

## 2022-08-18 RX ADMIN — Medication 2 G: at 04:08

## 2022-08-18 RX ADMIN — FAMOTIDINE 20 MG: 20 TABLET, FILM COATED ORAL at 09:08

## 2022-08-18 RX ADMIN — CEFAZOLIN SODIUM 2 G: 2 SOLUTION INTRAVENOUS at 07:08

## 2022-08-18 NOTE — PLAN OF CARE
Patient tolerated PT session well. Patient ambulated 80ft with RW and contact guard assistance. No LOB or SOB noted. Patient educated in anterior hip precautions. Patient will have HHPT.       Problem: Physical Therapy  Goal: Physical Therapy Goal  Description: Goals to be met by: 2022    Patient will increase functional independence with mobility by performin. Supine to sit with supervision  2. Sit to stand transfer with Supervision  3. Gait x300 feet with Supervision using Rolling Walker  4. Ascend/Descend 1 curb step with Stand by assistance using Rolling Walker  5. Lower extremity exercise program x30 reps per handout, with supervision        Outcome: Ongoing, Progressing

## 2022-08-18 NOTE — PLAN OF CARE
preop complete. Pt resting comfortably. Significant other at BS. Call light in reach.pt states he has a walker.

## 2022-08-18 NOTE — ASSESSMENT & PLAN NOTE
57 y.o. male POD1 s/p R NATALIYA    Pain control: multimodal per surgical home  PT/OT: WBAT RLE with walker, anterior hip precautions, hip flexion 0-90 deg, no extremes of motion  DVT PPx: ASA 81mg BID, FCDs at all times when not ambulating  Abx: postop Ancef  Labs: Hct 29    Dispo: d/c home with HH pending PT

## 2022-08-18 NOTE — HOSPITAL COURSE
On 8/18/22, the patient arrived to the [facility] for proper pre-operative management.  Upon completion of pre-operative preparation, the patient was taken back to the operative theatre. R NATALIYA was performed without complication and the patient was transported to the post anesthesia care unit in stable condition.  After appropriate recovery from the anaesthetic agents used during the surgery, the patient was then transported to the hospital inpatient floor.  The interim of the hospital stay from arrival on the floor up to discharge has been uncomplicated. The patient has tolerated regular diet.  The patient's pain has been controlled using a multimodal approach. Currently, the patient's pain is well controlled on an oral regimen.  The patient has been voiding without difficulty.  The patient began participation in physical therapy after surgery and has progressed throughout the entire hospital stay.  Currently, the patient's progress is sufficient to allow the them to be discharged to home with home health safely.  The patient agrees with this assessment and desires a discharge today.

## 2022-08-18 NOTE — HPI
CC: right hip pain     Tyrone Santos is a 57 y.o. male with right hip pain.  Pain is worse with activity and weight bearing.  Patient has experienced interference of activities of daily living due to increased pain and decreased range of motion. Patient has failed non-operative treatment including NSAIDs, RFA, as well as greater than 3 months of activity modification. Tyrone Santos ambulates using assistive device . Pt had L NATALIYA 8/2021 and did well.      Relevant medical conditions of significance in perioperative period:  H/o drug/etoh use: denies recent use, off of pain mediaction   ERIC: does not use cpap  Bipolar disorder: on medication

## 2022-08-18 NOTE — NURSING TRANSFER
Nursing Transfer Note      8/18/2022     Reason patient is being transferred: extended recovery.    Transfer To: recovery suites from pacu.    Transfer via bed    Transfer with IVF, ice pack.    Transported by HAYDEN Blanco RN.    Medicines sent: mupirocin.    Any special needs or follow-up needed: none.    Chart send with patient: Yes    Notified: spouse

## 2022-08-18 NOTE — DISCHARGE INSTRUCTIONS
"You will be discharged on a "multi-modal" pain regimen. This means that different medications are used to control your pain. Each medication works differently to control your pain so that your pain control is optimized.    You will be discharged on Tylenol (Acetaminophen), Robaxin (Methocarbamol), Celebrex (Celecoxib) or Mobic (Meloxicam), and Roxicodone (Oxycodone). The only opioid medication is Roxicodone (Oxycodone). Do not drive while taking opioid pain medication.     You may also be discharged with a perineural catheter (PNC). This is a way to deliver medicine that numbs the nerves in the area of your surgery to decrease your pain. If you experience any difficulty with this, call the on-call nursing hotline ([phone]) and ask to speak with anesthesia.     You will also be discharged on Aspirin twice daily or Eliquis (Apixaban) twice daily. This is to prevent blood clots which may be harmful.     If you have any questions regarding your medications, please to not hesitate to contact us.    You are able to bear full weight on the operative extremity, also called "weight bearing as tolerated." Your physical therapist will have gone over any precautions you may have and how to safely move after surgery.    Do not remove surgical dressing for 2 weeks post-op. This will be done only by MD at initial post-op visit.    Patient may shower at home. Dry area around surgical dressing well afterward. No submerging underwater (bathing, swimming, hot tub) for 1 month.     If dressing becomes saturated with drainage or there are signs of infection, please call [facility] [phone] for further instructions and to make appt to be seen.       Call us if you experience: fever, chills, chest pain, shortness of breath, severe headache, pain not relieved by oral medications, increased pain and swelling at the operative site, or any other questions or concerns. We are happy to assist you at any time.    "

## 2022-08-18 NOTE — SUBJECTIVE & OBJECTIVE
Principal Problem:Avascular necrosis of right femoral head    Principal Orthopedic Problem: same    Interval History: Patient examined at the bedside. NAEON. Pain controlled. Tolerating PO w/out N/V and voiding appropriately.   Ambulated 80 ft with PT yesterday. Hct 29.     Review of patient's allergies indicates:   Allergen Reactions    Codeine Nausea Only    Seroquel [quetiapine]      Heart racing     Tizanidine Other (See Comments)     Caused increased pain and muscle pain       Current Facility-Administered Medications   Medication    0.9%  NaCl infusion    acetaminophen tablet 1,000 mg    aspirin EC tablet 81 mg    [START ON 8/19/2022] atorvastatin tablet 10 mg    busPIRone tablet 5 mg    calcium-vitamin D3 500 mg-5 mcg (200 unit) per tablet 1 tablet    ceFAZolin 2 gram in dextrose 5% 100 mL IVPB (premix)    [START ON 8/19/2022] cyanocobalamin tablet 1,000 mcg    [START ON 8/19/2022] EScitalopram oxalate tablet 10 mg    famotidine tablet 20 mg    folic acid tablet 1 mg    hydroCHLOROthiazide tablet 25 mg    [START ON 8/19/2022] lamoTRIgine tablet 100 mg    [START ON 8/19/2022] levothyroxine tablet 25 mcg    methocarbamoL tablet 750 mg    morphine injection 2 mg    mupirocin 2 % ointment 1 g    naloxone 0.4 mg/mL injection 0.02 mg    ondansetron injection 4 mg    oxyCODONE immediate release tablet 5 mg    oxyCODONE immediate release tablet Tab 10 mg    pantoprazole EC tablet 40 mg    polyethylene glycol packet 17 g    polyethylene glycol packet 17 g    pregabalin capsule 150 mg    prochlorperazine injection Soln 5 mg    senna-docusate 8.6-50 mg per tablet 1 tablet    [START ON 8/19/2022] tamsulosin 24 hr capsule 0.8 mg    trazodone split tablet 25 mg     Objective:     Vital Signs (Most Recent):  Temp: 97.2 °F (36.2 °C) (08/18/22 1213)  Pulse: 60 (08/18/22 1335)  Resp: 18 (08/18/22 1503)  BP: 100/60 (08/18/22 1335)  SpO2: 95 % (08/18/22 1213) Vital Signs (24h Range):  Temp:  [97.2 °F (36.2 °C)-98.2 °F (36.8 °C)]  "97.2 °F (36.2 °C)  Pulse:  [50-75] 60  Resp:  [11-18] 18  SpO2:  [94 %-99 %] 95 %  BP: ()/(52-78) 100/60     Weight: 80.7 kg (178 lb)  Height: 5' 8" (172.7 cm)  Body mass index is 27.06 kg/m².      Intake/Output Summary (Last 24 hours) at 8/18/2022 1551  Last data filed at 8/18/2022 1320  Gross per 24 hour   Intake 1500 ml   Output 450 ml   Net 1050 ml       Ortho/SPM Exam  AAOx4  NAD  Reg rate  No increased WOB    RLE  Dressing c/d/i  Ice in place  SILT T/SP/DP/Garcia/Sa  Motor intact T/SP/DP  Palpable DP pulse  FCDs in place and functioning      Significant Labs: All pertinent labs within the past 24 hours have been reviewed.    Significant Imaging: I have reviewed all pertinent imaging results/findings.  "

## 2022-08-18 NOTE — NURSING
Pt arrived to unit via hospital bed from PACU.  Pt aaox4, vss, no s/s of distress noted.  Pt states pain 8/10.  Dressing cdi.  Ice pack in place.  Pt instructed to call for assistance when getting up and he verbalized understanding.  Call light placed within reach.  Significant other at bedside.

## 2022-08-18 NOTE — PLAN OF CARE
Problem: Occupational Therapy  Goal: Occupational Therapy Goal  Description: Goals to be met by: 8/19/22    Patient will increase functional independence with ADLs by performing:    UE Dressing with Modified [place].  LE Dressing with Supervision.  Grooming while standing at sink with Modified [place].  Toileting from toilet with Modified [place] for hygiene and clothing management.   Toilet transfer to toilet with Modified [place].    Outcome: Ongoing, Progressing    OT evaluation with goals established. Patient completed bed mobility at contact guard assistance. He completed toilet task in standing along with grooming task in standing at contact guard assistance.       No he needs to be seen in the office.  Please explain to him how we are minimizing risk.  He can be scheduled next week.  If we need to we can refill his medications before then.

## 2022-08-18 NOTE — OP NOTE
Genaro SHAFFER right hip  OPERATIVE NOTE      Date of Operation:   8/18/2022    Providers Performing:   Surgeon(s):  Ángel Lam III, MD  Assistant: SMA Jana    Preoperative Diagnosis:   Right hip osteonecrosis with collapse    Postoperative Diagnosis:   Same    Operative Procedure:   Right Total Hip Arthroplasty, Anterior Approach, CPT 81472    Anesthesia:   Spinal+catheter  RAMA cocktail: SUSIE    Estimated Blood Loss:   400 cc     Specimens:   None    Complications:   None, stable to PACU.    Implants Utilized:   Depuy  [place] Gription; Size 54  [place] Altrx polyethylene liner; 54 OD, 36 ID, neutral  Actis size 6 HO  Biolox Delta Ceramic 12/14 taper 36mm + 5  Acetabular screw 20mm, 20mm    Implant Name Type Inv. Item Serial No.  Lot No. LRB No. Used Action   CUP 54MM - [id number]  CUP 54MM  [employer] [id number] Right 1 Implanted   LINE ACET PINN 36X54 ALTRX - [id number]  LINE ACET PINN 36X54 ALTRX  [employer] [id number] Right 1 Implanted   SCREW PINNACLE 6.5 X 20 - [id number]  SCREW PINNACLE 6.5 X 20  [employer] [id number] Right 1 Implanted   SCREW PINNACLE 6.5 X 20 - [id number]  SCREW PINNACLE 6.5 X 20  [employer] [id number] Right 1 Implanted   STEM ACTIS FEM COLLARED HIGH 6 - [id number]  STEM ACTIS FEM COLLARED HIGH 6  SYNTHES [id number] Right 1 Implanted   HEAD BIOLOX CERAMC FEM +5 36MM - [id number]  HEAD BIOLOX CERAMC FEM +5 36MM  [employer]ES [id number] Right 1 Implanted       Indications:   The patient has longstanding right hip pain secondary to the above diagnosis.. They have failed conservative management which includes anti-inflammatory medications and activity modification. We reviewed the risks and benefits of the direct anterior hip replacement with the patient prior to surgery including risk of infection, lateral thigh numbness, blood clot, leg length inequality, dislocation, components loosening, components wearing out, need for revision surgery, continued pain, limping, and injury to  nerves and vessels.  After discussing the risks and benefits of the procedure they would like to proceed with surgery.    Operative Notes:   The patient was met in the holding area. The operative site was marked. Anesthesia administered spinal anesthesia. The patient was placed supine on a [place] table and the operative site was identified. The hip was prepped and draped in the usual fashion. IV antibiotics were administered and a timeout was performed.     I performed a direct anterior approach to the hip. Skin incision was made and the fascia of the tensor fascia nichole was identified. I utilized the interval between the tensor fascia nichole and sartorious for direct dissection to the hip joint. I identified and coagulated the ascending branch of the lateral circumflex vessel. Standard retractors over the anterior hip capsule were placed and the capsulotomy was performed. The capsule was tagged for retraction. The femoral head was identified and the femoral neck osteotomy was made in accordance with preoperative templating. The femoral head was then removed with a corkscrew.    The standard anterior, posterior, and superior retractors were placed around the acetabulum. The capsular labrum and foveal contents were removed. Sequential acetabular reamers were used to prepare the acetabulum. Once good good fit was achieved, the final acetabular cup was selected to be one millimeter larger in diameter than the last reamer used. The cup was checked for a good rim fit and a provisional impaction was performed to verify positioning on fluoroscopy. Once this was satisfactory, the impaction was completed. I checked to make sure there was no overhanging osteophytes anteriorly as well as making sure that there was not excessive acetabular cup exposed. Screws were placed in the cup to augment initial fixation. The final liner was impacted into place and I confirmed that it was well seated.    The hydraulic hook was placed around the  femur. The femur was externally rotated and the standard calcar and greater trochanter retractors were placed. A provisional posterior capsulotomy was performed. Then, gross traction was released and the leg was extended and adducted. The appropriate release was performed to allow elevation of the femur for good visualization of the femoral canal.     A box osteotome was used to open the femoral canal and a canal finder was used to sound the canal. The starter broach and sequential broaches were used until stability was appropriate. A trial reduction was performed and intraoperative fluoroscopy was used to confirm appropriate leg lengths and offset.  Once the trial femoral components appeared appropriately positioned, the hip was dislocated, the femur re-exposed, and the trial femoral components were removed. The canal was irrigated and the final femoral stem was impacted to the level of the neck cut. The final ball was impacted onto a dry trunnion and the hip was reduced checking for appropriate soft tissue tension. Final intra-operative fluoroscopy was obtained to confirm implant positioning, leg length, and offset.     While checking xray, a 0.35% betadine solution was left to soak in the wound. The wound was copiously irrigated. I checked for any residual bleeders and made sure that there was appropriate hemostasis. The local anesthetic cocktail was administered. 1 gram of vancomycin powder was placed in the wound. The wound was closed in layers using #1 vicryl at the fascia, then 2-0 vicryl, 3-0 monocryl, 4-0 monocryl and Prineo Mesh+Dermabond. A sterile dressing was placed.     There were no complications or evidence of intraoperative fracture. All counts were correct.    The first-assistant was critical to all steps of the operation, including retraction during exposure and bone preparation, as well as the deep and superficial wound closure.  Dr. Lam performed and/or supervised the key portions of this  surgical procedure, including evaluation of the bone cuts, reshaping of the bony elements, and insertion of the provisional and final components.    Post Op Plan:   The patient will be weightbearing as tolerated with a walker with no extremes of motion  ASA for DVT prophylaxis (81mg BID for one month)  24 hours of IV antibiotics.      Signed by: Ángel Lam III, MD

## 2022-08-18 NOTE — PT/OT/SLP EVAL
"Occupational Therapy   Evaluation    Name: Tyrone Santos  MRN: [id number]  Admitting Diagnosis:  <principal problem not specified> Day of Surgery    Recommendations:     Discharge Recommendations: home with home health  Discharge Equipment Recommendations:  none  Barriers to discharge:  None    Assessment:     Tyrone Santos is a 57 y.o. male with a medical diagnosis of <principal problem not specified>.  He presents s/p right NATALIYA anterior approach.Patient completed bed mobility at contact guard assistance. He completed toilet task in standing along with grooming task in standing at contact guard assistance. Patient would benefit from skilled OT needs s/p right NATALIYA to increase independence with ADLs and ADL transfers while adhering to anterior hip precautions.  Performance deficits affecting function: weakness, impaired functional mobility, gait instability, impaired balance, impaired self care skills, decreased lower extremity function, decreased ROM, orthopedic precautions.      Rehab Prognosis: Good; patient would benefit from acute skilled OT services to address these deficits and reach maximum level of function.       Plan:     Patient to be seen daily to address the above listed problems via self-care/home management, therapeutic activities, therapeutic exercises  · Plan of Care Expires: 08/19/22  · Plan of Care Reviewed with: patient, significant other    Subjective     Chief Complaint: "pain in hip"   Patient/Family Comments/goals: "get back to activities"     Occupational Profile:  Living Environment: Patient lives with fiance in 1 level home with 1 steps to enter and has a tub/shower combo   Previous level of function: independent with ADLs, previous left NATALIYA anterior approach   Roles and Routines: 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  Patient found supine with cryotherapy, FCD, peripheral IV upon OT entry to room.    General Precautions: Standard, fall   Orthopedic Precautions:RLE weight bearing as tolerated, RLE anterior precautions (no extreme motions and flexion 0-90)   Braces: N/A     Occupational Performance:    Bed Mobility:    · Patient completed Scooting/Bridging with contact guard assistance  · Patient completed Supine to Sit with contact guard assistance    Functional Mobility/Transfers:  · Patient completed Sit <> Stand Transfer with contact guard assistance  with  rolling walker    · Patient completed chair transfer with step transfer with rolling walker at contact guard assistance   · Functional Mobility: patient ambulated to/from bathroom with use of rolling walker at contact guard assistance     Activities of Daily Living:  · Grooming: contact guard assistance standing at sink to wash hands   · Upper Body Dressing: minimum assistance sitting edge of bed to don gown, IV in   · Toileting: contact guard assistance completed in standing with use of rolling walker     Cognitive/Visual Perceptual:  Cognitive/Psychosocial Skills:     -       Oriented to: Person, Place and Time   -       Follows Commands/attention:Follows multistep  commands    Physical Exam:  Upper Extremity Range of Motion:     -       Right Upper Extremity: WFL  -       Left Upper Extremity: WFL  Upper Extremity Strength:    -       Right Upper Extremity: WFL  -       Left Upper Extremity: WFL    AMPAC 6 Click ADL:  AMPAC Total Score: 19    Treatment & Education:    Patient educated on anterior hip precautions with no extreme motions and hip  flexion 0-90 degrees   Patient educated on hand placement for transfers    Patient educated on rolling walker placement for ADLs  Patient educated on role OT and plan of care s/p surgery at [facility], white board updated as needed       Patient left up in chair with all lines intact, call button in reach, RN notified and fiance present    GOALS:   Multidisciplinary Problems     Occupational Therapy Goals        Problem: Occupational Therapy    Goal Priority Disciplines Outcome Interventions   Occupational Therapy Goal     OT, PT/OT Ongoing, Progressing    Description: Goals to be met by: 8/19/22    Patient will increase functional independence with ADLs by performing:    UE Dressing with Modified [place].  LE Dressing with Supervision.  Grooming while standing at sink with Modified [place].  Toileting from toilet with Modified [place] for hygiene and clothing management.   Toilet transfer to toilet with Modified [place].                     History:     Past Medical History:   Diagnosis Date    Addiction to drug     Alcohol abuse     Anxiety     Bipolar disorder     Depression     Difficulty urinating     GERD (gastroesophageal reflux disease)     Hx of psychiatric care     Hyperlipidemia     Hypertension     Ade     Psychiatric problem     S/P cervical spinal fusion 12/06/2018    S/P laparoscopic appendectomy 01/03/2017    Sleep apnea     Therapy     Thyroid disease        Past Surgical History:   Procedure Laterality Date    ANTERIOR CERVICAL DISCECTOMY W/ FUSION N/A 10/23/2018    Procedure: DISCECTOMY, SPINE, CERVICAL, ANTERIOR APPROACH, WITH FUSION C3-4;  Surgeon: Jw Anne MD;  Location: [facility] OR [address] [place] [address];  Service: Neurosurgery;  Laterality: N/A;    APPENDECTOMY      around 2012    ARTHROPLASTY OF HIP BY ANTERIOR APPROACH Left 8/23/2021    Procedure: ARTHROPLASTY, HIP, TOTAL, ANTERIOR APPROACH:LEFT:DPEUY-ACTIS+PINNACLE;  Surgeon: Ángel NIÑO  Genaro GRADY MD;  Location[facility];  Service: Orthopedics;  Laterality: Left;    INJECTION OF ANESTHETIC AGENT AROUND NERVE Left 7/1/2021    Procedure: BLOCK, NERVE, OBTURATOR and FEMORAL;  Surgeon: Josefina Lee MD;  [facility];  Service: Pain Management;  Laterality: Left;    SCAPHOID FRACTURE SURGERY      had to have a bone graft -early  1990's     WRIST SURGERY      6 years ago       Time Tracking:     OT Date of Treatment: 08/18/22  OT Start Time: 1252  OT Stop Time: 1316  OT Total Time (min): 24 min    Billable Minutes:Evaluation 10  Self Care/Home Management 14    Lynn Winchester OT  8/18/2022

## 2022-08-18 NOTE — PLAN OF CARE
[facility] Surgery ([facility])      HOME HEALTH ORDERS  FACE TO FACE ENCOUNTER    Patient Name: Tyrone Santos  YOB: 1965    PCP: Primary Doctor No   PCP Address: None  PCP Phone Number: None  PCP Fax: None    Encounter Date: 7/26/22    Admit to Home Health    Diagnoses:  There are no hospital problems to display for this patient.      Follow Up Appointments:  Future Appointments   Date Time Provider Department Center   8/22/2022 11:00 AM TELEMED NURSE, [facility] [address]   9/1/2022  7:30 AM Diya Corrales PA-C [facility] [address]       Allergies:  Review of patient's allergies indicates:   Allergen Reactions    Codeine Nausea Only    Seroquel [quetiapine]      Heart racing     Tizanidine Other (See Comments)     Caused increased pain and muscle pain       Medications: Review discharge medications with patient and family and provide education.    Current Facility-Administered Medications   Medication Dose Route Frequency Provider Last Rate Last Admin    fentaNYL 50 mcg/mL injection 100 mcg  100 mcg Intravenous PRN Diya Corrales PA-C        LIDOcaine (PF) 10 mg/ml (1%) injection 10 mg  1 mL Intradermal On Call Procedure Diya oCrrales PA-C        midazolam (VERSED) 1 mg/mL injection 4 mg  4 mg Intravenous PRN Diya Corrales PA-C        NON FORMULARY MEDICATION    PRN Ángel Lam III, MD   17 mL at 08/18/22 0741    ROPIvacaine-epi-clonid-ketorol (SUSIE) 2.46-0.005- 0.0008-0.3mg/mL solution Syrg    PRN Ángel Lam III, MD   50 mL at 08/18/22 0744    tranexamic acid (CYKLOKAPRON) 1,000 mg in sodium chloride 0.9 % 100 mL (ready to mix system)  1,000 mg Intravenous On Call Procedure Diya Corrales PA-C        tranexamic acid (CYKLOKAPRON) 1,000 mg in sodium chloride 0.9 % 100 mL (ready to mix system)  1,000 mg Intravenous On Call Procedure Diya Corrales PA-C        tranexamic acid (CYKLOKAPRON) 3,000 mg in sodium chloride 0.9% 100 mL  3,000 mg  Irrigation On Call Procedure Diya Corrales PA-C        tranexamic acid injection Soln    PRN Ángel Lam III, MD   3,000 mL at 08/18/22 0742    vancomycin injection    PRN Ángel Lam III, MD   1 g at 08/18/22 0743     Facility-Administered Medications Ordered in Other Encounters   Medication Dose Route Frequency Provider Last Rate Last Admin    dexamethasone injection   Intravenous PRN Ana Grewal CRNA   8 mg at 08/18/22 0715    electrolyte-S (ISOLYTE)   Intravenous Continuous PRN Ana Grewal CRNA   New Bag at 08/18/22 0759    famotidine (PF) injection   Intravenous PRN Ana Grewal CRNA   20 mg at 08/18/22 0657    fentaNYL 50 mcg/mL injection   Intravenous PRN Ana Grewal CRNA   50 mcg at 08/18/22 0724    ketamine injection   Intravenous PRN Ana Grewal CRNA   10 mg at 08/18/22 0715    LIDOcaine (cardiac) injection   Intravenous PRN Ana Grewal CRNA   75 mg at 08/18/22 0706    LIDOcaine-EPINEPHrine (PF) 1.5%-1:200,000 injection   Other PRN Ana Grewal CRNA   3 mL at 08/18/22 0833    midazolam injection   Intravenous PRN Ana Grewal CRNA   2 mg at 08/18/22 0657    ondansetron injection   Intravenous PRN Ana Grewal CRNA   4 mg at 08/18/22 0826    propofol (DIPRIVAN) 10 mg/mL infusion   Intravenous Continuous PRN Ana Grewal CRNA 32.985 mL/hr at 08/18/22 0726 75 mcg/kg/min at 08/18/22 0726    propofol (DIPRIVAN) 10 mg/mL infusion   Intravenous PRN Ana Grewal CRNA   80 mg at 08/18/22 0715    sodium chloride 0.9% infusion   Intravenous Continuous PRN Ana Grewal CRNA   Stopped at 08/18/22 0800    tranexamic acid injection Soln   Intravenous PRN Ana Grewal CRNA   1,000 mg at 08/18/22 0826     Current Discharge Medication List      START taking these medications    Details   acetaminophen (TYLENOL) 650 MG TbSR Take 1 tablet (650 mg total) by mouth every 6 to 8 hours as needed (pain).  Qty: 120 tablet, Refills: 0      aspirin (ECOTRIN) 81  MG EC tablet Take 1 tablet (81 mg total) by mouth 2 (two) times daily.  Qty: 60 tablet, Refills: 0      celecoxib (CELEBREX) 200 MG capsule Take 1 capsule (200 mg total) by mouth once daily.  Qty: 30 capsule, Refills: 0      docusate sodium (COLACE) 100 MG capsule Take 1 capsule (100 mg total) by mouth 2 (two) times daily as needed for Constipation.  Qty: 60 capsule, Refills: 0      methocarbamoL (ROBAXIN) 750 MG Tab Take 1 tablet (750 mg total) by mouth 3 (three) times daily as needed (muscle spasms).  Qty: 30 tablet, Refills: 0      oxyCODONE (ROXICODONE) 5 MG immediate release tablet Take 1 tablet by mouth every 4-6 hours as needed for pain  Qty: 30 tablet, Refills: 0    Comments: Greater than 7 day supply is medically necessary. Deliver to [facility] for surgery 8/18/2022.         CONTINUE these medications which have NOT CHANGED    Details   busPIRone (BUSPAR) 15 MG tablet Take 2 tablets po q am, 1 tablet po at noon and 1 tablet po q hs  Qty: 360 tablet, Refills: 0    Associated Diagnoses: Generalized anxiety disorder      calcium-vitamin D3 (OS-JALYN 500 + D3) 500 mg(1,250mg) -200 unit per tablet Take 1 tablet by mouth 2 (two) times daily with meals.      cyanocobalamin (VITAMIN B-12) 100 MCG tablet Take 100 mcg by mouth once daily.      EScitalopram oxalate (LEXAPRO) 10 MG tablet Take 1 tablet (10 mg total) by mouth once daily.  Qty: 90 tablet, Refills: 0    Associated Diagnoses: Generalized anxiety disorder      folic acid (FOLVITE) 1 MG tablet Take 1 mg by mouth once daily.      hydroCHLOROthiazide (HYDRODIURIL) 25 MG tablet Take 25 mg by mouth once daily.      lamoTRIgine (LAMICTAL) 100 MG tablet Take 1 tablet (100 mg total) by mouth once daily.  Qty: 90 tablet, Refills: 0    Associated Diagnoses: Bipolar affective disorder in remission      levothyroxine (SYNTHROID) 25 MCG tablet Take 25 mcg by mouth once daily.      omeprazole (PRILOSEC) 40 MG capsule Take 40 mg by mouth once daily.      rosuvastatin  (CRESTOR) 10 MG tablet rosuvastatin 10 mg tablet   TAKE 1 TABLET BY MOUTH EVERY DAY      tamsulosin (FLOMAX) 0.4 mg Cap Take 2 capsules (0.8 mg total) by mouth once daily.  Qty: 60 capsule, Refills: 11    Associated Diagnoses: BPH with urinary obstruction      traZODone (DESYREL) 50 MG tablet Take 1-2 tablets po q hs prn insomnia  Qty: 60 tablet, Refills: 1    Associated Diagnoses: Insomnia, unspecified type               I have seen and examined this patient within the last 30 days. My clinical findings that support the need for the home health skilled services and home bound status are the following:no   Weakness/numbness causing balance and gait disturbance due to Joint Replacement making it taxing to leave home.     Diet:   regular diet    Labs:  Report Lab results to PCP.    Referrals/ Consults  Physical Therapy to evaluate and treat. Evaluate for home safety and equipment needs; Establish/upgrade home exercise program. Perform / instruct on therapeutic exercises, gait training, transfer training, and Range of Motion.  Occupational Therapy to evaluate and treat. Evaluate home environment for safety and equipment needs. Perform/Instruct on transfers, ADL training, ROM, and therapeutic exercises.    Activities:   other WBAT with walker, no extreme range of motion    Nursing:   Agency to admit patient within 24 hours of hospital discharge unless specified on physician order or at patient request    SN to complete comprehensive assessment including routine vital signs. Instruct on disease process and s/s of complications to report to MD. Review/verify medication list sent home with the patient at time of discharge  and instruct patient/caregiver as needed. Frequency may be adjusted depending on start of care date.     Skilled nurse to perform up to 3 visits PRN for symptoms related to diagnosis    Notify MD if SBP > 160 or < 90; DBP > 90 or < 50; HR > 120 or < 50; Temp > 101; O2 < 88%    Ok to schedule additional  visits based on staff availability and patient request on consecutive days within the home health episode.    When multiple disciplines ordered:    Start of Care occurs on Sunday - Wednesday schedule remaining discipline evaluations as ordered on separate consecutive days following the start of care.    Thursday SOC -schedule subsequent evaluations Friday and Monday the following week.     Friday - Saturday SOC - schedule subsequent discipline evaluations on consecutive days starting Monday of the following week.    For all post-discharge communication and subsequent orders please contact patient's primary care physician. If unable to reach primary care physician or do not receive response within 30 minutes, please contact Lynn Tilley, Diya Corrales, or orthopedics on call for clinical staff order clarification    Miscellaneous   Routine Skin for Bedridden Patients: Instruct patient/caregiver to apply moisture barrier cream to all skin folds and wet areas in perineal area daily and after baths and all bowel movements.    Home Health Aide:  Physical Therapy Daily and Occupational Therapy Daily    Wound Care Orders  no  Do not remove surgical dressing for 2 weeks post-op. This will be done only by MD at initial post-op visit.    Patient may shower at home. Dry area around surgical dressing well afterward. No submerging underwater (bathing, swimming, hot tub) for 1 month.     If dressing becomes saturated with drainage or there are signs of infection, please call [facility] [phone] for further instructions and to make appt to be seen.       I certify that this patient is confined to his home and needs physical therapy and occupational therapy.

## 2022-08-18 NOTE — PLAN OF CARE
Problem: Adult Inpatient Plan of Care  Goal: Absence of Hospital-Acquired Illness or Injury  Intervention: Identify and Manage Fall Risk  Flowsheets (Taken 8/18/2022 1529)  Safety Promotion/Fall Prevention:   nonskid shoes/socks when out of bed   side rails raised x 2   Fall Risk reviewed with patient/family   Fall Risk signage in place   medications reviewed   Supervised toileting - stay within arms reach     Problem: Adult Inpatient Plan of Care  Goal: Absence of Hospital-Acquired Illness or Injury  Intervention: Prevent and Manage VTE (Venous Thromboembolism) Risk  Flowsheets (Taken 8/18/2022 1529)  VTE Prevention/Management:   remove, assess skin, and reapply sequential compression device   remove, assess skin, and reapply compression stockings   intravenous hydration   dorsiflexion/plantar flexion performed     Problem: Pain Acute  Goal: Acceptable Pain Control and Functional Ability  8/18/2022 1529 by Krissy Hicks RN  Outcome: Ongoing, Progressing     Problem: Adjustment to Surgery (Hip Arthroplasty)  Goal: Optimal Coping  8/18/2022 1529 by Krissy Hicks RN  Outcome: Ongoing, Progressing     Problem: Bleeding (Hip Arthroplasty)  Goal: Absence of Bleeding  Intervention: Monitor and Manage Bleeding  Flowsheets (Taken 8/18/2022 1529)  Bleeding Management: dressing monitored     Problem: Fluid and Electrolyte Imbalance (Hip Arthroplasty)  Goal: Fluid and Electrolyte Balance  8/18/2022 1529 by Krissy Hicks RN  Outcome: Ongoing, Progressing     Problem: Infection (Hip Arthroplasty)  Goal: Absence of Infection Signs and Symptoms  8/18/2022 1529 by Krissy Hicks RN  Outcome: Ongoing, Progressing     Problem: Ongoing Anesthesia Effects (Hip Arthroplasty)  Goal: Anesthesia/Sedation Recovery  8/18/2022 1529 by Krissy Hicks RN  Outcome: Ongoing, Progressing     Problem: Pain (Hip Arthroplasty)  Goal: Acceptable Pain Control  8/18/2022 1529 by Krissy Hicks RN  Outcome: Ongoing, Progressing     Problem:  Postoperative Urinary Retention (Hip Arthroplasty)  Goal: Effective Urinary Elimination  Intervention: Monitor and Manage Urinary Retention  Flowsheets (Taken 8/18/2022 1529)  Urinary Elimination Promotion: toileting device within reach     Problem: Respiratory Compromise (Hip Arthroplasty)  Goal: Effective Oxygenation and Ventilation  Intervention: Optimize Oxygenation and Ventilation  Flowsheets (Taken 8/18/2022 1529)  Head of Bed (HOB) Positioning: HOB elevated     Problem: Hypertension Acute  Goal: Blood Pressure Within Desired Range  Intervention: Normalize Blood Pressure  Flowsheets (Taken 8/18/2022 1529)  Medication Review/Management: medications reviewed

## 2022-08-18 NOTE — ANESTHESIA PREPROCEDURE EVALUATION
08/18/2022  Tyrone Santos is a 57 y.o., male.  Pre-operative evaluation for Procedure(s) (LRB):  ARTHROPLASTY, HIP, TOTAL, ANTERIOR APPROACH: RIGHT: DEPUY-ACTIS+PINNACLE (Right)    Tyrone Santos is a 57 y.o. male     Patient Active Problem List   Diagnosis    Essential hypertension    Hypercholesteremia    Cervical spinal stenosis    Cervical myofascial pain syndrome    Spondylosis of cervical region without myelopathy or radiculopathy    Facet arthritis, degenerative, cervical spine    Acid reflux    Bipolar affective disorder in remission    Sleep apnea    Anemia    Elevated AST (SGOT)    Cervical stenosis of spinal canal    Neck pain    Decreased functional mobility    BPH with urinary obstruction    Erectile dysfunction due to arterial insufficiency    Avascular necrosis of left femur    Avascular necrosis of bones of both hips    Left hip pain    Chronic pain    Hypothyroidism    Class 1 obesity with body mass index (BMI) of 31.0 to 31.9 in adult    Avascular necrosis of left femoral head    Weakness of trunk musculature    Condyloma    Avascular necrosis of right femoral head    Leukopenia       Review of patient's allergies indicates:   Allergen Reactions    Codeine Nausea Only    Seroquel [quetiapine]      Heart racing     Tizanidine Other (See Comments)     Caused increased pain and muscle pain       No current facility-administered medications on file prior to encounter.     Current Outpatient Medications on File Prior to Encounter   Medication Sig Dispense Refill    busPIRone (BUSPAR) 15 MG tablet Take 2 tablets po q am, 1 tablet po at noon and 1 tablet po q hs 360 tablet 0    calcium-vitamin D3 (OS-JALYN 500 + D3) 500 mg(1,250mg) -200 unit per tablet Take 1 tablet by mouth 2 (two) times daily with meals.      cyanocobalamin (VITAMIN B-12) 100 MCG tablet Take  100 mcg by mouth once daily.      EScitalopram oxalate (LEXAPRO) 10 MG tablet Take 1 tablet (10 mg total) by mouth once daily. 90 tablet 0    folic acid (FOLVITE) 1 MG tablet Take 1 mg by mouth once daily.      hydroCHLOROthiazide (HYDRODIURIL) 25 MG tablet Take 25 mg by mouth once daily.      lamoTRIgine (LAMICTAL) 100 MG tablet Take 1 tablet (100 mg total) by mouth once daily. 90 tablet 0    levothyroxine (SYNTHROID) 25 MCG tablet Take 25 mcg by mouth once daily.      omeprazole (PRILOSEC) 40 MG capsule Take 40 mg by mouth once daily.      rosuvastatin (CRESTOR) 10 MG tablet rosuvastatin 10 mg tablet   TAKE 1 TABLET BY MOUTH EVERY DAY      tamsulosin (FLOMAX) 0.4 mg Cap Take 2 capsules (0.8 mg total) by mouth once daily. 60 capsule 11    traZODone (DESYREL) 50 MG tablet Take 1-2 tablets po q hs prn insomnia 60 tablet 1       Past Surgical History:   Procedure Laterality Date    ANTERIOR CERVICAL DISCECTOMY W/ FUSION N/A 10/23/2018    Procedure: DISCECTOMY, SPINE, CERVICAL, ANTERIOR APPROACH, WITH FUSION C3-4;  Surgeon: Jw Anne MD;  Location: [address];  Service: Neurosurgery;  Laterality: N/A;    APPENDECTOMY      around 2012    ARTHROPLASTY OF HIP BY ANTERIOR APPROACH Left 8/23/2021    Procedure: ARTHROPLASTY, HIP, TOTAL, ANTERIOR APPROACH:LEFT:DPEUY-ACTIS+PINNACLE;  Surgeon: Ángel Lam III, MD;  Location: [facility] OR;  Service: Orthopedics;  Laterality: Left;    INJECTION OF ANESTHETIC AGENT AROUND NERVE Left 7/1/2021    Procedure: BLOCK, NERVE, OBTURATOR and FEMORAL;  Surgeon: Josefina Lee MD;  Location: [facility];  Service: Pain Management;  Laterality: Left;    SCAPHOID FRACTURE SURGERY      had to have a bone graft -early  1990's     WRIST SURGERY      6 years ago         CBC:  Lab Results   Component Value Date    WBC 3.31 (L) 07/06/2022    RBC 4.63 07/06/2022    HGB 13.2 (L) 07/06/2022    HCT 42.0 07/06/2022     07/06/2022    MCV 91 07/06/2022    MCH  "28.5 07/06/2022    MCHC 31.4 (L) 07/06/2022       CMP:   Lab Results   Component Value Date     07/06/2022    K 4.3 07/06/2022     07/06/2022    CO2 25 07/06/2022    BUN 8 07/06/2022    CREATININE 1.1 07/06/2022     07/06/2022    CALCIUM 9.3 07/06/2022       INR:  Lab Results   Component Value Date    INR 0.9 08/01/2022         Diagnostic Studies:      EKG:   Results for orders placed or performed during the hospital encounter of 07/06/22   EKG 12-lead    Collection Time: 07/06/22  8:50 PM    Narrative    Test Reason : COVID    Vent. Rate : 065 BPM     Atrial Rate : 065 BPM     P-R Int : 178 ms          QRS Dur : 088 ms      QT Int : 396 ms       P-R-T Axes : 040 054 048 degrees     QTc Int : 411 ms    Normal sinus rhythm  Normal ECG  When compared with ECG of 18-AUG-2021 12:03,  No significant change was found  Confirmed by ALYSSA EVANS MD ([id number]) on 7/7/2022 4:00:51 PM    Referred By: [email or web address]   SELF           Confirmed By:ALYSSA EVANS MD        2D Echo:  No results found for this or any previous visit.    Stress Test:   No results found for this or any previous visit.      Pre-op Vitals [08/18/22 0549]   BP Pulse Resp Temp SpO2   104/78 75 16 36.8 °C (98.2 °F) 96 %      Height Weight BMI (Calculated)     5' 8" 178 lb 27.1         Pre-op Vitals [08/18/22 0549]   BP Pulse Resp Temp SpO2   104/78 75 16 36.8 °C (98.2 °F) 96 %      Height Weight BMI (Calculated)     5' 8" 178 lb 27.1            Pre-op Assessment          Review of Systems      Physical Exam  General: Well nourished    Airway:  Mallampati: I / I  Mouth Opening: Normal  TM Distance: Normal  Tongue: Normal  Neck ROM: Normal ROM    Dental:  Intact    Chest/Lungs:  Clear to auscultation    Heart:  Rate: Normal  Rhythm: Regular Rhythm  Sounds: Normal        Anesthesia Plan  Type of Anesthesia, risks & benefits discussed:    Anesthesia Type: MAC, Gen ETT, Gen Supraglottic Airway, Gen Natural Airway, CSE, Spinal, Epidural  Intra-op " Monitoring Plan: Standard ASA Monitors  Post Op Pain Control Plan: multimodal analgesia and peripheral nerve block  Induction:  IV  Informed Consent: Informed consent signed with the Patient and all parties understand the risks and agree with anesthesia plan.  All questions answered.   ASA Score: 2  Day of Surgery Review of History & Physical: H&P Update referred to the surgeon/provider.    Ready For Surgery From Anesthesia Perspective.     .

## 2022-08-18 NOTE — ANESTHESIA PROCEDURE NOTES
CSE    Patient location during procedure: OR  Start time: 8/18/2022 7:03 AM  Timeout: 8/18/2022 7:02 AM  End time: 8/18/2022 7:06 AM      Staffing  Authorizing Provider: Earlene Olivas MD  Performing Provider: Earlene Olivas MD    Preanesthetic Checklist  Completed: patient identified, IV checked, site marked, risks and benefits discussed, surgical consent, monitors and equipment checked, pre-op evaluation and timeout performed  CSE  Patient position: sitting  Prep: ChloraPrep and site prepped and draped  Patient monitoring: heart rate, cardiac monitor, continuous pulse ox, continuous capnometry and frequent blood pressure checks  Approach: midline  Spinal Needle  Needle type: Kristine   Needle gauge: 25 G  Needle length: 5 in  Epidural Needle  Injection technique: TAL air  Needle type: Tuohy   Needle gauge: 17 G  Needle length: 3.5 in  Needle insertion depth: 5 cm  Location: L3-4  Needle localization: anatomical landmarks   Catheter  Catheter type: springwound  Catheter size: 19 G  Catheter at skin depth: 9 cm  Additional Documentation: negative aspiration for CSF and no paresthesia on injection  Assessment  Sensory level: T8   Dermatomal levels determined by pinch or prick  Intrathecal Medications:   administered: primary anesthetic mcg of    Medications:    Medications: Intrathecal - mepivacaine (CARBOCAINE) injection 15 mg/mL (1.5%) - Intrathecal   3.5 mL - 8/18/2022 7:04:00 AM

## 2022-08-18 NOTE — PT/OT/SLP EVAL
Physical Therapy Evaluation and Treatment     Patient Name:  Tyrone Santos   MRN:  [id number]    Recommendations:     Discharge Recommendations:  home health PT   Discharge Equipment Recommendations: none   Barriers to discharge: None    Assessment:     Tyrone Santos is a 57 y.o. male admitted with a medical diagnosis of Avascular necrosis of right femoral head.  He presents with the following impairments/functional limitations:  weakness, impaired functional mobility, gait instability, impaired balance, decreased lower extremity function, pain, decreased ROM, impaired skin, orthopedic precautions. Patient tolerated PT session well. Patient ambulated 80ft with RW and contact guard assistance. No LOB or SOB noted. Patient educated in anterior hip precautions. Patient will have HHPT.     Rehab Prognosis: Good; patient would benefit from acute skilled PT services to address these deficits and reach maximum level of function.    Recent Surgery: Procedure(s) (LRB):  ARTHROPLASTY, HIP, TOTAL, ANTERIOR APPROACH: RIGHT: DEPUY-ACTIS+PINNACLE (Right) Day of Surgery    Plan:     During this hospitalization, patient to be seen daily to address the identified rehab impairments via gait training, therapeutic activities, therapeutic exercises and progress toward the following goals:    · Plan of Care Expires:  08/19/22    Subjective     Chief Complaint: Hip pain.   Patient/Family Comments/goals: To get back to work.  Pain/Comfort:  · Pain Rating 1: 9/10  · Location - Side 1: Right  · Location 1: hip  · Pain Addressed 1: Pre-medicate for activity, Reposition, Distraction, Cessation of Activity, Nurse notified    Patients cultural, spiritual, Lutheran conflicts given the current situation:  n/a    Living Environment:  Patient lives in a [facility] with 1 step to enter. Prior to admission, patients level of function was independent for functional mobility. Equipment used at home: bedside commode, hip kit, walker, rolling, cane,  straight. He works on movie sets. Upon discharge, patient will have assistance from significant other.    Objective:     Communicated with RN prior to session.  Patient found up in chair with cryotherapy, FCD, peripheral IV upon PT entry to room. Significant other present in room.     General Precautions: Standard, fall   Orthopedic Precautions:RLE weight bearing as tolerated, RLE anterior precautions (no extremes of motion, hip flexion 0-90 degrees)   Braces: N/A  Respiratory Status: Room air    Exams:  · Cognitive Exam:  Patient is oriented to Person, Place, Time and Situation  · Gross Motor Coordination:  WFL  · Sensation:    · -       Intact  · RLE ROM: WFL within anterior hip precautions   · RLE Strength: appears WFL but did not formally assess due to pain and recent surgery   · LLE ROM: WFL  · LLE Strength: WFL    Functional Mobility:  · Bed Mobility:     · Scooting: contact guard assistance  · Sit to Supine: minimum assistance for R LE management    · Transfers:     · Sit to Stand:  contact guard assistance with rolling walker x1 from bedside chair   · Gait: Patient ambulated 80ft with Rolling Walker and contact guard assistance using 3-point gait. Patient demonstrated decreased deuce and decreased step length during gait due to pain, decreased ROM, and decreased strength.    Therapeutic Activities and Exercises:  Patient educated in:  -PT role and POC  -safety with transfers including hand placement  -gait sequencing and RW management  -OOB activity to maximize recovery including ambulating with nursing staff assistance and RW while in the hospital   -anterior hip precautions     AM-PAC 6 CLICK MOBILITY  Total Score:18     Patient left supine with all lines intact, call button in reach, RN notified and significant other present.    GOALS:   Multidisciplinary Problems     Physical Therapy Goals        Problem: Physical Therapy    Goal Priority Disciplines Outcome Goal Variances Interventions   Physical  Therapy Goal     PT, PT/OT Ongoing, Progressing     Description: Goals to be met by: 2022    Patient will increase functional independence with mobility by performin. Supine to sit with supervision  2. Sit to stand transfer with Supervision  3. Gait x300 feet with Supervision using Rolling Walker  4. Ascend/Descend 1 curb step with Stand by assistance using Rolling Walker  5. Lower extremity exercise program x30 reps per handout, with supervision                         History:     Past Medical History:   Diagnosis Date    Addiction to drug     Alcohol abuse     Anxiety     Bipolar disorder     Depression     Difficulty urinating     GERD (gastroesophageal reflux disease)     Hx of psychiatric care     Hyperlipidemia     Hypertension     Ade     Psychiatric problem     S/P cervical spinal fusion 2018    S/P laparoscopic appendectomy 2017    Sleep apnea     Therapy     Thyroid disease        Past Surgical History:   Procedure Laterality Date    ANTERIOR CERVICAL DISCECTOMY W/ FUSION N/A 10/23/2018    Procedure: DISCECTOMY, SPINE, CERVICAL, ANTERIOR APPROACH, WITH FUSION C3-4;  Surgeon: Jw Anne MD;  Location: [address];  Service: Neurosurgery;  Laterality: N/A;    APPENDECTOMY      around     ARTHROPLASTY OF HIP BY ANTERIOR APPROACH Left 2021    Procedure: ARTHROPLASTY, HIP, TOTAL, ANTERIOR APPROACH:LEFT:DPEUY-ACTIS+PINNACLE;  Surgeon: Ángel Lam III, MD;  Location: [facility];  Service: Orthopedics;  Laterality: Left;    INJECTION OF ANESTHETIC AGENT AROUND NERVE Left 2021    Procedure: BLOCK, NERVE, OBTURATOR and FEMORAL;  Surgeon: Josefina Lee MD;  Location: [facility];  Service: Pain Management;  Laterality: Left;    SCAPHOID FRACTURE SURGERY      had to have a bone graft -early       WRIST SURGERY      6 years ago       Time Tracking:     PT Received On: 22  PT Start Time: 1436     PT Stop Time: 1452  PT  Total Time (min): 16 min     Billable Minutes: Evaluation 8 and Gait Training 8      08/18/2022

## 2022-08-18 NOTE — ANESTHESIA POSTPROCEDURE EVALUATION
Anesthesia Post Evaluation    Patient: Tyrone Santos    Procedure(s) Performed: Procedure(s) (LRB):  ARTHROPLASTY, HIP, TOTAL, ANTERIOR APPROACH: RIGHT: DEPUY-ACTIS+PINNACLE (Right)    Final Anesthesia Type: CSE      Patient location during evaluation: PACU  Patient participation: Yes- Able to Participate  Level of consciousness: awake and alert  Post-procedure vital signs: reviewed and stable  Pain management: adequate  Airway patency: patent    PONV status at discharge: No PONV  Anesthetic complications: no      Cardiovascular status: blood pressure returned to baseline  Respiratory status: unassisted  Hydration status: euvolemic  Follow-up not needed.          Vitals Value Taken Time   /56 08/18/22 1201   Temp 36.2 °C (97.2 °F) 08/18/22 1213   Pulse 66 08/18/22 1213   Resp 18 08/18/22 1213   SpO2 95 % 08/18/22 1213   Vitals shown include unvalidated device data.      Event Time   Out of Recovery 11:59:54         Pain/Kassandra Score: Pain Rating Prior to Med Admin: 7 (8/18/2022 11:42 AM)  Pain Rating Post Med Admin: 7 (8/18/2022 11:42 AM)  Kassandra Score: 10 (8/18/2022 10:00 AM)

## 2022-08-19 ENCOUNTER — PATIENT MESSAGE (OUTPATIENT)
Dept: ADMINISTRATIVE | Facility: OTHER | Age: 57
End: 2022-08-19
Payer: MEDICAID

## 2022-08-19 VITALS
HEIGHT: 68 IN | SYSTOLIC BLOOD PRESSURE: 110 MMHG | TEMPERATURE: 98 F | HEART RATE: 63 BPM | DIASTOLIC BLOOD PRESSURE: 68 MMHG | WEIGHT: 178 LBS | BODY MASS INDEX: 26.98 KG/M2 | RESPIRATION RATE: 16 BRPM | OXYGEN SATURATION: 97 %

## 2022-08-19 LAB
BUN SERPL-MCNC: 8 MG/DL (ref 6–30)
CHLORIDE SERPL-SCNC: 107 MMOL/L (ref 95–110)
CREAT SERPL-MCNC: 0.9 MG/DL (ref 0.5–1.4)
GLUCOSE SERPL-MCNC: 109 MG/DL (ref 70–110)
HCT VFR BLD CALC: 29 %PCV (ref 36–54)
POC IONIZED CALCIUM: 1.15 MMOL/L (ref 1.06–1.42)
POC TCO2 (MEASURED): 24 MMOL/L (ref 23–29)
POTASSIUM BLD-SCNC: 4.2 MMOL/L (ref 3.5–5.1)
SAMPLE: ABNORMAL
SODIUM BLD-SCNC: 140 MMOL/L (ref 136–145)

## 2022-08-19 PROCEDURE — 97116 GAIT TRAINING THERAPY: CPT

## 2022-08-19 PROCEDURE — 25000003 PHARM REV CODE 250: Performed by: PHYSICIAN ASSISTANT

## 2022-08-19 PROCEDURE — 99499 NO LOS: ICD-10-PCS | Mod: ,,, | Performed by: PHYSICIAN ASSISTANT

## 2022-08-19 PROCEDURE — 99900035 HC TECH TIME PER 15 MIN (STAT)

## 2022-08-19 PROCEDURE — 25000003 PHARM REV CODE 250

## 2022-08-19 PROCEDURE — 97535 SELF CARE MNGMENT TRAINING: CPT

## 2022-08-19 PROCEDURE — 97110 THERAPEUTIC EXERCISES: CPT

## 2022-08-19 PROCEDURE — 99499 UNLISTED E&M SERVICE: CPT | Mod: ,,, | Performed by: PHYSICIAN ASSISTANT

## 2022-08-19 PROCEDURE — 94761 N-INVAS EAR/PLS OXIMETRY MLT: CPT

## 2022-08-19 RX ORDER — TRAZODONE HYDROCHLORIDE 50 MG/1
100 TABLET ORAL NIGHTLY
Status: DISCONTINUED | OUTPATIENT
Start: 2022-08-19 | End: 2022-08-19 | Stop reason: HOSPADM

## 2022-08-19 RX ORDER — CELECOXIB 200 MG/1
200 CAPSULE ORAL DAILY
Status: DISCONTINUED | OUTPATIENT
Start: 2022-08-19 | End: 2022-08-19 | Stop reason: HOSPADM

## 2022-08-19 RX ORDER — HYDROCHLOROTHIAZIDE 25 MG/1
25 TABLET ORAL DAILY
Status: DISCONTINUED | OUTPATIENT
Start: 2022-08-20 | End: 2022-08-19 | Stop reason: HOSPADM

## 2022-08-19 RX ADMIN — OXYCODONE HYDROCHLORIDE 10 MG: 10 TABLET ORAL at 12:08

## 2022-08-19 RX ADMIN — OXYCODONE HYDROCHLORIDE 10 MG: 10 TABLET ORAL at 04:08

## 2022-08-19 RX ADMIN — CELECOXIB 200 MG: 200 CAPSULE ORAL at 09:08

## 2022-08-19 RX ADMIN — BUSPIRONE HYDROCHLORIDE 5 MG: 5 TABLET ORAL at 09:08

## 2022-08-19 RX ADMIN — FAMOTIDINE 20 MG: 20 TABLET, FILM COATED ORAL at 09:08

## 2022-08-19 RX ADMIN — Medication 1 TABLET: at 07:08

## 2022-08-19 RX ADMIN — OXYCODONE HYDROCHLORIDE 10 MG: 10 TABLET ORAL at 07:08

## 2022-08-19 RX ADMIN — TRAZODONE HYDROCHLORIDE 100 MG: 50 TABLET ORAL at 12:08

## 2022-08-19 RX ADMIN — MUPIROCIN 1 G: 20 OINTMENT TOPICAL at 09:08

## 2022-08-19 RX ADMIN — ATORVASTATIN CALCIUM 10 MG: 10 TABLET, FILM COATED ORAL at 09:08

## 2022-08-19 RX ADMIN — LEVOTHYROXINE SODIUM 25 MCG: 25 TABLET ORAL at 09:08

## 2022-08-19 RX ADMIN — POLYETHYLENE GLYCOL 3350 17 G: 17 POWDER, FOR SOLUTION ORAL at 09:08

## 2022-08-19 RX ADMIN — ACETAMINOPHEN 1000 MG: 500 TABLET ORAL at 05:08

## 2022-08-19 RX ADMIN — ASPIRIN 81 MG: 81 TABLET, COATED ORAL at 09:08

## 2022-08-19 RX ADMIN — CYANOCOBALAMIN TAB 1000 MCG 1000 MCG: 1000 TAB at 09:08

## 2022-08-19 RX ADMIN — Medication 2 G: at 12:08

## 2022-08-19 RX ADMIN — PANTOPRAZOLE SODIUM 40 MG: 40 TABLET, DELAYED RELEASE ORAL at 09:08

## 2022-08-19 RX ADMIN — FOLIC ACID 1 MG: 1 TABLET ORAL at 09:08

## 2022-08-19 RX ADMIN — ACETAMINOPHEN 1000 MG: 500 TABLET ORAL at 12:08

## 2022-08-19 RX ADMIN — TAMSULOSIN HYDROCHLORIDE 0.8 MG: 0.4 CAPSULE ORAL at 09:08

## 2022-08-19 RX ADMIN — SENNOSIDES AND DOCUSATE SODIUM 1 TABLET: 50; 8.6 TABLET ORAL at 09:08

## 2022-08-19 RX ADMIN — ESCITALOPRAM OXALATE 10 MG: 10 TABLET ORAL at 09:08

## 2022-08-19 RX ADMIN — METHOCARBAMOL 750 MG: 750 TABLET ORAL at 09:08

## 2022-08-19 NOTE — ADDENDUM NOTE
Addendum  created 08/19/22 0808 by Kristel Mandel MD    Order list changed, Pharmacy for encounter modified

## 2022-08-19 NOTE — PROGRESS NOTES
[facility])  Orthopedics  Progress Note    Patient Name: Tyrone Santos  MRN: [id number]  Admission Date: 8/18/2022  Hospital Length of Stay: 0 days  Attending Provider: Ángel Lam III, MD  Primary Care Provider: Primary Doctor No  Follow-up For: Procedure(s) (LRB):  ARTHROPLASTY, HIP, TOTAL, ANTERIOR APPROACH: RIGHT: DEPUY-ACTIS+PINNACLE (Right)    Post-Operative Day: 1 Day Post-Op  Subjective:     Principal Problem:Avascular necrosis of right femoral head    Principal Orthopedic Problem: same    Interval History: Patient examined at the bedside. NAEON. Pain controlled. Tolerating PO w/out N/V and voiding appropriately.   Ambulated 80 ft with PT yesterday. Hct 29.     Review of patient's allergies indicates:   Allergen Reactions    Codeine Nausea Only    Seroquel [quetiapine]      Heart racing     Tizanidine Other (See Comments)     Caused increased pain and muscle pain       Current Facility-Administered Medications   Medication    0.9%  NaCl infusion    acetaminophen tablet 1,000 mg    aspirin EC tablet 81 mg    [START ON 8/19/2022] atorvastatin tablet 10 mg    busPIRone tablet 5 mg    calcium-vitamin D3 500 mg-5 mcg (200 unit) per tablet 1 tablet    ceFAZolin 2 gram in dextrose 5% 100 mL IVPB (premix)    [START ON 8/19/2022] cyanocobalamin tablet 1,000 mcg    [START ON 8/19/2022] EScitalopram oxalate tablet 10 mg    famotidine tablet 20 mg    folic acid tablet 1 mg    hydroCHLOROthiazide tablet 25 mg    [START ON 8/19/2022] lamoTRIgine tablet 100 mg    [START ON 8/19/2022] levothyroxine tablet 25 mcg    methocarbamoL tablet 750 mg    morphine injection 2 mg    mupirocin 2 % ointment 1 g    naloxone 0.4 mg/mL injection 0.02 mg    ondansetron injection 4 mg    oxyCODONE immediate release tablet 5 mg    oxyCODONE immediate release tablet Tab 10 mg    pantoprazole EC tablet 40 mg    polyethylene glycol packet 17 g    polyethylene glycol packet 17 g    pregabalin  "capsule 150 mg    prochlorperazine injection Soln 5 mg    senna-docusate 8.6-50 mg per tablet 1 tablet    [START ON 8/19/2022] tamsulosin 24 hr capsule 0.8 mg    trazodone split tablet 25 mg     Objective:     Vital Signs (Most Recent):  Temp: 97.2 °F (36.2 °C) (08/18/22 1213)  Pulse: 60 (08/18/22 1335)  Resp: 18 (08/18/22 1503)  BP: 100/60 (08/18/22 1335)  SpO2: 95 % (08/18/22 1213) Vital Signs (24h Range):  Temp:  [97.2 °F (36.2 °C)-98.2 °F (36.8 °C)] 97.2 °F (36.2 °C)  Pulse:  [50-75] 60  Resp:  [11-18] 18  SpO2:  [94 %-99 %] 95 %  BP: ()/(52-78) 100/60     Weight: 80.7 kg (178 lb)  Height: 5' 8" (172.7 cm)  Body mass index is 27.06 kg/m².      Intake/Output Summary (Last 24 hours) at 8/18/2022 1551  Last data filed at 8/18/2022 1320  Gross per 24 hour   Intake 1500 ml   Output 450 ml   Net 1050 ml       Ortho/SPM Exam  AAOx4  NAD  Reg rate  No increased WOB    RLE  Dressing c/d/i  Ice in place  SILT T/SP/DP/Garcia/Sa  Motor intact T/SP/DP  Palpable DP pulse  FCDs in place and functioning      Significant Labs: All pertinent labs within the past 24 hours have been reviewed.    Significant Imaging: I have reviewed all pertinent imaging results/findings.    Assessment/Plan:     * Avascular necrosis of right femoral head  57 y.o. male POD1 s/p R NATALIYA    Pain control: multimodal per surgical home  PT/OT: WBAT RLE with walker, anterior hip precautions, hip flexion 0-90 deg, no extremes of motion  DVT PPx: ASA 81mg BID, FCDs at all times when not ambulating  Abx: postop Ancef  Labs: Hct 29    Dispo: d/c home Home pending PT              Ebenezer Ivy MD  Orthopedics  [facility] ([facility])  "

## 2022-08-19 NOTE — PLAN OF CARE
Problem: Pain Acute  Goal: Acceptable Pain Control and Functional Ability  Intervention: Prevent or Manage Pain  Flowsheets (Taken 8/19/2022 1238)  Sensory Stimulation Regulation:   care clustered   lighting decreased   quiet environment promoted  Medication Review/Management: medications reviewed     Problem: Pain Acute  Goal: Acceptable Pain Control and Functional Ability  Intervention: Optimize Psychosocial Wellbeing  Flowsheets (Taken 8/19/2022 1238)  Supportive Measures: active listening utilized     Problem: Adjustment to Surgery (Hip Arthroplasty)  Goal: Optimal Coping  Intervention: Support Psychosocial Response to Surgery and Mobility Changes  Flowsheets (Taken 8/19/2022 1238)  Supportive Measures: active listening utilized     Problem: Bleeding (Hip Arthroplasty)  Goal: Absence of Bleeding  Intervention: Monitor and Manage Bleeding  Flowsheets (Taken 8/19/2022 1238)  Bleeding Management: dressing monitored     Problem: Infection (Hip Arthroplasty)  Goal: Absence of Infection Signs and Symptoms  Intervention: Prevent or Manage Infection  Flowsheets (Taken 8/19/2022 1238)  Infection Management: aseptic technique maintained     Problem: Pain (Hip Arthroplasty)  Goal: Acceptable Pain Control  Intervention: Prevent or Manage Pain  Flowsheets (Taken 8/19/2022 1238)  Pain Management Interventions:   care clustered   cold applied   pain management plan reviewed with patient/caregiver

## 2022-08-19 NOTE — PT/OT/SLP PROGRESS
"Occupational Therapy   Treatment/Discharge    Name: Tyrone Santos  MRN: [id number]  Admitting Diagnosis:  Avascular necrosis of right femoral head  1 Day Post-Op    Recommendations:     Discharge Recommendations: home  Discharge Equipment Recommendations:  none  Barriers to discharge:  None    Assessment:     Tyrone Santos is a 57 y.o. male with a medical diagnosis of Avascular necrosis of right femoral head. Patient is s/p right NATALIYA anterior approach. He completed lower body dressing at supervision and further ADLs at modified independence. He is appropriate for discharge home. Performance deficits affecting function are weakness, impaired functional mobility, gait instability, impaired balance, impaired self care skills, decreased lower extremity function, decreased ROM, orthopedic precautions.     Rehab Prognosis:  Good; patient would benefit from acute skilled OT services to address these deficits and reach maximum level of function.       Plan:     · DC from OT    Subjective     "I did not get much sleep"     Pain/Comfort:  · Pain Rating 1: 7/10  · Location - Side 1: Right  · Location - Orientation 1: anterior  · Location 1: hip  · Pain Addressed 1: Pre-medicate for activity, Reposition, Distraction    Objective:     Communicated with: RN prior to session.  Patient found supine with cryotherapy, FCD upon OT entry to room.    General Precautions: Standard, fall   Orthopedic Precautions:RLE weight bearing as tolerated, RLE anterior precautions (no extreme motions and flexion 0-90)   Braces: N/A  Respiratory Status: Room air     Occupational Performance:     Bed Mobility:    · Patient completed Scooting/Bridging with modified independence  · Patient completed Supine to Sit with modified independence     Functional Mobility/Transfers:  · Patient completed Sit <> Stand Transfer with modified independence  with  rolling walker    · Patient completed chair transfer with step transfer with modified independence " with rolling walker   · Functional Mobility: patient ambulated to/from bathroom with use of rolling walker at supervision     Activities of Daily Living:  · Grooming: modified independence standing at sink to wash hands   · Upper Body Dressing: modified independence sitting in chair to don overhead shirt   · Lower Body Dressing: supervision sitting in chair to don underwear/shorts with use of reacher, doff socks with shoe horn and don socks with sock aid       [facility] 6 Click ADL: 23    Treatment & Education:  -Patient educated on anterior hip precautions with no extreme motions and hip flexion 0-90 degrees  -Patient educated to avoid tight waist bands on surgical bandage on anterior hip  -Patient educated to dress surgical side first and further dressing techniques s/p surgery   -Patient educated on use of dressing aids for lower body dressing: sock aid, reacher and shoe horn   -Patient educated on hand placement for transfers   -Patient educated on rolling walker placement for ADLs  -Patient educated on proper foot wear s/p surgery   -Patient educated on car transfers s/p surgery  -Patient educated on role OT and plan of care s/p surgery at [facility], white board updated as needed     Patient left up in chair with call button in reach, RN notified and ice donnedEducation:      GOALS:   Multidisciplinary Problems     Occupational Therapy Goals        Problem: Occupational Therapy    Goal Priority Disciplines Outcome Interventions   Occupational Therapy Goal     OT, PT/OT Ongoing, Progressing    Description: Goals to be met by: 8/19/22    Patient will increase functional independence with ADLs by performing:    UE Dressing with Modified [place].  LE Dressing with Supervision.  Grooming while standing at sink with Modified [place].  Toileting from toilet with Modified [place] for hygiene and clothing management.   Toilet transfer to toilet with Modified [place].                      Time Tracking:     OT Date of Treatment: 08/19/22  OT Start Time: 0730  OT Stop Time: 0753  OT Total Time (min): 23 min    Billable Minutes:Self Care/Home Management 23 8/19/2022

## 2022-08-19 NOTE — ADDENDUM NOTE
Addendum  created 08/19/22 0937 by Kristel Mandel MD    Charge Capture section accepted, Cosign clinical note with attestation

## 2022-08-19 NOTE — PROGRESS NOTES
Acute Pain Service and Perioperative Surgical Home Progress Note    Interval history  Tyrone Santos is a 57 y.o., male,     Surgery:  Procedure(s) (LRB):  ARTHROPLASTY, HIP, TOTAL, ANTERIOR APPROACH: RIGHT: DEPUY-ACTIS+PINNACLE (Right)    Post Op Day #: 1    Problem List:    Active Hospital Problems    Diagnosis  POA    *Avascular necrosis of right femoral head [M87.051]  Yes      Resolved Hospital Problems   No resolved problems to display.       Subjective:       General appearance of alert, oriented, no complaints   Pain with rest: 2    Numbers   Pain with movement: 2    Numbers   Side Effects    1. Pruritis No    2. Nausea No    3. Motor Blockade Yes, 0=Ability to raise lower extremities off bed    4. Sedation No, 1=awake and alert    Schedule Medications:    acetaminophen  1,000 mg Oral Q6H    aspirin  81 mg Oral BID    atorvastatin  10 mg Oral Daily    busPIRone  5 mg Oral BID    calcium-vitamin D3  1 tablet Oral BID WM    celecoxib  200 mg Oral Daily    cyanocobalamin  1,000 mcg Oral Daily    EScitalopram oxalate  10 mg Oral Daily    famotidine  20 mg Oral BID    folic acid  1 mg Oral Daily    hydroCHLOROthiazide  25 mg Oral Daily    lamoTRIgine  100 mg Oral Daily    levothyroxine  25 mcg Oral Daily    methocarbamoL  750 mg Oral TID    mupirocin  1 g Nasal BID    pantoprazole  40 mg Oral Daily    polyethylene glycol  17 g Oral Daily    pregabalin  150 mg Oral QHS    senna-docusate 8.6-50 mg  1 tablet Oral BID    tamsulosin  0.8 mg Oral Daily    traZODone  100 mg Oral QHS        Continuous Infusions:   sodium chloride 0.9% 150 mL/hr at 08/18/22 1908        PRN Medications:  morphine, naloxone, ondansetron, oxyCODONE, oxyCODONE, polyethylene glycol, prochlorperazine       Antibiotics:  Antibiotics (From admission, onward)            Start     Stop Route Frequency Ordered    08/18/22 2100  mupirocin 2 % ointment 1 g         08/23 2059 Nasl 2 times daily 08/18/22 1351              Objective:         Vital Signs (Most Recent):  Temp: 97.4 °F (36.3 °C) (08/19/22 0419)  Pulse: (!) 59 (08/19/22 0435)  Resp: 16 (08/19/22 0435)  BP: 124/71 (08/19/22 0419)  SpO2: 97 % (08/19/22 0435) Vital Signs Range (Last 24H):  Temp:  [97.2 °F (36.2 °C)-98.2 °F (36.8 °C)]   Pulse:  [50-75]   Resp:  [11-18]   BP: ()/(52-78)   SpO2:  [93 %-99 %]          I & O (Last 24H):    Intake/Output Summary (Last 24 hours) at 8/19/2022 0511  Last data filed at 8/19/2022 0229  Gross per 24 hour   Intake 3600 ml   Output 1500 ml   Net 2100 ml       Physical Exam:    GA: Alert, comfortable, no acute distress.   Pulmonary: Clear to auscultation A/P/L. No wheezing, crackles, or rhonchi.  Cardiac: RRR S1 & S2 w/o rubs/murmurs/gallops.   Abdominal:Bowel sounds present. No tenderness to palpation or distension. No appreciable hepatosplenomegaly.   Skin: No jaundice, rashes, or visible lesions.         Laboratory:  CBC:   Recent Labs     08/19/22 0435   HCT 29*       BMP: No results for input(s): NA, K, CO2, CL, BUN, CREATININE, GLU, MG, PHOS, CALCIUM in the last 72 hours.    No results for input(s): PT, INR, PROTIME, APTT in the last 72 hours.      Anticoagulant dose aspirin 81 mg administered at 9:24 p.m. on August 18    Assessment:  Patient doing well, no distress, surgical site is clean and dry.  No significant bruising or swelling.  Good distal pulses of right foot and good range of motion       Pain control adequate    Plan:     1) Pain: Patient pain well-controlled with multi-modal regimen.   2) anxiety, depression, bipolar disorder, continue current regimen   3) hypertension and hyperlipidemia, continue prescribed medications   4) FEN/GI: Tolerating liquids, advance diet as tolerated    5) Dispo: Pt working well with PT/OT. Case management and SW for placement. Plan for D/C today.          Evaluator Haris Loredo PA-C

## 2022-08-19 NOTE — DISCHARGE SUMMARY
[facility]  Orthopedics  Discharge Summary      Patient Name: Tyrone Santos  MRN: [id number]  Admission Date: 8/18/2022  Hospital Length of Stay: 0 days  Discharge Date and Time: 08/19/2022  Attending Physician: Ángel Lam III, MD   Discharging Provider: Ebenezer Ivy MD  Primary Care Provider: Primary Doctor Kimber    HPI:   CC: right hip pain     Tyrone Santos is a 57 y.o. male with right hip pain.  Pain is worse with activity and weight bearing.  Patient has experienced interference of activities of daily living due to increased pain and decreased range of motion. Patient has failed non-operative treatment including NSAIDs, RFA, as well as greater than 3 months of activity modification. Tyrone Santos ambulates using assistive device . Pt had L NATALIYA 8/2021 and did well.      Relevant medical conditions of significance in perioperative period:  H/o drug/etoh use: denies recent use, off of pain mediaction   ERIC: does not use cpap  Bipolar disorder: on medication       Procedure(s) (LRB):  ARTHROPLASTY, HIP, TOTAL, ANTERIOR APPROACH: RIGHT: DEPUY-ACTIS+PINNACLE (Right)      Hospital Course:  On 8/18/22, the patient arrived to the [facility] for proper pre-operative management.  Upon completion of pre-operative preparation, the patient was taken back to the operative theatre. R NATALIYA was performed without complication and the patient was transported to the post anesthesia care unit in stable condition.  After appropriate recovery from the anaesthetic agents used during the surgery, the patient was then transported to the hospital inpatient floor.  The interim of the hospital stay from arrival on the floor up to discharge has been uncomplicated. The patient has tolerated regular diet.  The patient's pain has been controlled using a multimodal approach. Currently, the patient's pain is well controlled on an oral regimen.  The patient has been voiding without difficulty.   The patient began participation in physical therapy after surgery and has progressed throughout the entire hospital stay.  Currently, the patient's progress is sufficient to allow the them to be discharged to home safely.  The patient agrees with this assessment and desires a discharge today.      Goals of Care Treatment Preferences:  Code Status: Full Code      Consults (From admission, onward)        Status Ordering Provider     Inpatient consult to Social Work  Once        Provider:  (Not yet assigned)    Acknowledged ALEXUS SCHAEFFER     Inpatient consult to Respiratory Care  Once        Provider:  (Not yet assigned)    Acknowledged ALEXUS SCHAEFFER        Pending Diagnostic Studies:     None        Final Active Diagnoses:    Diagnosis Date Noted POA    PRINCIPAL PROBLEM:  Avascular necrosis of right femoral head [M87.051] 08/01/2022 Yes      Problems Resolved During this Admission:      Discharged Condition: good    Disposition: Home or Self Care    Follow Up:    Patient Instructions:      Activity as tolerated     Lifting restrictions   Order Comments: No strenuous exercise or lifting of > 10 lbs     Weight bearing as tolerated     No driving, operating heavy equipment or signing legal documents while taking pain medication     Leave dressing on - Keep it clean, dry, and intact until clinic visit   Order Comments: Do not remove surgical dressing for 2 weeks post-op. This will be done only by MD at initial post-op visit. If dressing is completely saturated, replace with identical dressing - silver-impregnated hydrocolloid dressing.    Do not get dressings wet. Do not shower.    If dressing continues to be saturated or there are signs of infection, please call [facility] [phone] for further instructions and to make appt to be seen.     Call MD for: fluid/liquid/drainage on dressing     Call MD for:  temperature >100.4     Call MD for:  persistent nausea and vomiting     Call MD for:  severe  uncontrolled pain     Call MD for:  difficulty breathing, headache or visual disturbances     Call MD for:  redness, tenderness, or signs of infection (pain, swelling, redness, odor or green/yellow discharge around incision site)     Call MD for:  hives     Call MD for:  persistent dizziness or light-headedness     Call MD for:  extreme fatigue     Ok to shower at home, running water only, towel dry, use shower chair for safety, no soaking in a tub or pool for one month.     Medications:  Reconciled Home Medications:      Medication List      START taking these medications    acetaminophen 650 MG Tbsr  Commonly known as: TYLENOL  Take 1 tablet (650 mg total) by mouth every 6 to 8 hours as needed (pain).     aspirin 81 MG EC tablet  Commonly known as: ECOTRIN  Take 1 tablet (81 mg total) by mouth 2 (two) times daily.     celecoxib 200 MG capsule  Commonly known as: CeleBREX  Take 1 capsule (200 mg total) by mouth once daily.     docusate sodium 100 MG capsule  Commonly known as: COLACE  Take 1 capsule (100 mg total) by mouth 2 (two) times daily as needed for Constipation.     methocarbamoL 750 MG Tab  Commonly known as: ROBAXIN  Take 1 tablet (750 mg total) by mouth 3 (three) times daily as needed (muscle spasms).     oxyCODONE 5 MG immediate release tablet  Commonly known as: ROXICODONE  Take 1 tablet by mouth every 4-6 hours as needed for pain        CONTINUE taking these medications    busPIRone 15 MG tablet  Commonly known as: BUSPAR  Take 2 tablets po q am, 1 tablet po at noon and 1 tablet po q hs     calcium-vitamin D3 500 mg-5 mcg (200 unit) per tablet  Commonly known as: OS-JALYN 500 + D3  Take 1 tablet by mouth 2 (two) times daily with meals.     cyanocobalamin 100 MCG tablet  Commonly known as: VITAMIN B-12  Take 100 mcg by mouth once daily.     EScitalopram oxalate 10 MG tablet  Commonly known as: LEXAPRO  Take 1 tablet (10 mg total) by mouth once daily.     folic acid 1 MG tablet  Commonly known as:  FOLVITE  Take 1 mg by mouth once daily.     hydroCHLOROthiazide 25 MG tablet  Commonly known as: HYDRODIURIL  Take 25 mg by mouth once daily.     lamoTRIgine 100 MG tablet  Commonly known as: LAMICTAL  Take 1 tablet (100 mg total) by mouth once daily.     levothyroxine 25 MCG tablet  Commonly known as: SYNTHROID  Take 25 mcg by mouth once daily.     omeprazole 40 MG capsule  Commonly known as: PRILOSEC  Take 40 mg by mouth once daily.     rosuvastatin 10 MG tablet  Commonly known as: CRESTOR  rosuvastatin 10 mg tablet   TAKE 1 TABLET BY MOUTH EVERY DAY     tamsulosin 0.4 mg Cap  Commonly known as: FLOMAX  Take 2 capsules (0.8 mg total) by mouth once daily.     traZODone 50 MG tablet  Commonly known as: DESYREL  Take 1-2 tablets po q hs prn insomnia            Ebenezer Ivy MD  Orthopedics  [place] [facility])

## 2022-08-19 NOTE — PT/OT/SLP PROGRESS
"Physical Therapy Treatment and Discharge    Patient Name:  Tyrone Santos   MRN:  [id number]    Recommendations:     Discharge Recommendations:  home health PT   Discharge Equipment Recommendations: none   Barriers to discharge: None    Assessment:     Tyrone Santos is a 57 y.o. male admitted with a medical diagnosis of Avascular necrosis of right femoral head.  He presents with the following impairments/functional limitations:  weakness, impaired functional mobility, gait instability, impaired balance, decreased lower extremity function, pain, decreased ROM, impaired skin, orthopedic precautions. Patient tolerated PT session well. Patient ambulated 200ft x2 trials with RW and supervision. No LOB or SOB noted. Patient ascended/descended 6" curb step with RW and stand by assistance. Patient performed R LE therex. Patient educated in anterior hip precautions. Patient will have HHPT. Patient ready to discharge home from PT standpoint.     Rehab Prognosis: Good; patient would benefit from acute skilled PT services to address these deficits and reach maximum level of function.    Recent Surgery: Procedure(s) (LRB):  ARTHROPLASTY, HIP, TOTAL, ANTERIOR APPROACH: RIGHT: DEPUY-ACTIS+PINNACLE (Right) 1 Day Post-Op    Plan:     During this hospitalization, patient to be seen daily to address the identified rehab impairments via gait training, therapeutic activities, therapeutic exercises and progress toward the following goals:    · Plan of Care Expires:  08/19/22    Subjective     Chief Complaint: Right hip pain.   Patient/Family Comments/goals: To walk without pain and get back to work.  Pain/Comfort:  · Pain Rating 1: 7/10  · Location - Side 1: Right  · Location 1: hip  · Pain Addressed 1: Pre-medicate for activity, Reposition, Distraction      Objective:     Communicated with RN prior to session.  Patient found up in chair with cryotherapy upon PT entry to room.     General Precautions: Standard, fall   Orthopedic " "Precautions:RLE weight bearing as tolerated, RLE anterior precautions (no extremes of motion, hip flexion 0-90 degrees)   Braces: N/A  Respiratory Status: Room air     Functional Mobility:  · Mat Mobility:     · Supine to Sit: supervision  · Sit to Supine: supervision with sheet as R LE leg lift  · Transfers:     · Sit to Stand:  supervision with rolling walker x1 from bedside chair and x1 from mat   · Gait: Patient ambulated 200ft x2 trials with Rolling Walker and supervision using swing-through gait. Patient demonstrated decreased deuce during gait due to pain, decreased ROM, and decreased strength.  · Stairs:   Patient ascended/descended 6" curb step with RW and stand by assistance.      AM-PAC 6 CLICK MOBILITY  Turning over in bed (including adjusting bedclothes, sheets and blankets)?: 4  Sitting down on and standing up from a chair with arms (e.g., wheelchair, bedside commode, etc.): 4  Moving from lying on back to sitting on the side of the bed?: 4  Moving to and from a bed to a chair (including a wheelchair)?: 4  Need to walk in hospital room?: 4  Climbing 3-5 steps with a railing?: 3  Basic Mobility Total Score: 23       Therapeutic Activities and Exercises:  Patient performed R LE therex x10 reps for heel slides and SAQ over bolster, and x30 reps A/P, quad set, and glute set all within anterior hip precautions.       Patient educated in:  -PT role and POC  -safety with transfers including hand placement  -gait sequencing and RW management  -OOB activity to maximize recovery including ambulating at home to prevent DVT   -car transfer  -curb training  -HEP for therex at home with handout provided   -anterior hip precautions       Patient left up in chair with all lines intact, call button in reach and RN notified.    GOALS:   Multidisciplinary Problems     Physical Therapy Goals        Problem: Physical Therapy    Goal Priority Disciplines Outcome Goal Variances Interventions   Physical Therapy Goal     PT, " PT/OT Ongoing, Progressing     Description: Goals to be met by: 2022    Patient will increase functional independence with mobility by performin. Supine to sit with supervision  2. Sit to stand transfer with Supervision  3. Gait x300 feet with Supervision using Rolling Walker  4. Ascend/Descend 1 curb step with Stand by assistance using Rolling Walker  5. Lower extremity exercise program x30 reps per handout, with supervision                         Time Tracking:     PT Received On: 22  PT Start Time: 837     PT Stop Time: 905  PT Total Time (min): 28 min     Billable Minutes: Gait Training 15 and Therapeutic Exercise 13    Treatment Type: Treatment  PT/PTA: PT           2022

## 2022-08-21 ENCOUNTER — PATIENT MESSAGE (OUTPATIENT)
Dept: ADMINISTRATIVE | Facility: OTHER | Age: 57
End: 2022-08-21
Payer: MEDICAID

## 2022-08-22 ENCOUNTER — CLINICAL SUPPORT (OUTPATIENT)
Dept: ORTHOPEDICS | Facility: CLINIC | Age: 57
End: 2022-08-22
Payer: MEDICAID

## 2022-08-22 DIAGNOSIS — Z96.649 STATUS POST HIP REPLACEMENT, UNSPECIFIED LATERALITY: Primary | ICD-10-CM

## 2022-08-22 PROCEDURE — 99499 UNLISTED E&M SERVICE: CPT | Mod: 95,,, | Performed by: ORTHOPAEDIC SURGERY

## 2022-08-22 PROCEDURE — 99499 NO LOS: ICD-10-PCS | Mod: 95,,, | Performed by: ORTHOPAEDIC SURGERY

## 2022-08-22 NOTE — PROGRESS NOTES
I called the patient today regarding his surgery with Dr. Ángel Lam III. The patient had a right anterior NATALIYA on 8/18.     Pain Scale: 7 / 10    Any issues with Fever: No.    Any issues with medications (specifically DVT prophylaxis): No. ASA 81 BID    Any issues with bowel movements:  Passing bebe: No.                                                                 Urination: No.                                                                 Constipation: No. LAst BM today 8/22    Completing at home exercises: Yes:     Any concerns regarding their dressing/bandage:  No.    Patient confirmed first pt has Medicaid, HH not covered, pt doing well appointment:  n/a on  n/a at n/a.     Any other concerns: No.        The patient was informed that if they have any urgent issues with their bandage, medications or any other health concerns regarding their surgery to call the 24/7 Orthopedic Post-op Hot Line at [phone]. The patient was reminded that if they have any chest pain or shortness of breath to call 911 or go to the ER.    The patient verbalized understanding and does not have any other questions

## 2022-08-24 NOTE — PLAN OF CARE
[facility])  Discharge Final Note    Primary Care Provider: Primary Doctor No    Expected Discharge Date: 8/19/2022    Final Discharge Note (most recent)     Final Note - 08/19/22 0957        Final Note    Assessment Type Final Discharge Note     Anticipated Discharge Disposition Home or Self Care     What phone number can be called within the next 1-3 days to see how you are doing after discharge? [phone]     Hospital Resources/Appts/Education Provided Provided patient/caregiver with written discharge plan information;Appointments scheduled by Navigator/Coordinator                 Future Appointments   Date Time Provider Department Center   9/1/2022  7:30 AM Diya Corrales PA-C [facility]

## 2022-08-25 ENCOUNTER — PATIENT MESSAGE (OUTPATIENT)
Dept: ADMINISTRATIVE | Facility: OTHER | Age: 57
End: 2022-08-25
Payer: MEDICAID

## 2022-09-01 ENCOUNTER — PATIENT MESSAGE (OUTPATIENT)
Dept: ADMINISTRATIVE | Facility: OTHER | Age: 57
End: 2022-09-01
Payer: MEDICAID

## 2022-09-01 ENCOUNTER — OFFICE VISIT (OUTPATIENT)
Dept: ORTHOPEDICS | Facility: CLINIC | Age: 57
End: 2022-09-01
Payer: MEDICAID

## 2022-09-01 VITALS — BODY MASS INDEX: 26.98 KG/M2 | HEIGHT: 68 IN | WEIGHT: 178 LBS

## 2022-09-01 DIAGNOSIS — Z96.649 STATUS POST HIP REPLACEMENT, UNSPECIFIED LATERALITY: Primary | ICD-10-CM

## 2022-09-01 PROCEDURE — 99024 PR POST-OP FOLLOW-UP VISIT: ICD-10-PCS | Mod: ,,, | Performed by: PHYSICIAN ASSISTANT

## 2022-09-01 PROCEDURE — 1159F PR MEDICATION LIST DOCUMENTED IN MEDICAL RECORD: ICD-10-PCS | Mod: CPTII,,, | Performed by: PHYSICIAN ASSISTANT

## 2022-09-01 PROCEDURE — 1159F MED LIST DOCD IN RCRD: CPT | Mod: CPTII,,, | Performed by: PHYSICIAN ASSISTANT

## 2022-09-01 PROCEDURE — 99999 PR PBB SHADOW E&M-EST. PATIENT-LVL III: CPT | Mod: PBBFAC,,, | Performed by: PHYSICIAN ASSISTANT

## 2022-09-01 PROCEDURE — 3008F PR BODY MASS INDEX (BMI) DOCUMENTED: ICD-10-PCS | Mod: CPTII,,, | Performed by: PHYSICIAN ASSISTANT

## 2022-09-01 PROCEDURE — 99024 POSTOP FOLLOW-UP VISIT: CPT | Mod: ,,, | Performed by: PHYSICIAN ASSISTANT

## 2022-09-01 PROCEDURE — 99213 OFFICE O/P EST LOW 20 MIN: CPT | Mod: PBBFAC | Performed by: PHYSICIAN ASSISTANT

## 2022-09-01 PROCEDURE — 3008F BODY MASS INDEX DOCD: CPT | Mod: CPTII,,, | Performed by: PHYSICIAN ASSISTANT

## 2022-09-01 PROCEDURE — 99999 PR PBB SHADOW E&M-EST. PATIENT-LVL III: ICD-10-PCS | Mod: PBBFAC,,, | Performed by: PHYSICIAN ASSISTANT

## 2022-09-01 RX ORDER — PREGABALIN 75 MG/1
75 CAPSULE ORAL 2 TIMES DAILY
Qty: 20 CAPSULE | Refills: 0 | Status: SHIPPED | OUTPATIENT
Start: 2022-09-01 | End: 2022-11-18

## 2022-09-01 RX ORDER — OXYCODONE HYDROCHLORIDE 5 MG/1
TABLET ORAL
Qty: 21 TABLET | Refills: 0 | Status: SHIPPED | OUTPATIENT
Start: 2022-09-01 | End: 2022-09-20

## 2022-09-01 NOTE — PROGRESS NOTES
"Tyrone Santos presents for initial post-operative visit following a right anterior total hip arthroplasty performed by Dr. Lam on 8/18/2022. Patient has some burning pain in thigh and some pain in right shin. Out of pain medication.     Exam:  Height 5' 8" (1.727 m), weight 80.7 kg (178 lb).   Patient is ambulating well with cane.      Incision is clean and dry without drainage or erythema.      Initial post-operative radiographs reviewed today revealing satisfactory position of the prosthesis.    A/P  2 weeks s/p right total hip replacement    - Patient advised to keep the incision clean and dry for the next 24 hours after which he  may wash the area with antibacterial soap in the shower, but will not submerge incision until one month post op.  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 Will wait to return until he discusses with Dr. Lam next visit.   - Follow up in 4 weeks with surgeon. Pt will call clinic immediately with problems/concerns.       "

## 2022-09-13 ENCOUNTER — PATIENT MESSAGE (OUTPATIENT)
Dept: PSYCHIATRY | Facility: CLINIC | Age: 57
End: 2022-09-13
Payer: MEDICAID

## 2022-09-14 DIAGNOSIS — G47.00 INSOMNIA, UNSPECIFIED TYPE: Primary | ICD-10-CM

## 2022-09-14 RX ORDER — ESZOPICLONE 3 MG/1
3 TABLET, FILM COATED ORAL NIGHTLY PRN
Qty: 30 TABLET | Refills: 0 | Status: SHIPPED | OUTPATIENT
Start: 2022-09-14 | End: 2022-09-20 | Stop reason: SDUPTHER

## 2022-09-19 ENCOUNTER — PATIENT MESSAGE (OUTPATIENT)
Dept: ORTHOPEDICS | Facility: CLINIC | Age: 57
End: 2022-09-19
Payer: MEDICAID

## 2022-09-19 DIAGNOSIS — Z96.649 STATUS POST HIP REPLACEMENT, UNSPECIFIED LATERALITY: Primary | ICD-10-CM

## 2022-09-20 ENCOUNTER — OFFICE VISIT (OUTPATIENT)
Dept: PSYCHIATRY | Facility: CLINIC | Age: 57
End: 2022-09-20
Payer: MEDICAID

## 2022-09-20 DIAGNOSIS — F31.70 BIPOLAR AFFECTIVE DISORDER IN REMISSION: Primary | ICD-10-CM

## 2022-09-20 DIAGNOSIS — G47.00 INSOMNIA, UNSPECIFIED TYPE: ICD-10-CM

## 2022-09-20 DIAGNOSIS — F41.1 GENERALIZED ANXIETY DISORDER: ICD-10-CM

## 2022-09-20 PROCEDURE — 1159F MED LIST DOCD IN RCRD: CPT | Mod: AF,HB,CPTII,95 | Performed by: NURSE PRACTITIONER

## 2022-09-20 PROCEDURE — 1160F PR REVIEW ALL MEDS BY PRESCRIBER/CLIN PHARMACIST DOCUMENTED: ICD-10-PCS | Mod: AF,HB,CPTII,95 | Performed by: NURSE PRACTITIONER

## 2022-09-20 PROCEDURE — 99214 OFFICE O/P EST MOD 30 MIN: CPT | Mod: AF,HB,95, | Performed by: NURSE PRACTITIONER

## 2022-09-20 PROCEDURE — 1159F PR MEDICATION LIST DOCUMENTED IN MEDICAL RECORD: ICD-10-PCS | Mod: AF,HB,CPTII,95 | Performed by: NURSE PRACTITIONER

## 2022-09-20 PROCEDURE — 1160F RVW MEDS BY RX/DR IN RCRD: CPT | Mod: AF,HB,CPTII,95 | Performed by: NURSE PRACTITIONER

## 2022-09-20 PROCEDURE — 99214 PR OFFICE/OUTPT VISIT, EST, LEVL IV, 30-39 MIN: ICD-10-PCS | Mod: AF,HB,95, | Performed by: NURSE PRACTITIONER

## 2022-09-20 RX ORDER — BUSPIRONE HYDROCHLORIDE 15 MG/1
TABLET ORAL
Qty: 360 TABLET | Refills: 0 | Status: SHIPPED | OUTPATIENT
Start: 2022-09-20 | End: 2022-12-15 | Stop reason: SDUPTHER

## 2022-09-20 RX ORDER — ESZOPICLONE 3 MG/1
3 TABLET, FILM COATED ORAL NIGHTLY PRN
Qty: 30 TABLET | Refills: 3 | Status: SHIPPED | OUTPATIENT
Start: 2022-09-20 | End: 2022-12-15 | Stop reason: SDUPTHER

## 2022-09-20 RX ORDER — ESCITALOPRAM OXALATE 10 MG/1
10 TABLET ORAL DAILY
Qty: 90 TABLET | Refills: 0 | Status: SHIPPED | OUTPATIENT
Start: 2022-09-20 | End: 2022-12-15 | Stop reason: SDUPTHER

## 2022-09-20 RX ORDER — LAMOTRIGINE 100 MG/1
100 TABLET ORAL DAILY
Qty: 90 TABLET | Refills: 0 | Status: SHIPPED | OUTPATIENT
Start: 2022-09-20 | End: 2022-12-15 | Stop reason: SDUPTHER

## 2022-09-20 NOTE — PROGRESS NOTES
9/20/2022 11:17 AM  Tyrone Santos  1965  [id number]    Outpatient Psychiatry Follow-Up Visit (MD/NP) - Telemedicine Visit      The patient location is: Patient reported that his location at the time of this visit was in the [facility]   Address: documented in [facility]      Visit type: audiovisual    Each patient to whom he or she provides medical services by telemedicine is:  (1) informed of the relationship between the physician and patient and the respective role of any other health care provider with respect to management of the patient; and (2) notified that he or she may decline to receive medical services by telemedicine and may withdraw from such care at any time.      Chief Complaint:  Tyrone Santos, a 57 y.o. male,who presents today for follow up of anxiety and mood swings.  Met with patient.        Interim Events/Subjective Report/Content of Current Session:     Pt is a 57 y.o. male with a hx of HTN, HLD, ED, BPH, hypothyroidism, ERIC, s/p cervical spine fusion, spondylosis, and bipolar d/o.    Today he reports he had to have a hip replacement recently. He was only able to work for about 2 weeks before he needed the surgery. He can't wait to return to work. Denies depressed mood, decreased motivation, anhedonia, and hopelessness. Anxiety is managable. Sleeping well with Lunesta 3 mg. However, since he had to switch to medicaid, they would only allow 7 tabs/month. We discussed Good RX and and he can get a 30 day supply at his pharmacy for about $25.      He had his nasal deviated septum surgically corrected and has been sleeping better since then. He also uses an oral appliance for his ERIC and no longer snores.    Mostly drinks non-alcoholic beer.    Pt denies recurrent thoughts of death and denies SI/HI. Denies any sxs of ochoa. Denies AVH, paranoia and delusions. No objective s/sx of psychosis or ochoa. Denies any ASE from his psych meds.    Discussed risks, benefits and SE profile of  medication options.       Psychotherapy:  Target symptoms: depression, anxiety   Why chosen therapy is appropriate versus another modality: relevant to diagnosis, patient responds to this modality  Outcome monitoring methods: self-report, observation  Therapeutic intervention type: supportive psychotherapy  Topics discussed/themes: work stress, stress related to medical comorbidities, building skills sets for symptom management  The patient's response to the intervention is accepting. The patient's progress toward treatment goals is good.   Duration of intervention: 4 minutes.      Psychotropic medication review  Previous Trials-  Wellbutrin   Valium  Xanax  Trileptal  Seroquel - heart racing  Trazodone - worked for a few years and then gave out  Depakote - worked for a while  Doxepin  Hydroxyzine - ineffective; made him angry  Naltrexone  Campral  Trazodone    Current meds-  Buspar   Lamictal  Lunesta  Lexapro        Review of Systems       Review of Systems   Constitutional:  Negative for chills, fever, malaise/fatigue and weight loss.   Respiratory:  Negative for shortness of breath and wheezing.    Cardiovascular:  Negative for chest pain and palpitations.   Gastrointestinal:  Negative for diarrhea and vomiting.   Musculoskeletal:  Negative for falls and myalgias.   Skin:  Negative for rash.   Neurological:  Negative for tremors, seizures and headaches.   Endo/Heme/Allergies:  Does not bruise/bleed easily.   Psychiatric/Behavioral:          See HPI       Past Medical, Family and Social History: The patient's past medical, family and social history have been reviewed and updated as appropriate within the electronic medical record - see encounter notes.    Compliance: yes    Side effects: None    Risk Parameters:  Patient reports no suicidal ideation  Patient reports no homicidal ideation  Patient reports no self-injurious behavior  Patient reports no violent behavior    Exam (detailed: at least 9 elements;  comprehensive: all 15 elements)   Constitutional  Vitals:  Most recent vital signs, dated less than 90 days prior to this appointment, were reviewed.   There were no vitals filed for this visit.     General:  unremarkable, age appropriate, well nourished, casually dressed, neatly groomed, obese     Musculoskeletal  Muscle Strength/Tone:  ADITYA due to virtual visit   Gait & Station:  ADITYA due to virtual visit     Psychiatric      Appearance:  unremarkable, age appropriate, well nourished, casually dressed, neatly groomed, obese   Behavior:  normal, friendly and cooperative, eye contact normal     Speech:  no latency; no press   Mood & Affect:  euthymic  congruent and appropriate   Thought Process:  normal and logical   Associations:  intact   Thought Content:  normal, no suicidality, no homicidality, delusions, or paranoia   Insight:  intact   Judgement: behavior is adequate to circumstances, age appropriate   Orientation:  grossly intact   Memory: intact for content of interview   Language: grossly intact   Attention Span & Concentration:  able to focus   Fund of Knowledge:  intact and appropriate to age and level of education     Medications:  Outpatient Encounter Medications as of 2022   Medication Sig Dispense Refill    acetaminophen (TYLENOL) 650 MG TbSR Take 1 tablet (650 mg total) by mouth every 6 to 8 hours as needed (pain). 120 tablet 0    aspirin (ECOTRIN) 81 MG EC tablet Take 1 tablet (81 mg total) by mouth 2 (two) times daily. 60 tablet 0    busPIRone (BUSPAR) 15 MG tablet Take 2 tablets po q am, 1 tablet po at noon and 1 tablet po q hs 360 tablet 0    calcium-vitamin D3 (OS-JALYN 500 + D3) 500 mg(1,250mg) -200 unit per tablet Take 1 tablet by mouth 2 (two) times daily with meals.      [] celecoxib (CELEBREX) 200 MG capsule Take 1 capsule (200 mg total) by mouth once daily. 30 capsule 0    cyanocobalamin (VITAMIN B-12) 100 MCG tablet Take 100 mcg by mouth once daily.      docusate sodium (COLACE)  100 MG capsule Take 1 capsule (100 mg total) by mouth 2 (two) times daily as needed for Constipation. 60 capsule 0    EScitalopram oxalate (LEXAPRO) 10 MG tablet Take 1 tablet (10 mg total) by mouth once daily. 90 tablet 0    eszopiclone (LUNESTA) 3 mg Tab Take 1 tablet (3 mg total) by mouth nightly as needed (insomnia). 30 tablet 0    folic acid (FOLVITE) 1 MG tablet Take 1 mg by mouth once daily.      hydroCHLOROthiazide (HYDRODIURIL) 25 MG tablet Take 25 mg by mouth once daily.      lamoTRIgine (LAMICTAL) 100 MG tablet Take 1 tablet (100 mg total) by mouth once daily. 90 tablet 0    levothyroxine (SYNTHROID) 25 MCG tablet Take 25 mcg by mouth once daily.      methocarbamoL (ROBAXIN) 750 MG Tab Take 1 tablet (750 mg total) by mouth 3 (three) times daily as needed (muscle spasms). 30 tablet 0    omeprazole (PRILOSEC) 40 MG capsule Take 40 mg by mouth once daily.      oxyCODONE (ROXICODONE) 5 MG immediate release tablet Take 1 tablet by mouth every 6-8 hours as needed for pain 21 tablet 0    pregabalin (LYRICA) 75 MG capsule Take 1 capsule (75 mg total) by mouth 2 (two) times a day. for 10 days 20 capsule 0    rosuvastatin (CRESTOR) 10 MG tablet rosuvastatin 10 mg tablet   TAKE 1 TABLET BY MOUTH EVERY DAY      tamsulosin (FLOMAX) 0.4 mg Cap Take 2 capsules (0.8 mg total) by mouth once daily. 60 capsule 11     No facility-administered encounter medications on file as of 9/20/2022.       Allergy:  Review of patient's allergies indicates:   Allergen Reactions    Codeine Nausea Only    Seroquel [quetiapine]      Heart racing     Tizanidine Other (See Comments)     Caused increased pain and muscle pain         Assessment and Diagnosis   Status/Progress: Based on the examination today, the patient's problem(s) is/are improved and well controlled.  New problems have not been presented today.   Co-morbidities are not complicating management of the primary condition.          General Impression:       ICD-10-CM ICD-9-CM   1.  Bipolar affective disorder in remission  F31.70 296.80   2. Generalized anxiety disorder  F41.1 300.02   3. Insomnia, unspecified type  G47.00 780.52        Diff Dx  Bipolar I vs Bipolar II vs MDD with anxiety      Intervention/Counseling/Treatment Plan     Medication Management:  Continue Lamictal 100 mg q day  Continue Buspar to 30 mg q am, 15 mg at noon, and 15 mg q hs  Continue Lunesta 3 mg q hs prn insomnia. Pt was told not drive or to take this med with ETOH or other sedating meds/substance. Pt verbalized understanding.  Continue Lexapro 10 mg q am to target anxiety  Labs: reviewed most recent  Discussed with patient informed consent, risks vs. benefits, alternative treatments, side effect profile and the inherent unpredictability of individual responses to these treatments. The patient expresses understanding of the above and displays the capacity to agree with this current plan and had no other questions.  Encouraged Patient to keep future appointments.   Take medications as prescribed and abstain from substance abuse.   Present to ED or call 911 for SI/HI plan or intent, psychosis, or medical emergency.      Return to Clinic: 3 months, or sooner if needed      Face-to-face time with the patient: 18 minutes  Total time:  27 minutes of total time spent on the encounter, which includes face to face time and non-face to face time preparing to see the patient (eg, review of tests), Obtaining and/or reviewing separately obtained history, Documenting clinical information in the electronic or other health record, Independently interpreting results (not separately reported) and communicating results to the patient/family/caregiver, or Care coordination (not separately reported).       Lisa Alejo, MSN, APRN, PMHNP-BC [facility]

## 2022-09-27 ENCOUNTER — HOSPITAL ENCOUNTER (OUTPATIENT)
Dept: RADIOLOGY | Facility: HOSPITAL | Age: 57
Discharge: HOME | End: 2022-09-27
Attending: ORTHOPAEDIC SURGERY
Payer: MEDICAID

## 2022-09-27 ENCOUNTER — PATIENT MESSAGE (OUTPATIENT)
Dept: ADMINISTRATIVE | Facility: OTHER | Age: 57
End: 2022-09-27
Payer: MEDICAID

## 2022-09-27 ENCOUNTER — OFFICE VISIT (OUTPATIENT)
Dept: ORTHOPEDICS | Facility: CLINIC | Age: 57
End: 2022-09-27
Payer: MEDICAID

## 2022-09-27 VITALS — HEIGHT: 68 IN | WEIGHT: 178 LBS | BODY MASS INDEX: 26.98 KG/M2

## 2022-09-27 DIAGNOSIS — Z96.649 STATUS POST HIP REPLACEMENT, UNSPECIFIED LATERALITY: ICD-10-CM

## 2022-09-27 DIAGNOSIS — Z96.642 PRESENCE OF LEFT ARTIFICIAL HIP JOINT: ICD-10-CM

## 2022-09-27 DIAGNOSIS — Z96.641 PRESENCE OF RIGHT ARTIFICIAL HIP JOINT: Primary | ICD-10-CM

## 2022-09-27 PROCEDURE — 3008F BODY MASS INDEX DOCD: CPT | Mod: CPTII,,, | Performed by: ORTHOPAEDIC SURGERY

## 2022-09-27 PROCEDURE — 99024 POSTOP FOLLOW-UP VISIT: CPT | Mod: ,,, | Performed by: ORTHOPAEDIC SURGERY

## 2022-09-27 PROCEDURE — 99024 PR POST-OP FOLLOW-UP VISIT: ICD-10-PCS | Mod: ,,, | Performed by: ORTHOPAEDIC SURGERY

## 2022-09-27 PROCEDURE — 1159F PR MEDICATION LIST DOCUMENTED IN MEDICAL RECORD: ICD-10-PCS | Mod: CPTII,,, | Performed by: ORTHOPAEDIC SURGERY

## 2022-09-27 PROCEDURE — 99999 PR PBB SHADOW E&M-EST. PATIENT-LVL III: ICD-10-PCS | Mod: PBBFAC,,, | Performed by: ORTHOPAEDIC SURGERY

## 2022-09-27 PROCEDURE — 1159F MED LIST DOCD IN RCRD: CPT | Mod: CPTII,,, | Performed by: ORTHOPAEDIC SURGERY

## 2022-09-27 PROCEDURE — 99213 OFFICE O/P EST LOW 20 MIN: CPT | Mod: PBBFAC | Performed by: ORTHOPAEDIC SURGERY

## 2022-09-27 PROCEDURE — 73502 XR HIP WITH PELVIS WHEN PERFORMED, 2 OR 3  VIEWS RIGHT: ICD-10-PCS | Mod: 26,RT,, | Performed by: RADIOLOGY

## 2022-09-27 PROCEDURE — 73502 X-RAY EXAM HIP UNI 2-3 VIEWS: CPT | Mod: 26,RT,, | Performed by: RADIOLOGY

## 2022-09-27 PROCEDURE — 99999 PR PBB SHADOW E&M-EST. PATIENT-LVL III: CPT | Mod: PBBFAC,,, | Performed by: ORTHOPAEDIC SURGERY

## 2022-09-27 PROCEDURE — 73502 X-RAY EXAM HIP UNI 2-3 VIEWS: CPT | Mod: TC,RT

## 2022-09-27 PROCEDURE — 3008F PR BODY MASS INDEX (BMI) DOCUMENTED: ICD-10-PCS | Mod: CPTII,,, | Performed by: ORTHOPAEDIC SURGERY

## 2022-09-27 RX ORDER — METHYLPREDNISOLONE 4 MG/1
TABLET ORAL
Qty: 1 EACH | Refills: 0 | Status: SHIPPED | OUTPATIENT
Start: 2022-09-27 | End: 2022-11-18

## 2022-09-27 RX ORDER — CELECOXIB 200 MG/1
200 CAPSULE ORAL DAILY
Qty: 30 CAPSULE | Refills: 0 | Status: SHIPPED | OUTPATIENT
Start: 2022-09-27 | End: 2022-11-18

## 2022-09-27 RX ORDER — METHOCARBAMOL 750 MG/1
750 TABLET, FILM COATED ORAL 3 TIMES DAILY PRN
Qty: 30 TABLET | Refills: 0 | Status: SHIPPED | OUTPATIENT
Start: 2022-09-27 | End: 2022-10-07
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
## 2022-10-12 NOTE — TRANSFER OF CARE
"Anesthesia Transfer of Care Note    Patient: Tyrone Santos    Procedure(s) Performed: Procedure(s) (LRB):  ARTHROPLASTY, HIP, TOTAL, ANTERIOR APPROACH: RIGHT: DEPUY-ACTIS+PINNACLE (Right)    Patient location: PACU    Anesthesia Type: general and regional    Transport from OR: Transported from OR on 100% O2 by closed face mask with adequate spontaneous ventilation    Post pain: adequate analgesia    Post assessment: no apparent anesthetic complications and tolerated procedure well    Post vital signs: stable    Level of consciousness: awake and responds to stimulation    Nausea/Vomiting: no nausea/vomiting    Complications: none    Transfer of care protocol was followed      Last vitals:   Visit Vitals  /78   Pulse 75   Temp 36.8 °C (98.2 °F) (Oral)   Resp 16   Ht 5' 8" (1.727 m)   Wt 80.7 kg (178 lb)   SpO2 96%   BMI 27.06 kg/m²     "
MOLECULAR PCR

## 2022-11-07 ENCOUNTER — PATIENT MESSAGE (OUTPATIENT)
Dept: ADMINISTRATIVE | Facility: OTHER | Age: 57
End: 2022-11-07
Payer: MEDICAID

## 2022-11-10 ENCOUNTER — OFFICE VISIT (OUTPATIENT)
Dept: ORTHOPEDICS | Facility: CLINIC | Age: 57
End: 2022-11-10
Payer: MEDICAID

## 2022-11-10 VITALS — HEIGHT: 68 IN | BODY MASS INDEX: 28.38 KG/M2 | WEIGHT: 187.25 LBS

## 2022-11-10 DIAGNOSIS — Z96.641 PRESENCE OF RIGHT ARTIFICIAL HIP JOINT: Primary | ICD-10-CM

## 2022-11-10 DIAGNOSIS — Z96.642 PRESENCE OF LEFT ARTIFICIAL HIP JOINT: ICD-10-CM

## 2022-11-10 PROCEDURE — 99999 PR PBB SHADOW E&M-EST. PATIENT-LVL III: ICD-10-PCS | Mod: PBBFAC,,, | Performed by: ORTHOPAEDIC SURGERY

## 2022-11-10 PROCEDURE — 3008F PR BODY MASS INDEX (BMI) DOCUMENTED: ICD-10-PCS | Mod: CPTII,,, | Performed by: ORTHOPAEDIC SURGERY

## 2022-11-10 PROCEDURE — 99213 OFFICE O/P EST LOW 20 MIN: CPT | Mod: PBBFAC | Performed by: ORTHOPAEDIC SURGERY

## 2022-11-10 PROCEDURE — 3008F BODY MASS INDEX DOCD: CPT | Mod: CPTII,,, | Performed by: ORTHOPAEDIC SURGERY

## 2022-11-10 PROCEDURE — 99999 PR PBB SHADOW E&M-EST. PATIENT-LVL III: CPT | Mod: PBBFAC,,, | Performed by: ORTHOPAEDIC SURGERY

## 2022-11-10 PROCEDURE — 99024 POSTOP FOLLOW-UP VISIT: CPT | Mod: ,,, | Performed by: ORTHOPAEDIC SURGERY

## 2022-11-10 PROCEDURE — 1159F PR MEDICATION LIST DOCUMENTED IN MEDICAL RECORD: ICD-10-PCS | Mod: CPTII,,, | Performed by: ORTHOPAEDIC SURGERY

## 2022-11-10 PROCEDURE — 1159F MED LIST DOCD IN RCRD: CPT | Mod: CPTII,,, | Performed by: ORTHOPAEDIC SURGERY

## 2022-11-10 PROCEDURE — 99024 PR POST-OP FOLLOW-UP VISIT: ICD-10-PCS | Mod: ,,, | Performed by: ORTHOPAEDIC SURGERY

## 2022-11-10 NOTE — PROGRESS NOTES
Subjective:     HPI:   Tyrone Santos is a 57 y.o. male who presents 12 weeks out from right NATALIYA     Date of surgery: 8/18/22    Medications: tylenol 2-3/month, rare at night    Assistive Devices: none    Limitations: still a little sore sleeping on R side, improving    Doing well, no problems, happy with recovery  Soreness at 6 weeks much improved, had overdone it prior to that visit    L hip doing well       Objective:   Body mass index is 28.48 kg/m².  Exam:    Gait: limp/antalgic/trendelenburg none    Incision: healed    Active straight leg raise: good strength, no discomfort    Extension: 0    Flexion: 100    Abduction: 30    Adduction: 20    External rotation: 30    Internal rotation: 20    LLD: 0 cm supine       Imaging:  None today        Assessment:       ICD-10-CM ICD-9-CM   1. Presence of right artificial hip joint  Z96.641 V43.64   2. Presence of left artificial hip joint  Z96.642 V43.64      Doing well  R NATALIYA 8/18/22  L NATALIYA 8/23/22     Plan:       Patient is doing very well with the hip replacement at this time.  They will continue routine care of their hip and see me for their routine follow-up.  If there are problems in the interim they will see me back sooner. Prophylactic antibiotic protocol given and explained to patient.     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            Past Medical History:   Diagnosis Date    Addiction to drug     Alcohol abuse     Anxiety     Bipolar disorder     Depression     Difficulty urinating     GERD (gastroesophageal reflux disease)     Hx of psychiatric care     Hyperlipidemia     Hypertension     Ade     Psychiatric problem     S/P cervical spinal fusion 12/06/2018    S/P laparoscopic appendectomy 01/03/2017    Sleep apnea     Therapy     Thyroid disease        Past Surgical History:   Procedure Laterality Date     ANTERIOR CERVICAL DISCECTOMY W/ FUSION N/A 10/23/2018    Procedure: DISCECTOMY, SPINE, CERVICAL, ANTERIOR APPROACH, WITH FUSION C3-4;  Surgeon: Jw Anne MD;  Location: [address];  Service: Neurosurgery;  Laterality: N/A;    APPENDECTOMY      around     ARTHROPLASTY OF HIP BY ANTERIOR APPROACH Left 2021    Procedure: ARTHROPLASTY, HIP, TOTAL, ANTERIOR APPROACH:LEFT:DPEUY-ACTIS+PINNACLE;  Surgeon: Ángel Lam III, MD;  Location: [facility];  Service: Orthopedics;  Laterality: Left;    ARTHROPLASTY OF HIP BY ANTERIOR APPROACH Right 2022    Procedure: ARTHROPLASTY, HIP, TOTAL, ANTERIOR APPROACH: RIGHT: DEPUY-ACTIS+PINNACLE;  Surgeon: Ángel Lam III, MD;  Location: [facility];  Service: Orthopedics;  Laterality: Right;    INJECTION OF ANESTHETIC AGENT AROUND NERVE Left 2021    Procedure: BLOCK, NERVE, OBTURATOR and FEMORAL;  Surgeon: Josefina Lee MD;  Location: [facility];  Service: Pain Management;  Laterality: Left;    SCAPHOID FRACTURE SURGERY      had to have a bone graft -early       WRIST SURGERY      6 years ago       Family History   Problem Relation Age of Onset    Alcohol abuse Mother     Alcohol abuse Maternal Uncle     Heart disease Father     No Known Problems Sister     No Known Problems Brother     No Known Problems Son     No Known Problems Sister     Cancer Sister     No Known Problems Brother     No Known Problems Brother        Social History     Socioeconomic History    Marital status:     Number of children: 1   Occupational History    Occupation:    Tobacco Use    Smoking status: Former     Types: Cigarettes     Quit date:      Years since quittin.8    Smokeless tobacco: Former   Substance and Sexual Activity    Alcohol use: Not Currently    Drug use: Not Currently     Types: Marijuana, Methamphetamines    Sexual activity: Never

## 2022-11-16 ENCOUNTER — PATIENT MESSAGE (OUTPATIENT)
Dept: ORTHOPEDICS | Facility: CLINIC | Age: 57
End: 2022-11-16
Payer: MEDICAID

## 2022-11-16 DIAGNOSIS — Z96.642 PRESENCE OF LEFT ARTIFICIAL HIP JOINT: Primary | ICD-10-CM

## 2022-11-17 ENCOUNTER — OFFICE VISIT (OUTPATIENT)
Dept: ORTHOPEDICS | Facility: CLINIC | Age: 57
End: 2022-11-17
Payer: MEDICAID

## 2022-11-17 ENCOUNTER — HOSPITAL ENCOUNTER (OUTPATIENT)
Dept: RADIOLOGY | Facility: HOSPITAL | Age: 57
Discharge: HOME | End: 2022-11-17
Attending: ORTHOPAEDIC SURGERY
Payer: MEDICAID

## 2022-11-17 ENCOUNTER — PATIENT MESSAGE (OUTPATIENT)
Dept: ADMINISTRATIVE | Facility: OTHER | Age: 57
End: 2022-11-17
Payer: MEDICAID

## 2022-11-17 VITALS — BODY MASS INDEX: 28.37 KG/M2 | HEIGHT: 68 IN | WEIGHT: 187.19 LBS

## 2022-11-17 DIAGNOSIS — Z96.641 PRESENCE OF RIGHT ARTIFICIAL HIP JOINT: ICD-10-CM

## 2022-11-17 DIAGNOSIS — M54.42 ACUTE MIDLINE LOW BACK PAIN WITH LEFT-SIDED SCIATICA: Primary | ICD-10-CM

## 2022-11-17 DIAGNOSIS — Z96.642 PRESENCE OF LEFT ARTIFICIAL HIP JOINT: ICD-10-CM

## 2022-11-17 DIAGNOSIS — M54.42 ACUTE MIDLINE LOW BACK PAIN WITH LEFT-SIDED SCIATICA: ICD-10-CM

## 2022-11-17 PROCEDURE — 99213 OFFICE O/P EST LOW 20 MIN: CPT | Mod: S$PBB,,, | Performed by: ORTHOPAEDIC SURGERY

## 2022-11-17 PROCEDURE — 73502 X-RAY EXAM HIP UNI 2-3 VIEWS: CPT | Mod: TC,LT

## 2022-11-17 PROCEDURE — 73502 XR HIP WITH PELVIS WHEN PERFORMED, 2 OR 3 VIEWS LEFT: ICD-10-PCS | Mod: 26,LT,, | Performed by: RADIOLOGY

## 2022-11-17 PROCEDURE — 99212 OFFICE O/P EST SF 10 MIN: CPT | Mod: PBBFAC | Performed by: ORTHOPAEDIC SURGERY

## 2022-11-17 PROCEDURE — 99999 PR PBB SHADOW E&M-EST. PATIENT-LVL II: CPT | Mod: PBBFAC,,, | Performed by: ORTHOPAEDIC SURGERY

## 2022-11-17 PROCEDURE — 72110 XR LUMBAR SPINE AP AND LAT WITH FLEX/EXT: ICD-10-PCS | Mod: 26,,, | Performed by: RADIOLOGY

## 2022-11-17 PROCEDURE — 73502 X-RAY EXAM HIP UNI 2-3 VIEWS: CPT | Mod: 26,LT,, | Performed by: RADIOLOGY

## 2022-11-17 PROCEDURE — 72110 X-RAY EXAM L-2 SPINE 4/>VWS: CPT | Mod: TC

## 2022-11-17 PROCEDURE — 72110 X-RAY EXAM L-2 SPINE 4/>VWS: CPT | Mod: 26,,, | Performed by: RADIOLOGY

## 2022-11-17 PROCEDURE — 99999 PR PBB SHADOW E&M-EST. PATIENT-LVL II: ICD-10-PCS | Mod: PBBFAC,,, | Performed by: ORTHOPAEDIC SURGERY

## 2022-11-17 PROCEDURE — 99213 PR OFFICE/OUTPT VISIT, EST, LEVL III, 20-29 MIN: ICD-10-PCS | Mod: S$PBB,,, | Performed by: ORTHOPAEDIC SURGERY

## 2022-11-17 PROCEDURE — 3008F BODY MASS INDEX DOCD: CPT | Mod: CPTII,,, | Performed by: ORTHOPAEDIC SURGERY

## 2022-11-17 PROCEDURE — 3008F PR BODY MASS INDEX (BMI) DOCUMENTED: ICD-10-PCS | Mod: CPTII,,, | Performed by: ORTHOPAEDIC SURGERY

## 2022-11-17 RX ORDER — METHOCARBAMOL 750 MG/1
750 TABLET, FILM COATED ORAL 3 TIMES DAILY PRN
Qty: 30 TABLET | Refills: 0 | Status: SHIPPED | OUTPATIENT
Start: 2022-11-17 | End: 2022-11-27

## 2022-11-17 RX ORDER — MELOXICAM 15 MG/1
15 TABLET ORAL DAILY
Qty: 30 TABLET | Refills: 0 | Status: SHIPPED | OUTPATIENT
Start: 2022-11-17 | End: 2023-03-20

## 2022-11-17 NOTE — PROGRESS NOTES
Subjective:     HPI:   Tyrone Santos is a 57 y.o. male who presents for add on appointment    B hip AVN  Saw him 11/10/22 for 12 week visit R ant NATALIYA  Went to work on 11/11/22  Fell within a week of returning to work after L NATALIYA    Tuesday 11/15 - climbing down off scissor lift, left foot slipped, fell 2-3 feet onto left side, able to stand and walk, did not file work comp    C/o low back pain, B hip/groin pain, L>R lat hip pain, new onset L foot numbness    Taking tylenol    Depression worse  Thinking he cannot return to his physically demanding job and inquiring about disability       Objective:   Body mass index is 28.46 kg/m².  Exam:  He is walking slowly and gingerly but no significant limp or antalgia or Trendelenburg.  He sits offloading his left spine buttock.  He is tender palpation in the midline of his lumbosacral junction.  He is tender at the muscles and soft tissues on the lateral hip where he fell consistent with contusion.  He has mild anterior and lateral hip discomfort with active straight leg raise both hips 0-90 hip flexion 40 abduction 30 adduction 40 external 20 internal rotation with some soft tissue laterally anterior soreness but no significant pain with log roll.  Distally has 5/5 strength throughout.  He is decreased sensation to light touch in the left foot.      Imaging:  X-rays of his hips obtained today no change from prior.      Assessment:       ICD-10-CM ICD-9-CM   1. Acute midline low back pain with left-sided sciatica  M54.42 724.2     724.3   2. Presence of left artificial hip joint  Z96.642 V43.64   3. Presence of right artificial hip joint  Z96.641 V43.64      I think his total hip replacements are mechanically functioning well.  He is certainly strained his muscles and has a contusion around his hip.      The biggest issue I think is some low back pain acute onset after a fall with some new onset left foot numbness.  We will get baseline lumbar spine x-rays today and  refer him to the back and spine clinic asap     Plan:       Use cane/walker  Ice/heat  Rest    Continue tylenol arthritis 3-4tabs/day  Rx celebrex    XR lumbar spine  Ref to back and spine team for spine eval    Mechanically THAs look fine    Orders Placed This Encounter   Procedures    Ambulatory referral/consult to Back & Spine Clinic     Standing Status:   Future     Standing Expiration Date:   12/17/2023     Referral Priority:   Routine     Referral Type:   Consultation     Referral Reason:   Specialty Services Required     Number of Visits Requested:   1       Medications Ordered This Encounter   Medications    meloxicam (MOBIC) 15 MG tablet     Sig: Take 1 tablet (15 mg total) by mouth once daily.     Dispense:  30 tablet     Refill:  0    methocarbamoL (ROBAXIN) 750 MG Tab     Sig: Take 1 tablet (750 mg total) by mouth 3 (three) times daily as needed (muscle spasm).     Dispense:  30 tablet     Refill:  0        Past Medical History:   Diagnosis Date    Addiction to drug     Alcohol abuse     Anxiety     Bipolar disorder     Depression     Difficulty urinating     GERD (gastroesophageal reflux disease)     Hx of psychiatric care     Hyperlipidemia     Hypertension     Ade     Psychiatric problem     S/P cervical spinal fusion 12/06/2018    S/P laparoscopic appendectomy 01/03/2017    Sleep apnea     Therapy     Thyroid disease        Past Surgical History:   Procedure Laterality Date    ANTERIOR CERVICAL DISCECTOMY W/ FUSION N/A 10/23/2018    Procedure: DISCECTOMY, SPINE, CERVICAL, ANTERIOR APPROACH, WITH FUSION C3-4;  Surgeon: Jw Anne MD;  Location: [address];  Service: Neurosurgery;  Laterality: N/A;    APPENDECTOMY      around 2012    ARTHROPLASTY OF HIP BY ANTERIOR APPROACH Left 8/23/2021    Procedure: ARTHROPLASTY, HIP, TOTAL, ANTERIOR APPROACH:LEFT:DPEUY-ACTIS+PINNACLE;  Surgeon: Ángel Lam III, MD;  Location: [facility] OR;  Service: Orthopedics;  Laterality: Left;    ARTHROPLASTY  OF HIP BY ANTERIOR APPROACH Right 2022    Procedure: ARTHROPLASTY, HIP, TOTAL, ANTERIOR APPROACH: RIGHT: DEPUY-ACTIS+PINNACLE;  Surgeon: Ángel Lam III, MD;  Location: [facility] OR;  Service: Orthopedics;  Laterality: Right;    INJECTION OF ANESTHETIC AGENT AROUND NERVE Left 2021    Procedure: BLOCK, NERVE, OBTURATOR and FEMORAL;  Surgeon: Josefina Lee MD;  Location: [facility]IN [facility]GT;  Service: Pain Management;  Laterality: Left;    SCAPHOID FRACTURE SURGERY      had to have a bone graft -early       WRIST SURGERY      6 years ago       Family History   Problem Relation Age of Onset    Alcohol abuse Mother     Alcohol abuse Maternal Uncle     Heart disease Father     No Known Problems Sister     No Known Problems Brother     No Known Problems Son     No Known Problems Sister     Cancer Sister     No Known Problems Brother     No Known Problems Brother        Social History     Socioeconomic History    Marital status:     Number of children: 1   Occupational History    Occupation:    Tobacco Use    Smoking status: Former     Types: Cigarettes     Quit date:      Years since quittin.8    Smokeless tobacco: Former   Substance and Sexual Activity    Alcohol use: Not Currently    Drug use: Not Currently     Types: Marijuana, Methamphetamines    Sexual activity: Never

## 2022-11-17 NOTE — PROGRESS NOTES
DATE: 11/18/2022  PATIENT: Tyrone Santos    Attending Physician: Bart Frye MD    HISTORY:  Tyrone Santos is a 57 y.o. male who returns to me today for follow up.  He was last seen by me 2/2/2022.  He had a right NATALIYA with Dr. Lam on 8/18/22 and did very well. Today he is doing well but notes he fell at work 4 days ago and since then has been having significant low back pain with pain down his left leg in addition to numbness, tingling, and weakness. The pain is worse with standing, walking, and sitting and improved by nothing.    The Patient denies myelopathic symptoms such as handwriting changes or difficulty with buttons/coins/keys. Denies perineal paresthesias, bowel/bladder dysfunction.    PMH/PSH/FamHx/SocHx:  Unchanged from prior visit    ROS:  REVIEW OF SYSTEMS:  Constitution: Negative. Negative for chills, fever and night sweats.   HENT: Negative for congestion and headaches.    Eyes: Negative for blurred vision, left vision loss and right vision loss.   Cardiovascular: Negative for chest pain and syncope.   Respiratory: Negative for cough and shortness of breath.    Endocrine: Negative for polydipsia, polyphagia and polyuria.   Hematologic/Lymphatic: Negative for bleeding problem. Does not bruise/bleed easily.   Skin: Negative for dry skin, itching and rash.   Musculoskeletal: Negative for falls and muscle weakness.   Gastrointestinal: Negative for abdominal pain and bowel incontinence.   Allergic/Immunologic: Negative for hives and persistent infections.  Genitourinary: Negative for urinary retention/incontinence and nocturia.   Neurological: negative for disturbances in coordination, no myelopathic symptoms such as handwriting changes or difficulty with buttons, coins, keys or small objects. No loss of balance and seizures.   Psychiatric/Behavioral: Negative for depression. The patient does not have insomnia.   Denies perineal paresthesias, bowel or bladder incontinence    EXAM:  Wt 84.9 kg  (187 lb 2.7 oz)   BMI 28.46 kg/m²     My physical examination was notable for the following findings:     Antalgic station and gait, no difficulty with toe or heel walk.   Dorsal lumbar skin negative for rashes, lesions, hairy patches and surgical scars. There is positive lumbar tenderness to palpation.  Lumbar range of motion is acceptable.  Global saggital and coronal spinal balance acceptable, not significant for scoliosis and kyphosis.  No pain with the range of motion of the bilateral hips. No trochanteric tenderness to palpation.  Bilateral lower extremities warm and well perfused, dorsalis pedis pulses 2+ bilaterally.  3/5 strength and tone in all major motor groups in the LLE compared to RLE. Altered sensation to light touch in the L2-S1 dermatomes in LLE compared to RLE.   Deep tendon reflexes symmetric 2+ in the bilateral lower extremities.  Negative Babinski bilaterally. Straight leg raise positive on left.      IMAGING:    Today I personally reviewed AP, Lat and Flex/Ex  upright L-spine that demonstrate mild degenerative changes without instability    Body mass index is 28.46 kg/m².    No results found for: HGBA1C      ASSESSMENT/PLAN:    There are no diagnoses linked to this encounter.    Today we discussed at length all of the different treatment options including anti-inflammatories, acetaminophen, rest, ice, heat, physical therapy including strengthening and stretching exercises, home exercises, ROM, aerobic conditioning, aqua therapy, other modalities including ultrasound, massage, and dry needling, epidural steroid injections and finally surgical intervention.      Pt presents with acute left lumbar radiculopathy with neuro deficits. Concern for acute disc herniation. Will obtain STAT MRI to further evaluate and call with results. Will send medrol dose pack and gabapentin to pharmacy.

## 2022-11-18 ENCOUNTER — OFFICE VISIT (OUTPATIENT)
Dept: ORTHOPEDICS | Facility: CLINIC | Age: 57
End: 2022-11-18
Payer: MEDICAID

## 2022-11-18 ENCOUNTER — HOSPITAL ENCOUNTER (OUTPATIENT)
Dept: RADIOLOGY | Facility: OTHER | Age: 57
Discharge: HOME | End: 2022-11-18
Attending: ORTHOPAEDIC SURGERY
Payer: MEDICAID

## 2022-11-18 VITALS — WEIGHT: 187.19 LBS | BODY MASS INDEX: 28.46 KG/M2

## 2022-11-18 DIAGNOSIS — M54.9 DORSALGIA, UNSPECIFIED: ICD-10-CM

## 2022-11-18 DIAGNOSIS — M54.9 DORSALGIA, UNSPECIFIED: Primary | ICD-10-CM

## 2022-11-18 PROCEDURE — 99999 PR PBB SHADOW E&M-EST. PATIENT-LVL III: CPT | Mod: PBBFAC,,, | Performed by: ORTHOPAEDIC SURGERY

## 2022-11-18 PROCEDURE — 1159F PR MEDICATION LIST DOCUMENTED IN MEDICAL RECORD: ICD-10-PCS | Mod: CPTII,,, | Performed by: ORTHOPAEDIC SURGERY

## 2022-11-18 PROCEDURE — 99214 OFFICE O/P EST MOD 30 MIN: CPT | Mod: S$PBB,,, | Performed by: ORTHOPAEDIC SURGERY

## 2022-11-18 PROCEDURE — 72148 MRI LUMBAR SPINE W/O DYE: CPT | Mod: TC

## 2022-11-18 PROCEDURE — 3008F PR BODY MASS INDEX (BMI) DOCUMENTED: ICD-10-PCS | Mod: CPTII,,, | Performed by: ORTHOPAEDIC SURGERY

## 2022-11-18 PROCEDURE — 99214 PR OFFICE/OUTPT VISIT, EST, LEVL IV, 30-39 MIN: ICD-10-PCS | Mod: S$PBB,,, | Performed by: ORTHOPAEDIC SURGERY

## 2022-11-18 PROCEDURE — 3008F BODY MASS INDEX DOCD: CPT | Mod: CPTII,,, | Performed by: ORTHOPAEDIC SURGERY

## 2022-11-18 PROCEDURE — 99999 PR PBB SHADOW E&M-EST. PATIENT-LVL III: ICD-10-PCS | Mod: PBBFAC,,, | Performed by: ORTHOPAEDIC SURGERY

## 2022-11-18 PROCEDURE — 72148 MRI LUMBAR SPINE W/O DYE: CPT | Mod: 26,,, | Performed by: RADIOLOGY

## 2022-11-18 PROCEDURE — 1159F MED LIST DOCD IN RCRD: CPT | Mod: CPTII,,, | Performed by: ORTHOPAEDIC SURGERY

## 2022-11-18 PROCEDURE — 72148 MRI LUMBAR SPINE WITHOUT CONTRAST: ICD-10-PCS | Mod: 26,,, | Performed by: RADIOLOGY

## 2022-11-18 PROCEDURE — 99213 OFFICE O/P EST LOW 20 MIN: CPT | Mod: PBBFAC | Performed by: ORTHOPAEDIC SURGERY

## 2022-11-18 RX ORDER — GABAPENTIN 300 MG/1
300 CAPSULE ORAL 3 TIMES DAILY
Qty: 90 CAPSULE | Refills: 11 | Status: SHIPPED | OUTPATIENT
Start: 2022-11-18 | End: 2023-06-16 | Stop reason: DRUGHIGH

## 2022-11-18 RX ORDER — METHYLPREDNISOLONE 4 MG/1
TABLET ORAL
Qty: 1 EACH | Refills: 0 | Status: SHIPPED | OUTPATIENT
Start: 2022-11-18 | End: 2022-12-09

## 2022-11-21 ENCOUNTER — PATIENT MESSAGE (OUTPATIENT)
Dept: ORTHOPEDICS | Facility: CLINIC | Age: 57
End: 2022-11-21
Payer: MEDICAID

## 2022-11-23 DIAGNOSIS — M54.42 ACUTE MIDLINE LOW BACK PAIN WITH LEFT-SIDED SCIATICA: ICD-10-CM

## 2022-11-23 DIAGNOSIS — M54.9 DORSALGIA, UNSPECIFIED: Primary | ICD-10-CM

## 2022-11-23 NOTE — PROGRESS NOTES
Spoke to pt virtually. He continues to have severe left lumbar radiculopathy with neuro deficits. He has tried gabapentin, a medrol dose pack, and home exercises with worsening of pain. I provided the patient with a home exercise program. It is the [facility] spine conditioning program. Exercises include head rolls, kneeling back extension, sitting rotation stretch, modified seated side straddle, knee to chest, bird dog, plank, modified seated plank, hip bridges, abdominal bracing, and abdominal crunch. Pt completes each exercise daily for an hour with worsening of pain. Pain is 10/10. MRI lumbar demonstrates probable left far lateral disc bulge at L4-5 resulting in neural foraminal narrowing. Given failure of conservative rx, severe pain, and neuro deficits, will order left L4-5 TFESI with IR.

## 2022-11-24 ENCOUNTER — PATIENT MESSAGE (OUTPATIENT)
Dept: PSYCHIATRY | Facility: CLINIC | Age: 57
End: 2022-11-24
Payer: MEDICAID

## 2022-12-01 RX ORDER — CYCLOBENZAPRINE HCL 5 MG
5 TABLET ORAL 3 TIMES DAILY PRN
Qty: 90 TABLET | Refills: 0 | Status: SHIPPED | OUTPATIENT
Start: 2022-12-01 | End: 2023-02-07

## 2022-12-02 ENCOUNTER — PATIENT MESSAGE (OUTPATIENT)
Dept: ORTHOPEDICS | Facility: CLINIC | Age: 57
End: 2022-12-02
Payer: MEDICAID

## 2022-12-06 ENCOUNTER — PATIENT MESSAGE (OUTPATIENT)
Dept: PSYCHIATRY | Facility: CLINIC | Age: 57
End: 2022-12-06
Payer: MEDICAID

## 2022-12-09 ENCOUNTER — TELEPHONE (OUTPATIENT)
Dept: INTERVENTIONAL RADIOLOGY/VASCULAR | Facility: CLINIC | Age: 57
End: 2022-12-09
Payer: MEDICAID

## 2022-12-09 NOTE — TELEPHONE ENCOUNTER
Call and left patient a voice message to return call to [phone] to schedule a sooner IR procedure appointment for BOZENA if possible.  Patient schedule on 12/30/22.

## 2022-12-13 ENCOUNTER — HOSPITAL ENCOUNTER (OUTPATIENT)
Dept: INTERVENTIONAL RADIOLOGY/VASCULAR | Facility: HOSPITAL | Age: 57
Discharge: HOME | End: 2022-12-13
Attending: ORTHOPAEDIC SURGERY
Payer: MEDICAID

## 2022-12-13 VITALS
HEART RATE: 72 BPM | SYSTOLIC BLOOD PRESSURE: 163 MMHG | DIASTOLIC BLOOD PRESSURE: 105 MMHG | RESPIRATION RATE: 18 BRPM | OXYGEN SATURATION: 100 %

## 2022-12-13 DIAGNOSIS — M54.42 ACUTE MIDLINE LOW BACK PAIN WITH LEFT-SIDED SCIATICA: ICD-10-CM

## 2022-12-13 PROCEDURE — 25500020 PHARM REV CODE 255: Performed by: RADIOLOGY

## 2022-12-13 PROCEDURE — 25000003 PHARM REV CODE 250: Performed by: STUDENT IN AN ORGANIZED HEALTH CARE EDUCATION/TRAINING PROGRAM

## 2022-12-13 PROCEDURE — 64483 IR ESI LUMBAR W/ IMG: ICD-10-PCS | Mod: LT,,, | Performed by: RADIOLOGY

## 2022-12-13 PROCEDURE — A4550 SURGICAL TRAYS: HCPCS

## 2022-12-13 PROCEDURE — 64483 NJX AA&/STRD TFRM EPI L/S 1: CPT | Mod: LT | Performed by: RADIOLOGY

## 2022-12-13 PROCEDURE — 63600175 PHARM REV CODE 636 W HCPCS: Performed by: STUDENT IN AN ORGANIZED HEALTH CARE EDUCATION/TRAINING PROGRAM

## 2022-12-13 RX ORDER — METHYLPREDNISOLONE ACETATE 80 MG/ML
INJECTION, SUSPENSION INTRA-ARTICULAR; INTRALESIONAL; INTRAMUSCULAR; SOFT TISSUE
Status: COMPLETED | OUTPATIENT
Start: 2022-12-13 | End: 2022-12-13

## 2022-12-13 RX ORDER — LIDOCAINE HYDROCHLORIDE 10 MG/ML
INJECTION INFILTRATION; PERINEURAL
Status: COMPLETED | OUTPATIENT
Start: 2022-12-13 | End: 2022-12-13

## 2022-12-13 RX ADMIN — LIDOCAINE HYDROCHLORIDE 2 ML: 10 INJECTION, SOLUTION INFILTRATION; PERINEURAL at 09:12

## 2022-12-13 RX ADMIN — IOHEXOL 1 ML: 180 INJECTION INTRAVENOUS at 10:12

## 2022-12-13 RX ADMIN — METHYLPREDNISOLONE ACETATE 40 MG: 80 INJECTION, SUSPENSION INTRA-ARTICULAR; INTRALESIONAL; INTRAMUSCULAR; SOFT TISSUE at 09:12

## 2022-12-13 NOTE — PLAN OF CARE
Pt ambulated to Interventional Radiology room 4 for back injection. Plan of care reviewed with patient, patient verbalized understanding.  Name verified using two identifiers.  Allergies verified.  Labs, orders and consent reviewed on chart.  Pt oriented to unit and staff.  See flow sheet for documentation, monitoring and medication administration. Will continue to monitor.

## 2022-12-13 NOTE — H&P
Radiology History & Physical      SUBJECTIVE:     Chief Complaint: Back pain     History of Present Illness:  Tyrone Santos is a 57 y.o. male with LDDD, with left radiculopathy, referred by primary orthopaedics for left L4-5 transforaminal epidural steroid / lidocaine injections        Past Medical History:   Diagnosis Date    Addiction to drug     Alcohol abuse     Anxiety     Bipolar disorder     Depression     Difficulty urinating     GERD (gastroesophageal reflux disease)     Hx of psychiatric care     Hyperlipidemia     Hypertension     Ade     Psychiatric problem     S/P cervical spinal fusion 12/06/2018    S/P laparoscopic appendectomy 01/03/2017    Sleep apnea     Therapy     Thyroid disease      Past Surgical History:   Procedure Laterality Date    ANTERIOR CERVICAL DISCECTOMY W/ FUSION N/A 10/23/2018    Procedure: DISCECTOMY, SPINE, CERVICAL, ANTERIOR APPROACH, WITH FUSION C3-4;  Surgeon: Jw Anne MD;  Location: [address];  Service: Neurosurgery;  Laterality: N/A;    APPENDECTOMY      around 2012    ARTHROPLASTY OF HIP BY ANTERIOR APPROACH Left 8/23/2021    Procedure: ARTHROPLASTY, HIP, TOTAL, ANTERIOR APPROACH:LEFT:DPEUY-ACTIS+PINNACLE;  Surgeon: Ángel Lam III, MD;  Location: [facility];  Service: Orthopedics;  Laterality: Left;    ARTHROPLASTY OF HIP BY ANTERIOR APPROACH Right 8/18/2022    Procedure: ARTHROPLASTY, HIP, TOTAL, ANTERIOR APPROACH: RIGHT: DEPUY-ACTIS+PINNACLE;  Surgeon: Ángel Lam III, MD;  Location: [facility];  Service: Orthopedics;  Laterality: Right;    INJECTION OF ANESTHETIC AGENT AROUND NERVE Left 7/1/2021    Procedure: BLOCK, NERVE, OBTURATOR and FEMORAL;  Surgeon: Josefina Lee MD;  Location: [facility];  Service: Pain Management;  Laterality: Left;    SCAPHOID FRACTURE SURGERY      had to have a bone graft -early  1990's     WRIST SURGERY      6 years ago       Home Meds:   Prior to Admission medications    Medication Sig Start Date End Date  Taking? Authorizing Provider   acetaminophen (TYLENOL) 650 MG TbSR Take 1 tablet (650 mg total) by mouth every 6 to 8 hours as needed (pain). 8/17/22   Diya Corrales PA-C   aspirin (ECOTRIN) 81 MG EC tablet Take 1 tablet (81 mg total) by mouth 2 (two) times daily. 8/17/22 9/18/22  Diya Corrales PA-C   busPIRone (BUSPAR) 15 MG tablet Take 2 tablets po q am, 1 tablet po at noon and 1 tablet po q hs 9/20/22   OLMAN Prince   calcium-vitamin D3 (OS-JALYN 500 + D3) 500 mg(1,250mg) -200 unit per tablet Take 1 tablet by mouth 2 (two) times daily with meals.    Historical Provider   cyanocobalamin (VITAMIN B-12) 100 MCG tablet Take 100 mcg by mouth once daily.    Historical Provider   EScitalopram oxalate (LEXAPRO) 10 MG tablet Take 1 tablet (10 mg total) by mouth once daily. 9/20/22 12/19/22  OLMAN Prince   eszopiclone (LUNESTA) 3 mg Tab Take 1 tablet (3 mg total) by mouth nightly as needed (insomnia). 9/20/22 1/18/23  OLMAN Prince   folic acid (FOLVITE) 1 MG tablet Take 1 mg by mouth once daily.    Historical Provider   gabapentin (NEURONTIN) 300 MG capsule Take 1 capsule (300 mg total) by mouth 3 (three) times daily. 11/18/22 11/18/23  Sanna Aguiar PA-C   hydroCHLOROthiazide (HYDRODIURIL) 25 MG tablet Take 25 mg by mouth once daily.    Historical Provider   lamoTRIgine (LAMICTAL) 100 MG tablet Take 1 tablet (100 mg total) by mouth once daily. 9/20/22 12/19/22  OLMAN Prince   levothyroxine (SYNTHROID) 25 MCG tablet Take 25 mcg by mouth once daily. 5/10/21   Historical Provider   meloxicam (MOBIC) 15 MG tablet Take 1 tablet (15 mg total) by mouth once daily. 11/17/22   Ángel Lam III, MD   omeprazole (PRILOSEC) 40 MG capsule Take 40 mg by mouth once daily.    Historical Provider   tamsulosin (FLOMAX) 0.4 mg Cap Take 2 capsules (0.8 mg total) by mouth once daily. 5/5/22 5/5/23  Tyrone Blackburn MD     Anticoagulants/Antiplatelets:  no anticoagulation    Allergies:   Review of patient's allergies indicates:   Allergen Reactions    Codeine Nausea Only    Seroquel [quetiapine]      Heart racing     Tizanidine Other (See Comments)     Caused increased pain and muscle pain     Sedation History:  have not been any systemic reactions    Review of Systems:   Hematological: no known coagulopathies  Respiratory: no shortness of breath  Cardiovascular: no chest pain  Gastrointestinal: no abdominal pain  Genito-Urinary: no dysuria  Musculoskeletal: negative  Neurological: no TIA or stroke symptoms         OBJECTIVE:     Vital Signs (Most Recent)       Physical Exam:    General: no acute distress  Mental Status: alert and oriented to person, place and time  HEENT: normocephalic, atraumatic  Chest: unlabored breathing  Heart: regular heart rate  Abdomen: nondistended  Extremity: moves all extremities    Laboratory  Lab Results   Component Value Date    INR 0.9 08/01/2022       Lab Results   Component Value Date    WBC 3.31 (L) 07/06/2022    HGB 13.2 (L) 07/06/2022    HCT 29 (L) 08/19/2022    MCV 91 07/06/2022     07/06/2022      Lab Results   Component Value Date     07/06/2022     07/06/2022    K 4.3 07/06/2022     07/06/2022    CO2 25 07/06/2022    BUN 8 07/06/2022    CREATININE 1.1 07/06/2022    CALCIUM 9.3 07/06/2022    ALT 19 06/15/2021    AST 21 06/15/2021    ALBUMIN 4.0 06/15/2021    BILITOT 0.3 06/15/2021       ASSESSMENT/PLAN:     Sedation Plan: Local anesthesia    Patient will undergo: left L4-5 transforaminal epidural steroid / lidocaine injections             Kody Lerma MD     Neuro Endovascular Surgery Fellow   [facility]

## 2022-12-13 NOTE — DISCHARGE INSTRUCTIONS
For scheduling: Call [place] at [phone]    For questions or concerns call: [facility] 8 AM- 5PM [phone]. Radiology resident on call [phone].    For immediate concerns that are not emergent, you may call our radiology clinic at: [phone]  May remove dressing in 24 hours and shower.   Do Not sit in bath water, hot tub, or swim for 7 days

## 2022-12-13 NOTE — NURSING
Steroid back injection complete. Pt tolerated well. VSS. No signs or symptoms of distress noted. Pt escorted ambulatory to waiting area to meet family. Written and verbal discharge care instructions given and voices understanding

## 2022-12-13 NOTE — PROCEDURES
Radiology Post-Procedure Note    Pre Op Diagnosis: LBP    Post Op Diagnosis: Same    Procedure: Lumbar Transforaminal BOZENA    Procedure performed by: Rome Fairbanks MD    Written Informed Consent Obtained: Yes    Specimen Removed: NO    Estimated Blood Loss: Minimal    Findings:     Level injected: Left L4-L5  Needle used: 22 gauge  Dose:  40 mg Depo-methylprednisolone   2 mL Lidocaine 1% MPF    Patient tolerated procedure well.        Kody Lerma MD     Neuro Endovascular Surgery Fellow   [facility]

## 2022-12-15 ENCOUNTER — OFFICE VISIT (OUTPATIENT)
Dept: PSYCHIATRY | Facility: CLINIC | Age: 57
End: 2022-12-15
Payer: MEDICAID

## 2022-12-15 DIAGNOSIS — F41.1 GENERALIZED ANXIETY DISORDER: ICD-10-CM

## 2022-12-15 DIAGNOSIS — G47.00 INSOMNIA, UNSPECIFIED TYPE: ICD-10-CM

## 2022-12-15 DIAGNOSIS — F31.70 BIPOLAR AFFECTIVE DISORDER IN REMISSION: ICD-10-CM

## 2022-12-15 PROCEDURE — 1160F RVW MEDS BY RX/DR IN RCRD: CPT | Mod: SA,HB,CPTII,95 | Performed by: NURSE PRACTITIONER

## 2022-12-15 PROCEDURE — 1160F PR REVIEW ALL MEDS BY PRESCRIBER/CLIN PHARMACIST DOCUMENTED: ICD-10-PCS | Mod: SA,HB,CPTII,95 | Performed by: NURSE PRACTITIONER

## 2022-12-15 PROCEDURE — 99214 OFFICE O/P EST MOD 30 MIN: CPT | Mod: SA,HB,95, | Performed by: NURSE PRACTITIONER

## 2022-12-15 PROCEDURE — 99214 PR OFFICE/OUTPT VISIT, EST, LEVL IV, 30-39 MIN: ICD-10-PCS | Mod: SA,HB,95, | Performed by: NURSE PRACTITIONER

## 2022-12-15 PROCEDURE — 1159F PR MEDICATION LIST DOCUMENTED IN MEDICAL RECORD: ICD-10-PCS | Mod: SA,HB,CPTII,95 | Performed by: NURSE PRACTITIONER

## 2022-12-15 PROCEDURE — 1159F MED LIST DOCD IN RCRD: CPT | Mod: SA,HB,CPTII,95 | Performed by: NURSE PRACTITIONER

## 2022-12-15 RX ORDER — ESCITALOPRAM OXALATE 10 MG/1
10 TABLET ORAL DAILY
Qty: 90 TABLET | Refills: 0 | Status: SHIPPED | OUTPATIENT
Start: 2022-12-15 | End: 2023-11-07 | Stop reason: SDUPTHER

## 2022-12-15 RX ORDER — ESZOPICLONE 3 MG/1
3 TABLET, FILM COATED ORAL NIGHTLY PRN
Qty: 30 TABLET | Refills: 3 | Status: SHIPPED | OUTPATIENT
Start: 2022-12-15 | End: 2023-05-26

## 2022-12-15 RX ORDER — BUSPIRONE HYDROCHLORIDE 15 MG/1
TABLET ORAL
Qty: 360 TABLET | Refills: 0 | Status: SHIPPED | OUTPATIENT
Start: 2022-12-15 | End: 2023-11-07 | Stop reason: SDUPTHER

## 2022-12-15 RX ORDER — LAMOTRIGINE 100 MG/1
100 TABLET ORAL DAILY
Qty: 90 TABLET | Refills: 0 | Status: SHIPPED | OUTPATIENT
Start: 2022-12-15 | End: 2023-11-07 | Stop reason: SDUPTHER

## 2022-12-16 NOTE — PROGRESS NOTES
12/16/2022 11:17 AM  Tyrone Santos  1965  [id number]    Outpatient Psychiatry Follow-Up Visit (MD/NP) - Telemedicine Visit      The patient location is: Patient reported that his location at the time of this visit was in the [facility]   Address: documented in [facility]      Visit type: audiovisual    Each patient to whom he or she provides medical services by telemedicine is:  (1) informed of the relationship between the physician and patient and the respective role of any other health care provider with respect to management of the patient; and (2) notified that he or she may decline to receive medical services by telemedicine and may withdraw from such care at any time.      Chief Complaint:  Tyrone Santos, a 57 y.o. male,who presents today for follow up of anxiety and mood swings.  Met with patient.        Interim Events/Subjective Report/Content of Current Session:     Pt is a 57 y.o. male with a hx of HTN, HLD, ED, BPH, hypothyroidism, ERIC, s/p cervical spine fusion, spondylosis, and bipolar d/o.    Pt's father passed away on Thanksgiving day. He states the reality of the loss hasn't sunk in yet. He is concerned about what he should do when this happens. We discussed grief reactions and talked about his support system. He states his fiance, Jeanine, is a huge support for him. He and his siblings have discussed how best to care for their elderly mother and mentally handicapped brother who lives with their mother.    Endorses grief but denies depressed mood, decreased energy or motivation, anhedonia, and hopelessness. Anxiety is managable. Sleeping well with Lunesta 3 mg.     He is working a new job at [employer] and really enjoys it. He is back doing what he loves to do.    Pt denies recurrent thoughts of death and denies SI/HI. Denies any sxs of ochoa. Denies AVH, paranoia and delusions. No objective s/sx of psychosis or ochoa. Denies any ASE from his psych meds.      Psychotherapy:  Target  symptoms: depression, anxiety , grief  Why chosen therapy is appropriate versus another modality: relevant to diagnosis, patient responds to this modality  Outcome monitoring methods: self-report, observation  Therapeutic intervention type: supportive psychotherapy  Topics discussed/themes: illness/death of a loved one, building skills sets for symptom management  The patient's response to the intervention is accepting. The patient's progress toward treatment goals is good.   Duration of intervention: 11 minutes.      Psychotropic medication review  Previous Trials-  Wellbutrin   Valium  Xanax  Trileptal  Seroquel - heart racing  Trazodone - worked for a few years and then gave out  Depakote - worked for a while  Doxepin  Hydroxyzine - ineffective; made him angry  Naltrexone  Campral  Trazodone    Current meds-  Buspar   Lamictal  Lunesta  Lexapro        Review of Systems       Review of Systems   Constitutional:  Negative for chills, fever, malaise/fatigue and weight loss.   Respiratory:  Negative for shortness of breath and wheezing.    Cardiovascular:  Negative for chest pain and palpitations.   Gastrointestinal:  Negative for diarrhea and vomiting.   Musculoskeletal:  Negative for falls and myalgias.   Skin:  Negative for rash.   Neurological:  Negative for tremors, seizures and headaches.   Endo/Heme/Allergies:  Does not bruise/bleed easily.   Psychiatric/Behavioral:          See HPI       Past Medical, Family and Social History: The patient's past medical, family and social history have been reviewed and updated as appropriate within the electronic medical record - see encounter notes.    Compliance: yes    Side effects: None    Risk Parameters:  Patient reports no suicidal ideation  Patient reports no homicidal ideation  Patient reports no self-injurious behavior  Patient reports no violent behavior    Exam (detailed: at least 9 elements; comprehensive: all 15 elements)   Constitutional  Vitals:  Most recent  vital signs, dated greater than 90 days prior to this appointment, were reviewed.   There were no vitals filed for this visit.     General:  unremarkable, age appropriate, well nourished, casually dressed, neatly groomed, obese     Musculoskeletal  Muscle Strength/Tone:  ADITYA due to virtual visit   Gait & Station:  ADITYA due to virtual visit     Psychiatric      Appearance:  unremarkable, age appropriate, well nourished, casually dressed, neatly groomed, obese   Behavior:  normal, friendly and cooperative, eye contact normal     Speech:  no latency; no press   Mood & Affect:  euthymic  congruent and appropriate   Thought Process:  normal and logical   Associations:  intact   Thought Content:  normal, no suicidality, no homicidality, delusions, or paranoia   Insight:  intact   Judgement: behavior is adequate to circumstances, age appropriate   Orientation:  grossly intact   Memory: intact for content of interview   Language: grossly intact   Attention Span & Concentration:  able to focus   Fund of Knowledge:  intact and appropriate to age and level of education     Medications:  Outpatient Encounter Medications as of 12/15/2022   Medication Sig Dispense Refill    acetaminophen (TYLENOL) 650 MG TbSR Take 1 tablet (650 mg total) by mouth every 6 to 8 hours as needed (pain). 120 tablet 0    aspirin (ECOTRIN) 81 MG EC tablet Take 1 tablet (81 mg total) by mouth 2 (two) times daily. 60 tablet 0    busPIRone (BUSPAR) 15 MG tablet Take 2 tablets po q am, 1 tablet po at noon and 1 tablet po q hs 360 tablet 0    calcium-vitamin D3 (OS-JALYN 500 + D3) 500 mg(1,250mg) -200 unit per tablet Take 1 tablet by mouth 2 (two) times daily with meals.      cyanocobalamin (VITAMIN B-12) 100 MCG tablet Take 100 mcg by mouth once daily.      EScitalopram oxalate (LEXAPRO) 10 MG tablet Take 1 tablet (10 mg total) by mouth once daily. 90 tablet 0    eszopiclone (LUNESTA) 3 mg Tab Take 1 tablet (3 mg total) by mouth nightly as needed (insomnia).  30 tablet 3    folic acid (FOLVITE) 1 MG tablet Take 1 mg by mouth once daily.      gabapentin (NEURONTIN) 300 MG capsule Take 1 capsule (300 mg total) by mouth 3 (three) times daily. 90 capsule 11    hydroCHLOROthiazide (HYDRODIURIL) 25 MG tablet Take 25 mg by mouth once daily.      lamoTRIgine (LAMICTAL) 100 MG tablet Take 1 tablet (100 mg total) by mouth once daily. 90 tablet 0    levothyroxine (SYNTHROID) 25 MCG tablet Take 25 mcg by mouth once daily.      meloxicam (MOBIC) 15 MG tablet Take 1 tablet (15 mg total) by mouth once daily. 30 tablet 0    omeprazole (PRILOSEC) 40 MG capsule Take 40 mg by mouth once daily.      tamsulosin (FLOMAX) 0.4 mg Cap Take 2 capsules (0.8 mg total) by mouth once daily. 60 capsule 11    [DISCONTINUED] busPIRone (BUSPAR) 15 MG tablet Take 2 tablets po q am, 1 tablet po at noon and 1 tablet po q hs 360 tablet 0    [DISCONTINUED] EScitalopram oxalate (LEXAPRO) 10 MG tablet Take 1 tablet (10 mg total) by mouth once daily. 90 tablet 0    [DISCONTINUED] eszopiclone (LUNESTA) 3 mg Tab Take 1 tablet (3 mg total) by mouth nightly as needed (insomnia). 30 tablet 3    [DISCONTINUED] lamoTRIgine (LAMICTAL) 100 MG tablet Take 1 tablet (100 mg total) by mouth once daily. 90 tablet 0     No facility-administered encounter medications on file as of 12/15/2022.       Allergy:  Review of patient's allergies indicates:   Allergen Reactions    Codeine Nausea Only    Seroquel [quetiapine]      Heart racing     Tizanidine Other (See Comments)     Caused increased pain and muscle pain         Assessment and Diagnosis   Status/Progress: Based on the examination today, the patient's problem(s) is/are well controlled.  New problems have not been presented today.   Co-morbidities are not complicating management of the primary condition.          General Impression:       ICD-10-CM ICD-9-CM   1. Generalized anxiety disorder  F41.1 300.02   2. Insomnia, unspecified type  G47.00 780.52   3. Bipolar affective  disorder in remission  F31.70 296.80        Diff Dx  Bipolar I vs Bipolar II vs MDD with anxiety      Intervention/Counseling/Treatment Plan     Medication Management:  Continue Lamictal 100 mg q day  Continue Buspar to 30 mg q am, 15 mg at noon, and 15 mg q hs  Continue Lunesta 3 mg q hs prn insomnia. Pt was told not drive or to take this med with ETOH or other sedating meds/substance. Pt verbalized understanding.  Continue Lexapro 10 mg q am to target anxiety  Labs: reviewed most recent  Discussed with patient informed consent, risks vs. benefits, alternative treatments, side effect profile and the inherent unpredictability of individual responses to these treatments. The patient expresses understanding of the above and displays the capacity to agree with this current plan and had no other questions.  Encouraged Patient to keep future appointments.   Take medications as prescribed and abstain from substance abuse.   Present to ED or call 911 for SI/HI plan or intent, psychosis, or medical emergency.      Return to Clinic: 3 months, or sooner if needed      Face-to-face time with the patient: 22 minutes  Total time:  31 minutes of total time spent on the encounter, which includes face to face time and non-face to face time preparing to see the patient (eg, review of tests), Obtaining and/or reviewing separately obtained history, Documenting clinical information in the electronic or other health record, Independently interpreting results (not separately reported) and communicating results to the patient/family/caregiver, or Care coordination (not separately reported).       Lisa Alejo, MSN, APRN, PMHNP-BC [facility]

## 2022-12-27 ENCOUNTER — OFFICE VISIT (OUTPATIENT)
Dept: PRIMARY CARE CLINIC | Facility: CLINIC | Age: 57
End: 2022-12-27
Payer: MEDICAID

## 2022-12-27 VITALS
SYSTOLIC BLOOD PRESSURE: 122 MMHG | HEART RATE: 76 BPM | TEMPERATURE: 98 F | HEIGHT: 68 IN | WEIGHT: 187.19 LBS | DIASTOLIC BLOOD PRESSURE: 78 MMHG | BODY MASS INDEX: 28.37 KG/M2 | OXYGEN SATURATION: 99 %

## 2022-12-27 DIAGNOSIS — R73.03 PREDIABETES: Primary | ICD-10-CM

## 2022-12-27 DIAGNOSIS — N40.1 BPH WITH URINARY OBSTRUCTION: ICD-10-CM

## 2022-12-27 DIAGNOSIS — N13.8 BPH WITH URINARY OBSTRUCTION: ICD-10-CM

## 2022-12-27 DIAGNOSIS — K21.9 GASTROESOPHAGEAL REFLUX DISEASE, UNSPECIFIED WHETHER ESOPHAGITIS PRESENT: ICD-10-CM

## 2022-12-27 DIAGNOSIS — F31.70 BIPOLAR AFFECTIVE DISORDER IN REMISSION: ICD-10-CM

## 2022-12-27 DIAGNOSIS — I10 ESSENTIAL HYPERTENSION: ICD-10-CM

## 2022-12-27 DIAGNOSIS — E03.9 HYPOTHYROIDISM, UNSPECIFIED TYPE: ICD-10-CM

## 2022-12-27 DIAGNOSIS — G47.00 INSOMNIA, UNSPECIFIED TYPE: ICD-10-CM

## 2022-12-27 PROCEDURE — 99999 PR PBB SHADOW E&M-EST. PATIENT-LVL V: CPT | Mod: PBBFAC,,, | Performed by: FAMILY MEDICINE

## 2022-12-27 PROCEDURE — 99214 OFFICE O/P EST MOD 30 MIN: CPT | Mod: S$PBB,,, | Performed by: FAMILY MEDICINE

## 2022-12-27 PROCEDURE — 3078F PR MOST RECENT DIASTOLIC BLOOD PRESSURE < 80 MM HG: ICD-10-PCS | Mod: CPTII,,, | Performed by: FAMILY MEDICINE

## 2022-12-27 PROCEDURE — 3008F BODY MASS INDEX DOCD: CPT | Mod: CPTII,,, | Performed by: FAMILY MEDICINE

## 2022-12-27 PROCEDURE — 1159F MED LIST DOCD IN RCRD: CPT | Mod: CPTII,,, | Performed by: FAMILY MEDICINE

## 2022-12-27 PROCEDURE — 99215 OFFICE O/P EST HI 40 MIN: CPT | Mod: PBBFAC,PN | Performed by: FAMILY MEDICINE

## 2022-12-27 PROCEDURE — 1160F RVW MEDS BY RX/DR IN RCRD: CPT | Mod: CPTII,,, | Performed by: FAMILY MEDICINE

## 2022-12-27 PROCEDURE — 3074F SYST BP LT 130 MM HG: CPT | Mod: CPTII,,, | Performed by: FAMILY MEDICINE

## 2022-12-27 PROCEDURE — 3074F PR MOST RECENT SYSTOLIC BLOOD PRESSURE < 130 MM HG: ICD-10-PCS | Mod: CPTII,,, | Performed by: FAMILY MEDICINE

## 2022-12-27 PROCEDURE — 3008F PR BODY MASS INDEX (BMI) DOCUMENTED: ICD-10-PCS | Mod: CPTII,,, | Performed by: FAMILY MEDICINE

## 2022-12-27 PROCEDURE — 3078F DIAST BP <80 MM HG: CPT | Mod: CPTII,,, | Performed by: FAMILY MEDICINE

## 2022-12-27 PROCEDURE — 1159F PR MEDICATION LIST DOCUMENTED IN MEDICAL RECORD: ICD-10-PCS | Mod: CPTII,,, | Performed by: FAMILY MEDICINE

## 2022-12-27 PROCEDURE — 99999 PR PBB SHADOW E&M-EST. PATIENT-LVL V: ICD-10-PCS | Mod: PBBFAC,,, | Performed by: FAMILY MEDICINE

## 2022-12-27 PROCEDURE — 99214 PR OFFICE/OUTPT VISIT, EST, LEVL IV, 30-39 MIN: ICD-10-PCS | Mod: S$PBB,,, | Performed by: FAMILY MEDICINE

## 2022-12-27 PROCEDURE — 1160F PR REVIEW ALL MEDS BY PRESCRIBER/CLIN PHARMACIST DOCUMENTED: ICD-10-PCS | Mod: CPTII,,, | Performed by: FAMILY MEDICINE

## 2022-12-27 RX ORDER — DULAGLUTIDE 1.5 MG/.5ML
1.5 INJECTION, SOLUTION SUBCUTANEOUS
COMMUNITY
Start: 2022-12-19 | End: 2023-04-11

## 2022-12-27 RX ORDER — NALOXONE HYDROCHLORIDE 4 MG/.1ML
SPRAY NASAL
COMMUNITY
End: 2023-04-11

## 2022-12-27 RX ORDER — ESZOPICLONE 2 MG/1
TABLET, FILM COATED ORAL
COMMUNITY
End: 2022-12-27

## 2022-12-27 RX ORDER — ASPIRIN 81 MG/1
81 TABLET ORAL 2 TIMES DAILY
Qty: 60 TABLET | Refills: 0
Start: 2022-12-27 | End: 2023-03-20

## 2022-12-27 NOTE — Clinical Note
Can you get colonosocpy records from [facility] [place], [facility] from [facility]. THanks

## 2022-12-27 NOTE — PROGRESS NOTES
Clinic Note  2022      Subjective:       Patient ID:  Tyrone is a 57 y.o. male being seen for an new visit.      Chief Complaint: Establish Care    Establish care-patient with a history of cervical spinal stenosis with cervical fusion, hypertension, history of meth use, alcohol dependence, bipolar disorder, depression, hypertension, hyperlipidemia, BPH, ED, hypothyroidism, avascular necrosis of the left femoral head, sleep apnea here to establish care within the [facility] system.  Patient is wondering if he can take less medications    Prediabetes-on Trulicity for this, patient will continue for now     BPH-being followed by urology for this, recently increased from Flomax 0.4 mg to 0.8 mg with still no improvement of symptoms.  Will follow-up with them     Acid reflux-has been on Prilosec for a long time, has been difficult to wean off as he will have worsening of his acid reflux symptoms.  Patient with history of normal EGD and colonoscopy    Hypothyroidism-on Synthroid 25 mcg daily     Bipolar disorder-on Lamictal, escitalopram, BuSpar.  Being followed by Psychiatry    Hypertension-on hydrochlorothiazide    Alcohol dependence-will have several alcoholic drinks per day, has significantly improved compared to the past.  On folic acid daily for this     Insomnia-on Lunesta        Family History   Problem Relation Age of Onset    Alcohol abuse Mother     Alcohol abuse Maternal Uncle     Heart disease Father     No Known Problems Sister     No Known Problems Brother     No Known Problems Son     No Known Problems Sister     Cancer Sister     No Known Problems Brother     No Known Problems Brother      Social History     Socioeconomic History    Marital status:     Number of children: 1   Occupational History    Occupation:    Tobacco Use    Smoking status: Former     Types: Cigarettes     Quit date:      Years since quittin.0    Smokeless tobacco: Former   Substance and Sexual Activity     Alcohol use: Not Currently    Drug use: Not Currently     Types: Marijuana, Methamphetamines    Sexual activity: Never     Past Surgical History:   Procedure Laterality Date    ANTERIOR CERVICAL DISCECTOMY W/ FUSION N/A 10/23/2018    Procedure: DISCECTOMY, SPINE, CERVICAL, ANTERIOR APPROACH, WITH FUSION C3-4;  Surgeon: Jw Anne MD;  Location: [address];  Service: Neurosurgery;  Laterality: N/A;    APPENDECTOMY      around 2012    ARTHROPLASTY OF HIP BY ANTERIOR APPROACH Left 8/23/2021    Procedure: ARTHROPLASTY, HIP, TOTAL, ANTERIOR APPROACH:LEFT:DPEUY-ACTIS+PINNACLE;  Surgeon: Ángel Lam III, MD;  Location: [facility] OR;  Service: Orthopedics;  Laterality: Left;    ARTHROPLASTY OF HIP BY ANTERIOR APPROACH Right 8/18/2022    Procedure: ARTHROPLASTY, HIP, TOTAL, ANTERIOR APPROACH: RIGHT: DEPUY-ACTIS+PINNACLE;  Surgeon: Ángel Lam III, MD;  Location: [facility] OR;  Service: Orthopedics;  Laterality: Right;    INJECTION OF ANESTHETIC AGENT AROUND NERVE Left 7/1/2021    Procedure: BLOCK, NERVE, OBTURATOR and FEMORAL;  Surgeon: Josefina Lee MD;  [facility];  Service: Pain Management;  Laterality: Left;    SCAPHOID FRACTURE SURGERY      had to have a bone graft -early  1990's     WRIST SURGERY      6 years ago     Medication List with Changes/Refills   Current Medications    ACETAMINOPHEN (TYLENOL) 650 MG TBSR    Take 1 tablet (650 mg total) by mouth every 6 to 8 hours as needed (pain).    BUSPIRONE (BUSPAR) 15 MG TABLET    Take 2 tablets po q am, 1 tablet po at noon and 1 tablet po q hs    CALCIUM-VITAMIN D3 (OS-JALYN 500 + D3) 500 MG(1,250MG) -200 UNIT PER TABLET    Take 1 tablet by mouth 2 (two) times daily with meals.    CYANOCOBALAMIN (VITAMIN B-12) 100 MCG TABLET    Take 100 mcg by mouth once daily.    ESCITALOPRAM OXALATE (LEXAPRO) 10 MG TABLET    Take 1 tablet (10 mg total) by mouth once daily.    ESZOPICLONE (LUNESTA) 3 MG TAB    Take 1 tablet (3 mg total) by mouth nightly  as needed (insomnia).    FOLIC ACID (FOLVITE) 1 MG TABLET    Take 1 mg by mouth once daily.    GABAPENTIN (NEURONTIN) 300 MG CAPSULE    Take 1 capsule (300 mg total) by mouth 3 (three) times daily.    HYDROCHLOROTHIAZIDE (HYDRODIURIL) 25 MG TABLET    Take 25 mg by mouth once daily.    LAMOTRIGINE (LAMICTAL) 100 MG TABLET    Take 1 tablet (100 mg total) by mouth once daily.    LEVOTHYROXINE (SYNTHROID) 25 MCG TABLET    Take 25 mcg by mouth once daily.    MELOXICAM (MOBIC) 15 MG TABLET    Take 1 tablet (15 mg total) by mouth once daily.    NALOXONE (NARCAN) 4 MG/ACTUATION SPRY    Narcan 4 mg/actuation nasal spray    OMEPRAZOLE (PRILOSEC) 40 MG CAPSULE    Take 40 mg by mouth once daily.    TAMSULOSIN (FLOMAX) 0.4 MG CAP    Take 2 capsules (0.8 mg total) by mouth once daily.    TRULICITY 1.5 MG/0.5 ML PEN INJECTOR    Inject 1.5 mg into the skin every 7 days.   Changed and/or Refilled Medications    Modified Medication Previous Medication    ASPIRIN (ECOTRIN) 81 MG EC TABLET aspirin (ECOTRIN) 81 MG EC tablet       Take 1 tablet (81 mg total) by mouth 2 (two) times daily.    Take 1 tablet (81 mg total) by mouth 2 (two) times daily.   Discontinued Medications    ESZOPICLONE (LUNESTA) 2 MG TAB    eszopiclone 2 mg tablet   TAKE 1 TABLET (2 MG TOTAL) BY MOUTH NIGHTLY AS NEEDED (INSOMNIA).     Patient Active Problem List   Diagnosis    Essential hypertension    Hypercholesteremia    Cervical spinal stenosis    Cervical myofascial pain syndrome    Spondylosis of cervical region without myelopathy or radiculopathy    Facet arthritis, degenerative, cervical spine    Acid reflux    Bipolar affective disorder in remission    Sleep apnea    Anemia    Elevated AST (SGOT)    Cervical stenosis of spinal canal    Neck pain    Decreased functional mobility    BPH with urinary obstruction    Erectile dysfunction due to arterial insufficiency    Avascular necrosis of left femur    Avascular necrosis of bones of both hips    Left hip pain  "   Chronic pain    Hypothyroidism    Class 1 obesity with body mass index (BMI) of 31.0 to 31.9 in adult    Avascular necrosis of left femoral head    Weakness of trunk musculature    Condyloma    Avascular necrosis of right femoral head    Leukopenia     Review of Systems   Constitutional:  Negative for chills, fever, malaise/fatigue and weight loss.   HENT:  Negative for congestion, sinus pain and sore throat.    Respiratory:  Negative for cough, shortness of breath and wheezing.    Cardiovascular:  Negative for chest pain and palpitations.   Gastrointestinal:  Negative for constipation, diarrhea, nausea and vomiting.   Genitourinary:  Negative for dysuria, frequency and urgency.   Musculoskeletal:  Positive for back pain and joint pain. Negative for myalgias.   Skin:  Negative for rash.   Neurological:  Negative for headaches.       Objective:      /78   Pulse 76   Temp 97.9 °F (36.6 °C) (Oral)   Ht 5' 8" (1.727 m)   Wt 84.9 kg (187 lb 2.7 oz)   SpO2 99%   BMI 28.46 kg/m²   Estimated body mass index is 28.46 kg/m² as calculated from the following:    Height as of this encounter: 5' 8" (1.727 m).    Weight as of this encounter: 84.9 kg (187 lb 2.7 oz).  Physical Exam  Vitals reviewed.   Constitutional:       General: He is not in acute distress.     Appearance: He is not diaphoretic.   HENT:      Head: Normocephalic and atraumatic.   Eyes:      Conjunctiva/sclera: Conjunctivae normal.   Cardiovascular:      Rate and Rhythm: Normal rate and regular rhythm.      Heart sounds: Normal heart sounds.   Pulmonary:      Effort: Pulmonary effort is normal. No respiratory distress.      Breath sounds: Normal breath sounds. No wheezing.   Abdominal:      General: Bowel sounds are normal.      Palpations: Abdomen is soft.   Musculoskeletal:         General: Normal range of motion.      Cervical back: Normal range of motion.   Skin:     General: Skin is warm and dry.      Findings: No erythema or rash. "   Neurological:      Mental Status: He is alert and oriented to person, place, and time.   Psychiatric:         Mood and Affect: Mood and affect normal.         Behavior: Behavior normal.         Thought Content: Thought content normal.         Judgment: Judgment normal.         Assessment and Plan:     1. Prediabetes  - per patient had labs done recently at [facility], will obtain records.  Can continue Trulicity or focus on dietary changes    2. BPH with urinary obstruction  - continue Flomax 0.8 mg daily, follow-up with Urology    3. Gastroesophageal reflux disease, unspecified whether esophagitis present  - discussed with patient the importance of dietary changes including reducing alcohol, caffeine, fatty foods, citrus foods    4. Hypothyroidism, unspecified type  - on Synthroid 25 mcg daily, recent TSH 6 although has been stable around 5-6, monitor for now as patient asymptomatic    5. Bipolar affective disorder in remission  - stable, being followed with Psychiatry on on Lamictal, escitalopram, BuSpar.      6. Essential hypertension  - blood pressure controlled on hydrochlorothiazide, recent BMP unremarkable    7. Insomnia, unspecified type  - continue Lunesta per Psychiatry        Follow up:   No follow-ups on file.     Other Orders Placed This Visit:  No orders of the defined types were placed in this encounter.          Nico Wong MD        This note is dictated on M*Modal word recognition program.  There are word recognition mistakes that are occasionally missed on review.

## 2022-12-28 ENCOUNTER — PATIENT MESSAGE (OUTPATIENT)
Dept: ADMINISTRATIVE | Facility: HOSPITAL | Age: 57
End: 2022-12-28
Payer: MEDICAID

## 2022-12-28 ENCOUNTER — PATIENT OUTREACH (OUTPATIENT)
Dept: ADMINISTRATIVE | Facility: HOSPITAL | Age: 57
End: 2022-12-28
Payer: MEDICAID

## 2022-12-28 DIAGNOSIS — Z12.11 COLON CANCER SCREENING: ICD-10-CM

## 2023-02-23 ENCOUNTER — NURSE TRIAGE (OUTPATIENT)
Dept: ADMINISTRATIVE | Facility: CLINIC | Age: 58
End: 2023-02-23
Payer: MEDICAID

## 2023-02-23 NOTE — TELEPHONE ENCOUNTER
OOC RN  Patient calling about new dr. Garcia not receiving his messages.?   Gave him the My chart help desk[place].  For past month I have left shoulder left side of head got HA.  For years intermittent, now it is constant. Care advise is to call 911 now.    Reason for Disposition   Difficult to awaken or acting confused (e.g., disoriented, slurred speech)    Protocols used: Headache-A-OH

## 2023-03-07 ENCOUNTER — TELEPHONE (OUTPATIENT)
Dept: PRIMARY CARE CLINIC | Facility: CLINIC | Age: 58
End: 2023-03-07
Payer: MEDICAID

## 2023-03-07 NOTE — TELEPHONE ENCOUNTER
"----- Message from Geri Fuchs MA sent at 3/7/2023 10:49 AM CST -----  Good morning,    Please have someone call Mr. Santos about a problem he is having with this head. Patient states" he has sent several messages and no one has returned his call.    Thanks    "

## 2023-03-07 NOTE — TELEPHONE ENCOUNTER
Patient states he didn't go to ED he is not currently having a h/a but he would like to see his doctor.Scheduled an appointment with Dr. Wong patient advised he verbalized an understanding.

## 2023-03-20 ENCOUNTER — OFFICE VISIT (OUTPATIENT)
Dept: PRIMARY CARE CLINIC | Facility: CLINIC | Age: 58
End: 2023-03-20
Payer: MEDICAID

## 2023-03-20 ENCOUNTER — HOSPITAL ENCOUNTER (OUTPATIENT)
Dept: RADIOLOGY | Facility: HOSPITAL | Age: 58
Discharge: HOME | End: 2023-03-20
Attending: FAMILY MEDICINE
Payer: MEDICAID

## 2023-03-20 VITALS
OXYGEN SATURATION: 98 % | DIASTOLIC BLOOD PRESSURE: 85 MMHG | HEART RATE: 67 BPM | HEIGHT: 68 IN | BODY MASS INDEX: 28.59 KG/M2 | WEIGHT: 188.63 LBS | SYSTOLIC BLOOD PRESSURE: 136 MMHG

## 2023-03-20 DIAGNOSIS — G47.00 INSOMNIA, UNSPECIFIED TYPE: ICD-10-CM

## 2023-03-20 DIAGNOSIS — R51.9 NONINTRACTABLE HEADACHE, UNSPECIFIED CHRONICITY PATTERN, UNSPECIFIED HEADACHE TYPE: Primary | ICD-10-CM

## 2023-03-20 DIAGNOSIS — L57.0 ACTINIC KERATOSES: ICD-10-CM

## 2023-03-20 DIAGNOSIS — R51.9 NONINTRACTABLE HEADACHE, UNSPECIFIED CHRONICITY PATTERN, UNSPECIFIED HEADACHE TYPE: ICD-10-CM

## 2023-03-20 DIAGNOSIS — L81.9 HYPERPIGMENTED SKIN LESION: ICD-10-CM

## 2023-03-20 PROCEDURE — 1160F PR REVIEW ALL MEDS BY PRESCRIBER/CLIN PHARMACIST DOCUMENTED: ICD-10-PCS | Mod: CPTII,,, | Performed by: FAMILY MEDICINE

## 2023-03-20 PROCEDURE — 99999 PR PBB SHADOW E&M-EST. PATIENT-LVL V: ICD-10-PCS | Mod: PBBFAC,,, | Performed by: FAMILY MEDICINE

## 2023-03-20 PROCEDURE — 99214 OFFICE O/P EST MOD 30 MIN: CPT | Mod: S$PBB,,, | Performed by: FAMILY MEDICINE

## 2023-03-20 PROCEDURE — 3008F BODY MASS INDEX DOCD: CPT | Mod: CPTII,,, | Performed by: FAMILY MEDICINE

## 2023-03-20 PROCEDURE — 1159F MED LIST DOCD IN RCRD: CPT | Mod: CPTII,,, | Performed by: FAMILY MEDICINE

## 2023-03-20 PROCEDURE — 3075F SYST BP GE 130 - 139MM HG: CPT | Mod: CPTII,,, | Performed by: FAMILY MEDICINE

## 2023-03-20 PROCEDURE — 3079F PR MOST RECENT DIASTOLIC BLOOD PRESSURE 80-89 MM HG: ICD-10-PCS | Mod: CPTII,,, | Performed by: FAMILY MEDICINE

## 2023-03-20 PROCEDURE — 70450 CT HEAD WITHOUT CONTRAST: ICD-10-PCS | Mod: 26,,, | Performed by: RADIOLOGY

## 2023-03-20 PROCEDURE — 70450 CT HEAD/BRAIN W/O DYE: CPT | Mod: 26,,, | Performed by: RADIOLOGY

## 2023-03-20 PROCEDURE — 70450 CT HEAD/BRAIN W/O DYE: CPT | Mod: TC

## 2023-03-20 PROCEDURE — 1159F PR MEDICATION LIST DOCUMENTED IN MEDICAL RECORD: ICD-10-PCS | Mod: CPTII,,, | Performed by: FAMILY MEDICINE

## 2023-03-20 PROCEDURE — 99214 PR OFFICE/OUTPT VISIT, EST, LEVL IV, 30-39 MIN: ICD-10-PCS | Mod: S$PBB,,, | Performed by: FAMILY MEDICINE

## 2023-03-20 PROCEDURE — 3008F PR BODY MASS INDEX (BMI) DOCUMENTED: ICD-10-PCS | Mod: CPTII,,, | Performed by: FAMILY MEDICINE

## 2023-03-20 PROCEDURE — 99215 OFFICE O/P EST HI 40 MIN: CPT | Mod: PBBFAC,PN,25 | Performed by: FAMILY MEDICINE

## 2023-03-20 PROCEDURE — 3079F DIAST BP 80-89 MM HG: CPT | Mod: CPTII,,, | Performed by: FAMILY MEDICINE

## 2023-03-20 PROCEDURE — 3075F PR MOST RECENT SYSTOLIC BLOOD PRESS GE 130-139MM HG: ICD-10-PCS | Mod: CPTII,,, | Performed by: FAMILY MEDICINE

## 2023-03-20 PROCEDURE — 1160F RVW MEDS BY RX/DR IN RCRD: CPT | Mod: CPTII,,, | Performed by: FAMILY MEDICINE

## 2023-03-20 PROCEDURE — 99999 PR PBB SHADOW E&M-EST. PATIENT-LVL V: CPT | Mod: PBBFAC,,, | Performed by: FAMILY MEDICINE

## 2023-03-20 RX ORDER — AMITRIPTYLINE HYDROCHLORIDE 25 MG/1
25 TABLET, FILM COATED ORAL NIGHTLY
Qty: 60 TABLET | Refills: 1 | Status: SHIPPED | OUTPATIENT
Start: 2023-03-20 | End: 2023-04-11

## 2023-03-20 RX ORDER — CYCLOBENZAPRINE HCL 10 MG
10 TABLET ORAL 3 TIMES DAILY PRN
Qty: 30 TABLET | Refills: 0 | Status: SHIPPED | OUTPATIENT
Start: 2023-03-20 | End: 2023-04-11 | Stop reason: SDUPTHER

## 2023-03-20 NOTE — PROGRESS NOTES
"Clinic Note  3/20/2023      Subjective:       Patient ID:  Tyrone is a 58 y.o. male being seen for an established visit.    Chief Complaint: Migraine    Headaches- Patient presents with constant, sharp headaches worsening over the last 2 months. Patient reports he will get sharp 8-9/10 headaches radiating from the cervical spine to posterior Left side of the head everyday to every-other day. Today he states his pain is 5/10. He reports no triggers, states nothing he notices makes his headaches worse or better. Patient states headaches are the worst headaches of his life. He has tried otc Ecedrin, Asprin, Ibuprofen, Aleve with no relief in symptoms. Patient reports he will use massage gun on his neck which provides some relief but only for a few moments then the headache comes right back. Patient reports trouble concentrating and headaches affecting his daily routine and at work. He states the headaches will wake him up during the night, reports only getting 4-5 hours of sleep. He states in the past he has gotten similar headaches in this same area every blue moon (1-2 times a year) but never this severe or consistent. He denies throbbing, photophobia, phonophobia, nausea, vomiting, and pain behind the eyes.     Lesions on forehead- Patient presents with 1cm brownish lesion on the scalp that he has had for a while. Reports changes in appearance of the lesion. He also has another lesion on the forehead that is rough and "scabbing over". Patient denies symptoms. Patient denies sun protection or wearing sun screen. Patient works as riggor for light and production equipment.     Prediabetes- continues on Trulicity     BPH- being followed by urology, on Flomax 0.8 mg     Acid reflux- Patient presents with history of acid reflex, he takes Prilosec for his symptoms. States he has tried to stop taking Prilosec but continues to have symptoms.     Hypothyroidism- on Synthroid 25 mcg daily     Bipolar disorder- on Lamictal, " escitalopram, BuSpar.  Being followed by Psychiatry     Hypertension- Patient present with history of HTN, he takes hydrochlorothiazide 25mg     History of Alcohol dependence- Patient states he mostly drinks non-alcoholics. He will have an occasional beer 2-3 times a month socially.    Insomnia- Patient presents with history of Insomnia, he is on Lunesta. Reports his headaches have been affecting his sleep, will get 4-5 hrs sleep per night.         Review of Systems   Constitutional:  Negative for chills, fever, malaise/fatigue and weight loss.   HENT:  Negative for congestion, ear pain, sinus pain and sore throat.    Eyes:  Negative for blurred vision, double vision, photophobia and pain.   Respiratory:  Negative for cough, shortness of breath and wheezing.    Cardiovascular:  Negative for chest pain and palpitations.   Gastrointestinal:  Negative for constipation, diarrhea, heartburn, nausea and vomiting.   Genitourinary: Negative.  Negative for dysuria, frequency and urgency.   Musculoskeletal:  Positive for neck pain. Negative for myalgias.   Skin:  Positive for rash. Negative for itching.   Neurological:  Positive for headaches.   Endo/Heme/Allergies: Negative.    Psychiatric/Behavioral: Negative.       Medication List with Changes/Refills   New Medications    AMITRIPTYLINE (ELAVIL) 25 MG TABLET    Take 1 tablet (25 mg total) by mouth every evening. Headache prevention / insomnia    CYCLOBENZAPRINE (FLEXERIL) 10 MG TABLET    Take 1 tablet (10 mg total) by mouth 3 (three) times daily as needed for Muscle spasms.   Current Medications    BUSPIRONE (BUSPAR) 15 MG TABLET    Take 2 tablets po q am, 1 tablet po at noon and 1 tablet po q hs    CALCIUM-VITAMIN D3 (OS-JALYN 500 + D3) 500 MG(1,250MG) -200 UNIT PER TABLET    Take 1 tablet by mouth 2 (two) times daily with meals.    CYANOCOBALAMIN (VITAMIN B-12) 100 MCG TABLET    Take 100 mcg by mouth once daily.    ESCITALOPRAM OXALATE (LEXAPRO) 10 MG TABLET    Take 1  tablet (10 mg total) by mouth once daily.    ESZOPICLONE (LUNESTA) 3 MG TAB    Take 1 tablet (3 mg total) by mouth nightly as needed (insomnia).    FOLIC ACID (FOLVITE) 1 MG TABLET    Take 1 mg by mouth once daily.    GABAPENTIN (NEURONTIN) 300 MG CAPSULE    Take 1 capsule (300 mg total) by mouth 3 (three) times daily.    HYDROCHLOROTHIAZIDE (HYDRODIURIL) 25 MG TABLET    Take 25 mg by mouth once daily.    LAMOTRIGINE (LAMICTAL) 100 MG TABLET    Take 1 tablet (100 mg total) by mouth once daily.    LEVOTHYROXINE (SYNTHROID) 25 MCG TABLET    Take 25 mcg by mouth once daily.    NALOXONE (NARCAN) 4 MG/ACTUATION SPRY    Narcan 4 mg/actuation nasal spray    OMEPRAZOLE (PRILOSEC) 40 MG CAPSULE    Take 40 mg by mouth once daily.    TAMSULOSIN (FLOMAX) 0.4 MG CAP    Take 2 capsules (0.8 mg total) by mouth once daily.    TRULICITY 1.5 MG/0.5 ML PEN INJECTOR    Inject 1.5 mg into the skin every 7 days.   Discontinued Medications    ACETAMINOPHEN (TYLENOL) 650 MG TBSR    Take 1 tablet (650 mg total) by mouth every 6 to 8 hours as needed (pain).    ASPIRIN (ECOTRIN) 81 MG EC TABLET    Take 1 tablet (81 mg total) by mouth 2 (two) times daily.    CYCLOBENZAPRINE (FLEXERIL) 5 MG TABLET    TAKE 1 TABLET(5 MG) BY MOUTH THREE TIMES DAILY AS NEEDED    MELOXICAM (MOBIC) 15 MG TABLET    Take 1 tablet (15 mg total) by mouth once daily.       Patient Active Problem List   Diagnosis    Essential hypertension    Hypercholesteremia    Cervical spinal stenosis    Cervical myofascial pain syndrome    Spondylosis of cervical region without myelopathy or radiculopathy    Facet arthritis, degenerative, cervical spine    Acid reflux    Bipolar affective disorder in remission    Sleep apnea    Anemia    Elevated AST (SGOT)    Cervical stenosis of spinal canal    Neck pain    Decreased functional mobility    BPH with urinary obstruction    Erectile dysfunction due to arterial insufficiency    Avascular necrosis of left femur    Avascular necrosis of  "bones of both hips    Left hip pain    Chronic pain    Hypothyroidism    Class 1 obesity with body mass index (BMI) of 31.0 to 31.9 in adult    Avascular necrosis of left femoral head    Weakness of trunk musculature    Condyloma    Avascular necrosis of right femoral head    Leukopenia           Objective:      /85 (BP Location: Left arm, Patient Position: Sitting, BP Method: X-Large (Automatic))   Pulse 67   Ht 5' 8" (1.727 m)   Wt 85.6 kg (188 lb 9.7 oz)   SpO2 98%   BMI 28.68 kg/m²   Estimated body mass index is 28.68 kg/m² as calculated from the following:    Height as of this encounter: 5' 8" (1.727 m).    Weight as of this encounter: 85.6 kg (188 lb 9.7 oz).  Physical Exam  Vitals reviewed.   Constitutional:       General: He is not in acute distress.     Appearance: Normal appearance. He is not diaphoretic.   HENT:      Head: Normocephalic and atraumatic.        Nose: Nose normal.      Mouth/Throat:      Mouth: Mucous membranes are moist.      Pharynx: Oropharynx is clear.   Eyes:      Conjunctiva/sclera: Conjunctivae normal.   Cardiovascular:      Rate and Rhythm: Normal rate and regular rhythm.      Pulses: Normal pulses.      Heart sounds: Normal heart sounds.   Pulmonary:      Effort: Pulmonary effort is normal. No respiratory distress.      Breath sounds: Normal breath sounds. No wheezing.   Abdominal:      General: Bowel sounds are normal.      Palpations: Abdomen is soft.   Musculoskeletal:         General: Normal range of motion.      Cervical back: Normal range of motion.   Skin:     General: Skin is warm and dry.      Findings: No erythema or rash.   Neurological:      Mental Status: He is alert and oriented to person, place, and time.   Psychiatric:         Mood and Affect: Mood and affect normal.         Behavior: Behavior normal.         Thought Content: Thought content normal.         Judgment: Judgment normal.                 Assessment and Plan:     1. Nonintractable headache, " unspecified chronicity pattern, unspecified headache type  Patient presents with constant sharp 8-9/10 daily headaches of the left posterior head radiating to cervical spine X 2 months. Patient denies triggers of photophobia, phonophobia, N/V. Reports this being worst headache of his life. Due to his constant headaches and no relief with otc NSAIDs will start amitriptyline for abortive therapy. Patient can try muscle relaxer at night for relief.   Differentials most suspicious for tension headache, cervicalgia. Subarachnoid hemorrhage, less suspicious for migraine or cluster headache.   Will obtain imaging of the head to r/o concern for SAH, f/u with neurology.  - CT Head Without Contrast; Future  - Ambulatory referral/consult to Neurology; Future  - amitriptyline (ELAVIL) 25 MG tablet; Take 1 tablet (25 mg total) by mouth every evening. Headache prevention / insomnia  Dispense: 60 tablet; Refill: 1  - cyclobenzaprine (FLEXERIL) 10 MG tablet; Take 1 tablet (10 mg total) by mouth 3 (three) times daily as needed for Muscle spasms.  Dispense: 30 tablet; Refill: 0    2. Insomnia, unspecified type  - continue Lunesta per Psychiatry, may improve with amitriptyline    3. Actinic keratoses  Patient presents with rough, scaly, flesh-colored lesion of the forehead. Reports sun exposure with no sun protection. Refer to dermatology for further workup and removal.  - Ambulatory referral/consult to Dermatology; Future    4. Hyperpigmented skin lesion  Patient presents with 1cm, flat, brown, heterogenous, asymmetric patch on the scalp. Patient reports lesion changing overtime. Differentials most suspicious for atypical nevi, melanoma. F/u with derm for biopsy.  - Ambulatory referral/consult to Dermatology; Future       Follow Up:   Follow up in about 6 weeks (around 5/1/2023) for virtual visit f/u.    Other Orders Placed This Visit:  Orders Placed This Encounter   Procedures    CT Head Without Contrast    Ambulatory  referral/consult to Neurology    Ambulatory referral/consult to Dermatology         ELLIE Batista      I saw and evaluated the patient and discussed the care with  ELLIE Batista. I agree with the findings and plan as documented in the note above.      Nico Wong MD            This note is dictated on M*Modal word recognition program.  There are word recognition mistakes that are occasionally missed on review.

## 2023-03-23 ENCOUNTER — PATIENT MESSAGE (OUTPATIENT)
Dept: ADMINISTRATIVE | Facility: OTHER | Age: 58
End: 2023-03-23
Payer: MEDICAID

## 2023-03-24 ENCOUNTER — LAB VISIT (OUTPATIENT)
Dept: LAB | Facility: HOSPITAL | Age: 58
End: 2023-03-24
Payer: MEDICAID

## 2023-03-24 DIAGNOSIS — E78.49 OTHER HYPERLIPIDEMIA: ICD-10-CM

## 2023-03-24 LAB
ALBUMIN SERPL BCP-MCNC: 4.2 G/DL (ref 3.5–5.2)
ALP SERPL-CCNC: 91 U/L (ref 55–135)
ALT SERPL W/O P-5'-P-CCNC: 31 U/L (ref 10–44)
ANION GAP SERPL CALC-SCNC: 12 MMOL/L (ref 8–16)
AST SERPL-CCNC: 27 U/L (ref 10–40)
BILIRUB SERPL-MCNC: 0.6 MG/DL (ref 0.1–1)
BUN SERPL-MCNC: 12 MG/DL (ref 6–20)
CALCIUM SERPL-MCNC: 9.3 MG/DL (ref 8.7–10.5)
CHLORIDE SERPL-SCNC: 104 MMOL/L (ref 95–110)
CHOLEST SERPL-MCNC: 258 MG/DL (ref 120–199)
CHOLEST/HDLC SERPL: 3.1 {RATIO} (ref 2–5)
CO2 SERPL-SCNC: 26 MMOL/L (ref 23–29)
CREAT SERPL-MCNC: 1.1 MG/DL (ref 0.5–1.4)
EST. GFR  (NO RACE VARIABLE): >60 ML/MIN/1.73 M^2
GLUCOSE SERPL-MCNC: 94 MG/DL (ref 70–110)
HDLC SERPL-MCNC: 83 MG/DL (ref 40–75)
HDLC SERPL: 32.2 % (ref 20–50)
LDLC SERPL CALC-MCNC: 146.8 MG/DL (ref 63–159)
NONHDLC SERPL-MCNC: 175 MG/DL
POTASSIUM SERPL-SCNC: 4.1 MMOL/L (ref 3.5–5.1)
PROT SERPL-MCNC: 7.4 G/DL (ref 6–8.4)
SODIUM SERPL-SCNC: 142 MMOL/L (ref 136–145)
T4 FREE SERPL-MCNC: 0.92 NG/DL (ref 0.71–1.51)
TRIGL SERPL-MCNC: 141 MG/DL (ref 30–150)
TSH SERPL DL<=0.005 MIU/L-ACNC: 4.95 UIU/ML (ref 0.4–4)

## 2023-03-24 PROCEDURE — 80053 COMPREHEN METABOLIC PANEL: CPT | Performed by: FAMILY MEDICINE

## 2023-03-24 PROCEDURE — 36415 COLL VENOUS BLD VENIPUNCTURE: CPT | Performed by: FAMILY MEDICINE

## 2023-03-24 PROCEDURE — 84443 ASSAY THYROID STIM HORMONE: CPT | Performed by: FAMILY MEDICINE

## 2023-03-24 PROCEDURE — 80061 LIPID PANEL: CPT | Performed by: FAMILY MEDICINE

## 2023-03-24 PROCEDURE — 84439 ASSAY OF FREE THYROXINE: CPT | Performed by: FAMILY MEDICINE

## 2023-03-27 ENCOUNTER — PATIENT MESSAGE (OUTPATIENT)
Dept: PSYCHIATRY | Facility: CLINIC | Age: 58
End: 2023-03-27
Payer: MEDICAID

## 2023-03-28 ENCOUNTER — OFFICE VISIT (OUTPATIENT)
Dept: PSYCHIATRY | Facility: CLINIC | Age: 58
End: 2023-03-28
Payer: MEDICAID

## 2023-03-28 DIAGNOSIS — F41.1 GENERALIZED ANXIETY DISORDER: Primary | ICD-10-CM

## 2023-03-28 DIAGNOSIS — F31.70 BIPOLAR AFFECTIVE DISORDER IN REMISSION: ICD-10-CM

## 2023-03-28 DIAGNOSIS — G47.00 INSOMNIA, UNSPECIFIED TYPE: ICD-10-CM

## 2023-03-28 PROCEDURE — 1159F PR MEDICATION LIST DOCUMENTED IN MEDICAL RECORD: ICD-10-PCS | Mod: SA,HB,CPTII,95 | Performed by: NURSE PRACTITIONER

## 2023-03-28 PROCEDURE — 99214 OFFICE O/P EST MOD 30 MIN: CPT | Mod: SA,HB,95, | Performed by: NURSE PRACTITIONER

## 2023-03-28 PROCEDURE — 99214 PR OFFICE/OUTPT VISIT, EST, LEVL IV, 30-39 MIN: ICD-10-PCS | Mod: SA,HB,95, | Performed by: NURSE PRACTITIONER

## 2023-03-28 PROCEDURE — 1160F PR REVIEW ALL MEDS BY PRESCRIBER/CLIN PHARMACIST DOCUMENTED: ICD-10-PCS | Mod: SA,HB,CPTII,95 | Performed by: NURSE PRACTITIONER

## 2023-03-28 PROCEDURE — 1159F MED LIST DOCD IN RCRD: CPT | Mod: SA,HB,CPTII,95 | Performed by: NURSE PRACTITIONER

## 2023-03-28 PROCEDURE — 1160F RVW MEDS BY RX/DR IN RCRD: CPT | Mod: SA,HB,CPTII,95 | Performed by: NURSE PRACTITIONER

## 2023-03-28 NOTE — PROGRESS NOTES
3/28/2023 11:17 AM  Tyrone Santos  1965  [id number]    Outpatient Psychiatry Follow-Up Visit (MD/NP) - Telemedicine Visit      The patient location is: Patient reported that his location at the time of this visit was in the [facility]   Address: documented in [facility]      Visit type: audiovisual    Each patient to whom he or she provides medical services by telemedicine is:  (1) informed of the relationship between the physician and patient and the respective role of any other health care provider with respect to management of the patient; and (2) notified that he or she may decline to receive medical services by telemedicine and may withdraw from such care at any time.      Chief Complaint:  Tyrone Santos, a 58 y.o. male,who presents today for follow up of anxiety and mood swings.  Met with patient.        Interim Events/Subjective Report/Content of Current Session:     Pt is a 58 y.o. male with a hx of HTN, HLD, ED, BPH, hypothyroidism, ERIC, s/p cervical spine fusion, spondylosis, and bipolar d/o.    Pt's mother passed away unexpectedly after a fall two days ago. She and the pt were arguing and the pt was unable to reconcile with her before she . He is feeling very overwhelmed. He took the day off from work. He plans to return tomorrow. Has the support of his younger siblings and his fiance, Jeanine.    Endorses grief but denies depressed mood, decreased energy or motivation, anhedonia, and hopelessness. Anxiety is managable. Has not slept well the past couple of nights.    ASEs from psych meds: denies    Pt denies recurrent thoughts of death and denies SI/HI. Denies any sxs of ochoa. Denies AVH, paranoia and delusions. No objective s/sx of psychosis or ochoa.       Psychotherapy:  Target symptoms: depression, anxiety , grief  Why chosen therapy is appropriate versus another modality: relevant to diagnosis, patient responds to this modality  Outcome monitoring methods: self-report,  observation  Therapeutic intervention type: supportive psychotherapy  Topics discussed/themes: illness/death of a loved one, building skills sets for symptom management  The patient's response to the intervention is accepting. The patient's progress toward treatment goals is good.   Duration of intervention: 10 minutes.      Psychotropic medication review  Previous Trials-  Wellbutrin   Valium  Xanax  Trileptal  Seroquel - heart racing  Trazodone - worked for a few years and then gave out  Depakote - worked for a while  Doxepin  Hydroxyzine - ineffective; made him angry  Naltrexone  Campral  Trazodone    Current meds-  Buspar   Lamictal  Lunesta  Lexapro        Review of Systems       Review of Systems   Constitutional:  Negative for chills, fever, malaise/fatigue and weight loss.   Respiratory:  Negative for shortness of breath and wheezing.    Cardiovascular:  Negative for chest pain and palpitations.   Gastrointestinal:  Negative for diarrhea and vomiting.   Musculoskeletal:  Negative for falls and myalgias.   Skin:  Negative for rash.   Neurological:  Negative for tremors, seizures and headaches.   Endo/Heme/Allergies:  Does not bruise/bleed easily.   Psychiatric/Behavioral:          See HPI       Past Medical, Family and Social History: The patient's past medical, family and social history have been reviewed and updated as appropriate within the electronic medical record - see encounter notes.    Compliance: yes      Risk Parameters:  Patient reports no suicidal ideation  Patient reports no homicidal ideation  Patient reports no self-injurious behavior  Patient reports no violent behavior    Exam (detailed: at least 9 elements; comprehensive: all 15 elements)   Constitutional  Vitals:  Most recent vital signs, dated less than 90 days prior to this appointment, were reviewed.   There were no vitals filed for this visit.     General:  unremarkable, age appropriate, well nourished, casually dressed, neatly  groomed, obese     Musculoskeletal  Muscle Strength/Tone:  ADITYA due to virtual visit   Gait & Station:  ADITYA due to virtual visit     Psychiatric      Appearance:  unremarkable, age appropriate, well nourished, casually dressed, neatly groomed, obese   Behavior:  normal, friendly and cooperative, eye contact normal     Speech:  no latency; no press   Mood & Affect:  euthymic  congruent and appropriate   Thought Process:  normal and logical   Associations:  intact   Thought Content:  normal, no suicidality, no homicidality, delusions, or paranoia   Insight:  intact   Judgement: behavior is adequate to circumstances, age appropriate   Orientation:  grossly intact   Memory: intact for content of interview   Language: grossly intact   Attention Span & Concentration:  able to focus   Fund of Knowledge:  intact and appropriate to age and level of education     Medications:  Outpatient Encounter Medications as of 3/28/2023   Medication Sig Dispense Refill    amitriptyline (ELAVIL) 25 MG tablet Take 1 tablet (25 mg total) by mouth every evening. Headache prevention / insomnia 60 tablet 1    busPIRone (BUSPAR) 15 MG tablet Take 2 tablets po q am, 1 tablet po at noon and 1 tablet po q hs 360 tablet 0    calcium-vitamin D3 (OS-JALYN 500 + D3) 500 mg(1,250mg) -200 unit per tablet Take 1 tablet by mouth 2 (two) times daily with meals.      cyanocobalamin (VITAMIN B-12) 100 MCG tablet Take 100 mcg by mouth once daily.      cyclobenzaprine (FLEXERIL) 10 MG tablet Take 1 tablet (10 mg total) by mouth 3 (three) times daily as needed for Muscle spasms. 30 tablet 0    EScitalopram oxalate (LEXAPRO) 10 MG tablet Take 1 tablet (10 mg total) by mouth once daily. 90 tablet 0    eszopiclone (LUNESTA) 3 mg Tab Take 1 tablet (3 mg total) by mouth nightly as needed (insomnia). 30 tablet 3    folic acid (FOLVITE) 1 MG tablet Take 1 mg by mouth once daily.      gabapentin (NEURONTIN) 300 MG capsule Take 1 capsule (300 mg total) by mouth 3 (three)  times daily. 90 capsule 11    hydroCHLOROthiazide (HYDRODIURIL) 25 MG tablet Take 25 mg by mouth once daily.      lamoTRIgine (LAMICTAL) 100 MG tablet Take 1 tablet (100 mg total) by mouth once daily. 90 tablet 0    levothyroxine (SYNTHROID) 25 MCG tablet Take 25 mcg by mouth once daily.      naloxone (NARCAN) 4 mg/actuation Spry Narcan 4 mg/actuation nasal spray      omeprazole (PRILOSEC) 40 MG capsule Take 40 mg by mouth once daily.      tamsulosin (FLOMAX) 0.4 mg Cap Take 2 capsules (0.8 mg total) by mouth once daily. 60 capsule 11    TRULICITY 1.5 mg/0.5 mL pen injector Inject 1.5 mg into the skin every 7 days.       No facility-administered encounter medications on file as of 3/28/2023.       Allergy:  Review of patient's allergies indicates:   Allergen Reactions    Codeine Nausea Only    Seroquel [quetiapine]      Heart racing     Tizanidine Other (See Comments)     Caused increased pain and muscle pain         Assessment and Diagnosis   Status/Progress: Based on the examination today, the patient's problem(s) is/are well controlled.  New problems have not been presented today.   Co-morbidities are not complicating management of the primary condition.          General Impression:       ICD-10-CM ICD-9-CM   1. Generalized anxiety disorder  F41.1 300.02   2. Bipolar affective disorder in remission  F31.70 296.80   3. Insomnia, unspecified type  G47.00 780.52        Diff Dx  Bipolar I vs Bipolar II vs MDD with anxiety      Intervention/Counseling/Treatment Plan     Medication Management:  Continue Lamictal 100 mg q day  Continue Buspar to 30 mg q am, 15 mg at noon, and 15 mg q hs  Continue Lunesta 3 mg q hs prn insomnia. Pt was told not drive or to take this med with ETOH or other sedating meds/substance. Pt verbalized understanding.  Continue Lexapro 10 mg q am to target anxiety  Labs: reviewed most recent  Discussed with patient informed consent, risks vs. benefits, alternative treatments, side effect profile  and the inherent unpredictability of individual responses to these treatments. The patient expresses understanding of the above and displays the capacity to agree with this current plan and had no other questions.  Encouraged Patient to keep future appointments.   Take medications as prescribed and abstain from substance abuse.   Present to ED or call 911 for SI/HI plan or intent, psychosis, or medical emergency.      Return to Clinic: 3 months, or sooner if needed      Face-to-face time with the patient: 20 minutes  Total time:  24 minutes of total time spent on the encounter, which includes face to face time and non-face to face time preparing to see the patient (eg, review of tests), Obtaining and/or reviewing separately obtained history, Documenting clinical information in the electronic or other health record, Independently interpreting results (not separately reported) and communicating results to the patient/family/caregiver, or Care coordination (not separately reported).       Lisa Alejo, MSN, APRN, PMHNP-BC  [facility]

## 2023-04-03 ENCOUNTER — PATIENT MESSAGE (OUTPATIENT)
Dept: PRIMARY CARE CLINIC | Facility: CLINIC | Age: 58
End: 2023-04-03
Payer: MEDICAID

## 2023-04-03 DIAGNOSIS — R51.9 NONINTRACTABLE HEADACHE, UNSPECIFIED CHRONICITY PATTERN, UNSPECIFIED HEADACHE TYPE: Primary | ICD-10-CM

## 2023-04-03 RX ORDER — ATORVASTATIN CALCIUM 20 MG/1
20 TABLET, FILM COATED ORAL NIGHTLY
Qty: 90 TABLET | Refills: 3 | Status: SHIPPED | OUTPATIENT
Start: 2023-04-03 | End: 2023-04-27

## 2023-04-05 RX ORDER — RIZATRIPTAN BENZOATE 10 MG/1
10 TABLET ORAL
Qty: 20 TABLET | Refills: 0 | Status: SHIPPED | OUTPATIENT
Start: 2023-04-05 | End: 2023-04-11 | Stop reason: SDUPTHER

## 2023-04-11 ENCOUNTER — OFFICE VISIT (OUTPATIENT)
Dept: PRIMARY CARE CLINIC | Facility: CLINIC | Age: 58
End: 2023-04-11
Payer: MEDICAID

## 2023-04-11 DIAGNOSIS — M79.10 MUSCLE PAIN: Primary | ICD-10-CM

## 2023-04-11 DIAGNOSIS — R51.9 NONINTRACTABLE HEADACHE, UNSPECIFIED CHRONICITY PATTERN, UNSPECIFIED HEADACHE TYPE: ICD-10-CM

## 2023-04-11 PROCEDURE — 99213 PR OFFICE/OUTPT VISIT, EST, LEVL III, 20-29 MIN: ICD-10-PCS | Mod: 95,,, | Performed by: FAMILY MEDICINE

## 2023-04-11 PROCEDURE — 99213 OFFICE O/P EST LOW 20 MIN: CPT | Mod: 95,,, | Performed by: FAMILY MEDICINE

## 2023-04-11 RX ORDER — RIZATRIPTAN BENZOATE 10 MG/1
10 TABLET ORAL
Qty: 20 TABLET | Refills: 3 | Status: SHIPPED | OUTPATIENT
Start: 2023-04-11 | End: 2023-11-07 | Stop reason: SDUPTHER

## 2023-04-11 RX ORDER — CYCLOBENZAPRINE HCL 10 MG
10 TABLET ORAL 3 TIMES DAILY PRN
Qty: 60 TABLET | Refills: 4 | Status: SHIPPED | OUTPATIENT
Start: 2023-04-11 | End: 2023-10-11

## 2023-04-11 NOTE — PROGRESS NOTES
The patient location is: home  The chief complaint leading to consultation is: headaches    Visit type: audiovisual    Face to Face time with patient: 20   minutes of total time spent on the encounter, which includes face to face time and non-face to face time preparing to see the patient (eg, review of tests), Obtaining and/or reviewing separately obtained history, Documenting clinical information in the electronic or other health record, Independently interpreting results (not separately reported) and communicating results to the patient/family/caregiver, or Care coordination (not separately reported).         Each patient to whom he or she provides medical services by telemedicine is:  (1) informed of the relationship between the physician and patient and the respective role of any other health care provider with respect to management of the patient; and (2) notified that he or she may decline to receive medical services by telemedicine and may withdraw from such care at any time.    Notes:       Clinic Note  4/11/2023      Subjective:       Patient ID:  Tyrone is a 58 y.o. male being seen for an established visit.    Chief Complaint:  Headaches    Headaches-patient here for follow-up of his headaches which have been worsening over the last 2 3 months.  Was recently seen in clinic several weeks ago for this and was started on amitriptyline and Flexeril p.r.n..  Amitriptyline caused patient to have side effects of sleepwalking, has since stopped.  Flexeril did not helps with the headaches but it did help with his generalized body pain would like a refill of this.  Was then prescribed rizatriptan which has been the most helpful for patients headaches, has taken about once daily p.r.n. over the last week.  Has an appointment with Neurology in June.  Recent CT head was normal    Skin lesion-will be scheduling a Dermatology appointment soon        Review of Systems   Constitutional:  Negative for chills, fever,  malaise/fatigue and weight loss.   HENT:  Negative for congestion, sinus pain and sore throat.    Respiratory:  Negative for cough, shortness of breath and wheezing.    Cardiovascular:  Negative for chest pain and palpitations.   Gastrointestinal:  Negative for constipation, diarrhea, nausea and vomiting.   Genitourinary:  Negative for dysuria, frequency and urgency.   Musculoskeletal:  Negative for myalgias.   Skin:  Negative for rash.   Neurological:  Positive for headaches.     Medication List with Changes/Refills   Current Medications    AMITRIPTYLINE (ELAVIL) 25 MG TABLET    Take 1 tablet (25 mg total) by mouth every evening. Headache prevention / insomnia    ATORVASTATIN (LIPITOR) 20 MG TABLET    Take 1 tablet (20 mg total) by mouth every evening. For cholesterol    BUSPIRONE (BUSPAR) 15 MG TABLET    Take 2 tablets po q am, 1 tablet po at noon and 1 tablet po q hs    CALCIUM-VITAMIN D3 (OS-JALYN 500 + D3) 500 MG(1,250MG) -200 UNIT PER TABLET    Take 1 tablet by mouth 2 (two) times daily with meals.    CYANOCOBALAMIN (VITAMIN B-12) 100 MCG TABLET    Take 100 mcg by mouth once daily.    CYCLOBENZAPRINE (FLEXERIL) 10 MG TABLET    Take 1 tablet (10 mg total) by mouth 3 (three) times daily as needed for Muscle spasms.    ESCITALOPRAM OXALATE (LEXAPRO) 10 MG TABLET    Take 1 tablet (10 mg total) by mouth once daily.    ESZOPICLONE (LUNESTA) 3 MG TAB    Take 1 tablet (3 mg total) by mouth nightly as needed (insomnia).    FOLIC ACID (FOLVITE) 1 MG TABLET    Take 1 mg by mouth once daily.    GABAPENTIN (NEURONTIN) 300 MG CAPSULE    Take 1 capsule (300 mg total) by mouth 3 (three) times daily.    HYDROCHLOROTHIAZIDE (HYDRODIURIL) 25 MG TABLET    Take 25 mg by mouth once daily.    LAMOTRIGINE (LAMICTAL) 100 MG TABLET    Take 1 tablet (100 mg total) by mouth once daily.    LEVOTHYROXINE (SYNTHROID) 25 MCG TABLET    Take 25 mcg by mouth once daily.    NALOXONE (NARCAN) 4 MG/ACTUATION SPRY    Narcan 4 mg/actuation nasal spray  "   OMEPRAZOLE (PRILOSEC) 40 MG CAPSULE    Take 40 mg by mouth once daily.    RIZATRIPTAN (MAXALT) 10 MG TABLET    Take 1 tablet (10 mg total) by mouth as needed for Migraine. (Can repeat dose in 2 hours if needed, maximum 3 doses per day).    TAMSULOSIN (FLOMAX) 0.4 MG CAP    Take 2 capsules (0.8 mg total) by mouth once daily.    TRULICITY 1.5 MG/0.5 ML PEN INJECTOR    Inject 1.5 mg into the skin every 7 days.       Patient Active Problem List   Diagnosis    Essential hypertension    Hypercholesteremia    Cervical spinal stenosis    Cervical myofascial pain syndrome    Spondylosis of cervical region without myelopathy or radiculopathy    Facet arthritis, degenerative, cervical spine    Acid reflux    Bipolar affective disorder in remission    Sleep apnea    Anemia    Elevated AST (SGOT)    Cervical stenosis of spinal canal    Neck pain    Decreased functional mobility    BPH with urinary obstruction    Erectile dysfunction due to arterial insufficiency    Avascular necrosis of left femur    Avascular necrosis of bones of both hips    Left hip pain    Chronic pain    Hypothyroidism    Class 1 obesity with body mass index (BMI) of 31.0 to 31.9 in adult    Avascular necrosis of left femoral head    Weakness of trunk musculature    Condyloma    Avascular necrosis of right femoral head    Leukopenia           Objective:      There were no vitals taken for this visit.  Estimated body mass index is 28.68 kg/m² as calculated from the following:    Height as of 3/20/23: 5' 8" (1.727 m).    Weight as of 3/20/23: 85.6 kg (188 lb 9.7 oz).  Physical Exam  Constitutional:       General: He is not in acute distress.     Appearance: He is not diaphoretic.   HENT:      Head: Normocephalic and atraumatic.   Eyes:      Conjunctiva/sclera: Conjunctivae normal.   Pulmonary:      Effort: Pulmonary effort is normal.   Musculoskeletal:         General: Normal range of motion.   Neurological:      Mental Status: He is alert and oriented to " person, place, and time.   Psychiatric:         Mood and Affect: Mood and affect normal.         Behavior: Behavior normal.         Thought Content: Thought content normal.         Cognition and Memory: Memory normal.         Judgment: Judgment normal.         Assessment and Plan:     1. Nonintractable headache, unspecified chronicity pattern, unspecified headache type  - can continue rizatriptan p.r.n. for headaches, discussed with patient to try to limit to less than 10 days per month to prevent medication overuse headache  - rizatriptan (MAXALT) 10 MG tablet; Take 1 tablet (10 mg total) by mouth as needed for Migraine. (Can repeat dose in 2 hours if needed, maximum 3 doses per day).  Dispense: 20 tablet; Refill: 3    2. Muscle pain  - cyclobenzaprine (FLEXERIL) 10 MG tablet; Take 1 tablet (10 mg total) by mouth 3 (three) times daily as needed for Muscle spasms.  Dispense: 60 tablet; Refill: 4      Follow Up:   No follow-ups on file.  One month follow-up appointment already scheduled    Other Orders Placed This Visit:  No orders of the defined types were placed in this encounter.        Nico Wong MD        This note is dictated on M*Modal word recognition program.  There are word recognition mistakes that are occasionally missed on review.

## 2023-04-17 ENCOUNTER — TELEPHONE (OUTPATIENT)
Dept: DERMATOLOGY | Facility: CLINIC | Age: 58
End: 2023-04-17
Payer: MEDICAID

## 2023-04-17 NOTE — TELEPHONE ENCOUNTER
----- Message from Yelena Fish RN sent at 3/23/2023  4:50 PM CDT -----  Contact: BETH CASTELLANOS [[id number]][phone]    ----- Message -----  From: Renetta Tidwell  Sent: 3/23/2023   1:54 PM CDT  To: [facility] Derm Clinical Support Triage    Type: Appointment Request     Name of Caller: BETH CASTELLANOS [[id number]]  When is the first available appointment? Do not have access  Reason for Visit:  Actinic keratoses [L57.0]  Hyperpigmented skin lesion [L81.9]  Best Call Back Number: [phone]  Additional Information:

## 2023-05-01 ENCOUNTER — OFFICE VISIT (OUTPATIENT)
Dept: PRIMARY CARE CLINIC | Facility: CLINIC | Age: 58
End: 2023-05-01
Payer: MEDICAID

## 2023-05-01 DIAGNOSIS — R51.9 NONINTRACTABLE HEADACHE, UNSPECIFIED CHRONICITY PATTERN, UNSPECIFIED HEADACHE TYPE: Primary | ICD-10-CM

## 2023-05-01 PROCEDURE — 99214 PR OFFICE/OUTPT VISIT, EST, LEVL IV, 30-39 MIN: ICD-10-PCS | Mod: 95,,, | Performed by: FAMILY MEDICINE

## 2023-05-01 PROCEDURE — 1159F MED LIST DOCD IN RCRD: CPT | Mod: CPTII,95,, | Performed by: FAMILY MEDICINE

## 2023-05-01 PROCEDURE — 99214 OFFICE O/P EST MOD 30 MIN: CPT | Mod: 95,,, | Performed by: FAMILY MEDICINE

## 2023-05-01 PROCEDURE — 1159F PR MEDICATION LIST DOCUMENTED IN MEDICAL RECORD: ICD-10-PCS | Mod: CPTII,95,, | Performed by: FAMILY MEDICINE

## 2023-05-01 PROCEDURE — 1160F PR REVIEW ALL MEDS BY PRESCRIBER/CLIN PHARMACIST DOCUMENTED: ICD-10-PCS | Mod: CPTII,95,, | Performed by: FAMILY MEDICINE

## 2023-05-01 PROCEDURE — 1160F RVW MEDS BY RX/DR IN RCRD: CPT | Mod: CPTII,95,, | Performed by: FAMILY MEDICINE

## 2023-05-01 RX ORDER — PROPRANOLOL HYDROCHLORIDE 40 MG/1
40 TABLET ORAL 2 TIMES DAILY
Qty: 60 TABLET | Refills: 6 | Status: SHIPPED | OUTPATIENT
Start: 2023-05-01 | End: 2024-04-30

## 2023-05-01 NOTE — PROGRESS NOTES
The patient location is: home  The chief complaint leading to consultation is: HA f/u    Visit type: audiovisual    Face to Face time with patient: 25 minutes of total time spent on the encounter, which includes face to face time and non-face to face time preparing to see the patient (eg, review of tests), Obtaining and/or reviewing separately obtained history, Documenting clinical information in the electronic or other health record, Independently interpreting results (not separately reported) and communicating results to the patient/family/caregiver, or Care coordination (not separately reported).         Each patient to whom he or she provides medical services by telemedicine is:  (1) informed of the relationship between the physician and patient and the respective role of any other health care provider with respect to management of the patient; and (2) notified that he or she may decline to receive medical services by telemedicine and may withdraw from such care at any time.    Notes:     Clinic Note  5/1/2023      Subjective:       Patient ID:  Tyrone is a 58 y.o. male being seen for an established visit.    Chief Complaint:  Daily chronic headaches     Daily chronic headaches-patient reports having daily chronic headaches, described as constant sharp headaches radiating from the cervical spine to the posterior left side of the head.  Has been taking Excedrin 1 with NSAIDs and Tylenol which minimally helps.  Rizatriptan does help, however patient does not have many tablets of this medication, has not picked up with goal from pharmacy.  Try to take amitriptyline for prophylaxis but had significant side effects.  Flexeril not helpful.  Patient also has concurrent uncontrolled high blood pressure being managed by digital hypertension program.  Has not had new glasses in a long time    Review of Systems   Constitutional:  Negative for chills, fever, malaise/fatigue and weight loss.   HENT:  Negative for congestion,  sinus pain and sore throat.    Respiratory:  Negative for cough, shortness of breath and wheezing.    Cardiovascular:  Negative for chest pain and palpitations.   Gastrointestinal:  Negative for constipation, diarrhea, nausea and vomiting.   Genitourinary:  Negative for dysuria, frequency and urgency.   Musculoskeletal:  Negative for myalgias.   Skin:  Negative for rash.   Neurological:  Positive for headaches.     Medication List with Changes/Refills   New Medications    PROPRANOLOL (INDERAL) 40 MG TABLET    Take 1 tablet (40 mg total) by mouth 2 (two) times daily.   Current Medications    ATORVASTATIN (LIPITOR) 20 MG TABLET    Take 2 tablets (40 mg total) by mouth every evening. For cholesterol    BUSPIRONE (BUSPAR) 15 MG TABLET    Take 2 tablets po q am, 1 tablet po at noon and 1 tablet po q hs    CALCIUM-VITAMIN D3 (OS-JALYN 500 + D3) 500 MG(1,250MG) -200 UNIT PER TABLET    Take 1 tablet by mouth 2 (two) times daily with meals.    CYANOCOBALAMIN (VITAMIN B-12) 100 MCG TABLET    Take 100 mcg by mouth once daily.    CYCLOBENZAPRINE (FLEXERIL) 10 MG TABLET    Take 1 tablet (10 mg total) by mouth 3 (three) times daily as needed for Muscle spasms.    ESCITALOPRAM OXALATE (LEXAPRO) 10 MG TABLET    Take 1 tablet (10 mg total) by mouth once daily.    FOLIC ACID (FOLVITE) 1 MG TABLET    Take 1 mg by mouth once daily.    GABAPENTIN (NEURONTIN) 300 MG CAPSULE    Take 1 capsule (300 mg total) by mouth 3 (three) times daily.    HYDROCHLOROTHIAZIDE (HYDRODIURIL) 25 MG TABLET    Take 25 mg by mouth once daily.    LAMOTRIGINE (LAMICTAL) 100 MG TABLET    Take 1 tablet (100 mg total) by mouth once daily.    LEVOTHYROXINE (SYNTHROID) 25 MCG TABLET    Take 25 mcg by mouth once daily.    OMEPRAZOLE (PRILOSEC) 40 MG CAPSULE    Take 40 mg by mouth once daily.    RIZATRIPTAN (MAXALT) 10 MG TABLET    Take 1 tablet (10 mg total) by mouth as needed for Migraine. (Can repeat dose in 2 hours if needed, maximum 3 doses per day).    TAMSULOSIN  "(FLOMAX) 0.4 MG CAP    Take 2 capsules (0.8 mg total) by mouth once daily.       Patient Active Problem List   Diagnosis    Essential hypertension    Hypercholesteremia    Cervical spinal stenosis    Cervical myofascial pain syndrome    Spondylosis of cervical region without myelopathy or radiculopathy    Facet arthritis, degenerative, cervical spine    Acid reflux    Bipolar affective disorder in remission    Sleep apnea    Anemia    Elevated AST (SGOT)    Cervical stenosis of spinal canal    Neck pain    Decreased functional mobility    BPH with urinary obstruction    Erectile dysfunction due to arterial insufficiency    Avascular necrosis of left femur    Avascular necrosis of bones of both hips    Left hip pain    Chronic pain    Hypothyroidism    Class 1 obesity with body mass index (BMI) of 31.0 to 31.9 in adult    Avascular necrosis of left femoral head    Weakness of trunk musculature    Condyloma    Avascular necrosis of right femoral head    Leukopenia           Objective:      There were no vitals taken for this visit.  Estimated body mass index is 28.68 kg/m² as calculated from the following:    Height as of 3/20/23: 5' 8" (1.727 m).    Weight as of 3/20/23: 85.6 kg (188 lb 9.7 oz).  Physical Exam  Constitutional:       General: He is not in acute distress.     Appearance: He is not diaphoretic.   HENT:      Head: Normocephalic and atraumatic.   Eyes:      Conjunctiva/sclera: Conjunctivae normal.   Pulmonary:      Effort: Pulmonary effort is normal.   Musculoskeletal:         General: Normal range of motion.   Neurological:      Mental Status: He is alert and oriented to person, place, and time.   Psychiatric:         Mood and Affect: Mood and affect normal.         Behavior: Behavior normal.         Thought Content: Thought content normal.         Cognition and Memory: Memory normal.         Judgment: Judgment normal.         Assessment and Plan:     1. Nonintractable headache, unspecified chronicity " pattern, unspecified headache type  - will do a trial of propanolol for headache prophylaxis, blood pressures and heart rate reviewed with blood pressures ranging in the 130s to 150s.  Patient had normal CT head, follow-up neuro Neurology next month, also refer to Optometry for new eye glasses prescription.  Continue Excedrin, NSAIDs, Tylenol, rizatriptan for abortive therapy   - propranoloL (INDERAL) 40 MG tablet; Take 1 tablet (40 mg total) by mouth 2 (two) times daily.  Dispense: 60 tablet; Refill: 6  - Ambulatory referral/consult to Optometry; Future      Follow Up:   No follow-ups on file.    Other Orders Placed This Visit:  Orders Placed This Encounter   Procedures    Ambulatory referral/consult to Optometry         Nico Wong MD        This note is dictated on M*Modal word recognition program.  There are word recognition mistakes that are occasionally missed on review.

## 2023-05-24 ENCOUNTER — PATIENT MESSAGE (OUTPATIENT)
Dept: ADMINISTRATIVE | Facility: OTHER | Age: 58
End: 2023-05-24
Payer: MEDICAID

## 2023-05-26 DIAGNOSIS — G47.00 INSOMNIA, UNSPECIFIED TYPE: ICD-10-CM

## 2023-05-26 RX ORDER — ESZOPICLONE 3 MG/1
TABLET, FILM COATED ORAL
Qty: 30 TABLET | Refills: 1 | Status: SHIPPED | OUTPATIENT
Start: 2023-05-26 | End: 2023-08-08 | Stop reason: SDUPTHER

## 2023-06-02 DIAGNOSIS — I10 ESSENTIAL HYPERTENSION: ICD-10-CM

## 2023-06-03 RX ORDER — CHLORTHALIDONE 25 MG/1
TABLET ORAL
Qty: 90 TABLET | OUTPATIENT
Start: 2023-06-03

## 2023-06-03 NOTE — TELEPHONE ENCOUNTER
No care due was identified.  [facility] Embedded Care Due Messages. Reference number: [id number].   6/03/2023 4:47:35 AM CDT

## 2023-06-03 NOTE — TELEPHONE ENCOUNTER
Quick DC. Request already responded to by other means (e.g. phone or fax)   Refill Authorization Note   Tyrone Danielle  is requesting a refill authorization.  Brief Assessment and Rationale for Refill:  Quick Discontinue  Medication Therapy Plan:  Receipt confirmed by pharmacy (6/2/2023 11:00 AM CDT)    Medication Reconciliation Completed:  No      Comments:     Note composed:4:49 AM 06/03/2023

## 2023-06-09 ENCOUNTER — PATIENT MESSAGE (OUTPATIENT)
Dept: RESEARCH | Facility: HOSPITAL | Age: 58
End: 2023-06-09
Payer: MEDICAID

## 2023-06-16 ENCOUNTER — PATIENT MESSAGE (OUTPATIENT)
Dept: ADMINISTRATIVE | Facility: OTHER | Age: 58
End: 2023-06-16
Payer: MEDICAID

## 2023-06-16 ENCOUNTER — LAB VISIT (OUTPATIENT)
Dept: LAB | Facility: HOSPITAL | Age: 58
End: 2023-06-16
Attending: FAMILY MEDICINE
Payer: MEDICAID

## 2023-06-16 DIAGNOSIS — I10 ESSENTIAL HYPERTENSION: ICD-10-CM

## 2023-06-16 PROCEDURE — 80048 BASIC METABOLIC PNL TOTAL CA: CPT | Performed by: FAMILY MEDICINE

## 2023-06-16 PROCEDURE — 36415 COLL VENOUS BLD VENIPUNCTURE: CPT | Performed by: FAMILY MEDICINE

## 2023-06-17 LAB
ANION GAP SERPL CALC-SCNC: 12 MMOL/L (ref 8–16)
BUN SERPL-MCNC: 19 MG/DL (ref 6–20)
CALCIUM SERPL-MCNC: 9.2 MG/DL (ref 8.7–10.5)
CHLORIDE SERPL-SCNC: 101 MMOL/L (ref 95–110)
CO2 SERPL-SCNC: 27 MMOL/L (ref 23–29)
CREAT SERPL-MCNC: 1.3 MG/DL (ref 0.5–1.4)
EST. GFR  (NO RACE VARIABLE): >60 ML/MIN/1.73 M^2
GLUCOSE SERPL-MCNC: 115 MG/DL (ref 70–110)
POTASSIUM SERPL-SCNC: 3.7 MMOL/L (ref 3.5–5.1)
SODIUM SERPL-SCNC: 140 MMOL/L (ref 136–145)

## 2023-07-10 ENCOUNTER — TELEPHONE (OUTPATIENT)
Dept: PRIMARY CARE CLINIC | Facility: CLINIC | Age: 58
End: 2023-07-10
Payer: MEDICAID

## 2023-07-10 NOTE — TELEPHONE ENCOUNTER
----- Message from Bridgett Styles PharmD sent at 7/10/2023 10:24 AM CDT -----  Regarding: trulicity / ozempic  Patient is asking for advice on whether he should continue glp1. States he was originally prescribed Trulicity and switched to Ozempic  during supply/backorder issues. He has one dose of Ozempic remaining (he was unsure of dose). Please advise him as he's not enrolled in diabetes digital medicine program.

## 2023-07-18 ENCOUNTER — TELEPHONE (OUTPATIENT)
Dept: PRIMARY CARE CLINIC | Facility: CLINIC | Age: 58
End: 2023-07-18
Payer: MEDICAID

## 2023-08-08 DIAGNOSIS — G47.00 INSOMNIA, UNSPECIFIED TYPE: ICD-10-CM

## 2023-08-08 RX ORDER — ESZOPICLONE 3 MG/1
3 TABLET, FILM COATED ORAL NIGHTLY PRN
Qty: 30 TABLET | Refills: 1 | Status: SHIPPED | OUTPATIENT
Start: 2023-08-08 | End: 2023-11-15 | Stop reason: SDUPTHER

## 2023-09-21 ENCOUNTER — PATIENT MESSAGE (OUTPATIENT)
Dept: PSYCHIATRY | Facility: CLINIC | Age: 58
End: 2023-09-21
Payer: MEDICAID

## 2023-09-22 ENCOUNTER — TELEPHONE (OUTPATIENT)
Dept: PRIMARY CARE CLINIC | Facility: CLINIC | Age: 58
End: 2023-09-22
Payer: MEDICAID

## 2023-09-22 DIAGNOSIS — R12 HEARTBURN: Primary | ICD-10-CM

## 2023-09-22 NOTE — TELEPHONE ENCOUNTER
----- Message from Bridgett Styles PharmD sent at 9/22/2023  8:39 AM CDT -----  Patient requesting omeprazole 40 mg prescription for acid reflux symptoms (to Irma in chart). He states he has been trying to take omeprazole 20 mg and it is not controlling symptoms effectively.

## 2023-09-26 RX ORDER — OMEPRAZOLE 40 MG/1
40 CAPSULE, DELAYED RELEASE ORAL DAILY PRN
Qty: 90 CAPSULE | Refills: 2 | Status: SHIPPED | OUTPATIENT
Start: 2023-09-26

## 2023-10-05 DIAGNOSIS — M79.10 MUSCLE PAIN: ICD-10-CM

## 2023-10-05 NOTE — TELEPHONE ENCOUNTER
No care due was identified.  [facility] Embedded Care Due Messages. Reference number: [id number].   10/05/2023 7:17:12 AM CDT

## 2023-10-11 RX ORDER — CYCLOBENZAPRINE HCL 10 MG
10 TABLET ORAL
Qty: 60 TABLET | Refills: 4 | Status: SHIPPED | OUTPATIENT
Start: 2023-10-11 | End: 2023-11-07 | Stop reason: SDUPTHER

## 2023-11-07 ENCOUNTER — PATIENT MESSAGE (OUTPATIENT)
Dept: PSYCHIATRY | Facility: CLINIC | Age: 58
End: 2023-11-07
Payer: MEDICAID

## 2023-11-07 DIAGNOSIS — F31.70 BIPOLAR AFFECTIVE DISORDER IN REMISSION: ICD-10-CM

## 2023-11-07 DIAGNOSIS — F41.1 GENERALIZED ANXIETY DISORDER: ICD-10-CM

## 2023-11-07 RX ORDER — BUSPIRONE HYDROCHLORIDE 15 MG/1
TABLET ORAL
Qty: 120 TABLET | Refills: 0 | Status: SHIPPED | OUTPATIENT
Start: 2023-11-07 | End: 2024-02-08 | Stop reason: SDUPTHER

## 2023-11-07 RX ORDER — LAMOTRIGINE 100 MG/1
100 TABLET ORAL DAILY
Qty: 30 TABLET | Refills: 0 | Status: SHIPPED | OUTPATIENT
Start: 2023-11-07 | End: 2024-02-08 | Stop reason: SDUPTHER

## 2023-11-07 RX ORDER — ESCITALOPRAM OXALATE 10 MG/1
10 TABLET ORAL DAILY
Qty: 30 TABLET | Refills: 0 | Status: SHIPPED | OUTPATIENT
Start: 2023-11-07 | End: 2024-02-08 | Stop reason: SDUPTHER

## 2023-11-15 DIAGNOSIS — G47.00 INSOMNIA, UNSPECIFIED TYPE: ICD-10-CM

## 2023-11-15 RX ORDER — ESZOPICLONE 3 MG/1
3 TABLET, FILM COATED ORAL NIGHTLY PRN
Qty: 30 TABLET | Refills: 0 | Status: SHIPPED | OUTPATIENT
Start: 2023-11-15 | End: 2023-12-21 | Stop reason: SDUPTHER

## 2023-12-21 ENCOUNTER — PATIENT MESSAGE (OUTPATIENT)
Dept: PSYCHIATRY | Facility: CLINIC | Age: 58
End: 2023-12-21
Payer: MEDICAID

## 2023-12-21 DIAGNOSIS — G47.00 INSOMNIA, UNSPECIFIED TYPE: ICD-10-CM

## 2023-12-21 RX ORDER — ESZOPICLONE 3 MG/1
3 TABLET, FILM COATED ORAL NIGHTLY PRN
Qty: 30 TABLET | Refills: 0 | Status: SHIPPED | OUTPATIENT
Start: 2023-12-21 | End: 2024-01-29

## 2024-01-27 DIAGNOSIS — G47.00 INSOMNIA, UNSPECIFIED TYPE: ICD-10-CM

## 2024-01-29 ENCOUNTER — PATIENT MESSAGE (OUTPATIENT)
Dept: PSYCHIATRY | Facility: CLINIC | Age: 59
End: 2024-01-29
Payer: MEDICAID

## 2024-01-29 RX ORDER — ESZOPICLONE 3 MG/1
TABLET, FILM COATED ORAL
Qty: 30 TABLET | Refills: 0 | Status: SHIPPED | OUTPATIENT
Start: 2024-01-29 | End: 2024-02-08 | Stop reason: SDUPTHER

## 2024-02-07 DIAGNOSIS — E11.9 TYPE 2 DIABETES MELLITUS WITHOUT COMPLICATION: ICD-10-CM

## 2024-02-08 ENCOUNTER — PATIENT MESSAGE (OUTPATIENT)
Dept: PSYCHIATRY | Facility: CLINIC | Age: 59
End: 2024-02-08

## 2024-02-08 ENCOUNTER — OFFICE VISIT (OUTPATIENT)
Dept: PSYCHIATRY | Facility: CLINIC | Age: 59
End: 2024-02-08
Payer: MEDICAID

## 2024-02-08 DIAGNOSIS — G47.00 INSOMNIA, UNSPECIFIED TYPE: ICD-10-CM

## 2024-02-08 DIAGNOSIS — F31.70 BIPOLAR AFFECTIVE DISORDER IN REMISSION: ICD-10-CM

## 2024-02-08 DIAGNOSIS — F41.1 GENERALIZED ANXIETY DISORDER: ICD-10-CM

## 2024-02-08 PROCEDURE — 99214 OFFICE O/P EST MOD 30 MIN: CPT | Mod: SA,HB,95, | Performed by: NURSE PRACTITIONER

## 2024-02-08 PROCEDURE — 1159F MED LIST DOCD IN RCRD: CPT | Mod: SA,HB,CPTII,95 | Performed by: NURSE PRACTITIONER

## 2024-02-08 PROCEDURE — 1160F RVW MEDS BY RX/DR IN RCRD: CPT | Mod: SA,HB,CPTII,95 | Performed by: NURSE PRACTITIONER

## 2024-02-08 RX ORDER — BUSPIRONE HYDROCHLORIDE 15 MG/1
TABLET ORAL
Qty: 120 TABLET | Refills: 0 | Status: SHIPPED | OUTPATIENT
Start: 2024-02-08

## 2024-02-08 RX ORDER — ESZOPICLONE 3 MG/1
3 TABLET, FILM COATED ORAL DAILY
Qty: 30 TABLET | Refills: 2 | Status: SHIPPED | OUTPATIENT
Start: 2024-02-27 | End: 2024-05-27

## 2024-02-08 RX ORDER — ESCITALOPRAM OXALATE 10 MG/1
10 TABLET ORAL DAILY
Qty: 90 TABLET | Refills: 0 | Status: SHIPPED | OUTPATIENT
Start: 2024-02-08 | End: 2024-05-08

## 2024-02-08 RX ORDER — LAMOTRIGINE 25 MG/1
TABLET ORAL
Qty: 42 TABLET | Refills: 0 | Status: SHIPPED | OUTPATIENT
Start: 2024-02-08 | End: 2024-03-14

## 2024-02-08 NOTE — PROGRESS NOTES
"2/8/2024 11:17 AM  Tyrone Santos  1965  [id number]    Outpatient Psychiatry Follow-Up Visit (MD/NP) - Telemedicine Visit      The patient location is: Patient reported that his location at the time of this visit was in the [facility]   Address: documented in [facility]      Visit type: audiovisual    Each patient to whom he or she provides medical services by telemedicine is:  (1) informed of the relationship between the physician and patient and the respective role of any other health care provider with respect to management of the patient; and (2) notified that he or she may decline to receive medical services by telemedicine and may withdraw from such care at any time.      Chief Complaint:  Tyrone Santos, a 58 y.o. male,who presents today for follow up of anxiety and mood swings.  Met with patient.        Interim Events/Subjective Report/Content of Current Session:     Pt is a 58 y.o. male with a hx of HTN, HLD, ED, BPH, hypothyroidism, ERIC, s/p cervical spine fusion, spondylosis, and bipolar d/o.    He was last seen by me on 3/28/23 and was to return in 3 months. He is returning today.  Pt states mood is ok. Motivation is retuning slowly after his mother's death last year.   Has been out of lamotirigine for the past 15-20 days. We discussed the importance of slowly titrating back up to 100 mg in order to decrease chances of developing SJS. I reminded him that if he misses more than 4 days in a row of lamotrigine, we need to titrate back up and he verbalized understanding.   Still with his fiance Jeanine. Going well.  Sleep is "hit or miss". Has ERIC but cannot tolerate CPAP. Has trouble with sleep initiation.Can stay asleep.     Endorses grief but denies depressed mood, decreased energy or motivation, anhedonia, and hopelessness. Anxiety is managable. Has not slept well the past couple of nights.    ASEs from psych meds: denies    Pt denies recurrent thoughts of death and denies SI/HI. Denies any sxs " of ochoa or hypomania. Denies AVH, paranoia and delusions. No objective s/sx of psychosis or ochoa.       Psychotherapy:  Target symptoms: depression, anxiety , grief  Why chosen therapy is appropriate versus another modality: relevant to diagnosis, patient responds to this modality  Outcome monitoring methods: self-report, observation  Therapeutic intervention type: supportive psychotherapy  Topics discussed/themes: illness/death of a loved one, building skills sets for symptom management  The patient's response to the intervention is accepting. The patient's progress toward treatment goals is good.   Duration of intervention: 6 minutes.      Psychotropic medication review  Previous Trials-  Wellbutrin   Valium  Xanax  Trileptal  Seroquel - heart racing  Trazodone - worked for a few years and then gave out  Depakote - worked for a while  Doxepin  Hydroxyzine - ineffective; made him angry  Naltrexone  Campral  Trazodone    Current meds-  Buspar   Lamictal  Lunesta  Lexapro        Review of Systems       Review of Systems   Constitutional:  Negative for chills, fever, malaise/fatigue and weight loss.   Respiratory:  Negative for shortness of breath and wheezing.    Cardiovascular:  Negative for chest pain and palpitations.   Gastrointestinal:  Negative for diarrhea and vomiting.   Musculoskeletal:  Negative for falls and myalgias.   Skin:  Negative for rash.   Neurological:  Negative for tremors, seizures and headaches.   Endo/Heme/Allergies:  Does not bruise/bleed easily.   Psychiatric/Behavioral:          See HPI         Past Medical, Family and Social History: The patient's past medical, family and social history have been reviewed and updated as appropriate within the electronic medical record - see encounter notes.    Compliance: no      Risk Parameters:  Patient reports no suicidal ideation  Patient reports no homicidal ideation  Patient reports no self-injurious behavior  Patient reports no violent  behavior    Exam (detailed: at least 9 elements; comprehensive: all 15 elements)   Constitutional  Vitals:  Most recent vital signs, dated less than 90 days prior to this appointment, were reviewed.   There were no vitals filed for this visit.     General:  unremarkable, age appropriate, well nourished, casually dressed, neatly groomed, obese     Musculoskeletal  Muscle Strength/Tone:  ADITYA due to virtual visit   Gait & Station:  ADITYA due to virtual visit     Psychiatric      Appearance:  unremarkable, age appropriate, well nourished, casually dressed, neatly groomed, obese   Behavior:  normal, friendly and cooperative, eye contact normal     Speech:  no latency; no press   Mood & Affect:  euthymic  congruent and appropriate   Thought Process:  normal and logical   Associations:  intact   Thought Content:  normal, no suicidality, no homicidality, delusions, or paranoia   Insight:  intact   Judgement: behavior is adequate to circumstances, age appropriate   Orientation:  grossly intact   Memory: intact for content of interview   Language: grossly intact   Attention Span & Concentration:  able to focus   Fund of Knowledge:  intact and appropriate to age and level of education     Medications:  Outpatient Encounter Medications as of 2/8/2024   Medication Sig Dispense Refill    amLODIPine (NORVASC) 5 MG tablet Take 1 tablet (5 mg total) by mouth every evening. (For blood pressure) 90 tablet 1    atorvastatin (LIPITOR) 40 MG tablet Take 1 tablet (40 mg total) by mouth every evening. For cholesterol (new dose) 90 tablet 1    busPIRone (BUSPAR) 15 MG tablet Take 2 tablets po q am, 1 tablet po at noon and 1 tablet po q hs 120 tablet 0    calcium-vitamin D3 (OS-JALYN 500 + D3) 500 mg(1,250mg) -200 unit per tablet Take 1 tablet by mouth 2 (two) times daily with meals.      chlorthalidone (HYGROTEN) 25 MG Tab TAKE 1 TABLET(25 MG) BY MOUTH EVERY DAY. REPLACES HCTZ FOR BLOOD PRESSURE 30 tablet 1    cyanocobalamin (VITAMIN B-12) 100  MCG tablet Take 100 mcg by mouth once daily.      cyclobenzaprine (FLEXERIL) 10 MG tablet Take 1 tablet (10 mg total) by mouth 3 (three) times daily as needed for Muscle spasms. 60 tablet 4    EScitalopram oxalate (LEXAPRO) 10 MG tablet Take 1 tablet (10 mg total) by mouth once daily. 90 tablet 0    [START ON 2/27/2024] eszopiclone (LUNESTA) 3 mg Tab Take 1 tablet (3 mg total) by mouth Daily. 30 tablet 2    folic acid (FOLVITE) 1 MG tablet Take 1 mg by mouth once daily.      gabapentin (NEURONTIN) 400 MG capsule 1 t.i.d.; increase by 1 q 5 days p.r.n. up to 2 t.i.d.      lamoTRIgine (LAMICTAL) 25 MG tablet Take 1 tablet (25 mg total) by mouth once daily for 14 days, THEN 2 tablets (50 mg total) once daily for 14 days. 42 tablet 0    levothyroxine (SYNTHROID) 25 MCG tablet Take 25 mcg by mouth once daily.      omeprazole (PRILOSEC) 40 MG capsule Take 1 capsule (40 mg total) by mouth daily as needed (heartburn). 90 capsule 2    propranoloL (INDERAL) 40 MG tablet Take 1 tablet (40 mg total) by mouth 2 (two) times daily. 60 tablet 6    rizatriptan (MAXALT) 10 MG tablet Take 1 tablet (10 mg total) by mouth as needed for Migraine. (Can repeat dose in 2 hours if needed, maximum 3 doses per day). 20 tablet 3    tamsulosin (FLOMAX) 0.4 mg Cap Take 2 capsules (0.8 mg total) by mouth once daily. 60 capsule 11    [DISCONTINUED] busPIRone (BUSPAR) 15 MG tablet Take 2 tablets po q am, 1 tablet po at noon and 1 tablet po q hs 120 tablet 0    [DISCONTINUED] EScitalopram oxalate (LEXAPRO) 10 MG tablet Take 1 tablet (10 mg total) by mouth once daily. 30 tablet 0    [DISCONTINUED] eszopiclone (LUNESTA) 3 mg Tab Take 1 tablet (3 mg total) by mouth nightly as needed (insomnia). 30 tablet 0    [DISCONTINUED] eszopiclone (LUNESTA) 3 mg Tab TAKE 1 TABLET(3 MG) BY MOUTH EVERY NIGHT AS NEEDED FOR INSOMNIA 30 tablet 0    [DISCONTINUED] lamoTRIgine (LAMICTAL) 100 MG tablet Take 1 tablet (100 mg total) by mouth once daily. 30 tablet 0     No  facility-administered encounter medications on file as of 2/8/2024.       Allergy:  Review of patient's allergies indicates:   Allergen Reactions    Codeine Nausea Only    Seroquel [quetiapine]      Heart racing     Tizanidine Other (See Comments)     Caused increased pain and muscle pain         Assessment and Diagnosis   Status/Progress: Based on the examination today, the patient's problem(s) is/are well controlled.  New problems have not been presented today.   Co-morbidities are not complicating management of the primary condition.          General Impression:       ICD-10-CM ICD-9-CM   1. Generalized anxiety disorder  F41.1 300.02   2. Bipolar affective disorder in remission  F31.70 296.80   3. Insomnia, unspecified type  G47.00 780.52        Diff Dx  Bipolar I vs Bipolar II vs MDD with anxiety      Intervention/Counseling/Treatment Plan     Medication Management:  Continue Lamictal 100 mg q day  Continue Buspar to 30 mg q am, 15 mg at noon, and 15 mg q hs  Continue Lunesta 3 mg q hs prn insomnia. Pt was told not drive or to take this med with ETOH or other sedating meds/substance. Pt verbalized understanding.  Continue Lexapro 10 mg q am to target anxiety  Labs: reviewed most recent  Lamictal: Discussed with patient the possibility of developing Awad-Yfn Syndrome (SJS) with the use of Lamictal. Pt was told to STOP taking the Lamictal immediately and to notify the clinic if any rash develops. Advised if he experiences a large, rapidly expanding, blistering/ulcering, or perioral rash to immediately go to ER or call 911. Pt was told not to use any new products that may cause a rash (ex. changing laundry detergent and soap, skin creams/lotions, perfume, etc). Also informed the pt of the most common side effects of the medication including dizziness, sedation, sleep disturbance, ataxia, somnolence, headache, blurred vision and nausea. Instructed pt that he may develop SJS if he misses more than 4 days of  the medicine and will need to begin titration again at 25 mg. The patient expresses understanding of the above and displays the capacity to agree with this treatment given said understanding. Patient also agrees that, currently, the benefits outweigh the risks and would like to pursue treatment at this time.   Discussed with patient informed consent, risks vs. benefits, alternative treatments, side effect profile and the inherent unpredictability of individual responses to these treatments. The patient expresses understanding of the above and displays the capacity to agree with this current plan and had no other questions.  Encouraged Patient to keep future appointments.   Take medications as prescribed and abstain from substance abuse.   Present to ED or call 911 for SI/HI plan or intent, psychosis, or medical emergency.      Return to Clinic: 3 months, or sooner if needed      Face-to-face time with the patient: 18 minutes  Total time:  22 minutes of total time spent on the encounter, which includes face to face time and non-face to face time preparing to see the patient (eg, review of tests), Obtaining and/or reviewing separately obtained history, Documenting clinical information in the electronic or other health record, Independently interpreting results (not separately reported) and communicating results to the patient/family/caregiver, or Care coordination (not separately reported).       Lisa Alejo, MSN, APRN, PMHNP-BC [facility]

## 2024-03-14 DIAGNOSIS — F31.70 BIPOLAR AFFECTIVE DISORDER IN REMISSION: ICD-10-CM

## 2024-03-14 RX ORDER — LAMOTRIGINE 100 MG/1
100 TABLET ORAL DAILY
Qty: 30 TABLET | Refills: 2 | Status: SHIPPED | OUTPATIENT
Start: 2024-03-14 | End: 2024-06-12

## 2024-04-03 ENCOUNTER — PATIENT MESSAGE (OUTPATIENT)
Dept: ADMINISTRATIVE | Facility: HOSPITAL | Age: 59
End: 2024-04-03
Payer: MEDICAID

## 2024-05-09 DIAGNOSIS — M79.10 MUSCLE PAIN: ICD-10-CM

## 2024-05-09 NOTE — TELEPHONE ENCOUNTER
Care Due:                  Date            Visit Type   Department     Provider  --------------------------------------------------------------------------------                                ESTABLISHED                              PATIENT -    [facility]  Last Visit: 05-      [facility]           LAURO PERKINS  Next Visit: None Scheduled  None         None Found                                                            Last  Test          Frequency    Reason                     Performed    Due Date  --------------------------------------------------------------------------------    Office Visit  15 months..  amLODIPine, atorvastatin,   05- 07-                             chlorthalidone,                             omeprazole, propranoloL,                             rizatriptan..............    CMP.........  12 months..  atorvastatin,              03- 03-                             chlorthalidone...........    Lipid Panel.  12 months..  atorvastatin.............  03- 03-    [facility] Embedded Care Due Messages. Reference number: [id number].   5/09/2024 8:04:37 AM CDT

## 2024-05-14 RX ORDER — CYCLOBENZAPRINE HCL 10 MG
10 TABLET ORAL
Qty: 270 TABLET | Refills: 1 | Status: SHIPPED | OUTPATIENT
Start: 2024-05-14

## 2024-06-20 ENCOUNTER — PATIENT MESSAGE (OUTPATIENT)
Dept: PSYCHIATRY | Facility: CLINIC | Age: 59
End: 2024-06-20

## 2024-06-26 DIAGNOSIS — I10 ESSENTIAL HYPERTENSION: ICD-10-CM

## 2024-07-09 ENCOUNTER — PATIENT MESSAGE (OUTPATIENT)
Dept: ADMINISTRATIVE | Facility: HOSPITAL | Age: 59
End: 2024-07-09
Payer: MEDICAID

## 2024-10-16 ENCOUNTER — PATIENT MESSAGE (OUTPATIENT)
Dept: ADMINISTRATIVE | Facility: HOSPITAL | Age: 59
End: 2024-10-16
Payer: MEDICAID

## 2024-11-13 ENCOUNTER — PATIENT MESSAGE (OUTPATIENT)
Dept: ADMINISTRATIVE | Facility: HOSPITAL | Age: 59
End: 2024-11-13
Payer: MEDICAID

## 2024-11-21 ENCOUNTER — PATIENT MESSAGE (OUTPATIENT)
Dept: ADMINISTRATIVE | Facility: HOSPITAL | Age: 59
End: 2024-11-21
Payer: MEDICAID

## 2024-11-21 DIAGNOSIS — R06.02 SOB (SHORTNESS OF BREATH): Primary | ICD-10-CM

## 2024-11-23 ENCOUNTER — PATIENT MESSAGE (OUTPATIENT)
Dept: ORTHOPEDICS | Facility: CLINIC | Age: 59
End: 2024-11-23
Payer: MEDICAID

## 2024-11-25 ENCOUNTER — TELEPHONE (OUTPATIENT)
Dept: ORTHOPEDICS | Facility: CLINIC | Age: 59
End: 2024-11-25
Payer: MEDICAID

## 2024-11-25 NOTE — TELEPHONE ENCOUNTER
I called the patient today regarding his appointment. I left a message for the patient to call me back. I left my name and phone number.  Need to get pt scheduled to see Dr. Lam.    Sincerely,  Aleida Lynn CMA   Certified Clinical Medical Assistant to Dr. Ángel moser  Phone: [phone]  Fax: [phone]

## 2024-11-26 ENCOUNTER — TELEPHONE (OUTPATIENT)
Dept: ORTHOPEDICS | Facility: CLINIC | Age: 59
End: 2024-11-26
Payer: MEDICAID

## 2024-11-26 NOTE — TELEPHONE ENCOUNTER
Spoke to pt and was able to get him scheduled to see Dr. Lam on January 21, 2024 at 3:00 PM.    Sincerely,  Aleida Lynn, [facility]   Certified Clinical Medical Assistant to Dr. Ángel moser  Phone: [phone]  Fax: [phone]

## 2024-12-03 ENCOUNTER — PATIENT MESSAGE (OUTPATIENT)
Dept: ORTHOPEDICS | Facility: CLINIC | Age: 59
End: 2024-12-03
Payer: MEDICAID

## 2024-12-05 ENCOUNTER — PATIENT OUTREACH (OUTPATIENT)
Dept: ADMINISTRATIVE | Facility: HOSPITAL | Age: 59
End: 2024-12-05
Payer: MEDICAID

## 2024-12-05 ENCOUNTER — TELEPHONE (OUTPATIENT)
Dept: ORTHOPEDICS | Facility: CLINIC | Age: 59
End: 2024-12-05
Payer: MEDICAID

## 2024-12-05 DIAGNOSIS — M25.551 BILATERAL HIP PAIN: Primary | ICD-10-CM

## 2024-12-05 DIAGNOSIS — M25.561 PAIN IN BOTH KNEES, UNSPECIFIED CHRONICITY: Primary | ICD-10-CM

## 2024-12-05 DIAGNOSIS — Z11.59 NEED FOR HEPATITIS C SCREENING TEST: Primary | ICD-10-CM

## 2024-12-05 DIAGNOSIS — Z11.4 SCREENING FOR HUMAN IMMUNODEFICIENCY VIRUS: ICD-10-CM

## 2024-12-05 DIAGNOSIS — M25.552 BILATERAL HIP PAIN: Primary | ICD-10-CM

## 2024-12-05 DIAGNOSIS — M25.562 PAIN IN BOTH KNEES, UNSPECIFIED CHRONICITY: Primary | ICD-10-CM

## 2024-12-05 NOTE — PROGRESS NOTES
Health Maintenance Due   Topic Date Due    Hepatitis C Screening  Never done    HIV Screening  Never done    Hemoglobin A1c (Diabetic Prevention Screening)  Never done    Colorectal Cancer Screening  Never done    Influenza Vaccine (1) 09/01/2024    COVID-19 Vaccine (4 - 2024-25 season) 09/01/2024    Shingles Vaccine (2 of 2) 10/14/2024    Patient went back to his old pcp.

## 2024-12-05 NOTE — TELEPHONE ENCOUNTER
Spoke to pt and was able to get him in with Dr. Saravia on December 11, 2024 at 1:00pm.    Sincerely,  Aleida Lynn, [facility]   Certified Clinical Medical Assistant to Dr. Ángel moser  Phone: [phone]  Fax: [phone]

## 2024-12-11 ENCOUNTER — OFFICE VISIT (OUTPATIENT)
Dept: ORTHOPEDICS | Facility: CLINIC | Age: 59
End: 2024-12-11
Payer: MEDICAID

## 2024-12-11 ENCOUNTER — HOSPITAL ENCOUNTER (OUTPATIENT)
Dept: RADIOLOGY | Facility: HOSPITAL | Age: 59
Discharge: HOME | End: 2024-12-11
Attending: ORTHOPAEDIC SURGERY
Payer: MEDICAID

## 2024-12-11 ENCOUNTER — TELEPHONE (OUTPATIENT)
Dept: ORTHOPEDICS | Facility: CLINIC | Age: 59
End: 2024-12-11
Payer: MEDICAID

## 2024-12-11 VITALS — HEIGHT: 68 IN | WEIGHT: 200 LBS | BODY MASS INDEX: 30.31 KG/M2

## 2024-12-11 DIAGNOSIS — B99.9 INFECTION: Primary | ICD-10-CM

## 2024-12-11 DIAGNOSIS — M25.561 PAIN IN BOTH KNEES, UNSPECIFIED CHRONICITY: ICD-10-CM

## 2024-12-11 DIAGNOSIS — M25.562 PAIN IN BOTH KNEES, UNSPECIFIED CHRONICITY: ICD-10-CM

## 2024-12-11 DIAGNOSIS — M25.552 BILATERAL HIP PAIN: ICD-10-CM

## 2024-12-11 DIAGNOSIS — M47.816 LUMBAR SPONDYLOSIS: ICD-10-CM

## 2024-12-11 DIAGNOSIS — M25.551 BILATERAL HIP PAIN: ICD-10-CM

## 2024-12-11 PROCEDURE — 73521 X-RAY EXAM HIPS BI 2 VIEWS: CPT | Mod: 26,,, | Performed by: RADIOLOGY

## 2024-12-11 PROCEDURE — 3008F BODY MASS INDEX DOCD: CPT | Mod: CPTII,,, | Performed by: ORTHOPAEDIC SURGERY

## 2024-12-11 PROCEDURE — 73564 X-RAY EXAM KNEE 4 OR MORE: CPT | Mod: TC,50

## 2024-12-11 PROCEDURE — 73564 X-RAY EXAM KNEE 4 OR MORE: CPT | Mod: 26,50,, | Performed by: RADIOLOGY

## 2024-12-11 PROCEDURE — 73521 X-RAY EXAM HIPS BI 2 VIEWS: CPT | Mod: TC

## 2024-12-11 PROCEDURE — 99214 OFFICE O/P EST MOD 30 MIN: CPT | Mod: PBBFAC,25 | Performed by: ORTHOPAEDIC SURGERY

## 2024-12-11 PROCEDURE — 1160F RVW MEDS BY RX/DR IN RCRD: CPT | Mod: CPTII,,, | Performed by: ORTHOPAEDIC SURGERY

## 2024-12-11 PROCEDURE — 99213 OFFICE O/P EST LOW 20 MIN: CPT | Mod: S$PBB,,, | Performed by: ORTHOPAEDIC SURGERY

## 2024-12-11 PROCEDURE — 1159F MED LIST DOCD IN RCRD: CPT | Mod: CPTII,,, | Performed by: ORTHOPAEDIC SURGERY

## 2024-12-11 PROCEDURE — 99999 PR PBB SHADOW E&M-EST. PATIENT-LVL IV: CPT | Mod: PBBFAC,,, | Performed by: ORTHOPAEDIC SURGERY

## 2024-12-11 RX ORDER — BUPROPION HYDROCHLORIDE 300 MG/1
300 TABLET ORAL DAILY
COMMUNITY

## 2024-12-11 RX ORDER — POTASSIUM CHLORIDE 750 MG/1
10 CAPSULE, EXTENDED RELEASE ORAL ONCE
COMMUNITY

## 2024-12-11 RX ORDER — PANTOPRAZOLE SODIUM 40 MG/1
40 TABLET, DELAYED RELEASE ORAL DAILY
COMMUNITY

## 2024-12-11 RX ORDER — ERGOCALCIFEROL 1.25 MG/1
50000 CAPSULE ORAL
COMMUNITY

## 2024-12-11 RX ORDER — TRAZODONE HYDROCHLORIDE 150 MG/1
150 TABLET ORAL NIGHTLY
COMMUNITY

## 2024-12-11 RX ORDER — MELOXICAM 15 MG/1
15 TABLET ORAL DAILY
Qty: 30 TABLET | Refills: 1 | Status: SHIPPED | OUTPATIENT
Start: 2024-12-11

## 2024-12-11 RX ORDER — TESTOSTERONE CYPIONATE 1000 MG/10ML
200 INJECTION, SOLUTION INTRAMUSCULAR
COMMUNITY

## 2024-12-12 NOTE — PROGRESS NOTES
Subjective:      Patient ID: Tyrone Santos is a 59 y.o. male.    Chief Complaint:   Pain of the Right Knee, Pain of the Left Knee, Pain of the Left Hip, and Pain of the Right Hip    History of Present Illness    CHIEF COMPLAINT:  Patient presents today for evaluation of left groin and hip pain, with associated numbness and tingling in the calves. .    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 He describes two recent falls: one from a curb about two feet high where his left foot caught on the edge, and another from an eight-foot ladder where his right leg gave out, causing him to slide down with his legs split.    Patient characterizes his pain as a pinching sensation, which can occur even when lying still. He experiences pinching, and his right heel can hurt even when lying still. The left side is reported as the most painful area. Patient demonstrates difficulty in movement, explaining that it hurts when bending his knee, both going up and coming back down. He also mentions pain in the groin area, particularly when the leg is manipulated.    Patient reports significant limitations in daily activities due to pain and balance issues, stating that he lacks balance on either leg. He reports difficulty in dressing, particularly when putting on undergarments or shoes, requiring support to maintain balance.    Patient reports experiencing migraines and mentions upcoming appointments with a urologist and a neurosurgeon. He wears a back brace when working, noting that it provides minimal relief. Patient also mentions a history of disc disease in his lower spine from a few years ago.    Patient reports not seeing a pain management specialist recently due to a change in insurance coverage.    Patient denies laying on his left side  due to pain. Patient denies taking any regular arthritis medication.    MEDICATIONS:  Patient is on migraine medication. He was prescribed Gabapentin but has not taken it for at least a month. He takes multiple medications in the morning with food to avoid stomach upset.    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 He wears a back brace at work, which provides minimal relief. Pain affects his mobility at work, and he experiences difficulty with balance, dressing, and putting on shoes. Patient has not been working for the current month.        Objective:   Ortho/SPM Exam  There is is a mild varus deformity, which is passively correctable.  Active range of motion is 0-115 degrees.  Anterior crepitus with active extension.  Patellar mobility is mildly limited.  Boggy synovitis without effusion.  Medial joint line tenderness predominates.  No instability to varus/valgus/Lachman's stressing.  No pain in the groin with flexion and internal rotation of the hip which is not limited.  Skin intact.  Distal neurovascular intact.  Flip test negative.          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  X-rays of the hips and pelvis show bilateral total hip arthroplasty without signs of loosening or other complications.  Assessment/Plan   We will check infection labs to rule out infection with his hip replacements.  I think that his pain is related to his lumbar spondylosis, with possible radiculopathy.    Prescribed meloxicam daily for arthritis pain.  The patient is prescribed meloxicam for short-term daily use, with intermittent as-needed use over the longer term.   Appropriate cautions are provided regarding possible GI and renal adverse effects.  The synergy of meloxicam and acetaminophen is described, and Tylenol Arthritis or the generic  equivalent is recommended, as often as every 8 hours.  The patient is cautioned to avoid exceeding 3000 mg of Tylenol every 24 hours.  The record is reviewed regarding history renal or hepatic dysfunction, history of GI disease.    Referred to spine specialist for evaluation of back pain and related symptoms.  Discussed the possibility of a new MRI of the lumbar spine, which may be ordered by the spine specialist.        The patient's pathophysiology was explained in detail with reference to x-rays, models, other visual aids as appropriate.  Treatment options were discussed in detail.  Questions were invited and answered to the patient's satisfaction.    This note was generated with the assistance of ambient listening technology. Verbal consent was obtained by the patient and accompanying visitor(s) for the recording of patient appointment to facilitate this note. I attest to having reviewed and edited the generated note for accuracy, though some syntax or spelling errors may persist. Please contact the author of this note for any clarification.       Jose Saravia MD  Orthopedic Surgery

## 2024-12-12 NOTE — PROGRESS NOTES
"DATE: 12/19/2024  PATIENT: Tyrone Santos    Attending Physician: Bart Frye M.D.    HISTORY:  Tyrone Santos is a 59 y.o. male who returns to me today for follow up.  He was last seen by QUETA Isidro.  Today he is doing well but notes continued low back and bilateral lateral hip pain. He treats his pain with Mobic and Tylenol with little relief. He had an BOZENA in 2022 that gave relief for a few days. He states his lower legs often feel weak. Denies recent fall.  The Patient denies myelopathic symptoms such as handwriting changes or difficulty with buttons/coins/keys. Denies perineal paresthesias, bowel/bladder dysfunction.    EXAM:  Ht 5' 8" (1.727 m)   Wt 91.9 kg (202 lb 9.6 oz)   BMI 30.81 kg/m²     My physical examination was notable for the following findings:     Normal station and gait, no difficulty with toe or heel walk.   Dorsal lumbar skin negative for rashes, lesions, hairy patches and surgical scars. There is mild lumbar tenderness to palpation.  Lumbar range of motion is acceptable.  Global saggital and coronal spinal balance acceptable, not significant for scoliosis and kyphosis.  No pain with the range of motion of the bilateral hips. No trochanteric tenderness to palpation.  Bilateral lower extremities warm and well perfused, dorsalis pedis pulses 2+ bilaterally.  Normal strength and tone in all major motor groups in the bilateral lower extremities. decreased sensation to light touch in the L2-S1 dermatomes bilaterally.  Deep tendon reflexes symmetric 2+ in the bilateral lower extremities.  Negative Babinski bilaterally. Straight leg raise posiitve bilaterally.    IMAGING:  Today I personally re- reviewed AP, Lat and Flex/Ex  upright L-spine that demonstrate trace retrolisthesis of L4 on L5.     Lumbar MRI 2022 shows moderate stenosis at L4/5.     Body mass index is 30.81 kg/m².    No results found for: "HGBA1C"      ASSESSMENT/PLAN:    Tyrone was seen today for low-back pain and back " pain.    Diagnoses and all orders for this visit:    Dorsalgia, unspecified  -     MRI Lumbar Spine Without Contrast; Future    Lumbar spondylosis  -     Ambulatory referral/consult to [facility]  -     X-Ray Lumbar Spine Ap Lateral w/Flex Ext; Future  -     MRI Lumbar Spine Without Contrast; Future  -     methocarbamoL (ROBAXIN) 750 MG Tab; Take 1-2 tablets (750-1,500 mg total) by mouth 3 (three) times daily as needed (muscle tension).  -     pregabalin (LYRICA) 75 MG capsule; Take 1 capsule (75 mg total) by mouth 2 (two) times daily.        Xray and MRI ordered. I will discuss results with Dr. Topete and update the patient with a surgical plan.   I had a sit down discussion with the patient regarding a TLIF. We specifically discussed the risks, benefits, and alternatives to surgery. We discussed the surgical procedure including the skin incision, nerve decompression, bone fusion, allograft, iliac crest bone graft, and surgical implants including pedicle screws and interbody devices as indicated: they understand the risks include but are not limited to death, paralysis, blindness, bleeding, infection, damage to arteries, veins and nerves, spinal fluid leak, continued or worsening pain, no improvement in symptoms, non-union, and the possible need for more surgery in the future, the possible need for perioperative blood transfusion, as well as the possibility other unforseen and unknown complications. We talked about expected hospital stay and recovery period. All questions were answered; they understand and wish to proceed.

## 2024-12-19 ENCOUNTER — OFFICE VISIT (OUTPATIENT)
Dept: ORTHOPEDICS | Facility: CLINIC | Age: 59
End: 2024-12-19
Payer: MEDICAID

## 2024-12-19 VITALS — HEIGHT: 68 IN | BODY MASS INDEX: 30.71 KG/M2 | WEIGHT: 202.63 LBS

## 2024-12-19 DIAGNOSIS — M47.816 LUMBAR SPONDYLOSIS: ICD-10-CM

## 2024-12-19 DIAGNOSIS — M54.9 DORSALGIA, UNSPECIFIED: Primary | ICD-10-CM

## 2024-12-19 PROCEDURE — 99215 OFFICE O/P EST HI 40 MIN: CPT | Mod: PBBFAC | Performed by: REGISTERED NURSE

## 2024-12-19 PROCEDURE — 1160F RVW MEDS BY RX/DR IN RCRD: CPT | Mod: CPTII,,, | Performed by: REGISTERED NURSE

## 2024-12-19 PROCEDURE — 1159F MED LIST DOCD IN RCRD: CPT | Mod: CPTII,,, | Performed by: REGISTERED NURSE

## 2024-12-19 PROCEDURE — 99999 PR PBB SHADOW E&M-EST. PATIENT-LVL V: CPT | Mod: PBBFAC,,, | Performed by: REGISTERED NURSE

## 2024-12-19 PROCEDURE — 3008F BODY MASS INDEX DOCD: CPT | Mod: CPTII,,, | Performed by: REGISTERED NURSE

## 2024-12-19 PROCEDURE — 99215 OFFICE O/P EST HI 40 MIN: CPT | Mod: S$PBB,,, | Performed by: REGISTERED NURSE

## 2024-12-19 RX ORDER — PREGABALIN 75 MG/1
75 CAPSULE ORAL 2 TIMES DAILY
Qty: 60 CAPSULE | Refills: 5 | Status: SHIPPED | OUTPATIENT
Start: 2024-12-19 | End: 2025-06-19

## 2024-12-19 RX ORDER — CARIPRAZINE 1.5 MG/1
1.5 CAPSULE, GELATIN COATED ORAL
COMMUNITY
Start: 2024-11-04

## 2024-12-19 RX ORDER — LUMATEPERONE 42 MG/1
CAPSULE ORAL
COMMUNITY
Start: 2024-12-04

## 2024-12-19 RX ORDER — ALFUZOSIN HYDROCHLORIDE 10 MG/1
10 TABLET, EXTENDED RELEASE ORAL
COMMUNITY
Start: 2024-12-13

## 2024-12-19 RX ORDER — QUETIAPINE FUMARATE 200 MG/1
200 TABLET, FILM COATED ORAL NIGHTLY
COMMUNITY
Start: 2024-10-17

## 2024-12-19 RX ORDER — METHOCARBAMOL 750 MG/1
750-1500 TABLET, FILM COATED ORAL 3 TIMES DAILY PRN
Qty: 60 TABLET | Refills: 2 | Status: SHIPPED | OUTPATIENT
Start: 2024-12-19 | End: 2025-01-18

## 2025-01-03 NOTE — PROGRESS NOTES
"Established Patient - Audio Only Telehealth Visit     The patient location is: [place]  The chief complaint leading to consultation is: MRI results  Visit type: Virtual visit with audio only (telephone)  Total time spent with patient: 20min     The reason for the audio only service rather than synchronous audio and video virtual visit was related to technical difficulties or patient preference/necessity.     Each patient to whom I provide medical services by telemedicine is:  (1) informed of the relationship between the physician and patient and the respective role of any other health care provider with respect to management of the patient; and (2) notified that they may decline to receive medical services by telemedicine and may withdraw from such care at any time. Patient verbally consented to receive this service via voice-only telephone call.    DATE: 1/7/2025  PATIENT: Tyrone Santos    Attending Physician: Bart Frye M.D.    HISTORY:  Tyrone Santos is a 59 y.o. male who returns to me today for MRI results.  He was last seen by me 12/19/2024.  Today he is doing well but notes low back and bilateral lateral hip pain.   The Patient denies myelopathic symptoms such as handwriting changes or difficulty with buttons/coins/keys. Denies perineal paresthesias, bowel/bladder dysfunction.    IMAGING:  Today I personally re- reviewed AP, Lat and Flex/Ex  upright L-spine that demonstrate trace retrolisthesis of L4 on L5.     Lumbar MRI shows moderate stenosis from L3-5.     There is no height or weight on file to calculate BMI.    No results found for: "HGBA1C"      ASSESSMENT/PLAN:    Diagnoses and all orders for this visit:    Lumbar radiculopathy  -     Cancel: EMG W/ ULTRASOUND AND NERVE CONDUCTION TEST 2 Extremities; Future  -     EMG W/ ULTRASOUND AND NERVE CONDUCTION TEST 2 Extremities; Future  -     EMG W/ ULTRASOUND AND NERVE CONDUCTION TEST 2 Extremities        EMG ordered outside of [facility]. He will send " results to me. I will have Dr. Frye review and discuss surgery at that time     This service was not originating from a related E/M service provided within the previous 7 days nor will  to an E/M service or procedure within the next 24 hours or my soonest available appointment.  Prevailing standard of care was able to be met in this audio-only visit.

## 2025-01-06 ENCOUNTER — HOSPITAL ENCOUNTER (OUTPATIENT)
Dept: RADIOLOGY | Facility: HOSPITAL | Age: 60
Discharge: HOME | End: 2025-01-06
Attending: REGISTERED NURSE
Payer: MEDICAID

## 2025-01-06 DIAGNOSIS — M54.9 DORSALGIA, UNSPECIFIED: ICD-10-CM

## 2025-01-06 DIAGNOSIS — M47.816 LUMBAR SPONDYLOSIS: ICD-10-CM

## 2025-01-06 PROCEDURE — 72148 MRI LUMBAR SPINE W/O DYE: CPT | Mod: TC

## 2025-01-06 PROCEDURE — 72110 X-RAY EXAM L-2 SPINE 4/>VWS: CPT | Mod: TC

## 2025-01-06 PROCEDURE — 72148 MRI LUMBAR SPINE W/O DYE: CPT | Mod: 26,,, | Performed by: RADIOLOGY

## 2025-01-06 PROCEDURE — 72110 X-RAY EXAM L-2 SPINE 4/>VWS: CPT | Mod: 26,,, | Performed by: RADIOLOGY

## 2025-01-07 ENCOUNTER — OFFICE VISIT (OUTPATIENT)
Dept: ORTHOPEDICS | Facility: CLINIC | Age: 60
End: 2025-01-07
Payer: MEDICAID

## 2025-01-07 ENCOUNTER — HOSPITAL ENCOUNTER (OUTPATIENT)
Dept: CARDIOLOGY | Facility: HOSPITAL | Age: 60
Discharge: HOME | End: 2025-01-07
Attending: FAMILY MEDICINE
Payer: MEDICAID

## 2025-01-07 VITALS
HEART RATE: 70 BPM | BODY MASS INDEX: 30.62 KG/M2 | WEIGHT: 202 LBS | DIASTOLIC BLOOD PRESSURE: 80 MMHG | SYSTOLIC BLOOD PRESSURE: 125 MMHG | HEIGHT: 68 IN

## 2025-01-07 DIAGNOSIS — M54.16 LUMBAR RADICULOPATHY: Primary | ICD-10-CM

## 2025-01-07 DIAGNOSIS — R06.02 SOB (SHORTNESS OF BREATH): ICD-10-CM

## 2025-01-07 LAB
ASCENDING AORTA: 3.39 CM
AV AREA BY CONTINUOUS VTI: 3.2 CM2
AV INDEX (PROSTH): 0.84
AV LVOT MEAN GRADIENT: 2 MMHG
AV LVOT PEAK GRADIENT: 3 MMHG
AV MEAN GRADIENT: 2.3 MMHG
AV PEAK GRADIENT: 4 MMHG
AV VALVE AREA BY VELOCITY RATIO: 3.4 CM²
AV VALVE AREA: 3.2 CM2
AV VELOCITY RATIO: 0.9
BSA FOR ECHO PROCEDURE: 2.1 M2
CV ECHO LV RWT: 0.37 CM
DOP CALC AO PEAK VEL: 1 M/S
DOP CALC AO VTI: 19.2 CM
DOP CALC LVOT AREA: 3.8 CM2
DOP CALC LVOT DIAMETER: 2.2 CM
DOP CALC LVOT PEAK VEL: 0.9 M/S
DOP CALC LVOT STROKE VOLUME: 61.6 CM3
DOP CALC RVOT AREA: 2.92 CM2
DOP CALC RVOT DIAMETER: 1.93 CM
DOP CALCLVOT PEAK VEL VTI: 16.2 CM
E WAVE DECELERATION TIME: 186.68 MS
E/A RATIO: 0.76
E/E' RATIO: 5.67 M/S
ECHO EF ESTIMATED: 51 %
ECHO LV POSTERIOR WALL: 0.9 CM (ref 0.6–1.1)
FRACTIONAL SHORTENING: 24.5 % (ref 28–44)
GLOBAL LONGITUIDAL STRAIN: 13 %
HR MV ECHO: 70 BPM
INTERVENTRICULAR SEPTUM: 0.9 CM (ref 0.6–1.1)
IVC DIAMETER: 1.29 CM
LA MAJOR: 3.24 CM
LA MINOR: 3.48 CM
LA WIDTH: 2.91 CM
LEFT ATRIUM SIZE: 3.26 CM
LEFT ATRIUM VOLUME INDEX MOD: 10.4 ML/M2
LEFT ATRIUM VOLUME INDEX: 13.2 ML/M2
LEFT ATRIUM VOLUME MOD: 21.27 ML
LEFT ATRIUM VOLUME: 27.06 CM3
LEFT INTERNAL DIMENSION IN SYSTOLE: 3.7 CM (ref 2.1–4)
LEFT VENTRICLE DIASTOLIC VOLUME INDEX: 56.04 ML/M2
LEFT VENTRICLE DIASTOLIC VOLUME: 114.88 ML
LEFT VENTRICLE MASS INDEX: 74.6 G/M2
LEFT VENTRICLE SYSTOLIC VOLUME INDEX: 27.7 ML/M2
LEFT VENTRICLE SYSTOLIC VOLUME: 56.8 ML
LEFT VENTRICULAR INTERNAL DIMENSION IN DIASTOLE: 4.9 CM (ref 3.5–6)
LEFT VENTRICULAR MASS: 153 G
LV LATERAL E/E' RATIO: 4.25
LV SEPTAL E/E' RATIO: 8.5
MV A" WAVE DURATION": 122.74 MS
MV MEAN GRADIENT: 1 MMHG
MV PEAK A VEL: 0.67 M/S
MV PEAK E VEL: 0.51 M/S
OHS CV RV/LV RATIO: 0.53 CM
OHS LV EJECTION FRACTION SIMPSONS BIPLANE MOD: 52 %
PISA TR MAX VEL: 2.18 M/S
PULM VEIN A" WAVE DURATION": 122.74 MS
PULM VEIN S/D RATIO: 1.21
PULMONIC VEIN PEAK A VELOCITY: 0.3 M/S
PV PEAK D VEL: 0.33 M/S
PV PEAK S VEL: 0.4 M/S
RA MAJOR: 3.18 CM
RA PRESSURE ESTIMATED: 3 MMHG
RA WIDTH: 2.23 CM
RIGHT ATRIAL AREA: 9.1 CM2
RIGHT VENTRICLE DIASTOLIC BASEL DIMENSION: 2.6 CM
RV TB RVSP: 5 MMHG
RV TISSUE DOPPLER FREE WALL SYSTOLIC VELOCITY 1 (APICAL 4 CHAMBER VIEW): 10.81 CM/S
SINUS: 3.09 CM
STJ: 3.48 CM
TDI LATERAL: 0.12 M/S
TDI SEPTAL: 0.06 M/S
TDI: 0.09 M/S
TR MAX PG: 19 MMHG
TRICUSPID ANNULAR PLANE SYSTOLIC EXCURSION: 1.64 CM
TV PEAK GRADIENT: 19 MMHG
TV REST PULMONARY ARTERY PRESSURE: 22 MMHG
Z-SCORE OF LEFT VENTRICULAR DIMENSION IN END DIASTOLE: -2.27
Z-SCORE OF LEFT VENTRICULAR DIMENSION IN END SYSTOLE: -0.14

## 2025-01-07 PROCEDURE — 93356 MYOCRD STRAIN IMG SPCKL TRCK: CPT

## 2025-01-07 PROCEDURE — 93356 MYOCRD STRAIN IMG SPCKL TRCK: CPT | Mod: ,,, | Performed by: INTERNAL MEDICINE

## 2025-01-07 PROCEDURE — 93306 TTE W/DOPPLER COMPLETE: CPT | Mod: 26,,, | Performed by: INTERNAL MEDICINE

## 2025-01-10 DIAGNOSIS — R06.02 SOB (SHORTNESS OF BREATH): Primary | ICD-10-CM

## 2025-01-13 ENCOUNTER — TELEPHONE (OUTPATIENT)
Dept: ORTHOPEDICS | Facility: CLINIC | Age: 60
End: 2025-01-13
Payer: MEDICAID

## 2025-01-13 NOTE — TELEPHONE ENCOUNTER
Spoke to patient regarding his nerve testing. Stated to patient because the orders were sent outside of [facility], someone from [facility] will be contacting him to get scheduled. Patient stated thank you. Thanks.

## 2025-01-30 ENCOUNTER — PATIENT MESSAGE (OUTPATIENT)
Dept: ORTHOPEDICS | Facility: CLINIC | Age: 60
End: 2025-01-30
Payer: MEDICAID

## 2025-02-11 ENCOUNTER — PATIENT MESSAGE (OUTPATIENT)
Dept: ORTHOPEDICS | Facility: CLINIC | Age: 60
End: 2025-02-11
Payer: MEDICAID

## 2025-02-12 DIAGNOSIS — M54.16 LUMBAR RADICULOPATHY: Primary | ICD-10-CM

## 2025-03-06 ENCOUNTER — PATIENT MESSAGE (OUTPATIENT)
Dept: ORTHOPEDICS | Facility: CLINIC | Age: 60
End: 2025-03-06
Payer: MEDICAID

## 2025-03-16 DIAGNOSIS — F41.1 GENERALIZED ANXIETY DISORDER: ICD-10-CM

## 2025-03-17 RX ORDER — ESCITALOPRAM OXALATE 10 MG/1
10 TABLET ORAL
Qty: 90 TABLET | Refills: 0 | Status: SHIPPED | OUTPATIENT
Start: 2025-03-17

## 2025-04-01 ENCOUNTER — PROCEDURE VISIT (OUTPATIENT)
Dept: NEUROLOGY | Facility: CLINIC | Age: 60
End: 2025-04-01
Payer: MEDICAID

## 2025-04-01 DIAGNOSIS — M54.16 LUMBAR RADICULOPATHY: ICD-10-CM

## 2025-04-01 PROCEDURE — 95886 MUSC TEST DONE W/N TEST COMP: CPT | Mod: 26,S$PBB,, | Performed by: PHYSICAL MEDICINE & REHABILITATION

## 2025-04-01 PROCEDURE — 95886 MUSC TEST DONE W/N TEST COMP: CPT | Mod: PBBFAC,PN | Performed by: PHYSICAL MEDICINE & REHABILITATION

## 2025-04-01 PROCEDURE — 95910 NRV CNDJ TEST 7-8 STUDIES: CPT | Mod: PBBFAC,PN | Performed by: PHYSICAL MEDICINE & REHABILITATION

## 2025-04-01 PROCEDURE — 95910 NRV CNDJ TEST 7-8 STUDIES: CPT | Mod: 26,S$PBB,, | Performed by: PHYSICAL MEDICINE & REHABILITATION

## 2025-04-01 NOTE — PROCEDURES
Test Date:  2025    Patient: huma santos : 1965 Physician: Ronnie Franco D.O.   ID#: [id number] SEX: Male Ref. Phys: EDILBERTO Bradshaw, GIANNA     HPI: Huma Santos is a 60 y.o.male who presents for NCS/EMG to evaluate for lumbosacral radiculopathy.     PROCEDURE:  Prior to the procedure, the procedure was discussed in detail with the patient.  All questions were answered, and verbal consent was obtained.  For nerve conduction studies, a combination of surface electrodes, bar electrodes, and/or ring electrodes were used as needed.  For needle EMG, each site was cleaned and prepped in usual fashion with an alcohol pad.  A monopolar needle (28G) was used.  There was no significant bleeding, and bandages were applied as needed.  The procedure was tolerated without adverse effect.  The patient was instructed on post-procedure care including ice if needed for 10-15 minutes up to 4 times/day for any sore muscles.  I discussed with the patient that the data would be reviewed and a report sent to the referring provider, where any follow up questions regarding next steps should be directed.        NCV & EMG Findings:  All nerve conduction studies (as indicated in the following tables) were within normal limits.  All examined muscles (as indicated in the following table) showed no evidence of electrical instability.      IMPRESSIONS:  This is a normal electrophysiologic study of the bilateral lower extremities.  There is no definitive evidence of a lumbosacral radiculopathy on today's electrophysiologic study.  Please note that there are a few caveats to consider with radiculopathy as it relates to NCS/EMG testing.  NCS is often normal in radiculopathy, so we rely on the needle EMG for diagnosis.  If prior posterior cervical or lumbar surgery has been done, false positives are known to occur when evaluating paraspinal musculature.  The needle EMG will also be normal in purely demyelinating radiculopathy lesions,  in predominant sensory nerve root/dorsal root lesions, and in some cases hyperacute lesions.  In these cases, the EMG/NCS will be normal, and can miss radiculopathy.  Sensitivity for needle EMG is estimated to be between 49%-86% with lumbosacral radiculopathy, so NCS/EMG shouldn't be used to definitively rule out radiculopathy, especially if there is a reasonable clinical suspicion.                ___________________________  Ronnie Franco D.O.        NCS+  Motor Nerve Results      Latency Amplitude F-Lat Segment Distance CV Comment   Site (ms) Norm (mV) Norm (ms)  (cm) (m/s) Norm    Left Fibular (EDB)   Ankle 3.6  < 6.5 2.7  > 1.10         Bel Fib Head 11.3 - 2.0 -  Bel Fib Head-Ankle 34 44  > 36    Pop Fossa 12.1 - 4.1 -  Pop Fossa-Bel Fib Head 12 150  > 42    Right Fibular (EDB)   Ankle 3.8  < 6.5 1.10  > 1.10         Bel Fib Head 10.4 - 0.75 -  Bel Fib Head-Ankle 34 52  > 36    Pop Fossa 11.9 - 2.9 -  Pop Fossa-Bel Fib Head 12 80  > 42    Left Tibial (AHB)   Ankle 3.7  < 6.1 14.8  > 1.10         Right Tibial (AHB)   Ankle 3.2  < 6.1 12.5  > 1.10           Sensory Nerve Results      Start Lat Latency (Peak) Amplitude (P-P) Segment Distance Start CV Comment   Site (ms) Norm (ms) (µV) Norm  (cm) (m/s) Norm    Left Sural (Rec:Lat Mall)   Calf 1.80  < 3.6 2.4 18  > 4 Calf-Lat Mall 14 78  > 39    Right Sural (Rec:Lat Mall)   Calf 1.93  < 3.6 2.6 24  > 4 Calf-Lat Mall 14 73  > 39    Right Superficial Fibular (Rec:Ankle)   14 cm 2.0 - 2.6 8  > 5 14 cm-Ankle 14 70  > 32      EMG+     Side Muscle Nerve Root Ins Act Fibs Psw Amp Dur Poly Recrt Int Pat Comment   Right Vastus Med Femoral L2-L4 Nml Nml Nml Nml Nml 0 Nml Nml    Right Tib Anterior Fibular,  Deep Fibula... L4-L5 Nml Nml Nml Nml Nml 0 Nml Nml    Right Fib Longus Fibular,  Superficial... L5-S1 Nml Nml Nml Nml Nml 0 Nml Nml    Right Gastroc Tibial S1-S2 Nml Nml Nml Nml Nml 0 Nml Nml    Right Dorsal Interossei ped I Lateral Plantar S2-S3 Nml Nml Nml Nml Nml 0  Nml Nml    Left Vastus Med Femoral L2-L4 Nml Nml Nml Nml Nml 0 Nml Nml    Left Tib Anterior Fibular,  Deep Fibula... L4-L5 Nml Nml Nml Nml Nml 0 Nml Nml    Left Fib Longus Fibular,  Superficial... L5-S1 Nml Nml Nml Nml Nml 0 Nml Nml    Left Gastroc Tibial S1-S2 Nml Nml Nml Nml Nml 0 Nml Nml    Left Dorsal Interossei ped I Lateral Plantar S2-S3 Nml Nml Nml Nml Nml 0 Nml Nml    Right Lumbo Parasp (Lower) Rami L5-S1 Nml Nml Nml         Left Lumbo Parasp (Lower) Rami L5-S1 Nml Nml Nml                 Waveforms:    Motor              Sensory

## 2025-04-09 ENCOUNTER — OFFICE VISIT (OUTPATIENT)
Dept: ORTHOPEDICS | Facility: CLINIC | Age: 60
End: 2025-04-09
Payer: MEDICAID

## 2025-04-09 VITALS — HEIGHT: 68 IN | WEIGHT: 201.94 LBS | BODY MASS INDEX: 30.61 KG/M2

## 2025-04-09 DIAGNOSIS — M47.816 LUMBAR SPONDYLOSIS: Primary | ICD-10-CM

## 2025-04-09 DIAGNOSIS — M43.10 DEGENERATIVE SPONDYLOLISTHESIS: ICD-10-CM

## 2025-04-09 DIAGNOSIS — M51.362 DEGENERATION OF INTERVERTEBRAL DISC OF LUMBAR REGION WITH DISCOGENIC BACK PAIN AND LOWER EXTREMITY PAIN: ICD-10-CM

## 2025-04-09 DIAGNOSIS — M54.16 LUMBAR RADICULOPATHY: ICD-10-CM

## 2025-04-09 PROCEDURE — 99214 OFFICE O/P EST MOD 30 MIN: CPT | Mod: PBBFAC | Performed by: ORTHOPAEDIC SURGERY

## 2025-04-09 PROCEDURE — 99999 PR PBB SHADOW E&M-EST. PATIENT-LVL IV: CPT | Mod: PBBFAC,,, | Performed by: ORTHOPAEDIC SURGERY

## 2025-04-11 NOTE — PROGRESS NOTES
DATE: 4/11/2025  PATIENT: Tyrone Santos    Attending Physician: Bart Frye M.D.    CHIEF COMPLAINT: LBP and LLE pain    HISTORY:  Tyrone Santos is a 60 y.o. male presents for initial evaluation of low back and left leg pain (Back - 6, Leg - 6). Back/leg pain is about 90/10%. The pain has been present for 1 year. The patient describes the pain as dull and it radiates posterolaterally down LLE to the foot.  The pain is worse with activity and improved by rest. There is no associated numbness and tingling. There is no subjective weakness. Prior treatments have included OTC meds, PT, BOZENA, but no surgery.    The Patient denies myelopathic symptoms such as handwriting changes or difficulty with buttons/coins/keys. Denies perineal paresthesias, bowel/bladder dysfunction.    The patient does not smoke, have DM or endorse IVDU. The patient is not on any blood thinners and does not take chronic narcotics. 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    PAST MEDICAL/SURGICAL HISTORY:  Past Medical History:   Diagnosis Date    Addiction to drug     Alcohol abuse     Anxiety     Bipolar disorder     Depression     Difficulty urinating     GERD (gastroesophageal reflux disease)     Hx of psychiatric care     Hyperlipidemia     Hypertension     Ade     Psychiatric problem     S/P cervical spinal fusion 12/06/2018    S/P laparoscopic appendectomy 01/03/2017    Sleep apnea     Therapy     Thyroid disease      Past Surgical History:   Procedure Laterality Date    ANTERIOR CERVICAL DISCECTOMY W/ FUSION N/A 10/23/2018    Procedure: DISCECTOMY, SPINE, CERVICAL, ANTERIOR APPROACH, WITH FUSION C3-4;  Surgeon: Jw Anne MD;  Location: [facility] OR [address] [place] [address];  Service: Neurosurgery;  Laterality: N/A;    APPENDECTOMY      around 2012    ARTHROPLASTY OF HIP BY ANTERIOR APPROACH Left 8/23/2021    Procedure: ARTHROPLASTY, HIP, TOTAL, ANTERIOR APPROACH:LEFT:DPEUY-ACTIS+PINNACLE;  Surgeon: Ángel Lam  "III, MD;  Location: [facility];  Service: Orthopedics;  Laterality: Left;    ARTHROPLASTY OF HIP BY ANTERIOR APPROACH Right 8/18/2022    Procedure: ARTHROPLASTY, HIP, TOTAL, ANTERIOR APPROACH: RIGHT: DEPUY-ACTIS+PINNACLE;  Surgeon: Ángel Lam III, MD;  Location: [facility];  Service: Orthopedics;  Laterality: Right;    INJECTION OF ANESTHETIC AGENT AROUND NERVE Left 7/1/2021    Procedure: BLOCK, NERVE, OBTURATOR and FEMORAL;  Surgeon: Josefina Lee MD;  Location: [facility];  Service: Pain Management;  Laterality: Left;    SCAPHOID FRACTURE SURGERY      had to have a bone graft -early  1990's     WRIST SURGERY      6 years ago       Current Medications: Current Medications[1]    Social History: Social History[2]    REVIEW OF SYSTEMS:  Constitution: Negative. Negative for chills, fever and night sweats.   Cardiovascular: Negative for chest pain and syncope.   Respiratory: Negative for cough and shortness of breath.   Gastrointestinal: See HPI. Negative for nausea/vomiting. Negative for abdominal pain.  Genitourinary: See HPI. Negative for discoloration or dysuria.  Hematologic/Lymphatic: negative for bleeding/clotting disorders.   Musculoskeletal: Negative for falls and muscle weakness.   Neurological: See HPI. no history of seizures. no history of cranial surgery or shunts.  Neurological: See HPI. No seizures.   Endocrine: Negative for polydipsia, polyphagia and polyuria.   Allergic/Immunologic: Negative for hives and persistent infections.     EXAM:  Ht 5' 8" (1.727 m)   Wt 91.6 kg (201 lb 15.1 oz)   BMI 30.71 kg/m²     PHYSICAL EXAMINATION:    General: The patient is a 60 y.o. male in no apparent distress, the patient is orientatied to person, place and time.  Psych: Normal mood and affect  HEENT: Vision grossly intact, hearing intact to the spoken word.  Lungs: Respirations unlabored.  Gait: Normal station and gait, no difficulty with toe or heel walk.   Skin: Dorsal lumbar skin negative for rashes, " "lesions, hairy patches and surgical scars. There is lower lumbar tenderness to palpation.  Range of motion: Lumbar range of motion is acceptable.  Spinal Balance: Global saggital and coronal spinal balance acceptable, no significant for scoliosis and kyphosis.  Musculoskeletal: No pain with the range of motion of the bilateral hips. No trochanteric tenderness to palpation.  Vascular: Bilateral lower extremities warm and well perfused, Dorsalis pedis pulses 2+ bilaterally.  Neurological: Normal strength and tone in all major motor groups in the bilateral lower extremities. Normal sensation to light touch in the L2-S1 dermatomes bilaterally.  Deep tendon reflexes symmetric 2+ in the bilateral lower extremities.  Negative Babinski bilaterally. Straight leg raise negative bilaterally.    IMAGING:   Today I independently reviewed the following images and my interpretations are as follows:    AP, Lat and Flex/Ex  upright L-spine demonstrate spondylosis and DDD. L4-5 retrolisthesis.    Lumbar MRI showed L4-5 disc bulge with mod central stenosis.    EMG BLE was normal.    Body mass index is 30.71 kg/m².  No results found for: "HGBA1C"    ASSESSMENT/PLAN:    Tyrone was seen today for pain.    Diagnoses and all orders for this visit:    Lumbar spondylosis    Degeneration of intervertebral disc of lumbar region with discogenic back pain and lower extremity pain    Lumbar radiculopathy  -     IR BOZENA Lumbar; Future    Degenerative spondylolisthesis      Follow up in about 3 months (around 7/9/2025).    Patient has lumbar spondylosis and stenosis with LLE radiculopathy. I discussed the natural history of their diagnoses as well as surgical and nonsurgical treatment options. I educated the patient on the importance of core/back strengthening, correct posture, bending/lifting ergonomics, and low-impact aerobic exercises (walking, elliptical, and aquatherapy). Continue medications. I ordered L4-5 BOZENA with IR (medicaid). Patient will " follow up in 3 months for re-evaluation.    Bart Frye MD  Orthopaedic Spine Surgeon  Department of Orthopaedic Surgery  [phone]           [1]   Current Outpatient Medications:     alfuzosin (UROXATRAL) 10 mg Tb24, Take 10 mg by mouth., Disp: , Rfl:     amLODIPine (NORVASC) 5 MG tablet, Take 1 tablet (5 mg total) by mouth every evening. (For blood pressure), Disp: 90 tablet, Rfl: 1    atorvastatin (LIPITOR) 40 MG tablet, Take 1 tablet (40 mg total) by mouth every evening. For cholesterol (new dose), Disp: 90 tablet, Rfl: 1    buPROPion (WELLBUTRIN XL) 300 MG 24 hr tablet, Take 300 mg by mouth once daily., Disp: , Rfl:     busPIRone (BUSPAR) 15 MG tablet, Take 2 tablets po q am, 1 tablet po at noon and 1 tablet po q hs, Disp: 120 tablet, Rfl: 0    calcium-vitamin D3 (OS-JALYN 500 + D3) 500 mg(1,250mg) -200 unit per tablet, Take 1 tablet by mouth 2 (two) times daily with meals., Disp: , Rfl:     CAPLYTA 42 mg Cap, , Disp: , Rfl:     chlorthalidone (HYGROTEN) 25 MG Tab, TAKE 1 TABLET(25 MG) BY MOUTH EVERY DAY. REPLACES HCTZ FOR BLOOD PRESSURE, Disp: 30 tablet, Rfl: 1    cyanocobalamin (VITAMIN B-12) 100 MCG tablet, Take 100 mcg by mouth once daily., Disp: , Rfl:     ergocalciferol (ERGOCALCIFEROL) 50,000 unit Cap, Take 50,000 Units by mouth every 7 days., Disp: , Rfl:     EScitalopram oxalate (LEXAPRO) 10 MG tablet, TAKE 1 TABLET(10 MG) BY MOUTH EVERY DAY, Disp: 90 tablet, Rfl: 0    folic acid (FOLVITE) 1 MG tablet, Take 1 mg by mouth once daily., Disp: , Rfl:     lamoTRIgine (LAMICTAL) 100 MG tablet, Take 1 tablet (100 mg total) by mouth once daily., Disp: 30 tablet, Rfl: 2    meloxicam (MOBIC) 15 MG tablet, Take 1 tablet (15 mg total) by mouth once daily., Disp: 30 tablet, Rfl: 1    omeprazole (PRILOSEC) 40 MG capsule, Take 1 capsule (40 mg total) by mouth daily as needed (heartburn)., Disp: 90 capsule, Rfl: 2    pantoprazole (PROTONIX) 40 MG tablet, Take 40 mg by mouth once daily., Disp: , Rfl:     potassium  chloride (MICRO-K) 10 MEQ CpSR, Take 10 mEq by mouth once., Disp: , Rfl:     pregabalin (LYRICA) 75 MG capsule, Take 1 capsule (75 mg total) by mouth 2 (two) times daily., Disp: 60 capsule, Rfl: 5    propranoloL (INDERAL) 40 MG tablet, Take 1 tablet (40 mg total) by mouth 2 (two) times daily., Disp: 60 tablet, Rfl: 6    rizatriptan (MAXALT) 10 MG tablet, Take 1 tablet (10 mg total) by mouth as needed for Migraine. (Can repeat dose in 2 hours if needed, maximum 3 doses per day)., Disp: 20 tablet, Rfl: 3    VRAYLAR 1.5 mg Cap, Take 1.5 mg by mouth., Disp: , Rfl:   [2]   Social History  Socioeconomic History    Marital status:     Number of children: 1   Occupational History    Occupation: [employer]   Tobacco Use    Smoking status: Former     Current packs/day: 0.00     Types: Cigarettes     Quit date:      Years since quittin.2    Smokeless tobacco: Former   Substance and Sexual Activity    Alcohol use: Not Currently    Drug use: Not Currently     Types: Marijuana, Methamphetamines    Sexual activity: Never     Social Drivers of Health     Financial Resource Strain: High Risk (2025)    Overall Financial Resource Strain (CARDIA)     Difficulty of Paying Living Expenses: Hard   Food Insecurity: Food Insecurity Present (2025)    Hunger Vital Sign     Worried About Running Out of Food in the Last Year: Sometimes true     Ran Out of Food in the Last Year: Sometimes true   Transportation Needs: No Transportation Needs (2025)    PRAPARE - Transportation     Lack of Transportation (Medical): No     Lack of Transportation (Non-Medical): No   Physical Activity: Insufficiently Active (2025)    Exercise Vital Sign     Days of Exercise per Week: 2 days     Minutes of Exercise per Session: 40 min   Stress: Stress Concern Present (2025)    [employer] - Occupational Stress Questionnaire     Feeling of Stress : To some extent   Housing Stability: Low Risk  (2025)     Housing Stability Vital Sign     Unable to Pay for Housing in the Last Year: No     Number of Times Moved in the Last Year: 0     Homeless in the Last Year: No

## 2025-04-28 ENCOUNTER — PATIENT MESSAGE (OUTPATIENT)
Dept: ORTHOPEDICS | Facility: CLINIC | Age: 60
End: 2025-04-28
Payer: MEDICAID

## 2025-05-19 ENCOUNTER — HOSPITAL ENCOUNTER (OUTPATIENT)
Dept: INTERVENTIONAL RADIOLOGY/VASCULAR | Facility: HOSPITAL | Age: 60
Discharge: HOME | End: 2025-05-19
Attending: ORTHOPAEDIC SURGERY
Payer: MEDICAID

## 2025-05-19 VITALS
OXYGEN SATURATION: 98 % | RESPIRATION RATE: 16 BRPM | HEART RATE: 63 BPM | DIASTOLIC BLOOD PRESSURE: 74 MMHG | SYSTOLIC BLOOD PRESSURE: 111 MMHG

## 2025-05-19 DIAGNOSIS — M54.16 LUMBAR RADICULOPATHY: ICD-10-CM

## 2025-05-19 PROCEDURE — 25500020 PHARM REV CODE 255: Performed by: RADIOLOGY

## 2025-05-19 PROCEDURE — 63600175 PHARM REV CODE 636 W HCPCS: Mod: JZ,TB | Performed by: RADIOLOGY

## 2025-05-19 PROCEDURE — 62322 NJX INTERLAMINAR LMBR/SAC: CPT | Performed by: RADIOLOGY

## 2025-05-19 RX ORDER — METHYLPREDNISOLONE ACETATE 40 MG/ML
INJECTION, SUSPENSION INTRA-ARTICULAR; INTRALESIONAL; INTRAMUSCULAR; SOFT TISSUE
Status: COMPLETED | OUTPATIENT
Start: 2025-05-19 | End: 2025-05-19

## 2025-05-19 RX ADMIN — IOHEXOL 1 ML: 180 INJECTION INTRAVENOUS at 09:05

## 2025-05-19 RX ADMIN — METHYLPREDNISOLONE ACETATE 80 MG: 40 INJECTION, SUSPENSION INTRA-ARTICULAR; INTRALESIONAL; INTRAMUSCULAR; SOFT TISSUE at 09:05

## 2025-05-19 NOTE — DISCHARGE INSTRUCTIONS
Patient Discharge Instructions for Spine Injections    About your injection:    Your injection today was at the level L4-L5  You received Depomedrol 80mg        Special instructions:    No driving today.   You may remove the band-aid tomorrow.   No tub baths, swimming, or hot tubs for 2 days. Showers are permitted.   You may experience some numbness/tingling at your legs/feet. This is often expected and should subside within several hours.    You may experience some discomfort at the site, but it should not be excruciating. It is ok to take over the counter pain reliever, if needed. It is also ok to use an ice pack if needed. Avoid heating pad use for 2 days.    It may take up to a week for you to notice any positive results from this procedure. Please inform your ordering physician if your symptoms return or get worse.    When to call:    Severe pain or headache  Loss of bowel/bladder control  Fever or chills  Redness or swelling around the injection site    Contact information:  For immediate concerns that are not emergent, you may call our interventional radiology clinic at [phone] or [phone]     ** After hours and weekends: Call the paging  at [phone] and ask for the Radiology Resident on call**

## 2025-05-19 NOTE — PROCEDURES
Radiology Post-Procedure Note    Pre Op Diagnosis: LBP    Post Op Diagnosis: Same    Procedure: Lumbar interlaminal BOZENA    Procedure performed by: Zach Mcmahon MD    Written Informed Consent Obtained: Yes    Specimen Removed: NO    Estimated Blood Loss: Minimal    Findings:     Level injected: L4-L5  Needle used: 18 gauge  Dose:  80 mg Depo-methylprednisolone   2 mL  Bupivicaine 0.25% MPF    Patient tolerated procedure well.    Zach Mcmahon MD  Department of Radiology

## 2025-05-19 NOTE — H&P
Radiology Minor Procedure Note    Procedure Requested: BOZENA    History of Present Illness:  Tyrone Santos is a 60 y.o. male with history of Low back pain and Radiculopathy L>R.  Past Medical History:   Diagnosis Date    Addiction to drug     Alcohol abuse     Anxiety     Bipolar disorder     Depression     Difficulty urinating     GERD (gastroesophageal reflux disease)     Hx of psychiatric care     Hyperlipidemia     Hypertension     Ade     Psychiatric problem     S/P cervical spinal fusion 12/06/2018    S/P laparoscopic appendectomy 01/03/2017    Sleep apnea     Therapy     Thyroid disease      Past Surgical History:   Procedure Laterality Date    ANTERIOR CERVICAL DISCECTOMY W/ FUSION N/A 10/23/2018    Procedure: DISCECTOMY, SPINE, CERVICAL, ANTERIOR APPROACH, WITH FUSION C3-4;  Surgeon: Jw Anne MD;  Location: [address];  Service: Neurosurgery;  Laterality: N/A;    APPENDECTOMY      around 2012    ARTHROPLASTY OF HIP BY ANTERIOR APPROACH Left 8/23/2021    Procedure: ARTHROPLASTY, HIP, TOTAL, ANTERIOR APPROACH:LEFT:DPEUY-ACTIS+PINNACLE;  Surgeon: Ángel Lam III, MD;  Location: [facility] OR;  Service: Orthopedics;  Laterality: Left;    ARTHROPLASTY OF HIP BY ANTERIOR APPROACH Right 8/18/2022    Procedure: ARTHROPLASTY, HIP, TOTAL, ANTERIOR APPROACH: RIGHT: DEPUY-ACTIS+PINNACLE;  Surgeon: Ángel Lam III, MD;  Location: [facility] OR;  Service: Orthopedics;  Laterality: Right;    INJECTION OF ANESTHETIC AGENT AROUND NERVE Left 7/1/2021    Procedure: BLOCK, NERVE, OBTURATOR and FEMORAL;  Surgeon: Josefina Lee MD;  Location: [facility];  Service: Pain Management;  Laterality: Left;    SCAPHOID FRACTURE SURGERY      had to have a bone graft -early  1990's     WRIST SURGERY      6 years ago       Allergies:   Review of patient's allergies indicates:   Allergen Reactions    Codeine Nausea Only    Seroquel [quetiapine]      Heart racing     Tizanidine Other (See Comments)     Caused  increased pain and muscle pain       Current Inpatient Meds:   Home Meds:   Prior to Admission medications    Medication Sig Start Date End Date Taking? Authorizing Provider   alfuzosin (UROXATRAL) 10 mg Tb24 Take 10 mg by mouth. 12/13/24   Provider, Historical   amLODIPine (NORVASC) 5 MG tablet Take 1 tablet (5 mg total) by mouth every evening. (For blood pressure) 11/15/23 4/9/25  Nico Wong MD   atorvastatin (LIPITOR) 40 MG tablet Take 1 tablet (40 mg total) by mouth every evening. For cholesterol (new dose) 7/10/23 4/9/25  Nico Wong MD   buPROPion (WELLBUTRIN XL) 300 MG 24 hr tablet Take 300 mg by mouth once daily.    Provider, Historical   busPIRone (BUSPAR) 15 MG tablet Take 2 tablets po q am, 1 tablet po at noon and 1 tablet po q hs 2/8/24   Lisa Alejo NP-C   calcium-vitamin D3 (OS-JALYN 500 + D3) 500 mg(1,250mg) -200 unit per tablet Take 1 tablet by mouth 2 (two) times daily with meals.    Provider, Historical   CAPLYTA 42 mg Cap  12/4/24   Provider, Historical   chlorthalidone (HYGROTEN) 25 MG Tab TAKE 1 TABLET(25 MG) BY MOUTH EVERY DAY. REPLACES HCTZ FOR BLOOD PRESSURE 10/20/23   Nico Wong MD   cyanocobalamin (VITAMIN B-12) 100 MCG tablet Take 100 mcg by mouth once daily.    Provider, Historical   ergocalciferol (ERGOCALCIFEROL) 50,000 unit Cap Take 50,000 Units by mouth every 7 days.    Provider, Historical   EScitalopram oxalate (LEXAPRO) 10 MG tablet TAKE 1 TABLET(10 MG) BY MOUTH EVERY DAY 3/17/25   Lisa Alejo NP-C   folic acid (FOLVITE) 1 MG tablet Take 1 mg by mouth once daily.    Provider, Historical   lamoTRIgine (LAMICTAL) 100 MG tablet Take 1 tablet (100 mg total) by mouth once daily. 3/14/24 4/9/25  Lisa Alejo NP-C   meloxicam (MOBIC) 15 MG tablet Take 1 tablet (15 mg total) by mouth once daily. 12/11/24   Jose Saravia MD   omeprazole (PRILOSEC) 40 MG capsule Take 1 capsule (40 mg total) by mouth daily as needed (heartburn).  9/26/23   Nico Wong MD   pantoprazole (PROTONIX) 40 MG tablet Take 40 mg by mouth once daily.    Provider, Historical   potassium chloride (MICRO-K) 10 MEQ CpSR Take 10 mEq by mouth once.    Provider, Historical   pregabalin (LYRICA) 75 MG capsule Take 1 capsule (75 mg total) by mouth 2 (two) times daily. 12/19/24 6/19/25  EDILBERTO Bradshaw, NP   propranoloL (INDERAL) 40 MG tablet Take 1 tablet (40 mg total) by mouth 2 (two) times daily. 5/1/23 4/9/25  Nico Wong MD   rizatriptan (MAXALT) 10 MG tablet Take 1 tablet (10 mg total) by mouth as needed for Migraine. (Can repeat dose in 2 hours if needed, maximum 3 doses per day). 11/15/23 4/9/25  Nico Wong MD   VRAYLAR 1.5 mg Cap Take 1.5 mg by mouth. 11/4/24   Provider, Historical      Anticoagulants/Antiplatelets: no anticoagulation    Labs:  Lab Results   Component Value Date    INR 0.9 08/01/2022       Lab Results   Component Value Date    WBC 5.4 06/06/2023    HGB 14.5 06/06/2023    HCT 41.9 06/06/2023    MCV 92.0 06/06/2023     07/06/2022      Lab Results   Component Value Date     (H) 06/16/2023     06/16/2023    K 3.7 06/16/2023     06/16/2023    CO2 27 06/16/2023    BUN 19 06/16/2023    CREATININE 1.3 06/16/2023    CALCIUM 9.2 06/16/2023    ALT 33 06/06/2023    AST 40 06/06/2023    ALBUMIN 4.6 06/06/2023       Objective:  Vitals: Pulse: 63 (05/19/25 0839)  Resp: 16 (05/19/25 0839)  BP: 111/74 (05/19/25 0839)  SpO2: 98 % (05/19/25 0839)   PE:  Strength: equal bilaterally    Plan: BOZENA at L4-5. Imaging reviewed. Informed consent obtained. Local anesthesia.    Zach Mcmahon MD  Department of Radiology

## 2025-05-19 NOTE — PROGRESS NOTES
BOZENA finished. Patient tolerated well. Bandage applied to lower back clean, dry and intact. Patient given discharge instructions and verbalized understanding of at home care. Patient ambulated to wife in waiting room.

## 2025-06-04 ENCOUNTER — PATIENT MESSAGE (OUTPATIENT)
Dept: ORTHOPEDICS | Facility: CLINIC | Age: 60
End: 2025-06-04
Payer: MEDICAID

## 2025-06-04 DIAGNOSIS — M54.16 LUMBAR RADICULOPATHY: ICD-10-CM

## 2025-06-04 DIAGNOSIS — M54.9 DORSALGIA, UNSPECIFIED: Primary | ICD-10-CM

## 2025-06-09 ENCOUNTER — TELEPHONE (OUTPATIENT)
Dept: ORTHOPEDICS | Facility: CLINIC | Age: 60
End: 2025-06-09
Payer: MEDICAID

## 2025-07-07 DIAGNOSIS — Z01.818 PREOPERATIVE TESTING: Primary | ICD-10-CM

## 2025-07-07 NOTE — ANESTHESIA PAT ROS NOTE
07/07/2025  Tyrone Santos is a 60 y.o., male.      Pre-op Assessment          Review of Systems           Anesthesia Assessment: Preoperative EQUATION    Planned Procedure: Procedure(s) (LRB):  ARTHRODESIS, SPINE, LUMBAR, TLIF, POSTERIOR, W/ PEDICLE SCREW L4-5 globus robot SNS:SSEP/EMG (N/A)  Requested Anesthesia Type:General  Surgeon: Bart Frye MD  Service: Orthopedics  Known or anticipated Date of Surgery:8/5/2025    Surgeon notes: reviewed    Electronic QUestionnaire Assessment completed via nurse interview with patient.        Triage considerations:       Previous anesthesia records:CSE and No problems  8/18/2022   ARTHROPLASTY, HIP, TOTAL, ANTERIOR APPROACH: RIGHT: DEPUY-ACTIS+PINNACLE (Right: Hip)   Airway:  Mallampati: I / I  Mouth Opening: Normal  TM Distance: Normal  Tongue: Normal  Neck ROM: Normal ROM    ** H/O ACDF**    Last PCP note: outside [facility]   Subspecialty notes: Ortho    Other important co-morbidities: PER EPIC: GERD, HLD, HTN, Obesity, ERIC, and LUMBAR RADICULOPATHY      Tests already available:  Available tests,  6-12 months ago , within [facility] .   1/6/2025 MRI LUMBAR SPINE W/O CONTRAST, XRAY LUMBAR SPINE AP/LAT W F/E          Instructions given. (See in Nurse's note)    Optimization:  Anesthesia Preop Clinic Assessment  Indicated    Medical Opinion Indicated         Plan:    Testing:  BMP, EKG, Hematology Profile, PT/INR, PTT, TSH, T&S, and UA   Pre-anesthesia  visit       Visit focus: concerns in complex and/or prolonged anesthesia, COMORBIDITIES     Consultation:Patient's PCP for re-evaluation     Patient  has previously scheduled Medical Appointment:7/9 CT SPINE, 7/9 DR FRYE    Navigation: Tests Scheduled. TBD             Consults scheduled.TBD             Results will be tracked by Preop Clinic.  7/24 Medical clearance given by Dr. Karen Scott on 7/18. Received labs and  EKG. ( All in media)  7/30 Labs,  and UA reviewed by Dr. Samara Long.   Jennifer Bar RN BSN

## 2025-07-07 NOTE — PRE-PROCEDURE INSTRUCTIONS
Patient stated has not had any problem with anesthesia in the past. Will need medical clearance from your PCP, Dr. Karen Scott. He will make an appt. Will need poc appt, labs, ua, and EKG. Our  will call to set up these appts.       Preop instructions given. Hold aspirin, aspirin containing products, nsaids( Aleve, Advil, Motrin, Ibuprofen, Naprosyn, Naproxen, Voltaren, Diclofenac, Mobic, Meloxicam, Celebrex, Celecoxib), vitamins ( Calcium with vitamin D,  B12, Ergocalciferol- Vitamin D, folvite) and supplements one week prior to surgery.     May take Tylenol. (   Also sent to [facility] portal).  \Verbalizes understanding.

## 2025-07-09 ENCOUNTER — HOSPITAL ENCOUNTER (OUTPATIENT)
Dept: RADIOLOGY | Facility: HOSPITAL | Age: 60
Discharge: HOME | End: 2025-07-09
Attending: REGISTERED NURSE
Payer: MEDICAID

## 2025-07-09 ENCOUNTER — OFFICE VISIT (OUTPATIENT)
Dept: ORTHOPEDICS | Facility: CLINIC | Age: 60
End: 2025-07-09
Payer: MEDICAID

## 2025-07-09 VITALS — BODY MASS INDEX: 31.37 KG/M2 | HEIGHT: 68 IN | WEIGHT: 207 LBS

## 2025-07-09 DIAGNOSIS — M51.362 DEGENERATION OF INTERVERTEBRAL DISC OF LUMBAR REGION WITH DISCOGENIC BACK PAIN AND LOWER EXTREMITY PAIN: ICD-10-CM

## 2025-07-09 DIAGNOSIS — M43.10 DEGENERATIVE SPONDYLOLISTHESIS: ICD-10-CM

## 2025-07-09 DIAGNOSIS — M47.816 LUMBAR SPONDYLOSIS: Primary | ICD-10-CM

## 2025-07-09 DIAGNOSIS — M54.16 LUMBAR RADICULOPATHY: ICD-10-CM

## 2025-07-09 DIAGNOSIS — M54.9 DORSALGIA, UNSPECIFIED: ICD-10-CM

## 2025-07-09 PROCEDURE — 99999 PR PBB SHADOW E&M-EST. PATIENT-LVL III: CPT | Mod: PBBFAC,,, | Performed by: ORTHOPAEDIC SURGERY

## 2025-07-09 PROCEDURE — 72131 CT LUMBAR SPINE W/O DYE: CPT | Mod: TC

## 2025-07-09 PROCEDURE — 72131 CT LUMBAR SPINE W/O DYE: CPT | Mod: 26,,, | Performed by: INTERNAL MEDICINE

## 2025-07-09 PROCEDURE — 1160F RVW MEDS BY RX/DR IN RCRD: CPT | Mod: CPTII,,, | Performed by: ORTHOPAEDIC SURGERY

## 2025-07-09 PROCEDURE — 1159F MED LIST DOCD IN RCRD: CPT | Mod: CPTII,,, | Performed by: ORTHOPAEDIC SURGERY

## 2025-07-09 PROCEDURE — 99213 OFFICE O/P EST LOW 20 MIN: CPT | Mod: PBBFAC,25 | Performed by: ORTHOPAEDIC SURGERY

## 2025-07-09 PROCEDURE — 99214 OFFICE O/P EST MOD 30 MIN: CPT | Mod: S$PBB,,, | Performed by: ORTHOPAEDIC SURGERY

## 2025-07-09 PROCEDURE — 3008F BODY MASS INDEX DOCD: CPT | Mod: CPTII,,, | Performed by: ORTHOPAEDIC SURGERY

## 2025-07-12 NOTE — PROGRESS NOTES
"DATE: 7/11/2025  PATIENT: Tyrone Santos    Attending Physician: Bart Frye M.D.    HISTORY:  Tyrone Santos is a 60 y.o. male who returns to me today for FU. Patient continues to have LBP that radiates posterolaterally down LLE to the foot. He has been taking meds and doing exercises without much relief. He is miserable and wants surgical intervention.    The patient does not smoke, have DM or endorse IVDU. The patient is not on any blood thinners and does not take chronic narcotics. He works as a  in the [employer] industry.    PMH/PSH/FamHx/SocHx:  Unchanged from prior visit    ROS:  Positive for LBP and LLE pain  Denies perineal paresthesias, bowel or bladder incontinence    EXAM:  Ht 5' 8" (1.727 m)   Wt 93.9 kg (207 lb 0.2 oz)   BMI 31.48 kg/m²     My physical examination was notable for the following findings: motor intact BLE; SILT    IMAGING:  Today I independently reviewed the following images and my interpretations are as follows:    Previous L-spine XRs showed spondylosis and DDD. L4-5 retrolisthesis.    Lumbar MRI showed L4-5 disc bulge with mod central stenosis.     CT lumbar showed spondylosis.    ASSESSMENT/PLAN:  Patient has lumbar spondylosis and stenosis with LLE radiculopathy. I discussed the natural history of their diagnoses as well as surgical and nonsurgical treatment options. I educated the patient on the importance of core/back strengthening, correct posture, bending/lifting ergonomics, and low-impact aerobic exercises (walking, elliptical, and aquatherapy). Continue medications. Patient has failed conservative management and is a candidate for L4-5 PLDF/TLIF (L). He will need preop clearance.    I had a sit down discussion with the patient regarding the above surgery. We specifically discussed the risks, benefits, and alternatives to surgery. We discussed the surgical procedure including the skin incision, nerve decompression, bone fusion, allograft, iliac " crest bone graft, and surgical implants including pedicle screws and interbody devices as indicated: they understand the risks include but are not limited to death, paralysis, blindness, bleeding, infection, damage to arteries, veins and nerves, spinal fluid leak, continued or worsening pain, no improvement in symptoms, non-union, and the possible need for more surgery in the future, the possible need for perioperative blood transfusion, as well as the possibility other unforseen and unknown complications. We talked about expected hospital stay and recovery period. All questions were answered; they understand and wish to proceed.     Bart Frye MD  Orthopaedic Spine Surgeon  Department of Orthopaedic Surgery  [phone]

## 2025-07-12 NOTE — H&P (VIEW-ONLY)
"DATE: 7/11/2025  PATIENT: Tyrone Santos    Attending Physician: Bart Frye M.D.    HISTORY:  Tyrone Santos is a 60 y.o. male who returns to me today for FU. Patient continues to have LBP that radiates posterolaterally down LLE to the foot. He has been taking meds and doing exercises without much relief. He is miserable and wants surgical intervention.    The patient does not smoke, have DM or endorse IVDU. The patient is not on any blood thinners and does not take chronic narcotics. He works as a  in the [employer] industry.    PMH/PSH/FamHx/SocHx:  Unchanged from prior visit    ROS:  Positive for LBP and LLE pain  Denies perineal paresthesias, bowel or bladder incontinence    EXAM:  Ht 5' 8" (1.727 m)   Wt 93.9 kg (207 lb 0.2 oz)   BMI 31.48 kg/m²     My physical examination was notable for the following findings: motor intact BLE; SILT    IMAGING:  Today I independently reviewed the following images and my interpretations are as follows:    Previous L-spine XRs showed spondylosis and DDD. L4-5 retrolisthesis.    Lumbar MRI showed L4-5 disc bulge with mod central stenosis.     CT lumbar showed spondylosis.    ASSESSMENT/PLAN:  Patient has lumbar spondylosis and stenosis with LLE radiculopathy. I discussed the natural history of their diagnoses as well as surgical and nonsurgical treatment options. I educated the patient on the importance of core/back strengthening, correct posture, bending/lifting ergonomics, and low-impact aerobic exercises (walking, elliptical, and aquatherapy). Continue medications. Patient has failed conservative management and is a candidate for L4-5 PLDF/TLIF (L). He will need preop clearance.    I had a sit down discussion with the patient regarding the above surgery. We specifically discussed the risks, benefits, and alternatives to surgery. We discussed the surgical procedure including the skin incision, nerve decompression, bone fusion, allograft, iliac " crest bone graft, and surgical implants including pedicle screws and interbody devices as indicated: they understand the risks include but are not limited to death, paralysis, blindness, bleeding, infection, damage to arteries, veins and nerves, spinal fluid leak, continued or worsening pain, no improvement in symptoms, non-union, and the possible need for more surgery in the future, the possible need for perioperative blood transfusion, as well as the possibility other unforseen and unknown complications. We talked about expected hospital stay and recovery period. All questions were answered; they understand and wish to proceed.     Bart Frye MD  Orthopaedic Spine Surgeon  Department of Orthopaedic Surgery  [phone]

## 2025-07-24 ENCOUNTER — TELEPHONE (OUTPATIENT)
Dept: ORTHOPEDICS | Facility: CLINIC | Age: 60
End: 2025-07-24
Payer: MEDICAID

## 2025-07-24 RX ORDER — DOXEPIN HYDROCHLORIDE 150 MG/1
25 CAPSULE ORAL NIGHTLY
COMMUNITY

## 2025-07-24 RX ORDER — MIRABEGRON 25 MG/1
25 TABLET, FILM COATED, EXTENDED RELEASE ORAL DAILY
COMMUNITY

## 2025-07-24 RX ORDER — ALLOPURINOL 300 MG/1
300 TABLET ORAL DAILY
COMMUNITY

## 2025-07-24 NOTE — PRE-PROCEDURE INSTRUCTIONS
Preop and med instructions given:       Disp Refills Start End   alfuzosin (UROXATRAL) 10 mg Tb24 -- -- 12/13/2024 --   Sig: Take 10 mg by mouth.   Class: Historical Med   Route: Jennifer Cantu RN 7/24/2025  9:44 AM  TAKE IN AM OF SURGERY.              allopurinoL (ZYLOPRIM) 300 MG tablet -- --  --   Sig: Take 300 mg by mouth once daily.   Class: Historical Med   Route: Jennifer Cantu RN 7/24/2025  9:55 AM  TAKE IN AM OF SURGERY.              atorvastatin (LIPITOR) 40 MG tablet 90 tablet 1 7/10/2023 4/9/2025   Sig: Take 1 tablet (40 mg total) by mouth every evening. For cholesterol (new dose)   Route: Jennifer Cantu RN 7/24/2025  9:44 AM  TAKE THE NIGHT BEFORE SURGERY.              buPROPion (WELLBUTRIN XL) 300 MG 24 hr tablet -- --  --   Sig: Take 300 mg by mouth once daily.   Class: Historical Med   Route: Jennifer Cantu RN 7/24/2025  9:44 AM  TAKE IN AM OF SURGERY.              calcium-vitamin D3 (OS-JALYN 500 + D3) 500 mg(1,250mg) -200 unit per tablet -- --  --   Sig: Take 1 tablet by mouth 2 (two) times daily with meals.   Class: Historical Med   Route: Jennifer Cantu RN 7/7/2025 12:04 PM  HOLD ONE WEEK PRIOR TO SURGERY.              CAPLYTA 42 mg Cap -- -- 12/4/2024 --   Sig: every evening.   Class: Historical Med       Jennifer Bar RN 7/24/2025  9:45 AM  TAKE THE NIGHT BEFORE SURGERY.              chlorthalidone (HYGROTEN) 25 MG Tab 30 tablet 1 10/20/2023 --   Sig: TAKE 1 TABLET(25 MG) BY MOUTH EVERY DAY. REPLACES HCTZ FOR BLOOD PRESSURE       Jennifer Bar RN 7/24/2025  9:45 AM  HOLD IN AM OF SURGERY.            cyanocobalamin (VITAMIN B-12) 100 MCG tablet -- --  --   Sig: Take 100 mcg by mouth once daily.   Class: Historical Med   Route: Jennifer Cantu RN 7/7/2025 12:05 PM  HOLD ONE WEEK PRIOR TO SURGERY.              doxepin (SINEQUAN) 150 MG Cap -- --  --   Sig: Take 25 mg by mouth every evening.   Class: Historical Med   Route: Oral       Prieur,  PRADIP Rodriguez 7/24/2025  9:51 AM  TAKE THE NIGHT BEFORE SURGERY.              ergocalciferol (ERGOCALCIFEROL) 50,000 unit Cap -- --  --   Sig: Take 50,000 Units by mouth every 7 days.   Class: Historical Med   Route: Jennifer Cantu RN 7/7/2025 12:04 PM  HOLD ONE WEEK PRIOR TO SURGERY.            folic acid (FOLVITE) 1 MG tablet -- --  --   Sig: Take 1 mg by mouth once daily.   Class: Historical Med   Route: Jennifer Cantu RN 7/7/2025 12:05 PM  HOLD ONE WEEK PRIOR TO SURGERY.              lamoTRIgine (LAMICTAL) 100 MG tablet 30 tablet 2 3/14/2024 4/9/2025   Sig: Take 1 tablet (100 mg total) by mouth once daily.   Route: Jennifer Cantu RN 7/24/2025  9:45 AM  TAKE IN AM OF SURGERY.              mirabegron (MYRBETRIQ) 25 mg Tb24 ER tablet -- --  --   Sig: Take 25 mg by mouth once daily.   Class: Historical Med   Route: Jennifer Cantu RN 7/24/2025  9:50 AM  HOLD IN AM OF SURGERY.            omeprazole (PRILOSEC) 40 MG capsule 90 capsule 2 9/26/2023 --   Sig: Take 1 capsule (40 mg total) by mouth daily as needed (heartburn).   Route: Jennifer Cantu RN 7/24/2025  9:46 AM  TAKE IF NEEDED            pantoprazole (PROTONIX) 40 MG tablet -- --  --   Sig: Take 40 mg by mouth once daily.   Class: Historical Med   Route: Jennifer Cantu RN 7/24/2025  9:47 AM  TAKE IN AM OF SURGERY.              potassium chloride (MICRO-K) 10 MEQ CpSR -- --  --   Sig: Take 10 mEq by mouth once.   Class: Historical Med   Route: Jennifer Cantu RN 7/24/2025  9:47 AM  HOLD IN AM OF SURGERY.            propranoloL (INDERAL) 40 MG tablet 60 tablet 6 5/1/2023 4/9/2025   Sig: Take 1 tablet (40 mg total) by mouth 2 (two) times daily.   Route: Oral       Jennifer Bar RN 7/24/2025  9:47 AM  TAKE IN AM OF SURGERY.              rizatriptan (MAXALT) 10 MG tablet 20 tablet 3 11/15/2023 4/9/2025   Sig: Take 1 tablet (10 mg total) by mouth as needed for Migraine. (Can repeat dose in 2 hours if  needed, maximum 3 doses per day).   Route: Oral       Jennifer Bar RN 2025  9:47 AM  TAKE IF NEEDED            UNABLE TO FIND -- --  --   Si mg. medication name: ANASTORZOLE 1MG EVERY  3 DAYS   Class: Historical Med       Jennifer Bar RN 2025  9:54 AM  TAKE AS DIRECTED.            VRAYLAR 1.5 mg Cap -- -- 2024 --   Sig: Take 1.5 mg by mouth once daily. TAKES 3MG.   Class: Historical Med   Route: Oral       Jennifer Bar RN 2025  9:48 AM  TAKE IN AM OF SURGERY.              Your surgery has been scheduled for:__2025________________________________________  [facility] ( [place]) [phone]  You should report to:  ____[facility], located on the [place] side of the first floor of the           [facility] ([phone])  ___x_The Second Floor Surgery Center, located on the [facility] side of the            Second floor of the [facility] ([phone])  ____3rd Floor [facility] located on the [facility] side of the [facility] [phone]  Please Note   Tell your doctor if you take Aspirin, products containing Aspirin, herbal medications  or blood thinners, such as Coumadin, Ticlid, or Plavix.  (Consult your provider regarding holding or stopping before surgery).  Arrange for someone to drive you home following surgery.  You will not be allowed to leave the surgical facility alone or drive yourself home following sedation and anesthesia.  Before Surgery  Stop taking all vitamins/ herbal medications 14days prior to surgery  No Motrin/Advil (Ibuprofen) 7 days before surgery  No Aleve (Naproxen) 7 days before surgery  Stop Taking Asprin, products containing Asprin _7____days before surgery  Stop taking blood thinners_______days before surgery  No Goody's/BC  Powder 7 days before surgery  Refrain from drinking alcoholic beverages for 24hours before and after surgery  Stop or limit smoking _________days before  surgery( former smoker)  You may take Tylenol for pain  Night before Surgery        Nothing to eat or drink after midnight. May have clear liluids ( water or apple juice) up until  2 hour  before your arrival time.  Take a shower or bath (shower is recommended).  Bathe with Hibiclens soap or an antibacterial soap from the neck down.  If not supplied by your surgeon, hibiclens soap will need to be purchased over the counter in pharmacy.  Rinse soap off thoroughly.  Shampoo your hair with your regular shampoo  The Day of Surgery  NOTHING TO  DRINK 2 hours before arrival time. If you are told to take medication on the morning of surgery, it may be taken with a sip of water.   Take another bath or shower with hibiclens or any antibacterial soap, to reduce the chance of infection.  Take heart and blood pressure medications with a small sip of water, as advised by the perioperative team.  Do not take fluid pills  You may brush your teeth and rinse your mouth, but do not swall any additional water.   Do not apply perfumes, powder, body lotions or deodorant on the day of surgery.  Nail polish should be removed.  Do not wear makeup or moisturizer  Wear comfortable clothes, such as a button front shirt and loose fitting pants.  Leave all jewelry, including body piercings, and valuables at home.    Bring any devices you will neeed after surgery such as crutches or canes.  If you have sleep apnea, please bring your CPAP machine  In the event that your physical condition changes including the onset of a cold or respiratory illness, or if you have to delay or cancel your surgery, please notify your surgeon.    Stated knows where  the surgery center. Also sent preop and medication instructions to [facility] portal. Verbalizes understanding.

## 2025-07-24 NOTE — TELEPHONE ENCOUNTER
Patient completed required Spine Class prior to his surgery (ARTHRODESIS, SPINE, LUMBAR, TLIF, POSTERIOR, W/ PEDICLE SCREW L4-5 globus robot SNS:SSEP/EMG ) scheduled on 08/05/2025 with Dr Bart Frye.     Patient completed AUDIO Spine Education. Reviewed pain medication usage - discussed how to use muscle relaxers and pain medication, driving 3-4 week post surgery and only if not requiring narcotics for pain, Lifting restrictions - nothing heavier than a gallon of milk or equivalent of 8 pounds and to be mindful of bending, lifting and twisting.  Mobility - proper use of assistive devices (cane or walker) as necessary to aid with mobility needs, Walk as tolerated or 10 minutes building up to 30 minutes every 3 hours, Limit time in bed using favorite chair/recliner as tolerated, if indicated wear prescribed brace if indicated as prescribed at all times taking it off only to sleep and shower, and discussed increased risk for fall and need to be more aware of surroundings (Pets, scatter Rugs, Cords).  Showering reviewed. Reviewed incisional care and when to call provider (redness, increased drainage, warmth to incision or neighboring skin, chills and fever higher than 100.0).    Advised that inpatient Case Management will be involved with discharge plan and will be working with the team and physical therapy to have a safe discharge. Patient is amendable to Home Health for PT/OT to assist with enhancing posture, restoring mobility and facilitating healing.  Post operative follow-up  appointments will be scheduled prior to hospital discharge.    My direct number provided. Will follow-up with patient for post hospital call on 08/04/2025. All questions answered to patient satisfaction.      SHAWN Dunbar (Dee)  Complex Spine Navigator  [facility]         [phone]

## 2025-07-28 ENCOUNTER — ANESTHESIA EVENT (OUTPATIENT)
Dept: SURGERY | Facility: HOSPITAL | Age: 60
DRG: 451 | End: 2025-07-28
Payer: MEDICAID

## 2025-07-28 NOTE — ANESTHESIA PREPROCEDURE EVALUATION
[facility]  Anesthesia Pre-Operative Evaluation         Patient Name: Tyrone Santos  YOB: 1965  MRN: [id number]    SUBJECTIVE:     Pre-operative evaluation for Procedure(s) (LRB):  ARTHRODESIS, SPINE, LUMBAR, TLIF, POSTERIOR, W/ PEDICLE SCREW L4-5 globus robot SNS:SSEP/EMG (N/A)     07/28/2025    Tyrone Santos is a 60 y.o. male w/ a significant PMHx of HTN, GERD, ERIC, obesity, hypothyroidism, cervical spondylosis, lumbar radiculopathy.    Patient now presents for the above procedure(s).    Echo Summary  Results for orders placed during the hospital encounter of 01/07/25    Echo    Interpretation Summary    Left Ventricle: The left ventricle is mildly dilated. Normal wall thickness. Mild global hypokinesis present. There is low normal systolic function with a visually estimated ejection fraction of 50 - 55%. Global longitudinal strain is -13.0%. There is normal diastolic function.    Right Ventricle: Normal right ventricular cavity size. Wall thickness is normal. Systolic function is normal.    Pulmonary Artery: No pulmonary hypertension. The estimated pulmonary artery systolic pressure is 22 mmHg.    IVC/SVC: Normal venous pressure at 3 mmHg.       Prev airway: None documented.      Problem List[1]    Review of patient's allergies indicates:   Allergen Reactions    Codeine Nausea Only    Seroquel [quetiapine]      Heart racing     Tizanidine Other (See Comments)     Caused increased pain and muscle pain       Current Inpatient Medications:      Medications Ordered Prior to Encounter[2]    Past Surgical History:   Procedure Laterality Date    ANTERIOR CERVICAL DISCECTOMY W/ FUSION N/A 10/23/2018    Procedure: DISCECTOMY, SPINE, CERVICAL, ANTERIOR APPROACH, WITH FUSION C3-4;  Surgeon: Jw Anne MD;  Location: [facility] OR [address] [place] [address];  Service: Neurosurgery;  Laterality: N/A;    APPENDECTOMY      around 2012    ARTHROPLASTY OF HIP BY ANTERIOR APPROACH Left  8/23/2021    Procedure: ARTHROPLASTY, HIP, TOTAL, ANTERIOR APPROACH:LEFT:DPEUY-ACTIS+PINNACLE;  Surgeon: Ángel Lam III, MD;  Location: [facility] OR;  Service: Orthopedics;  Laterality: Left;    ARTHROPLASTY OF HIP BY ANTERIOR APPROACH Right 8/18/2022    Procedure: ARTHROPLASTY, HIP, TOTAL, ANTERIOR APPROACH: RIGHT: DEPUY-ACTIS+PINNACLE;  Surgeon: Ángel Lam III, MD;  Location: [facility] OR;  Service: Orthopedics;  Laterality: Right;    INJECTION OF ANESTHETIC AGENT AROUND NERVE Left 7/1/2021    Procedure: BLOCK, NERVE, OBTURATOR and FEMORAL;  Surgeon: Josefina Lee MD;  [facility];  Service: Pain Management;  Laterality: Left;    SCAPHOID FRACTURE SURGERY      had to have a bone graft -early  1990's     WRIST SURGERY      6 years ago       Social History:  Tobacco Use: Medium Risk (7/11/2025)    Patient History     Smoking Tobacco Use: Former     Smokeless Tobacco Use: Former     Passive Exposure: Not on file      Alcohol Use: Alcohol Misuse (4/4/2025)    AUDIT-C     Frequency of Alcohol Consumption: 4 or more times a week     Average Number of Drinks: 1 or 2     Frequency of Binge Drinking: Less than monthly        OBJECTIVE:     Vital Signs Range (Last 24H):         Significant Labs:  Lab Results   Component Value Date    WBC 5.4 06/06/2023    HGB 14.5 06/06/2023    HCT 41.9 06/06/2023     07/06/2022    CHOL 258 (H) 03/24/2023    TRIG 141 03/24/2023    HDL 83 (H) 03/24/2023    ALT 33 06/06/2023    AST 40 06/06/2023     06/16/2023    K 3.7 06/16/2023     06/16/2023    CREATININE 1.3 06/16/2023    BUN 19 06/16/2023    CO2 27 06/16/2023    TSH 4.947 (H) 03/24/2023    INR 0.9 08/01/2022       Diagnostic Studies: No relevant studies.    EKG:   Results for orders placed or performed during the hospital encounter of 07/06/22   EKG 12-lead    Collection Time: 07/06/22  8:50 PM    Narrative    Test Reason : COVID    Vent. Rate : 065 BPM     Atrial Rate : 065 BPM     P-R Int  ": 178 ms          QRS Dur : 088 ms      QT Int : 396 ms       P-R-T Axes : 040 054 048 degrees     QTc Int : 411 ms    Normal sinus rhythm  Normal ECG  When compared with ECG of 18-AUG-2021 12:03,  No significant change was found  Confirmed by ALYSSA EVANS MD ([id number]) on 7/7/2022 4:00:51 PM    Referred By: [email or web address]   SELF           Confirmed By:ALYSSA EVANS MD       2D ECHO:  TTE:  Results for orders placed or performed during the hospital encounter of 01/07/25   Echo   Result Value Ref Range    LV Diastolic Volume 114.88 mL    Echo EF Estimated 51 %    LV Systolic Volume 56.80 mL    IVS 0.9 0.6 - 1.1 cm    LVIDd 4.9 3.5 - 6.0 cm    LVIDs 3.7 2.1 - 4.0 cm    LVOT diameter 2.2 cm    PW 0.9 0.6 - 1.1 cm    AV LVOT peak gradient 3 mmHg    LVOT mn grad 2 mmHg    LVOT peak eder 0.9 m/s    LVOT peak VTI 16.2 cm    RV- eken basal diam 2.6 cm    RVOT prox diameter 1.93 cm    RV S' 10.81 cm/s    LA size 3.26 cm    Left Atrium Major Axis 3.24 cm    Left Atrium Minor Axis 3.48 cm    LA Vol (MOD) 21.27 mL    RA Major Axis 3.18 cm    RA Area 9.1 cm2    AV valve area 3.2 cm2    AV area by cont VTI 3.2 cm2    AV peak gradient 4.0 mmHg    AV mean gradient 2.3 mmHg    Ao peak eder 1.0 m/s    Ao VTI 19.2 cm    MV Peak A Eder 0.67 m/s    E wave deceleration time 186.68 ms    MV Peak E Eder 0.51 m/s    E/A ratio 0.76     LV LATERAL E/E' RATIO 4.25     LV SEPTAL E/E' RATIO 8.50     TDI LATERAL 0.12 m/s    TDI SEPTAL 0.06 m/s    TV peak gradient 19 mmHg    TR Max Eder 2.18 m/s    Ascending aorta 3.39 cm    STJ 3.48 cm    P vein A 0.3 m/s    MV "A" wave duration 122.74 ms    Pulm vein "A" wave 122.74 ms    PV Peak D Eder 0.33 m/s    PV Peak S Eder 0.40 m/s    IVC diameter 1.29 cm    Sinus 3.09 cm    LA WIDTH 2.91 cm    RA Width 2.23 cm    TAPSE 1.64 cm    BSA 2.1 m2    LVOT stroke volume 61.6 cm3    LV Systolic Volume Index 27.7 mL/m2    LV Diastolic Volume Index 56.04 mL/m2    LVOT area 3.8 cm2    FS 24.5 28 - 44 %    Left Ventricle Relative " Wall Thickness 0.37 cm    LV mass 153.0 g    LV Mass Index 74.6 g/m2    E/E' ratio 5.67 m/s    RITA 13.2 mL/m2    LA Vol 27.06 cm3    Pulm vein S/D ratio 1.21     RVOT area 2.92 cm2    RV/LV Ratio 0.53 cm    RITA (MOD) 10.4 mL/m2    AV Velocity Ratio 0.90     AV index (prosthetic) 0.84     JOVI by Velocity Ratio 3.4 cm²    Triscuspid Valve Regurgitation Peak Gradient 19 mmHg    Mean e' 0.09 m/s    ZLVIDS -0.14     ZLVIDD -2.27     Mitral Valve Heart Rate 70 bpm    MV mean gradient 1 mmHg    TV resting pulmonary artery pressure 22 mmHg    RV TB RVSP 5 mmHg    Est. RA pres 3 mmHg    Alvares's Biplane MOD Ejection Fraction 52 %    GLS 13.0 %    Narrative      Left Ventricle: The left ventricle is mildly dilated. Normal wall   thickness. Mild global hypokinesis present. There is low normal systolic   function with a visually estimated ejection fraction of 50 - 55%. Global   longitudinal strain is -13.0%. There is normal diastolic function.    Right Ventricle: Normal right ventricular cavity size. Wall thickness   is normal. Systolic function is normal.    Pulmonary Artery: No pulmonary hypertension. The estimated pulmonary   artery systolic pressure is 22 mmHg.    IVC/SVC: Normal venous pressure at 3 mmHg.         GERMÁN:  No results found for this or any previous visit.    ASSESSMENT/PLAN:           Pre-op Assessment    I have reviewed the Patient Summary Reports.     I have reviewed the Nursing Notes.    I have reviewed the Medications.     Review of Systems  Anesthesia Hx:  No problems with previous Anesthesia               Denies Personal Hx of Anesthesia complications.                    Hematology/Oncology:  Hematology Normal                                     Cardiovascular:     Hypertension              ECG has been reviewed.    Functional Capacity 3.5 METS                         Pulmonary:        Sleep Apnea                Renal/:  Renal/ Normal                 Hepatic/GI:     GERD, well controlled                 Musculoskeletal:  Arthritis          Spine Disorders: cervical and lumbar            Neurological:    Neuromuscular Disease,                                   Endocrine:   Hypothyroidism        Obesity / BMI > 30  Psych:  Psychiatric History                Physical Exam  General: Well nourished, Cooperative, Alert and Oriented    Airway:  Mallampati: III / II  Mouth Opening: Small, but > 3cm  TM Distance: Normal  Tongue: Normal  Neck ROM: Extension Decreased    Dental:  Periodontal disease, Intact    Chest/Lungs:  Clear to auscultation    Heart:  Rate: Normal  Rhythm: Regular Rhythm    7/24 Medical clearance given by Dr. Karen Scott on 7/18. Received labs and EKG. ( All in media)       Anesthesia Plan  Type of Anesthesia, risks & benefits discussed:    Anesthesia Type: Gen ETT  Intra-op Monitoring Plan: Standard ASA Monitors  Post Op Pain Control Plan: multimodal analgesia and IV/PO Opioids PRN  Induction:  IV  Airway Plan: Direct and Video, Post-Induction  Informed Consent: Informed consent signed with the Patient and all parties understand the risks and agree with anesthesia plan.  All questions answered.   ASA Score: 2  Day of Surgery Review of History & Physical: H&P Update referred to the surgeon/provider.    Ready For Surgery From Anesthesia Perspective.     .             [1]  Patient Active Problem List  Diagnosis    Essential hypertension    Hypercholesteremia    Cervical spinal stenosis    Cervical myofascial pain syndrome    Spondylosis of cervical region without myelopathy or radiculopathy    Facet arthritis, degenerative, cervical spine    Acid reflux    Bipolar affective disorder in remission    Sleep apnea    Anemia    Elevated AST (SGOT)    Cervical stenosis of spinal canal    Neck pain    Decreased functional mobility    BPH with urinary obstruction    Erectile dysfunction due to arterial insufficiency    Avascular necrosis of left femur    Avascular necrosis of bones of  both hips    Left hip pain    Chronic pain    Hypothyroidism    Class 1 obesity with body mass index (BMI) of 31.0 to 31.9 in adult    Avascular necrosis of left femoral head    Weakness of trunk musculature    Condyloma    Avascular necrosis of right femoral head    Leukopenia   [2]  Current Outpatient Medications on File Prior to Visit   Medication Sig Dispense Refill    alfuzosin (UROXATRAL) 10 mg Tb24 Take 10 mg by mouth.      allopurinoL (ZYLOPRIM) 300 MG tablet Take 300 mg by mouth once daily.      atorvastatin (LIPITOR) 40 MG tablet Take 1 tablet (40 mg total) by mouth every evening. For cholesterol (new dose) 90 tablet 1    buPROPion (WELLBUTRIN XL) 300 MG 24 hr tablet Take 300 mg by mouth once daily.      calcium-vitamin D3 (OS-JALYN 500 + D3) 500 mg(1,250mg) -200 unit per tablet Take 1 tablet by mouth 2 (two) times daily with meals.      CAPLYTA 42 mg Cap every evening.      chlorthalidone (HYGROTEN) 25 MG Tab TAKE 1 TABLET(25 MG) BY MOUTH EVERY DAY. REPLACES HCTZ FOR BLOOD PRESSURE 30 tablet 1    cyanocobalamin (VITAMIN B-12) 100 MCG tablet Take 100 mcg by mouth once daily.      doxepin (SINEQUAN) 150 MG Cap Take 25 mg by mouth every evening.      ergocalciferol (ERGOCALCIFEROL) 50,000 unit Cap Take 50,000 Units by mouth every 7 days.      folic acid (FOLVITE) 1 MG tablet Take 1 mg by mouth once daily.      lamoTRIgine (LAMICTAL) 100 MG tablet Take 1 tablet (100 mg total) by mouth once daily. 30 tablet 2    mirabegron (MYRBETRIQ) 25 mg Tb24 ER tablet Take 25 mg by mouth once daily.      omeprazole (PRILOSEC) 40 MG capsule Take 1 capsule (40 mg total) by mouth daily as needed (heartburn). 90 capsule 2    pantoprazole (PROTONIX) 40 MG tablet Take 40 mg by mouth once daily.      potassium chloride (MICRO-K) 10 MEQ CpSR Take 10 mEq by mouth once.      propranoloL (INDERAL) 40 MG tablet Take 1 tablet (40 mg total) by mouth 2 (two) times daily. 60 tablet 6    rizatriptan (MAXALT) 10 MG  tablet Take 1 tablet (10 mg total) by mouth as needed for Migraine. (Can repeat dose in 2 hours if needed, maximum 3 doses per day). 20 tablet 3    UNABLE TO FIND 1 mg. medication name: ANASTORZOLE 1MG EVERY  3 DAYS      VRAYLAR 1.5 mg Cap Take 1.5 mg by mouth once daily. TAKES 3MG.       No current facility-administered medications on file prior to visit.

## 2025-07-29 ENCOUNTER — LAB VISIT (OUTPATIENT)
Dept: LAB | Facility: HOSPITAL | Age: 60
End: 2025-07-29
Attending: ANESTHESIOLOGY
Payer: MEDICAID

## 2025-07-29 ENCOUNTER — HOSPITAL ENCOUNTER (OUTPATIENT)
Dept: CARDIOLOGY | Facility: CLINIC | Age: 60
Discharge: HOME | End: 2025-07-29
Payer: MEDICAID

## 2025-07-29 DIAGNOSIS — R06.02 SOB (SHORTNESS OF BREATH): ICD-10-CM

## 2025-07-29 DIAGNOSIS — Z01.818 PREOPERATIVE TESTING: ICD-10-CM

## 2025-07-29 LAB
BILIRUB UR QL STRIP.AUTO: NEGATIVE
CLARITY UR: CLEAR
COLOR UR AUTO: YELLOW
GLUCOSE UR QL STRIP: NEGATIVE
HGB UR QL STRIP: NEGATIVE
INDIRECT COOMBS: NORMAL
KETONES UR QL STRIP: NEGATIVE
LEUKOCYTE ESTERASE UR QL STRIP: NEGATIVE
NITRITE UR QL STRIP: NEGATIVE
OHS QRS DURATION: 94 MS
OHS QTC CALCULATION: 446 MS
PH UR STRIP: 6 [PH]
PROT UR QL STRIP: NEGATIVE
RH BLD: NORMAL
SP GR UR STRIP: 1.02
SPECIMEN OUTDATE: NORMAL
T4 FREE SERPL-MCNC: 0.96 NG/DL (ref 0.71–1.51)
TSH SERPL-ACNC: 5.92 UIU/ML (ref 0.4–4)
UROBILINOGEN UR STRIP-ACNC: NEGATIVE EU/DL

## 2025-07-29 PROCEDURE — 84443 ASSAY THYROID STIM HORMONE: CPT

## 2025-07-29 PROCEDURE — 84439 ASSAY OF FREE THYROXINE: CPT

## 2025-07-29 PROCEDURE — 93010 ELECTROCARDIOGRAM REPORT: CPT | Mod: S$PBB,,, | Performed by: STUDENT IN AN ORGANIZED HEALTH CARE EDUCATION/TRAINING PROGRAM

## 2025-07-29 PROCEDURE — 93005 ELECTROCARDIOGRAM TRACING: CPT | Mod: PBBFAC | Performed by: STUDENT IN AN ORGANIZED HEALTH CARE EDUCATION/TRAINING PROGRAM

## 2025-07-29 PROCEDURE — 81003 URINALYSIS AUTO W/O SCOPE: CPT

## 2025-07-29 PROCEDURE — 36415 COLL VENOUS BLD VENIPUNCTURE: CPT

## 2025-07-29 PROCEDURE — 86901 BLOOD TYPING SEROLOGIC RH(D): CPT | Performed by: ANESTHESIOLOGY

## 2025-07-29 NOTE — ANESTHESIA PREPROCEDURE EVALUATION
[facility]  Anesthesia Pre-Operative Evaluation         Patient Name: Tyrone Santos  YOB: 1965  MRN: [id number]    SUBJECTIVE:     Pre-operative evaluation for Procedure(s) (LRB):  ARTHRODESIS, SPINE, LUMBAR, TLIF, POSTERIOR, W/ PEDICLE SCREW L4-5 globus robot SNS:SSEP/EMG (N/A)     07/28/2025    Tyrone Santos is a 60 y.o. male w/ a significant PMHx of HTN, GERD, ERIC, obesity, hypothyroidism, cervical spondylosis, lumbar radiculopathy.    Patient now presents for the above procedure(s).    Echo Summary  Results for orders placed during the hospital encounter of 01/07/25    Echo    Interpretation Summary    Left Ventricle: The left ventricle is mildly dilated. Normal wall thickness. Mild global hypokinesis present. There is low normal systolic function with a visually estimated ejection fraction of 50 - 55%. Global longitudinal strain is -13.0%. There is normal diastolic function.    Right Ventricle: Normal right ventricular cavity size. Wall thickness is normal. Systolic function is normal.    Pulmonary Artery: No pulmonary hypertension. The estimated pulmonary artery systolic pressure is 22 mmHg.    IVC/SVC: Normal venous pressure at 3 mmHg.       Prev airway: None documented.      Problem List[1]    Review of patient's allergies indicates:   Allergen Reactions    Codeine Nausea Only    Seroquel [quetiapine]      Heart racing     Tizanidine Other (See Comments)     Caused increased pain and muscle pain       Current Inpatient Medications:      Medications Ordered Prior to Encounter[2]    Past Surgical History:   Procedure Laterality Date    ANTERIOR CERVICAL DISCECTOMY W/ FUSION N/A 10/23/2018    Procedure: DISCECTOMY, SPINE, CERVICAL, ANTERIOR APPROACH, WITH FUSION C3-4;  Surgeon: Jw Anne MD;  Location: [facility] OR [address] [place] [address];  Service: Neurosurgery;  Laterality: N/A;    APPENDECTOMY      around 2012    ARTHROPLASTY OF HIP BY ANTERIOR APPROACH Left  8/23/2021    Procedure: ARTHROPLASTY, HIP, TOTAL, ANTERIOR APPROACH:LEFT:DPEUY-ACTIS+PINNACLE;  Surgeon: Ángel Lam III, MD;  Location: [facility] OR;  Service: Orthopedics;  Laterality: Left;    ARTHROPLASTY OF HIP BY ANTERIOR APPROACH Right 8/18/2022    Procedure: ARTHROPLASTY, HIP, TOTAL, ANTERIOR APPROACH: RIGHT: DEPUY-ACTIS+PINNACLE;  Surgeon: Ángel Lam III, MD;  Location: [facility] OR;  Service: Orthopedics;  Laterality: Right;    INJECTION OF ANESTHETIC AGENT AROUND NERVE Left 7/1/2021    Procedure: BLOCK, NERVE, OBTURATOR and FEMORAL;  Surgeon: Josefina Lee MD;  Location: [facility];  Service: Pain Management;  Laterality: Left;    SCAPHOID FRACTURE SURGERY      had to have a bone graft -early  1990's     WRIST SURGERY      6 years ago       Social History:  Tobacco Use: Medium Risk (7/11/2025)    Patient History     Smoking Tobacco Use: Former     Smokeless Tobacco Use: Former     Passive Exposure: Not on file      Alcohol Use: Alcohol Misuse (4/4/2025)    AUDIT-C     Frequency of Alcohol Consumption: 4 or more times a week     Average Number of Drinks: 1 or 2     Frequency of Binge Drinking: Less than monthly        OBJECTIVE:     Vital Signs Range (Last 24H):         Significant Labs:  Lab Results   Component Value Date    WBC 5.4 06/06/2023    HGB 14.5 06/06/2023    HCT 41.9 06/06/2023     07/06/2022    CHOL 258 (H) 03/24/2023    TRIG 141 03/24/2023    HDL 83 (H) 03/24/2023    ALT 33 06/06/2023    AST 40 06/06/2023     06/16/2023    K 3.7 06/16/2023     06/16/2023    CREATININE 1.3 06/16/2023    BUN 19 06/16/2023    CO2 27 06/16/2023    TSH 4.947 (H) 03/24/2023    INR 0.9 08/01/2022       Diagnostic Studies: No relevant studies.    EKG:   Results for orders placed or performed during the hospital encounter of 07/06/22   EKG 12-lead    Collection Time: 07/06/22  8:50 PM    Narrative    Test Reason : COVID    Vent. Rate : 065 BPM     Atrial Rate : 065 BPM     P-R Int  ": 178 ms          QRS Dur : 088 ms      QT Int : 396 ms       P-R-T Axes : 040 054 048 degrees     QTc Int : 411 ms    Normal sinus rhythm  Normal ECG  When compared with ECG of 18-AUG-2021 12:03,  No significant change was found  Confirmed by ALYSSA EVANS MD ([id number]) on 7/7/2022 4:00:51 PM    Referred By: [email or web address]   SELF           Confirmed By:ALYSSA EVANS MD       2D ECHO:  TTE:  Results for orders placed or performed during the hospital encounter of 01/07/25   Echo   Result Value Ref Range    LV Diastolic Volume 114.88 mL    Echo EF Estimated 51 %    LV Systolic Volume 56.80 mL    IVS 0.9 0.6 - 1.1 cm    LVIDd 4.9 3.5 - 6.0 cm    LVIDs 3.7 2.1 - 4.0 cm    LVOT diameter 2.2 cm    PW 0.9 0.6 - 1.1 cm    AV LVOT peak gradient 3 mmHg    LVOT mn grad 2 mmHg    LVOT peak eder 0.9 m/s    LVOT peak VTI 16.2 cm    RV- keen basal diam 2.6 cm    RVOT prox diameter 1.93 cm    RV S' 10.81 cm/s    LA size 3.26 cm    Left Atrium Major Axis 3.24 cm    Left Atrium Minor Axis 3.48 cm    LA Vol (MOD) 21.27 mL    RA Major Axis 3.18 cm    RA Area 9.1 cm2    AV valve area 3.2 cm2    AV area by cont VTI 3.2 cm2    AV peak gradient 4.0 mmHg    AV mean gradient 2.3 mmHg    Ao peak eder 1.0 m/s    Ao VTI 19.2 cm    MV Peak A Eder 0.67 m/s    E wave deceleration time 186.68 ms    MV Peak E Eder 0.51 m/s    E/A ratio 0.76     LV LATERAL E/E' RATIO 4.25     LV SEPTAL E/E' RATIO 8.50     TDI LATERAL 0.12 m/s    TDI SEPTAL 0.06 m/s    TV peak gradient 19 mmHg    TR Max Eder 2.18 m/s    Ascending aorta 3.39 cm    STJ 3.48 cm    P vein A 0.3 m/s    MV "A" wave duration 122.74 ms    Pulm vein "A" wave 122.74 ms    PV Peak D Eder 0.33 m/s    PV Peak S Eder 0.40 m/s    IVC diameter 1.29 cm    Sinus 3.09 cm    LA WIDTH 2.91 cm    RA Width 2.23 cm    TAPSE 1.64 cm    BSA 2.1 m2    LVOT stroke volume 61.6 cm3    LV Systolic Volume Index 27.7 mL/m2    LV Diastolic Volume Index 56.04 mL/m2    LVOT area 3.8 cm2    FS 24.5 28 - 44 %    Left Ventricle Relative " Wall Thickness 0.37 cm    LV mass 153.0 g    LV Mass Index 74.6 g/m2    E/E' ratio 5.67 m/s    RITA 13.2 mL/m2    LA Vol 27.06 cm3    Pulm vein S/D ratio 1.21     RVOT area 2.92 cm2    RV/LV Ratio 0.53 cm    RITA (MOD) 10.4 mL/m2    AV Velocity Ratio 0.90     AV index (prosthetic) 0.84     JOVI by Velocity Ratio 3.4 cm²    Triscuspid Valve Regurgitation Peak Gradient 19 mmHg    Mean e' 0.09 m/s    ZLVIDS -0.14     ZLVIDD -2.27     Mitral Valve Heart Rate 70 bpm    MV mean gradient 1 mmHg    TV resting pulmonary artery pressure 22 mmHg    RV TB RVSP 5 mmHg    Est. RA pres 3 mmHg    Alvares's Biplane MOD Ejection Fraction 52 %    GLS 13.0 %    Narrative      Left Ventricle: The left ventricle is mildly dilated. Normal wall   thickness. Mild global hypokinesis present. There is low normal systolic   function with a visually estimated ejection fraction of 50 - 55%. Global   longitudinal strain is -13.0%. There is normal diastolic function.    Right Ventricle: Normal right ventricular cavity size. Wall thickness   is normal. Systolic function is normal.    Pulmonary Artery: No pulmonary hypertension. The estimated pulmonary   artery systolic pressure is 22 mmHg.    IVC/SVC: Normal venous pressure at 3 mmHg.         GERMÁN:  No results found for this or any previous visit.    ASSESSMENT/PLAN:           Pre-op Assessment    I have reviewed the Patient Summary Reports.     I have reviewed the Nursing Notes.    I have reviewed the Medications.     Review of Systems  Anesthesia Hx:  No problems with previous Anesthesia               Denies Personal Hx of Anesthesia complications.                    Hematology/Oncology:  Hematology Normal                                     Cardiovascular:     Hypertension              ECG has been reviewed.                            Pulmonary:        Sleep Apnea                Renal/:  Renal/ Normal                 Hepatic/GI:     GERD                Musculoskeletal:  Arthritis                Neurological:    Neuromuscular Disease,                                   Endocrine:   Hypothyroidism        Obesity / BMI > 30  Psych:  Psychiatric History                  Physical Exam  General: Well nourished, Cooperative, Alert and Oriented    Airway:  Mallampati: III / II  Mouth Opening: Small, but > 3cm  TM Distance: Normal  Tongue: Normal  Neck ROM: Extension Decreased    Dental:  Intact, Periodontal disease    Chest/Lungs:  Clear to auscultation    Heart:  Rate: Normal  Rhythm: Regular Rhythm        Anesthesia Plan  Type of Anesthesia, risks & benefits discussed:    Anesthesia Type: Gen ETT  Intra-op Monitoring Plan: Standard ASA Monitors and Art Line  Post Op Pain Control Plan: multimodal analgesia and IV/PO Opioids PRN  Induction:  IV  Airway Plan: Direct and Video, Post-Induction  Informed Consent: Informed consent signed with the Patient and all parties understand the risks and agree with anesthesia plan.  All questions answered.   ASA Score: 2  Day of Surgery Review of History & Physical: H&P Update referred to the surgeon/provider.    .               [1]  Patient Active Problem List  Diagnosis    Essential hypertension    Hypercholesteremia    Cervical spinal stenosis    Cervical myofascial pain syndrome    Spondylosis of cervical region without myelopathy or radiculopathy    Facet arthritis, degenerative, cervical spine    Acid reflux    Bipolar affective disorder in remission    Sleep apnea    Anemia    Elevated AST (SGOT)    Cervical stenosis of spinal canal    Neck pain    Decreased functional mobility    BPH with urinary obstruction    Erectile dysfunction due to arterial insufficiency    Avascular necrosis of left femur    Avascular necrosis of bones of both hips    Left hip pain    Chronic pain    Hypothyroidism    Class 1 obesity with body mass index (BMI) of 31.0 to 31.9 in adult    Avascular necrosis of left femoral head    Weakness of trunk musculature     Condyloma    Avascular necrosis of right femoral head    Leukopenia   [2]  Current Outpatient Medications on File Prior to Visit   Medication Sig Dispense Refill    alfuzosin (UROXATRAL) 10 mg Tb24 Take 10 mg by mouth.      allopurinoL (ZYLOPRIM) 300 MG tablet Take 300 mg by mouth once daily.      atorvastatin (LIPITOR) 40 MG tablet Take 1 tablet (40 mg total) by mouth every evening. For cholesterol (new dose) 90 tablet 1    buPROPion (WELLBUTRIN XL) 300 MG 24 hr tablet Take 300 mg by mouth once daily.      calcium-vitamin D3 (OS-JALYN 500 + D3) 500 mg(1,250mg) -200 unit per tablet Take 1 tablet by mouth 2 (two) times daily with meals.      CAPLYTA 42 mg Cap every evening.      chlorthalidone (HYGROTEN) 25 MG Tab TAKE 1 TABLET(25 MG) BY MOUTH EVERY DAY. REPLACES HCTZ FOR BLOOD PRESSURE 30 tablet 1    cyanocobalamin (VITAMIN B-12) 100 MCG tablet Take 100 mcg by mouth once daily.      doxepin (SINEQUAN) 150 MG Cap Take 25 mg by mouth every evening.      ergocalciferol (ERGOCALCIFEROL) 50,000 unit Cap Take 50,000 Units by mouth every 7 days.      folic acid (FOLVITE) 1 MG tablet Take 1 mg by mouth once daily.      lamoTRIgine (LAMICTAL) 100 MG tablet Take 1 tablet (100 mg total) by mouth once daily. 30 tablet 2    mirabegron (MYRBETRIQ) 25 mg Tb24 ER tablet Take 25 mg by mouth once daily.      omeprazole (PRILOSEC) 40 MG capsule Take 1 capsule (40 mg total) by mouth daily as needed (heartburn). 90 capsule 2    pantoprazole (PROTONIX) 40 MG tablet Take 40 mg by mouth once daily.      potassium chloride (MICRO-K) 10 MEQ CpSR Take 10 mEq by mouth once.      propranoloL (INDERAL) 40 MG tablet Take 1 tablet (40 mg total) by mouth 2 (two) times daily. 60 tablet 6    rizatriptan (MAXALT) 10 MG tablet Take 1 tablet (10 mg total) by mouth as needed for Migraine. (Can repeat dose in 2 hours if needed, maximum 3 doses per day). 20 tablet 3    UNABLE TO FIND 1 mg. medication name: ANASTORZOLE 1MG EVERY  3 DAYS       VRAYLAR 1.5 mg Cap Take 1.5 mg by mouth once daily. TAKES 3MG.       No current facility-administered medications on file prior to visit.

## 2025-07-30 LAB — HOLD SPECIMEN: NORMAL

## 2025-08-04 ENCOUNTER — TELEPHONE (OUTPATIENT)
Dept: ORTHOPEDICS | Facility: CLINIC | Age: 60
End: 2025-08-04
Payer: MEDICAID

## 2025-08-04 NOTE — TELEPHONE ENCOUNTER
Spoke to pt, informed his arrival time for surgery tomorrow is 9:00am at [facility] 2nd floor, [address]. No food or drink after midnight. Pt pleased and verbalized understanding.

## 2025-08-05 ENCOUNTER — ANESTHESIA (OUTPATIENT)
Dept: SURGERY | Facility: HOSPITAL | Age: 60
DRG: 451 | End: 2025-08-05
Payer: MEDICAID

## 2025-08-05 ENCOUNTER — HOSPITAL ENCOUNTER (INPATIENT)
Facility: HOSPITAL | Age: 60
LOS: 3 days | Discharge: HOME | DRG: 451 | End: 2025-08-08
Attending: ORTHOPAEDIC SURGERY | Admitting: ORTHOPAEDIC SURGERY
Payer: MEDICAID

## 2025-08-05 DIAGNOSIS — M54.16 LUMBAR RADICULOPATHY: Primary | ICD-10-CM

## 2025-08-05 DIAGNOSIS — M47.816 LUMBAR SPONDYLOSIS: ICD-10-CM

## 2025-08-05 LAB
ABSOLUTE EOSINOPHIL (OHS): 0.03 K/UL
ABSOLUTE MONOCYTE (OHS): 0.13 K/UL (ref 0.3–1)
ABSOLUTE NEUTROPHIL COUNT (OHS): 7.58 K/UL (ref 1.8–7.7)
ALBUMIN SERPL BCP-MCNC: 3.4 G/DL (ref 3.5–5.2)
ALP SERPL-CCNC: 94 UNIT/L (ref 40–150)
ALT SERPL W/O P-5'-P-CCNC: 23 UNIT/L (ref 0–55)
ANION GAP (OHS): 9 MMOL/L (ref 8–16)
AST SERPL-CCNC: 36 UNIT/L (ref 0–50)
BASOPHILS # BLD AUTO: 0.04 K/UL
BASOPHILS NFR BLD AUTO: 0.5 %
BILIRUB SERPL-MCNC: 0.7 MG/DL (ref 0.1–1)
BUN SERPL-MCNC: 9 MG/DL (ref 6–20)
CALCIUM SERPL-MCNC: 8.1 MG/DL (ref 8.7–10.5)
CHLORIDE SERPL-SCNC: 109 MMOL/L (ref 95–110)
CO2 SERPL-SCNC: 21 MMOL/L (ref 23–29)
CREAT SERPL-MCNC: 1.2 MG/DL (ref 0.5–1.4)
ERYTHROCYTE [DISTWIDTH] IN BLOOD BY AUTOMATED COUNT: 14.2 % (ref 11.5–14.5)
GFR SERPLBLD CREATININE-BSD FMLA CKD-EPI: >60 ML/MIN/1.73/M2
GLUCOSE SERPL-MCNC: 130 MG/DL (ref 70–110)
HCT VFR BLD AUTO: 41.2 % (ref 40–54)
HGB BLD-MCNC: 13.4 GM/DL (ref 14–18)
IMM GRANULOCYTES # BLD AUTO: 0.1 K/UL (ref 0–0.04)
IMM GRANULOCYTES NFR BLD AUTO: 1.1 % (ref 0–0.5)
LYMPHOCYTES # BLD AUTO: 0.97 K/UL (ref 1–4.8)
MCH RBC QN AUTO: 31.5 PG (ref 27–31)
MCHC RBC AUTO-ENTMCNC: 32.5 G/DL (ref 32–36)
MCV RBC AUTO: 97 FL (ref 82–98)
NUCLEATED RBC (/100WBC) (OHS): 0 /100 WBC
PLATELET # BLD AUTO: 203 K/UL (ref 150–450)
PMV BLD AUTO: 9.9 FL (ref 9.2–12.9)
POTASSIUM SERPL-SCNC: 4.4 MMOL/L (ref 3.5–5.1)
PROT SERPL-MCNC: 6.1 GM/DL (ref 6–8.4)
RBC # BLD AUTO: 4.26 M/UL (ref 4.6–6.2)
RELATIVE EOSINOPHIL (OHS): 0.3 %
RELATIVE LYMPHOCYTE (OHS): 11 % (ref 18–48)
RELATIVE MONOCYTE (OHS): 1.5 % (ref 4–15)
RELATIVE NEUTROPHIL (OHS): 85.6 % (ref 38–73)
SODIUM SERPL-SCNC: 139 MMOL/L (ref 136–145)
WBC # BLD AUTO: 8.85 K/UL (ref 3.9–12.7)

## 2025-08-05 PROCEDURE — 25000003 PHARM REV CODE 250

## 2025-08-05 PROCEDURE — 37000008 HC ANESTHESIA 1ST 15 MINUTES: Performed by: ORTHOPAEDIC SURGERY

## 2025-08-05 PROCEDURE — C1729 CATH, DRAINAGE: HCPCS | Performed by: ORTHOPAEDIC SURGERY

## 2025-08-05 PROCEDURE — 01NB0ZZ RELEASE LUMBAR NERVE, OPEN APPROACH: ICD-10-PCS | Performed by: ORTHOPAEDIC SURGERY

## 2025-08-05 PROCEDURE — 0SB20ZZ EXCISION OF LUMBAR VERTEBRAL DISC, OPEN APPROACH: ICD-10-PCS | Performed by: ORTHOPAEDIC SURGERY

## 2025-08-05 PROCEDURE — 63600175 PHARM REV CODE 636 W HCPCS: Performed by: ORTHOPAEDIC SURGERY

## 2025-08-05 PROCEDURE — 85025 COMPLETE CBC W/AUTO DIFF WBC: CPT

## 2025-08-05 PROCEDURE — 36000710: Performed by: ORTHOPAEDIC SURGERY

## 2025-08-05 PROCEDURE — 11000001 HC ACUTE MED/SURG PRIVATE ROOM

## 2025-08-05 PROCEDURE — 71000015 HC POSTOP RECOV 1ST HR: Performed by: ORTHOPAEDIC SURGERY

## 2025-08-05 PROCEDURE — 63047 LAM FACETEC & FORAMOT LUMBAR: CPT | Mod: XU,,, | Performed by: ORTHOPAEDIC SURGERY

## 2025-08-05 PROCEDURE — 71000033 HC RECOVERY, INTIAL HOUR: Performed by: ORTHOPAEDIC SURGERY

## 2025-08-05 PROCEDURE — 63600175 PHARM REV CODE 636 W HCPCS

## 2025-08-05 PROCEDURE — 61783 SCAN PROC SPINAL: CPT | Mod: ,,, | Performed by: ORTHOPAEDIC SURGERY

## 2025-08-05 PROCEDURE — 20930 SP BONE ALGRFT MORSEL ADD-ON: CPT | Mod: ,,, | Performed by: ORTHOPAEDIC SURGERY

## 2025-08-05 PROCEDURE — C1713 ANCHOR/SCREW BN/BN,TIS/BN: HCPCS | Performed by: ORTHOPAEDIC SURGERY

## 2025-08-05 PROCEDURE — 27800903 OPTIME MED/SURG SUP & DEVICES OTHER IMPLANTS: Performed by: ORTHOPAEDIC SURGERY

## 2025-08-05 PROCEDURE — 71000016 HC POSTOP RECOV ADDL HR: Performed by: ORTHOPAEDIC SURGERY

## 2025-08-05 PROCEDURE — 27201423 OPTIME MED/SURG SUP & DEVICES STERILE SUPPLY: Performed by: ORTHOPAEDIC SURGERY

## 2025-08-05 PROCEDURE — 37000009 HC ANESTHESIA EA ADD 15 MINS: Performed by: ORTHOPAEDIC SURGERY

## 2025-08-05 PROCEDURE — 82040 ASSAY OF SERUM ALBUMIN: CPT

## 2025-08-05 PROCEDURE — 0SG00AJ FUSION OF LUMBAR VERTEBRAL JOINT WITH INTERBODY FUSION DEVICE, POSTERIOR APPROACH, ANTERIOR COLUMN, OPEN APPROACH: ICD-10-PCS | Performed by: ORTHOPAEDIC SURGERY

## 2025-08-05 PROCEDURE — 22853 INSJ BIOMECHANICAL DEVICE: CPT | Mod: ,,, | Performed by: ORTHOPAEDIC SURGERY

## 2025-08-05 PROCEDURE — 22633 ARTHRD CMBN 1NTRSPC LUMBAR: CPT | Mod: ,,, | Performed by: ORTHOPAEDIC SURGERY

## 2025-08-05 PROCEDURE — 20936 SP BONE AGRFT LOCAL ADD-ON: CPT | Mod: ,,, | Performed by: ORTHOPAEDIC SURGERY

## 2025-08-05 PROCEDURE — 25000003 PHARM REV CODE 250: Performed by: ORTHOPAEDIC SURGERY

## 2025-08-05 PROCEDURE — 22840 INSERT SPINE FIXATION DEVICE: CPT | Mod: ,,, | Performed by: ORTHOPAEDIC SURGERY

## 2025-08-05 PROCEDURE — 63048 LAM FACETEC &FORAMOT EA ADDL: CPT | Mod: XU,,, | Performed by: ORTHOPAEDIC SURGERY

## 2025-08-05 PROCEDURE — 36000711: Performed by: ORTHOPAEDIC SURGERY

## 2025-08-05 DEVICE — CAP SPINAL LOCK THRD CREO 5.5: Type: IMPLANTABLE DEVICE | Site: SPINE LUMBAR | Status: FUNCTIONAL

## 2025-08-05 DEVICE — IMPLANTABLE DEVICE: Type: IMPLANTABLE DEVICE | Site: SPINE LUMBAR | Status: FUNCTIONAL

## 2025-08-05 DEVICE — PUTTY OSTEOSELECT DBM SYR 5CC: Type: IMPLANTABLE DEVICE | Site: SPINE LUMBAR | Status: FUNCTIONAL

## 2025-08-05 DEVICE — SCREW BONE SPINAL CREO 6.5X50: Type: IMPLANTABLE DEVICE | Site: SPINE LUMBAR | Status: FUNCTIONAL

## 2025-08-05 DEVICE — SCREW CREO TREADED 6.5X55MM: Type: IMPLANTABLE DEVICE | Site: SPINE LUMBAR | Status: FUNCTIONAL

## 2025-08-05 RX ORDER — GABAPENTIN 300 MG/1
300 CAPSULE ORAL 3 TIMES DAILY
Qty: 90 CAPSULE | Refills: 0 | Status: SHIPPED | OUTPATIENT
Start: 2025-08-05 | End: 2025-09-07

## 2025-08-05 RX ORDER — ATORVASTATIN CALCIUM 40 MG/1
40 TABLET, FILM COATED ORAL NIGHTLY
Status: DISCONTINUED | OUTPATIENT
Start: 2025-08-05 | End: 2025-08-08 | Stop reason: HOSPADM

## 2025-08-05 RX ORDER — POLYETHYLENE GLYCOL 3350 17 G/17G
17 POWDER, FOR SOLUTION ORAL DAILY
Status: DISCONTINUED | OUTPATIENT
Start: 2025-08-05 | End: 2025-08-08 | Stop reason: HOSPADM

## 2025-08-05 RX ORDER — OXYCODONE HYDROCHLORIDE 5 MG/1
5 TABLET ORAL EVERY 4 HOURS PRN
Qty: 25 TABLET | Refills: 0 | Status: SHIPPED | OUTPATIENT
Start: 2025-08-05

## 2025-08-05 RX ORDER — MAGNESIUM SULFATE HEPTAHYDRATE 40 MG/ML
INJECTION, SOLUTION INTRAVENOUS
Status: DISCONTINUED | OUTPATIENT
Start: 2025-08-05 | End: 2025-08-05

## 2025-08-05 RX ORDER — SODIUM CHLORIDE 9 MG/ML
INJECTION, SOLUTION INTRAVENOUS CONTINUOUS
Status: DISCONTINUED | OUTPATIENT
Start: 2025-08-05 | End: 2025-08-08 | Stop reason: HOSPADM

## 2025-08-05 RX ORDER — MUPIROCIN 20 MG/G
OINTMENT TOPICAL
Status: DISCONTINUED | OUTPATIENT
Start: 2025-08-05 | End: 2025-08-05 | Stop reason: HOSPADM

## 2025-08-05 RX ORDER — BUPROPION HYDROCHLORIDE 150 MG/1
300 TABLET ORAL DAILY
Status: DISCONTINUED | OUTPATIENT
Start: 2025-08-06 | End: 2025-08-08 | Stop reason: HOSPADM

## 2025-08-05 RX ORDER — OXYCODONE HYDROCHLORIDE 10 MG/1
10 TABLET ORAL EVERY 4 HOURS PRN
Status: DISCONTINUED | OUTPATIENT
Start: 2025-08-05 | End: 2025-08-08 | Stop reason: HOSPADM

## 2025-08-05 RX ORDER — DEXMEDETOMIDINE HYDROCHLORIDE 100 UG/ML
INJECTION, SOLUTION INTRAVENOUS
Status: DISCONTINUED | OUTPATIENT
Start: 2025-08-05 | End: 2025-08-05

## 2025-08-05 RX ORDER — HEPARIN SODIUM 5000 [USP'U]/ML
5000 INJECTION, SOLUTION INTRAVENOUS; SUBCUTANEOUS EVERY 8 HOURS
Status: DISCONTINUED | OUTPATIENT
Start: 2025-08-05 | End: 2025-08-08 | Stop reason: HOSPADM

## 2025-08-05 RX ORDER — PROPRANOLOL HYDROCHLORIDE 20 MG/1
40 TABLET ORAL 2 TIMES DAILY
Status: DISCONTINUED | OUTPATIENT
Start: 2025-08-05 | End: 2025-08-08 | Stop reason: HOSPADM

## 2025-08-05 RX ORDER — ONDANSETRON 4 MG/1
4 TABLET, ORALLY DISINTEGRATING ORAL EVERY 8 HOURS PRN
Qty: 10 TABLET | Refills: 0 | Status: SHIPPED | OUTPATIENT
Start: 2025-08-05

## 2025-08-05 RX ORDER — CEFAZOLIN 2 G/1
2 INJECTION, POWDER, FOR SOLUTION INTRAMUSCULAR; INTRAVENOUS
Status: DISCONTINUED | OUTPATIENT
Start: 2025-08-05 | End: 2025-08-05 | Stop reason: HOSPADM

## 2025-08-05 RX ORDER — MUPIROCIN 20 MG/G
OINTMENT TOPICAL 2 TIMES DAILY
Status: DISCONTINUED | OUTPATIENT
Start: 2025-08-05 | End: 2025-08-08 | Stop reason: HOSPADM

## 2025-08-05 RX ORDER — CHLORTHALIDONE 25 MG/1
25 TABLET ORAL DAILY
Status: DISCONTINUED | OUTPATIENT
Start: 2025-08-06 | End: 2025-08-08 | Stop reason: HOSPADM

## 2025-08-05 RX ORDER — DOCUSATE SODIUM 100 MG/1
100 CAPSULE, LIQUID FILLED ORAL 2 TIMES DAILY
Status: DISCONTINUED | OUTPATIENT
Start: 2025-08-05 | End: 2025-08-08 | Stop reason: HOSPADM

## 2025-08-05 RX ORDER — CELECOXIB 100 MG/1
100 CAPSULE ORAL DAILY
Status: DISCONTINUED | OUTPATIENT
Start: 2025-08-05 | End: 2025-08-08 | Stop reason: HOSPADM

## 2025-08-05 RX ORDER — OXYCODONE HYDROCHLORIDE 5 MG/1
5 TABLET ORAL EVERY 4 HOURS PRN
Status: DISCONTINUED | OUTPATIENT
Start: 2025-08-05 | End: 2025-08-08 | Stop reason: HOSPADM

## 2025-08-05 RX ORDER — DEXAMETHASONE SODIUM PHOSPHATE 4 MG/ML
INJECTION, SOLUTION INTRA-ARTICULAR; INTRALESIONAL; INTRAMUSCULAR; INTRAVENOUS; SOFT TISSUE
Status: DISCONTINUED | OUTPATIENT
Start: 2025-08-05 | End: 2025-08-05

## 2025-08-05 RX ORDER — PROMETHAZINE HYDROCHLORIDE 25 MG/1
25 TABLET ORAL EVERY 6 HOURS PRN
Status: DISCONTINUED | OUTPATIENT
Start: 2025-08-05 | End: 2025-08-08 | Stop reason: HOSPADM

## 2025-08-05 RX ORDER — ONDANSETRON 8 MG/1
8 TABLET, ORALLY DISINTEGRATING ORAL EVERY 8 HOURS PRN
Status: DISCONTINUED | OUTPATIENT
Start: 2025-08-05 | End: 2025-08-08 | Stop reason: HOSPADM

## 2025-08-05 RX ORDER — SODIUM CHLORIDE 0.9 % (FLUSH) 0.9 %
10 SYRINGE (ML) INJECTION
Status: DISCONTINUED | OUTPATIENT
Start: 2025-08-05 | End: 2025-08-05 | Stop reason: HOSPADM

## 2025-08-05 RX ORDER — CELECOXIB 100 MG/1
100 CAPSULE ORAL 2 TIMES DAILY
Qty: 60 CAPSULE | Refills: 0 | Status: SHIPPED | OUTPATIENT
Start: 2025-08-05 | End: 2025-09-07

## 2025-08-05 RX ORDER — ONDANSETRON HYDROCHLORIDE 2 MG/ML
INJECTION, SOLUTION INTRAVENOUS
Status: DISCONTINUED | OUTPATIENT
Start: 2025-08-05 | End: 2025-08-05

## 2025-08-05 RX ORDER — LIDOCAINE HYDROCHLORIDE 20 MG/ML
INJECTION, SOLUTION EPIDURAL; INFILTRATION; INTRACAUDAL; PERINEURAL
Status: DISCONTINUED | OUTPATIENT
Start: 2025-08-05 | End: 2025-08-05

## 2025-08-05 RX ORDER — BISACODYL 10 MG/1
10 SUPPOSITORY RECTAL DAILY PRN
Status: DISCONTINUED | OUTPATIENT
Start: 2025-08-05 | End: 2025-08-08 | Stop reason: HOSPADM

## 2025-08-05 RX ORDER — KETAMINE HCL IN 0.9 % NACL 50 MG/5 ML
SYRINGE (ML) INTRAVENOUS
Status: DISCONTINUED | OUTPATIENT
Start: 2025-08-05 | End: 2025-08-05

## 2025-08-05 RX ORDER — GLUCAGON 1 MG
1 KIT INJECTION
Status: DISCONTINUED | OUTPATIENT
Start: 2025-08-05 | End: 2025-08-05 | Stop reason: HOSPADM

## 2025-08-05 RX ORDER — FAMOTIDINE 20 MG/1
20 TABLET, FILM COATED ORAL 2 TIMES DAILY
Status: DISCONTINUED | OUTPATIENT
Start: 2025-08-05 | End: 2025-08-08 | Stop reason: HOSPADM

## 2025-08-05 RX ORDER — ACETAMINOPHEN 325 MG/1
650 TABLET ORAL EVERY 6 HOURS PRN
Qty: 50 TABLET | Refills: 0 | Status: SHIPPED | OUTPATIENT
Start: 2025-08-05 | End: 2025-09-04

## 2025-08-05 RX ORDER — FENTANYL CITRATE 50 UG/ML
25 INJECTION, SOLUTION INTRAMUSCULAR; INTRAVENOUS EVERY 5 MIN PRN
Refills: 0 | Status: COMPLETED | OUTPATIENT
Start: 2025-08-05 | End: 2025-08-05

## 2025-08-05 RX ORDER — CEFAZOLIN 2 G/1
2 INJECTION, POWDER, FOR SOLUTION INTRAMUSCULAR; INTRAVENOUS
Status: COMPLETED | OUTPATIENT
Start: 2025-08-05 | End: 2025-08-06

## 2025-08-05 RX ORDER — VASOPRESSIN 20 [USP'U]/ML
INJECTION, SOLUTION INTRAMUSCULAR; SUBCUTANEOUS
Status: DISCONTINUED | OUTPATIENT
Start: 2025-08-05 | End: 2025-08-05

## 2025-08-05 RX ORDER — MIDAZOLAM HYDROCHLORIDE 1 MG/ML
INJECTION INTRAMUSCULAR; INTRAVENOUS
Status: DISCONTINUED | OUTPATIENT
Start: 2025-08-05 | End: 2025-08-05

## 2025-08-05 RX ORDER — TALC
6 POWDER (GRAM) TOPICAL NIGHTLY PRN
Status: DISCONTINUED | OUTPATIENT
Start: 2025-08-05 | End: 2025-08-08 | Stop reason: HOSPADM

## 2025-08-05 RX ORDER — CEFAZOLIN SODIUM 1 G/3ML
INJECTION, POWDER, FOR SOLUTION INTRAMUSCULAR; INTRAVENOUS
Status: DISCONTINUED | OUTPATIENT
Start: 2025-08-05 | End: 2025-08-05

## 2025-08-05 RX ORDER — AMOXICILLIN 250 MG
1 CAPSULE ORAL 2 TIMES DAILY
Qty: 14 TABLET | Refills: 0 | Status: SHIPPED | OUTPATIENT
Start: 2025-08-05 | End: 2025-08-15

## 2025-08-05 RX ORDER — PHENYLEPHRINE HYDROCHLORIDE 10 MG/ML
INJECTION INTRAVENOUS
Status: DISCONTINUED | OUTPATIENT
Start: 2025-08-05 | End: 2025-08-05

## 2025-08-05 RX ORDER — EPHEDRINE SULFATE 50 MG/ML
INJECTION, SOLUTION INTRAVENOUS
Status: DISCONTINUED | OUTPATIENT
Start: 2025-08-05 | End: 2025-08-05

## 2025-08-05 RX ORDER — METHOCARBAMOL 500 MG/1
500 TABLET, FILM COATED ORAL 4 TIMES DAILY
Qty: 120 TABLET | Refills: 0 | Status: SHIPPED | OUTPATIENT
Start: 2025-08-05 | End: 2025-09-07

## 2025-08-05 RX ORDER — METHOCARBAMOL 750 MG/1
750 TABLET, FILM COATED ORAL 3 TIMES DAILY
Status: DISCONTINUED | OUTPATIENT
Start: 2025-08-05 | End: 2025-08-08 | Stop reason: HOSPADM

## 2025-08-05 RX ORDER — PROPOFOL 10 MG/ML
VIAL (ML) INTRAVENOUS
Status: DISCONTINUED | OUTPATIENT
Start: 2025-08-05 | End: 2025-08-05

## 2025-08-05 RX ORDER — LAMOTRIGINE 100 MG/1
100 TABLET ORAL DAILY
Status: DISCONTINUED | OUTPATIENT
Start: 2025-08-06 | End: 2025-08-08 | Stop reason: HOSPADM

## 2025-08-05 RX ORDER — GABAPENTIN 300 MG/1
300 CAPSULE ORAL 3 TIMES DAILY
Status: DISCONTINUED | OUTPATIENT
Start: 2025-08-05 | End: 2025-08-08 | Stop reason: HOSPADM

## 2025-08-05 RX ORDER — LIDOCAINE HYDROCHLORIDE AND EPINEPHRINE 10; 10 UG/ML; MG/ML
INJECTION, SOLUTION INFILTRATION; PERINEURAL
Status: DISCONTINUED | OUTPATIENT
Start: 2025-08-05 | End: 2025-08-05 | Stop reason: HOSPADM

## 2025-08-05 RX ORDER — FENTANYL CITRATE 50 UG/ML
INJECTION, SOLUTION INTRAMUSCULAR; INTRAVENOUS
Status: DISCONTINUED | OUTPATIENT
Start: 2025-08-05 | End: 2025-08-05

## 2025-08-05 RX ORDER — OXYBUTYNIN CHLORIDE 5 MG/1
5 TABLET, EXTENDED RELEASE ORAL DAILY
Status: DISCONTINUED | OUTPATIENT
Start: 2025-08-06 | End: 2025-08-08 | Stop reason: HOSPADM

## 2025-08-05 RX ORDER — ALLOPURINOL 300 MG/1
300 TABLET ORAL DAILY
Status: DISCONTINUED | OUTPATIENT
Start: 2025-08-06 | End: 2025-08-08 | Stop reason: HOSPADM

## 2025-08-05 RX ORDER — VANCOMYCIN HYDROCHLORIDE 1 G/20ML
INJECTION, POWDER, LYOPHILIZED, FOR SOLUTION INTRAVENOUS
Status: DISCONTINUED | OUTPATIENT
Start: 2025-08-05 | End: 2025-08-05 | Stop reason: HOSPADM

## 2025-08-05 RX ORDER — ACETAMINOPHEN 500 MG
1000 TABLET ORAL EVERY 8 HOURS
Status: COMPLETED | OUTPATIENT
Start: 2025-08-05 | End: 2025-08-06

## 2025-08-05 RX ORDER — FAMOTIDINE 20 MG/1
20 TABLET, FILM COATED ORAL 2 TIMES DAILY
Qty: 60 TABLET | Refills: 0 | Status: SHIPPED | OUTPATIENT
Start: 2025-08-05 | End: 2025-09-07

## 2025-08-05 RX ORDER — ROCURONIUM BROMIDE 10 MG/ML
INJECTION, SOLUTION INTRAVENOUS
Status: DISCONTINUED | OUTPATIENT
Start: 2025-08-05 | End: 2025-08-05

## 2025-08-05 RX ADMIN — POLYETHYLENE GLYCOL 3350 17 G: 17 POWDER, FOR SOLUTION ORAL at 04:08

## 2025-08-05 RX ADMIN — DEXMEDETOMIDINE 8 MCG: 100 INJECTION, SOLUTION, CONCENTRATE INTRAVENOUS at 01:08

## 2025-08-05 RX ADMIN — Medication 15 MG: at 01:08

## 2025-08-05 RX ADMIN — SODIUM CHLORIDE: 0.9 INJECTION, SOLUTION INTRAVENOUS at 12:08

## 2025-08-05 RX ADMIN — ACETAMINOPHEN 1000 MG: 500 TABLET ORAL at 09:08

## 2025-08-05 RX ADMIN — GABAPENTIN 300 MG: 300 CAPSULE ORAL at 03:08

## 2025-08-05 RX ADMIN — EPHEDRINE SULFATE 10 MG: 50 INJECTION INTRAVENOUS at 01:08

## 2025-08-05 RX ADMIN — PROPOFOL 120 MG: 10 INJECTION, EMULSION INTRAVENOUS at 12:08

## 2025-08-05 RX ADMIN — PHENYLEPHRINE HYDROCHLORIDE 200 MCG: 10 INJECTION INTRAVENOUS at 12:08

## 2025-08-05 RX ADMIN — EPHEDRINE SULFATE 10 MG: 50 INJECTION INTRAVENOUS at 12:08

## 2025-08-05 RX ADMIN — FENTANYL CITRATE 25 MCG: 50 INJECTION INTRAMUSCULAR; INTRAVENOUS at 04:08

## 2025-08-05 RX ADMIN — DEXAMETHASONE SODIUM PHOSPHATE 8 MG: 4 INJECTION, SOLUTION INTRAMUSCULAR; INTRAVENOUS at 12:08

## 2025-08-05 RX ADMIN — EPHEDRINE SULFATE 10 MG: 50 INJECTION INTRAVENOUS at 02:08

## 2025-08-05 RX ADMIN — Medication 15 MG: at 02:08

## 2025-08-05 RX ADMIN — PROPRANOLOL HYDROCHLORIDE 40 MG: 20 TABLET ORAL at 09:08

## 2025-08-05 RX ADMIN — MIDAZOLAM HYDROCHLORIDE 2 MG: 2 INJECTION, SOLUTION INTRAMUSCULAR; INTRAVENOUS at 12:08

## 2025-08-05 RX ADMIN — SODIUM CHLORIDE: 9 INJECTION, SOLUTION INTRAVENOUS at 10:08

## 2025-08-05 RX ADMIN — VASOPRESSIN 2 UNITS: 20 INJECTION INTRAVENOUS at 02:08

## 2025-08-05 RX ADMIN — OXYCODONE HYDROCHLORIDE 10 MG: 10 TABLET ORAL at 11:08

## 2025-08-05 RX ADMIN — MAGNESIUM SULFATE 500 MG: 2 INJECTION INTRAVENOUS at 01:08

## 2025-08-05 RX ADMIN — METHOCARBAMOL 750 MG: 750 TABLET ORAL at 03:08

## 2025-08-05 RX ADMIN — METHOCARBAMOL 750 MG: 750 TABLET ORAL at 09:08

## 2025-08-05 RX ADMIN — LIDOCAINE HYDROCHLORIDE 100 MG: 20 INJECTION, SOLUTION EPIDURAL; INFILTRATION; INTRACAUDAL; PERINEURAL at 12:08

## 2025-08-05 RX ADMIN — SODIUM CHLORIDE 0.2 MCG/KG/MIN: 9 INJECTION, SOLUTION INTRAVENOUS at 01:08

## 2025-08-05 RX ADMIN — MAGNESIUM SULFATE 500 MG: 2 INJECTION INTRAVENOUS at 02:08

## 2025-08-05 RX ADMIN — FENTANYL CITRATE 25 MCG: 50 INJECTION INTRAMUSCULAR; INTRAVENOUS at 03:08

## 2025-08-05 RX ADMIN — MUPIROCIN: 20 OINTMENT TOPICAL at 09:08

## 2025-08-05 RX ADMIN — GABAPENTIN 300 MG: 300 CAPSULE ORAL at 09:08

## 2025-08-05 RX ADMIN — SODIUM CHLORIDE: 9 INJECTION, SOLUTION INTRAVENOUS at 03:08

## 2025-08-05 RX ADMIN — CEFAZOLIN 2 G: 330 INJECTION, POWDER, FOR SOLUTION INTRAMUSCULAR; INTRAVENOUS at 12:08

## 2025-08-05 RX ADMIN — CELECOXIB 100 MG: 100 CAPSULE ORAL at 03:08

## 2025-08-05 RX ADMIN — DEXMEDETOMIDINE 12 MCG: 100 INJECTION, SOLUTION, CONCENTRATE INTRAVENOUS at 02:08

## 2025-08-05 RX ADMIN — FENTANYL CITRATE 100 MCG: 50 INJECTION, SOLUTION INTRAMUSCULAR; INTRAVENOUS at 12:08

## 2025-08-05 RX ADMIN — GLYCOPYRROLATE 0.4 MG: 0.2 INJECTION, SOLUTION INTRAMUSCULAR; INTRAVENOUS at 12:08

## 2025-08-05 RX ADMIN — OXYCODONE HYDROCHLORIDE 10 MG: 10 TABLET ORAL at 03:08

## 2025-08-05 RX ADMIN — CEFAZOLIN 2 G: 2 INJECTION, POWDER, FOR SOLUTION INTRAMUSCULAR; INTRAVENOUS at 09:08

## 2025-08-05 RX ADMIN — SODIUM CHLORIDE: 0.9 INJECTION, SOLUTION INTRAVENOUS at 01:08

## 2025-08-05 RX ADMIN — OXYCODONE HYDROCHLORIDE 10 MG: 10 TABLET ORAL at 07:08

## 2025-08-05 RX ADMIN — HEPARIN SODIUM 5000 UNITS: 5000 INJECTION INTRAVENOUS; SUBCUTANEOUS at 09:08

## 2025-08-05 RX ADMIN — ONDANSETRON 4 MG: 2 INJECTION INTRAMUSCULAR; INTRAVENOUS at 02:08

## 2025-08-05 RX ADMIN — ROCURONIUM BROMIDE 50 MG: 10 INJECTION, SOLUTION INTRAVENOUS at 12:08

## 2025-08-05 RX ADMIN — SUGAMMADEX 400 MG: 100 INJECTION, SOLUTION INTRAVENOUS at 01:08

## 2025-08-05 RX ADMIN — ATORVASTATIN CALCIUM 40 MG: 40 TABLET, FILM COATED ORAL at 09:08

## 2025-08-05 RX ADMIN — FAMOTIDINE 20 MG: 20 TABLET, FILM COATED ORAL at 09:08

## 2025-08-05 NOTE — NURSING TRANSFER
Nursing Transfer Note      8/5/2025     Nurse giving handoff:More GOMEZ RN  Nurse receiving handoff:Charli NULL    Reason patient is being transferred: meets criteria    Transfer To: 503    Transfer via bed    Transfer with 3L to O2    Transported by transport x2    Transfer Vital Signs:  Blood Pressure:162/96  Heart Rate:68  O2:96  Temperature:97.5  Respirations:13    Telemetry: Rhythm sr  Order for Tele Monitor? No    Additional Lines: Oxygen, hemovac drains x2    Medicines sent: NS @ 100    Any special needs or follow-up needed: back brace when OOB    Patient belongings transferred with patient: yes    Chart send with patient: Yes    Notified: significant other    Patient reassessed at: 8/5

## 2025-08-05 NOTE — OP NOTE
DATE OF PROCEDURE: 8/5/2025.     SURGEON: Bart Frye M.D.     FIRST ASSISTANT: Ravindra Bradshaw MD, PGY-3 Orthopedics     PREOPERATIVE DIAGNOSIS:   1.   L4-5 spondylolisthesis grade 1  2.   Symptomatic lumbar spinal stenosis lumbar radiculopathy     POSTOPERATIVE DIAGNOSIS:   Same     PROCEDURES PERFORMED:  Posterior lateral and posterior lumbar interbody fusion L4-5  2.   Posterior nonsegmental instrumentation L4-5  3.   L4 laminectomy, and bilateral foraminotomy for decompression of central and bilateral foraminal stenosis.  4.   Application of intervertebral spacer device to L4-5 disc defect  5.   Robotic assistance for placement of the pedicle screws (requiring greater than 30 minutes of preoperative planning time)  6.   Auto and allografting     ANESTHESIA: General endotracheal anesthesia.     ESTIMATED BLOOD LOSS: 150 mL.     IMPLANTS: Globus screws, and static 14mm height (15deg lordosis, 26mm length) 3D-printed Ti cage.  DBM putty      SPECIMENS: None.     FINDINGS: L L4-5 mod stenosis.     DRAINS: One deep and one superficial HV drains     COMPLICATIONS: None.     SPONGE AND NEEDLE COUNT: Correct x2.     NEUROLOGICAL MONITORING: Somatosensory potentials, free run EMG, and EMG stimulation lumbar pedicle screws. There were no changes to SSEP baselines. There no significant EMG activity. All screws stimulated at or greater than 20 milliamps.      REASON FOR OPERATION AND BRIEF HISTORY AND PHYSICAL: Tyrone Santos is a 60 y.o. male with an L4-5 spondylolisthesis, and refractory lumbar spinal stenosis with radiculopathy.  He has failed extensive conservative management and is here for surgery today.     DESCRIPTION OF INFORMED CONSENT: Please see my last clinic note for a full description of the informed consent I had with the patient.     DESCRIPTION OF PROCEDURE: The patient was met in the preoperative area where all final questions were answered. Their lumbar spine was marked as the operative site. The  patient was then brought to the operating room where anesthesia was induced. The patient was then flipped prone onto the Royce spine table. All bony prominences were padded, paying special attention to the eyes, nose and mouth, axillae, ulnar nerve and hands, genitalia, knees and feet, this was confirmed to be adequate with the anesthesia team. Sequential compression devices were run throughout the case on the bilateral calves. The surgical field was prepped and draped in normal sterile manner after using fluoroscopy to localize our incisoin. A full time-out was then called, verifying patient's identity, surgery, site, and that they had been administered a weight appropriate dose of Ancef, no specific nursing, anesthetic or surgical concerns were identified and it was decided that it was safe to proceed with surgery. I informed all members of the operative team that they were empowered to speak up regarding concerns at any time during the procedure.     I then made a midline incision and carried dissection down through the skin and subcutaneous tissues using combination of sharp dissection and electrocautery where necessary. The dorsal fascia of the lumbar spine was identified and incised in the midline and I performed a preliminary subperiosteal exposure of the bilateral L3-4 and L4-5 facet joints, and what I took to be the L4 and L5 transverse processes bilaterally. At the conclusion my preliminary exposure, I placed a marker on what I took to be the left L5 pedicle, took a lateral radiograph, and this confirmed my levels.  Next I finalized my exposure. Subsequently I performed bilateral L4-5 facetectomies with an osteotome.     We placed a pin in the right posterior superior iliac spine for the robotic array. AP and lateral fluoroscopy images were taken to register to the preoperative CT scan and plan that we had performed prior to the incision.  Once that was done, the robotic arm was used to cannulate pedicle  screw tracts bilaterally at L4 and L5. Xrays confirmed adequate positioning of the screws on AP and lateral images.     I then began my neurological decompression. High-speed drill was used to thin the left L4 lamina at the level of the pars. This was then removed on block, and I released the ligamentum flavum centrally with a forward angle curette and resected it with a Kerrison punch. I subsequently performed a central as well as bilateral foraminal decompression with a Kerrison punch. Working on the left of midline I used a osteotome to remove the superior articular process of L5. I then performed a subtotal L4-5 diskectomy in a standard fashion. The disc space was then thoroughly irrigated and a static 14mm height 3D-printed Ti spacer was impacted, and confirmed to be at the correct level in adequate position radiographically. The disc space then packed with morselized bone graft.     The wound was then copiously irrigated. Rods were measured, cut, contoured, and locked into place with torque wrench. Morselized bone graft, mixed with 1 g vancomycin powder, was laid dorsally along the decorticated transverse processes from L4-5.     500 mg of vancomycin powder was placed in the deep space, and a deep drain was then placed. The deep fascia was then closed with #1 Vicryl sutures in an interrupted, figure-of-eight fashion. A superficial drain was then placed. The superficial layer was then irrigated and closed over an additional 500 mg of vancomycin powder with 2-0 Vicryl, 3-0 Monocryl, Dermabond and Steri-Strips. A soft dressing was then placed. The drains were secured in place with silk sutures. The patient was then flipped supine, extubated and transferred to the Recovery Room in stable condition.    Bart Frye MD  Orthopaedic Spine Surgeon  Department of Orthopaedic Surgery  [phone]

## 2025-08-05 NOTE — PLAN OF CARE
Pt IV flushed. Pt reports pain control following administration of medication. Lower back incision dressing CDI. Hemovac drains x2 insertion site dressings CDI. L drain superficial to suction, R drain deep to gravity. Pt to wear brace when OOB. NS @ 100

## 2025-08-05 NOTE — DISCHARGE INSTRUCTIONS
DR. ROXI BLACKWOOD'S POSTOPERATIVE INSTRUCTIONS -   LUMBAR FUSION       Antibiotics: You do not need additional antibiotics at home.    NSAIDs: Please refrain from taking ibuprofen (Advil), naproxen (Aleve), and other non steroidal anti-inflammatory medications other than the Celebrex that will be prescribed to you after surgery.    Wound Care: You may remove your dressing and shower 7 days after surgery. Until then please keep your wound clean and dry. Sponge baths are acceptable. Do not go in a pool or hot tub until seen in clinic. Please leave the small steri-strips covering your wound in place until they fall off naturally (2 weeks). You may notice clear suture ends hanging from the sides of your incision after the steri-strips are removed, it is ok to clip these with scissors.    Brace: You may be prescribed a brace, please wear this when up and walking, it is not necessary to wear at night when sleeping.    Pain: We will use a multimodal approach for pain management after your surgery.  You will be given a prescription for pain medicine when you are discharged from the hospital.  You will also be given prescriptions for Robaxin (a muscle relaxer), Gabapentin, Celebrex and Tylenol.  Please note: you will only be given ONE prescription for narcotics when you are discharged from the hospital.  This medication is for breakthrough pain only. This medication will not be refilled.  The other medications given to you may be refilled if needed.      Infection: Signs of infection include increasing wound drainage and redness around the wound, as well as a temperature over 101.5 degrees. It is unnecessary to take your temperature on a routine basis. Please call the below number if you are concerned about an infection.    Driving and Work: It is ok to return to driving and work as long as you are not taking narcotic pain medications and can walk greater than 100 feet. Please do not lift over 10 pounds or participate in  exercise or sports until cleared by Dr. Frye.    Deep Venous Thrombosis (Blood Clots): Symptoms include swelling in the legs and shortness of breath. Please call the office or proceed to the nearest emergency room if you have any of these symptoms.    Physical Therapy: The best physical therapy immediately after surgery is walking. Please try to walk as much as possible.    Follow-up: You will be scheduled for a follow-up appointment in 4 weeks with either Dr. Frye or his physician assistant, Sanna Aguiar PA-C. If you have staples in your wound, you will follow up in 2 weeks for wound check and staple removal.    Questions: During business hours please call [phone] for routine questions. For after hours questions please call [phone] and ask to speak with the Orthopaedic resident on call.    Disability: If you submitted short term disability paperwork for us to complete and would like to check the status, please call the Disability Department at [phone].  You may fax any necessary paperwork to [phone].

## 2025-08-05 NOTE — ANESTHESIA PROCEDURE NOTES
Intubation    Date/Time: 8/5/2025 12:14 PM    Performed by: Edin Stallworth MD  Authorized by: Terry Chávez MD    Intubation:     Induction:  Intravenous    Intubated:  Postinduction    Mask Ventilation:  2 handed mask ventilation with 2 providers    Attempts:  1    Attempted By:  Resident anesthesiologist    Method of Intubation:  Video laryngoscopy    Blade:  Bhardwaj 3    Laryngeal View Grade: Grade I - full view of cords      Difficult Airway Encountered?: No      Complications:  None    Airway Device:  Oral endotracheal tube    Airway Device Size:  7.5    Style/Cuff Inflation:  Cuffed (inflated to minimal occlusive pressure)    Tube secured:  23    Secured at:  The teeth    Placement Verified By:  Capnometry    Complicating Factors:  Anterior larynx    Findings Post-Intubation:  BS equal bilateral and atraumatic/condition of teeth unchanged

## 2025-08-05 NOTE — TRANSFER OF CARE
"Anesthesia Transfer of Care Note    Patient: Tyrone Santos    Procedure(s) Performed: Procedure(s) (LRB):  ARTHRODESIS, SPINE, LUMBAR, TLIF, POSTERIOR, W/ PEDICLE SCREW L4-5 (N/A)    Patient location: PACU    Anesthesia Type: general    Transport from OR: Transported from OR on 6-10 L/min O2 by face mask with adequate spontaneous ventilation    Post pain: adequate analgesia    Post assessment: no apparent anesthetic complications    Post vital signs: stable    Level of consciousness: awake    Nausea/Vomiting: no nausea/vomiting    Complications: none    Transfer of care protocol was followed      Last vitals: Visit Vitals  /89 (BP Location: Right arm, Patient Position: Lying)   Pulse 62   Temp 36.5 °C (97.7 °F) (Temporal)   Resp 18   Ht 5' 8" (1.727 m)   Wt 94.6 kg (208 lb 8.9 oz)   SpO2 95%   BMI 31.71 kg/m²     "

## 2025-08-05 NOTE — ASSESSMENT & PLAN NOTE
Tyrone Santos is a 60 y.o. male, who is now s/p L4/5 TLIF on 08/05    Surgical dressing C/D/I  Pain control: multimodal  PT/OT: WBAT BLE, spine precautions. LSO OOB  DVT PPx: SQH TID, FCDs at all times when not ambulating  Abx: postop Ancef x 24hrs   Drain: X2  Bowman: Remove POD 1   Nursing: Incentive spirometry, Monitor and record drain output each shift     » Dispo: pending PT & drain      Output by Drain (mL) 08/03/25 0701 - 08/03/25 1900 08/03/25 1901 - 08/04/25 0700 08/04/25 0701 - 08/04/25 1900 08/04/25 1901 - 08/05/25 0700 08/05/25 0701 - 08/05/25 1530   Requested LDAs do not have output data documented.

## 2025-08-05 NOTE — HOSPITAL COURSE
Please see the preoperative H&P and other available documentation for full details related to history prior to this admission.  Briefly, Tyrone Santos is a 60 y.o. male who was admitted following scheduled elective surgery for Lumbar radiculopathy. They underwent the following procedures: L4/5    Following a complete preoperative discussion of the risks and benefits of surgery with signed informed consent, the patient was taken to the operating room on 8/5/2025 and underwent the above stated procedures. The patient tolerated surgery well  and there were no complications. Please see the operative report for full intraoperative findings and details. Postoperatively, the patient did well  and was transferred from the PACU to the  floor in stable condition where they had a stable and uncomplicated  hospital course. Labs and vital signs remained stable and appropriate throughout course. Diet was advanced as tolerated and the patient's pain was controlled on oral pain medications without problem. Ambulating without issue. Voiding without issue with adequate urine output. Passing gas and stool. Incision site is clean, dry, and intact.    Currently, the patient is doing well and is stable and appropriate for discharge home at this time. Patient will follow up in clinic

## 2025-08-06 DIAGNOSIS — Z98.1 S/P LUMBAR SPINAL FUSION: Primary | ICD-10-CM

## 2025-08-06 LAB
ALBUMIN SERPL BCP-MCNC: 3.5 G/DL (ref 3.5–5.2)
ALP SERPL-CCNC: 86 UNIT/L (ref 40–150)
ALT SERPL W/O P-5'-P-CCNC: 28 UNIT/L (ref 0–55)
ANION GAP (OHS): 9 MMOL/L (ref 8–16)
AST SERPL-CCNC: 69 UNIT/L (ref 0–50)
BILIRUB SERPL-MCNC: 0.7 MG/DL (ref 0.1–1)
BUN SERPL-MCNC: 10 MG/DL (ref 6–20)
CALCIUM SERPL-MCNC: 8.4 MG/DL (ref 8.7–10.5)
CHLORIDE SERPL-SCNC: 107 MMOL/L (ref 95–110)
CO2 SERPL-SCNC: 24 MMOL/L (ref 23–29)
CREAT SERPL-MCNC: 1.1 MG/DL (ref 0.5–1.4)
ERYTHROCYTE [DISTWIDTH] IN BLOOD BY AUTOMATED COUNT: 14.2 % (ref 11.5–14.5)
GFR SERPLBLD CREATININE-BSD FMLA CKD-EPI: >60 ML/MIN/1.73/M2
GLUCOSE SERPL-MCNC: 137 MG/DL (ref 70–110)
HCT VFR BLD AUTO: 39.1 % (ref 40–54)
HGB BLD-MCNC: 12.8 GM/DL (ref 14–18)
MCH RBC QN AUTO: 31.8 PG (ref 27–31)
MCHC RBC AUTO-ENTMCNC: 32.7 G/DL (ref 32–36)
MCV RBC AUTO: 97 FL (ref 82–98)
PLATELET # BLD AUTO: 217 K/UL (ref 150–450)
PMV BLD AUTO: 9.9 FL (ref 9.2–12.9)
POTASSIUM SERPL-SCNC: 4.6 MMOL/L (ref 3.5–5.1)
PROT SERPL-MCNC: 6.2 GM/DL (ref 6–8.4)
RBC # BLD AUTO: 4.02 M/UL (ref 4.6–6.2)
SODIUM SERPL-SCNC: 140 MMOL/L (ref 136–145)
WBC # BLD AUTO: 11.61 K/UL (ref 3.9–12.7)

## 2025-08-06 PROCEDURE — 97165 OT EVAL LOW COMPLEX 30 MIN: CPT

## 2025-08-06 PROCEDURE — 63600175 PHARM REV CODE 636 W HCPCS

## 2025-08-06 PROCEDURE — 82040 ASSAY OF SERUM ALBUMIN: CPT

## 2025-08-06 PROCEDURE — 97161 PT EVAL LOW COMPLEX 20 MIN: CPT

## 2025-08-06 PROCEDURE — 25000003 PHARM REV CODE 250

## 2025-08-06 PROCEDURE — 11000001 HC ACUTE MED/SURG PRIVATE ROOM

## 2025-08-06 PROCEDURE — 36415 COLL VENOUS BLD VENIPUNCTURE: CPT

## 2025-08-06 PROCEDURE — 97535 SELF CARE MNGMENT TRAINING: CPT

## 2025-08-06 PROCEDURE — 97116 GAIT TRAINING THERAPY: CPT

## 2025-08-06 PROCEDURE — 85027 COMPLETE CBC AUTOMATED: CPT

## 2025-08-06 RX ORDER — DOXEPIN HYDROCHLORIDE 25 MG/1
25 CAPSULE ORAL NIGHTLY
Status: DISCONTINUED | OUTPATIENT
Start: 2025-08-06 | End: 2025-08-08 | Stop reason: HOSPADM

## 2025-08-06 RX ORDER — HYDROXYZINE HYDROCHLORIDE 25 MG/1
25 TABLET, FILM COATED ORAL 3 TIMES DAILY PRN
Status: DISCONTINUED | OUTPATIENT
Start: 2025-08-06 | End: 2025-08-08 | Stop reason: HOSPADM

## 2025-08-06 RX ADMIN — METHOCARBAMOL 750 MG: 750 TABLET ORAL at 03:08

## 2025-08-06 RX ADMIN — FAMOTIDINE 20 MG: 20 TABLET, FILM COATED ORAL at 08:08

## 2025-08-06 RX ADMIN — OXYCODONE HYDROCHLORIDE 5 MG: 5 TABLET ORAL at 01:08

## 2025-08-06 RX ADMIN — HEPARIN SODIUM 5000 UNITS: 5000 INJECTION INTRAVENOUS; SUBCUTANEOUS at 01:08

## 2025-08-06 RX ADMIN — CELECOXIB 100 MG: 100 CAPSULE ORAL at 08:08

## 2025-08-06 RX ADMIN — ATORVASTATIN CALCIUM 40 MG: 40 TABLET, FILM COATED ORAL at 09:08

## 2025-08-06 RX ADMIN — HEPARIN SODIUM 5000 UNITS: 5000 INJECTION INTRAVENOUS; SUBCUTANEOUS at 09:08

## 2025-08-06 RX ADMIN — DOXEPIN HYDROCHLORIDE 25 MG: 25 CAPSULE ORAL at 09:08

## 2025-08-06 RX ADMIN — MUPIROCIN: 20 OINTMENT TOPICAL at 08:08

## 2025-08-06 RX ADMIN — CEFAZOLIN 2 G: 2 INJECTION, POWDER, FOR SOLUTION INTRAMUSCULAR; INTRAVENOUS at 05:08

## 2025-08-06 RX ADMIN — DOCUSATE SODIUM 100 MG: 100 CAPSULE, LIQUID FILLED ORAL at 09:08

## 2025-08-06 RX ADMIN — OXYBUTYNIN CHLORIDE 5 MG: 5 TABLET, EXTENDED RELEASE ORAL at 08:08

## 2025-08-06 RX ADMIN — BUPROPION HYDROCHLORIDE 300 MG: 150 TABLET, EXTENDED RELEASE ORAL at 08:08

## 2025-08-06 RX ADMIN — HEPARIN SODIUM 5000 UNITS: 5000 INJECTION INTRAVENOUS; SUBCUTANEOUS at 05:08

## 2025-08-06 RX ADMIN — MUPIROCIN: 20 OINTMENT TOPICAL at 09:08

## 2025-08-06 RX ADMIN — ALLOPURINOL 300 MG: 300 TABLET ORAL at 08:08

## 2025-08-06 RX ADMIN — POLYETHYLENE GLYCOL 3350 17 G: 17 POWDER, FOR SOLUTION ORAL at 08:08

## 2025-08-06 RX ADMIN — GABAPENTIN 300 MG: 300 CAPSULE ORAL at 09:08

## 2025-08-06 RX ADMIN — PROPRANOLOL HYDROCHLORIDE 40 MG: 20 TABLET ORAL at 08:08

## 2025-08-06 RX ADMIN — SODIUM CHLORIDE: 9 INJECTION, SOLUTION INTRAVENOUS at 01:08

## 2025-08-06 RX ADMIN — METHOCARBAMOL 750 MG: 750 TABLET ORAL at 09:08

## 2025-08-06 RX ADMIN — DOCUSATE SODIUM 100 MG: 100 CAPSULE, LIQUID FILLED ORAL at 08:08

## 2025-08-06 RX ADMIN — ACETAMINOPHEN 1000 MG: 500 TABLET ORAL at 01:08

## 2025-08-06 RX ADMIN — LAMOTRIGINE 100 MG: 100 TABLET ORAL at 08:08

## 2025-08-06 RX ADMIN — OXYCODONE HYDROCHLORIDE 10 MG: 10 TABLET ORAL at 03:08

## 2025-08-06 RX ADMIN — ACETAMINOPHEN 1000 MG: 500 TABLET ORAL at 05:08

## 2025-08-06 RX ADMIN — GABAPENTIN 300 MG: 300 CAPSULE ORAL at 08:08

## 2025-08-06 RX ADMIN — CHLORTHALIDONE 25 MG: 25 TABLET ORAL at 08:08

## 2025-08-06 RX ADMIN — METHOCARBAMOL 750 MG: 750 TABLET ORAL at 08:08

## 2025-08-06 RX ADMIN — PROPRANOLOL HYDROCHLORIDE 40 MG: 20 TABLET ORAL at 09:08

## 2025-08-06 RX ADMIN — GABAPENTIN 300 MG: 300 CAPSULE ORAL at 03:08

## 2025-08-06 NOTE — PT/OT/SLP EVAL
Physical Therapy Co-Evaluation and Treatment    OT present for coeval due to pt's multiple medical comorbidities and functional/cognition deficits requiring two skilled therapists to appropriately progress pt's musculoskeletal strength, neuromuscular control, and endurance while taking into consideration medical acuity and pt safety.    Patient Name:  Tyrone Santos   MRN:  [id number]    Recommendations:     Discharge Recommendations: Low Intensity Therapy   Discharge Equipment Recommendations: hip kit   Barriers to discharge: None    Assessment:     Tyrone Santos is a 60 y.o. male admitted with a medical diagnosis of Lumbar radiculopathy.  He presents with the following impairments/functional limitations: impaired endurance, impaired functional mobility, gait instability, impaired balance, decreased lower extremity function, orthopedic precautions, decreased coordination     Pt receptive and tolerated PT co-eval with OT well. Pt educated on spinal precautions prior to start of treatment with pt verbalizing understanding. Pt able to amb ~300 ft with RW and SBA this session. Patient currently demonstrates a need for low intensity therapy on a scheduled basis secondary to a decline in functional status due to surgical procedure    Rehab Prognosis: Good; patient would benefit from acute skilled PT services to address these deficits and reach maximum level of function.    Recent Surgery: Procedure(s) (LRB):  ARTHRODESIS, SPINE, LUMBAR, TLIF, POSTERIOR, W/ PEDICLE SCREW L4-5 (N/A) 1 Day Post-Op    Plan:     During this hospitalization, patient to be seen 3 x/week to address the identified rehab impairments via gait training, therapeutic activities, therapeutic exercises, neuromuscular re-education and progress toward the following goals:    Plan of Care Expires:  09/05/25    Subjective     Chief Complaint: back pain  Patient/Family Comments/goals: To get out of bed  Pain/Comfort:  Pain Rating 1: 7/10  Location -  "Orientation 1: lower  Location 1: back  Pain Addressed 1: Reposition, Distraction, Pre-medicate for activity  Pain Rating Post-Intervention 1: 7/10    Patients cultural, spiritual, Mandaen conflicts given the current situation: no    Patient History:     Living Environment: Pt lives with fispencer in [facility] with 1 MAGALY. Bathroom: tub/shower combo   Prior Level of Function: mod I using SPC, uses RW for community distance  DME owned: Shower chair, RW, SPC  Caregiver Assistance: fiance    Objective:     Communicated with RN prior to session.  Patient found HOB elevated with hemovac, SCD, peripheral IV  upon PT entry to room.    General Precautions: Standard, fall  Orthopedic Precautions:spinal precautions   Braces: LSO (OOB)  Respiratory Status: Room air    Exams:  Gross Motor Coordination:  WFL  Sensation:    -       Intact  RLE ROM: WFL  RLE Strength: grossly 4-/5  LLE ROM: WFL  LLE Strength: grossly 4-/5    Functional Mobility:    Bed Mobility:   Rolling: to R with contact guard assistance  Supine > Sit: contact guard assistance    Transfers:   Sit <> Stand Transfer: stand by assistance from EOB using rolling walker     Balance:   Sitting balance: FAIR+: Maintains balance through MINIMAL excursions of active trunk motion  Standing balance:   FAIR: Maintains without assist but unable to take challenges  FAIR+: Needs CLOSE SUPERVISION during gait and is able to right self with minor LOB                 Gait:  Distance: ~300 ft   Assistive Device: RW  Assistance Level: stand by assistance  Gait Assessment: Pt amb with decreased deuce and decreased step length with slight forward flexed posture on RW.    Stairs:  Pt ascended/descended 6" curb step with Rolling Walker with no handrails with Contact Guard Assistance.       AM-PAC 6 CLICK MOBILITY  Total Score:20       Treatment & Education:  Pt educated on tip to reduce fall risk and safety with mobility and using call button for assistance from nursing staff with OOB " mobility.  Pt educated on sitting up in chair 2-4 hours of the day  Pt educated on amb 2-3x per day with assistance from staff and increased movement during hospital stay.  All questions answered within the scope of PT.  White board updated accordingly.    Patient left up in chair with all lines intact, call button in reach, and RN notified.    GOALS:   Multidisciplinary Problems       Physical Therapy Goals          Problem: Physical Therapy    Goal Priority Disciplines Outcome Interventions   Physical Therapy Goal     PT, PT/OT Progressing    Description: Goals to be met by: 25     Patient will increase functional independence with mobility by performin. Supine to sit with Set-up Villalba  2. Sit to stand transfer with Supervision  3. Bed to chair transfer with Modified Villalba using LRAD  4. Gait  x 200 feet with Supervision using LRAD.                          DME Justifications:  No DME recommended requiring DME justifications    History:     Past Medical History:   Diagnosis Date    Addiction to drug     Alcohol abuse     Anxiety     Bipolar disorder     Depression     Difficulty urinating     GERD (gastroesophageal reflux disease)     Hx of psychiatric care     Hyperlipidemia     Hypertension     Ade     Psychiatric problem     S/P cervical spinal fusion 2018    S/P laparoscopic appendectomy 2017    Sleep apnea     Therapy     Thyroid disease        Past Surgical History:   Procedure Laterality Date    ANTERIOR CERVICAL DISCECTOMY W/ FUSION N/A 10/23/2018    Procedure: DISCECTOMY, SPINE, CERVICAL, ANTERIOR APPROACH, WITH FUSION C3-4;  Surgeon: Jw Anne MD;  Location: [facility] OR [address] [place] [address];  Service: Neurosurgery;  Laterality: N/A;    APPENDECTOMY      around     ARTHROPLASTY OF HIP BY ANTERIOR APPROACH Left 2021    Procedure: ARTHROPLASTY, HIP, TOTAL, ANTERIOR APPROACH:LEFT:DPEUY-ACTIS+PINNACLE;  Surgeon: Ángel Lam III, MD;  Location: [facility] OR;   Service: Orthopedics;  Laterality: Left;    ARTHROPLASTY OF HIP BY ANTERIOR APPROACH Right 8/18/2022    Procedure: ARTHROPLASTY, HIP, TOTAL, ANTERIOR APPROACH: RIGHT: DEPUY-ACTIS+PINNACLE;  Surgeon: Ángel Lam III, MD;  Location: [facility] OR;  Service: Orthopedics;  Laterality: Right;    INJECTION OF ANESTHETIC AGENT AROUND NERVE Left 7/1/2021    Procedure: BLOCK, NERVE, OBTURATOR and FEMORAL;  Surgeon: Josefina Lee MD;  Location: [facility];  Service: Pain Management;  Laterality: Left;    SCAPHOID FRACTURE SURGERY      had to have a bone graft -early  1990's     WRIST SURGERY      6 years ago       Time Tracking:     PT Received On: 08/06/25  PT Start Time: 0850     PT Stop Time: 0913  PT Total Time (min): 23 min     Billable Minutes: Evaluation 10 and Gait Training 13      08/06/2025

## 2025-08-06 NOTE — PT/OT/SLP PROGRESS
Occupational Therapy   Co-Treatment  Co-treatment performed due to patient's multiple deficits requiring two skilled therapists to appropriately and safely assess patient's strength and endurance while facilitating functional tasks in addition to accommodating for patient's activity tolerance.      Name: Tyrone Santos  MRN: [id number]  Admitting Diagnosis:  Lumbar radiculopathy  1 Day Post-Op    Recommendations:     Discharge Recommendations: Low Intensity Therapy  Discharge Equipment Recommendations:  hip kit  Barriers to discharge:  None    Assessment:     Tyrone Santos is a 60 y.o. male with a medical diagnosis of Lumbar radiculopathy.  He presents AAOx4 and agreeable to therapy. Pt required light assistance for functional transfers and functional mobility and light-moderate assistance for seated ADLs. LSO brace donned at EOB with education provided on spinal precautions and LSO management. Pt demonstrated good carryover. Performance deficits affecting function are impaired endurance, impaired self care skills, impaired functional mobility, gait instability, impaired balance, decreased coordination, decreased lower extremity function, pain, decreased ROM, orthopedic precautions.  Patient continues to demonstrate the need for low intensity therapy on a scheduled basis exhibited by decreased independence with self-care and functional mobility     Rehab Prognosis:  Good; patient would benefit from acute skilled OT services to address these deficits and reach maximum level of function.       A client care conference was performed between the FRANCO and Shirin Hope Karla OTA, prior to treatment by EDUARDO, to discuss the patient's status, treatment plan and established goals.      Plan:     Patient to be seen 4 x/week to address the above listed problems via self-care/home management, therapeutic activities, therapeutic exercises, neuromuscular re-education  Plan of Care Expires: 09/05/25  Plan of Care Reviewed  "with: patient    Subjective   "I stood up last night with my nurse"  Chief Complaint: LBP  Patient/Family Comments/goals: to get better  Pain/Comfort:  Pain Rating 1: 7/10  Location - Orientation 1: lower  Location 1: back  Pain Addressed 1: Pre-medicate for activity, Reposition, Distraction  Pain Rating Post-Intervention 1: 7/10    Objective:   Additional staff present: Vera PT    Communicated with: RNJewell prior to session.  Patient found HOB elevated with hemovac, peripheral IV, SCD upon OT entry to room.    General Precautions: Standard, fall    Orthopedic Precautions:spinal precautions  Braces: LSO (worn when OOB)  Respiratory Status: Room air     Occupational Performance:     Bed Mobility:    Patient completed Rolling/Turning to Right with contact guard assistance  Patient completed Supine to Sit with contact guard assistance     Functional Mobility/Transfers:  Patient completed Sit <> Stand Transfer with stand by assistance  with  rolling walker   Functional Mobility: Pt engaged in functional mobility to simulate household/community distances in room and hallway with SBA and RW in order to maximize functional endurance and standing balance required for engagement in occupations of choice   No LOB or SOB noted  Cues for AD management   Stair Mobility: Curb step with PT CGA and RW    Activities of Daily Living:  Upper Body Dressing: minimum assistance to don gown over UE 2/2 IV line  Lower Body Dressing: moderate assistance required to pull b/l socks above heels in order to adhere to spinal precautions      AMPAC 6 Click ADL: 19    Treatment & Education:  -Education provided regarding fit and wear schedule of LSO, adjustments provided for comfort.  -Education provided regarding spinal precautions for use during functional tasks/mobility/transfers   -Education on energy conservation and task modification to maximize safety and (I) during ADLs and mobility  -Education on importance of OOB activity to " improve overall activity tolerance and promote recovery  -Pt educated to call for assistance and to transfer with hospital staff only  -Provided education regarding role of OT, POC, & discharge recommendations with pt verbalizing understanding.  Pt had no further questions & when asked whether there were any concerns pt reported none.     Patient left up in chair with all lines intact, call button in reach, and RN notified    GOALS:   Multidisciplinary Problems       Occupational Therapy Goals          Problem: Occupational Therapy    Goal Priority Disciplines Outcome Interventions   Occupational Therapy Goal     OT, PT/OT Progressing    Description: Goals to be met by: 9/5/25     Patient will increase functional independence with ADLs by performing:    UE Dressing, including LSO, with Modified Williams.  LE Dressing with Modified Williams and AE prn.  Grooming while standing at sink with Modified Williams.  Toileting from toilet with Modified Williams for hygiene and clothing management.   Supine to sit with Modified Williams.  Toilet transfer to toilet with Modified Williams.                         DME Justifications:  No DME recommended requiring DME justifications    Time Tracking:     OT Date of Treatment: 08/06/25  OT Start Time: 0850  OT Stop Time: 0912  OT Total Time (min): 22 min    Billable Minutes:Evaluation 10  Self Care/Home Management 12    OT/EDUARDO: OT          8/6/2025

## 2025-08-06 NOTE — ANESTHESIA POSTPROCEDURE EVALUATION
Anesthesia Post Evaluation    Patient: Tyrone Santos    Procedure(s) Performed: Procedure(s) (LRB):  ARTHRODESIS, SPINE, LUMBAR, TLIF, POSTERIOR, W/ PEDICLE SCREW L4-5 (N/A)    Final Anesthesia Type: general      Patient location during evaluation: PACU  Patient participation: Yes- Able to Participate  Level of consciousness: awake and alert  Post-procedure vital signs: reviewed and stable  Pain management: adequate  Airway patency: patent    PONV status at discharge: No PONV  Anesthetic complications: no      Cardiovascular status: blood pressure returned to baseline  Respiratory status: unassisted  Hydration status: euvolemic  Follow-up not needed.              Vitals Value Taken Time   /75 08/06/25 10:50   Temp 36.3 °C (97.3 °F) 08/06/25 10:50   Pulse 67 08/06/25 10:50   Resp 18 08/06/25 13:41   SpO2 97 % 08/06/25 10:50         Event Time   Out of Recovery 15:50:00         Pain/Kassandra Score: Pain Rating Prior to Med Admin: 5 (8/6/2025  1:42 PM)  Pain Rating Post Med Admin: 4 (8/6/2025  6:07 AM)  Kassandra Score: 10 (8/5/2025  3:50 PM)

## 2025-08-06 NOTE — PLAN OF CARE
[facility]  Initial Discharge Assessment       Primary Care Provider: Yoon Wong MD (Cindy)    Admission Diagnosis: Lumbar radiculopathy [M54.16]  Lumbar spondylosis [M47.816]    Admission Date: 8/5/2025  Expected Discharge Date: 8/7/2025         Payor: MEDICAID / Plan: [facility] COMMUNITY PLAN [facility] ([place] MEDICAID) / Product Type: Managed Medicaid /     Extended Emergency Contact Information  Primary Emergency Contact: Jeanine Aj   [place]  Mobile Phone: [phone]  Relation: Significant other    Discharge Plan A: Home with family         [id number] - [place] - [address] AT [facility] [address]  [address] [place] [address]  Phone: [phone] Fax: [phone]      Initial Assessment (most recent)       Adult Discharge Assessment - 08/06/25 1128          Discharge Assessment    Assessment Type Discharge Planning Assessment     Confirmed/corrected address, phone number and insurance Yes     Confirmed Demographics Correct on Facesheet     Source of Information patient     Communicated LIS with patient/caregiver Yes     People in Home significant other     Name(s) of People in Home Jeanine Aj (Significant other)     Facility Arrived From: Home     Do you expect to return to your current living situation? Yes     Do you have help at home or someone to help you manage your care at home? Yes     Who are your caregiver(s) and their phone number(s)? Jeanine-Significant other     Prior to hospitilization cognitive status: Alert/Oriented     Current cognitive status: Alert/Oriented     Equipment Currently Used at Home bedside commode;bath bench;cane, straight;walker, rolling     Readmission within 30 days? No     Patient currently being followed by outpatient case management? No     Do you currently have service(s) that help you manage your care at home? No     Do you take prescription medications? Yes     Do you have prescription  coverage? Yes     Do you have any problems affording any of your prescribed medications? No     Is the patient taking medications as prescribed? yes     Who is going to help you get home at discharge? Jeanine-Significant other     How do you get to doctors appointments? family or friend will provide     Are you on dialysis? No     Do you take coumadin? No     Discharge Plan A Home with family        Physical Activity    On average, how many days per week do you engage in moderate to strenuous exercise (like a brisk walk)? 3 days     On average, how many minutes do you engage in exercise at this level? 30 min        Financial Resource Strain    How hard is it for you to pay for the very basics like food, housing, medical care, and heating? Not hard at all        Housing Stability    In the last 12 months, was there a time when you were not able to pay the mortgage or rent on time? No     At any time in the past 12 months, were you homeless or living in a shelter (including now)? No        Transportation Needs    In the past 12 months, has lack of transportation kept you from medical appointments or from getting medications? No     In the past 12 months, has lack of transportation kept you from meetings, work, or from getting things needed for daily living? No        Food Insecurity    Within the past 12 months, you worried that your food would run out before you got the money to buy more. Never true     Within the past 12 months, the food you bought just didn't last and you didn't have money to get more. Never true        Stress    Do you feel stress - tense, restless, nervous, or anxious, or unable to sleep at night because your mind is troubled all the time - these days? Not at all        Social Isolation    How often do you feel lonely or isolated from those around you?  Never        Alcohol Use    Q1: How often do you have a drink containing alcohol? 4 or more times a week     Q2: How many drinks containing  alcohol do you have on a typical day when you are drinking? 1 or 2     Q3: How often do you have six or more drinks on one occasion? Never        Utilities    In the past 12 months has the electric, gas, oil, or water company threatened to shut off services in your home? No        Health Literacy    How often do you need to have someone help you when you read instructions, pamphlets, or other written material from your doctor or pharmacy? Never                      Spoke with patient at bedside to complete d/c planning assessment. Patient lives with his girlfriend in a single story home with once step to enter. Independent with ADL's. Home DME includes a bedside commode, walker, cane and bath bench. Verified PCP, Pharmacy and health insurance. PT/OT eval completed and recommending low intensity and hip kit for d/c needs. Patient reports he needs no DME a d/c and does not want HH arranged. He and his caregiver wish to manage his care independently. Discharge Plan A and Plan B have been determined by review of patient's clinical status, future medical and therapeutic needs, and coverage/benefits for post-acute care in coordination with multidisciplinary team members.      Shirin Carvalho RNCM  Case Management  [facility]  [phone]

## 2025-08-06 NOTE — NURSING
Patient arrived to the unit via bed with transport. Currently running NS @100mL/h and supplemental O2 @ 2L/m. VS and H2T exam completed and recorded in Epic. Surgical dressing is clean dry and intact. Both hemovac intact and draining. Admissions navigator completed. Fall/Safety precautions maintained. Call light and personal items within reach. Communication board updated.

## 2025-08-06 NOTE — PLAN OF CARE
OT eval complete. OT POC and goals established.   Problem: Occupational Therapy  Goal: Occupational Therapy Goal  Description: Goals to be met by: 9/5/25     Patient will increase functional independence with ADLs by performing:    UE Dressing, including LSO, with Modified Gila.  LE Dressing with Modified Gila and AE prn.  Grooming while standing at sink with Modified Gila.  Toileting from toilet with Modified Gila for hygiene and clothing management.   Supine to sit with Modified Gila.  Toilet transfer to toilet with Modified Gila.    Outcome: Progressing

## 2025-08-06 NOTE — SUBJECTIVE & OBJECTIVE
"Principal Problem:Lumbar radiculopathy    Principal Orthopedic Problem: as above, s/p L4/5 TLIF on 08/05    Interval History: Pt seen and examined at bedside. DELORES CHARLTON.  Has pain along the incision in the back.  No radicular pain to bilateral lower extremities. Reports no new symptoms.  Pending PT today  Vitals:    08/06/25 0741   BP: 125/82   Pulse: 80   Resp: 18   Temp: 98 °F (36.7 °C)         Review of patient's allergies indicates:   Allergen Reactions    Codeine Nausea Only    Seroquel [quetiapine]      Heart racing     Tizanidine Other (See Comments)     Caused increased pain and muscle pain       Current Facility-Administered Medications   Medication    0.9% NaCl infusion    0.9% NaCl infusion    acetaminophen tablet 1,000 mg    allopurinoL tablet 300 mg    atorvastatin tablet 40 mg    bisacodyL suppository 10 mg    buPROPion TB24 tablet 300 mg    celecoxib capsule 100 mg    chlorthalidone tablet 25 mg    docusate sodium capsule 100 mg    famotidine tablet 20 mg    gabapentin capsule 300 mg    heparin (porcine) injection 5,000 Units    lamoTRIgine tablet 100 mg    melatonin tablet 6 mg    methocarbamoL tablet 750 mg    mupirocin 2 % ointment    ondansetron disintegrating tablet 8 mg    oxybutynin 24 hr tablet 5 mg    oxyCODONE immediate release tablet 5 mg    oxyCODONE immediate release tablet Tab 10 mg    polyethylene glycol packet 17 g    promethazine tablet 25 mg    propranoloL tablet 40 mg     Objective:     Vital Signs (Most Recent):  Temp: 98 °F (36.7 °C) (08/06/25 0741)  Pulse: 80 (08/06/25 0741)  Resp: 18 (08/06/25 0741)  BP: 125/82 (08/06/25 0741)  SpO2: 97 % (08/06/25 0741) Vital Signs (24h Range):  Temp:  [97.2 °F (36.2 °C)-98 °F (36.7 °C)] 98 °F (36.7 °C)  Pulse:  [62-80] 80  Resp:  [8-20] 18  SpO2:  [92 %-97 %] 97 %  BP: (125-162)/(75-97) 125/82     Weight: 94.6 kg (208 lb 8.9 oz)  Height: 5' 8" (172.7 cm)  Body mass index is 31.71 kg/m².      Intake/Output Summary (Last 24 hours) at 8/6/2025 " 0750  Last data filed at 8/6/2025 0514  Gross per 24 hour   Intake 1650 ml   Output 440 ml   Net 1210 ml        Ortho/SPM Exam  Awake/alert/oriented x3, No acute distress, Afebrile, Vital signs stable  Normocephalic, Atraumatic  Good inspiratory effort with unlaboured breathing    Motor             RIGHT  LEFT  Iliopsoas (L2,3)       5/5    5/5  Quadriceps (L3,4)      5/5    5/5   Tibialis Anterior (L4.5)         5/5    5/5   Extensor Halucis Longus (L5)    5/5    5/5     Gastrocnemius/Soleus (S1)     5/5    5/5  Flexor Hallucis Longus(S2)     5/5    5/5    Sensation (a=absent, i=impaired, n=normal)       RIGHT  LEFT  L2 dermatome              n     n  L3 dermatome              n     n  L4 dermatome              n     n  L5 dermatome            n     n  S1 dermatome           n     n  S2 dermatome           n     n     Significant Labs: CBC:   Recent Labs   Lab 08/05/25  1547 08/06/25  0432   WBC 8.85 11.61   HGB 13.4* 12.8*   HCT 41.2 39.1*    217     CMP:   Recent Labs   Lab 08/05/25  1547 08/06/25  0432    140   K 4.4 4.6    107   CO2 21* 24   * 137*   BUN 9 10   CREATININE 1.2 1.1   CALCIUM 8.1* 8.4*   PROT 6.1 6.2   ALBUMIN 3.4* 3.5   BILITOT 0.7 0.7   ALKPHOS 94 86   AST 36 69*   ALT 23 28   ANIONGAP 9 9     All pertinent labs within the past 24 hours have been reviewed.    Significant Imaging: I have reviewed and interpreted all pertinent imaging results/findings.

## 2025-08-06 NOTE — PLAN OF CARE
Problem: Adult Inpatient Plan of Care  Goal: Plan of Care Review  Outcome: Progressing  Goal: Patient-Specific Goal (Individualized)  Outcome: Progressing  Goal: Absence of Hospital-Acquired Illness or Injury  Outcome: Progressing  Goal: Optimal Comfort and Wellbeing  Outcome: Progressing  Goal: Readiness for Transition of Care  Outcome: Progressing     Problem: Wound  Goal: Optimal Coping  Outcome: Progressing  Goal: Optimal Functional Ability  Outcome: Progressing     Patient progressing toward plan of care goals. Surgical dressing is clean dry and intact. Both hemovac drain intact and dry. Output recorded in flowsheet. Educated patient on importance of wearing brace when out of bed. Patient verbalized understanding. Pain assessed overnight. Prn medication given and effective. Educate patient on plan of care, patient verbalized understanding. Bed locked and in lowest position. Call light within reach.

## 2025-08-06 NOTE — PLAN OF CARE
59 y/o male AAOX4  lying supine in bed. NAD noted on room air. Surgical incision clean, dry, intact. X2 Hemovac drains intact. PIV infusing NS at 100 ml/hr. No complaints or concerns voiced at this time. Safety measures in place. Verbalized understanding.    Problem: Adult Inpatient Plan of Care  Goal: Plan of Care Review  Outcome: Progressing     Problem: Adult Inpatient Plan of Care  Goal: Optimal Comfort and Wellbeing  Outcome: Progressing     Problem: Infection  Goal: Absence of Infection Signs and Symptoms  Outcome: Progressing     Problem: Wound  Goal: Optimal Functional Ability  Outcome: Progressing     Problem: Fall Injury Risk  Goal: Absence of Fall and Fall-Related Injury  Outcome: Progressing

## 2025-08-06 NOTE — PROGRESS NOTES
[facility]  Orthopedics  Progress Note    Patient Name: Tyrone Santos  MRN: [id number]  Admission Date: 8/5/2025  Hospital Length of Stay: 1 days  Attending Provider: Bart Frye MD  Primary Care Provider: No primary care provider on file.  Follow-up For: Procedure(s) (LRB):  ARTHRODESIS, SPINE, LUMBAR, TLIF, POSTERIOR, W/ PEDICLE SCREW L4-5 (N/A)    Post-Operative Day: 1 Day Post-Op  Subjective:     No new subjective & objective note has been filed under this hospital service since the last note was generated.    Assessment/Plan:     * Lumbar radiculopathy  Tyrone Santos is a 60 y.o. male, who is now s/p L4/5 TLIF on 08/05    Surgical dressing C/D/I  Pain control: multimodal  PT/OT: WBAT BLE, spine precautions. LSO OOB  DVT PPx: SQH TID, FCDs at all times when not ambulating  Abx: postop Ancef x 24hrs   Drain: X2  Bowman: Remove POD 1   Nursing: Incentive spirometry, Monitor and record drain output each shift     » Dispo: pending PT & drain      Output by Drain (mL) 08/03/25 0701 - 08/03/25 1900 08/03/25 1901 - 08/04/25 0700 08/04/25 0701 - 08/04/25 1900 08/04/25 1901 - 08/05/25 0700 08/05/25 0701 - 08/05/25 1530   Requested LDAs do not have output data documented.                Ravindra Bradshaw MD  Orthopedics  [facility]

## 2025-08-06 NOTE — PLAN OF CARE
PT eval completed- see note for details, goals and POC established.     Problem: Physical Therapy  Goal: Physical Therapy Goal  Description: Goals to be met by: 25     Patient will increase functional independence with mobility by performin. Supine to sit with Set-up [place]  2. Sit to stand transfer with Supervision  3. Bed to chair transfer with Modified [place] using LRAD  4. Gait  x 200 feet with Supervision using LRAD.     Outcome: Progressing   2025

## 2025-08-07 PROBLEM — Z74.09 OTHER REDUCED MOBILITY: Status: ACTIVE | Noted: 2021-01-25

## 2025-08-07 PROCEDURE — 11000001 HC ACUTE MED/SURG PRIVATE ROOM

## 2025-08-07 PROCEDURE — 97530 THERAPEUTIC ACTIVITIES: CPT | Mod: CO

## 2025-08-07 PROCEDURE — 25000003 PHARM REV CODE 250

## 2025-08-07 PROCEDURE — 63600175 PHARM REV CODE 636 W HCPCS

## 2025-08-07 PROCEDURE — 94760 N-INVAS EAR/PLS OXIMETRY 1: CPT

## 2025-08-07 PROCEDURE — 97530 THERAPEUTIC ACTIVITIES: CPT

## 2025-08-07 PROCEDURE — 97535 SELF CARE MNGMENT TRAINING: CPT | Mod: CO

## 2025-08-07 RX ADMIN — FAMOTIDINE 20 MG: 20 TABLET, FILM COATED ORAL at 09:08

## 2025-08-07 RX ADMIN — SODIUM CHLORIDE: 9 INJECTION, SOLUTION INTRAVENOUS at 02:08

## 2025-08-07 RX ADMIN — SODIUM CHLORIDE: 9 INJECTION, SOLUTION INTRAVENOUS at 10:08

## 2025-08-07 RX ADMIN — GABAPENTIN 300 MG: 300 CAPSULE ORAL at 09:08

## 2025-08-07 RX ADMIN — OXYCODONE HYDROCHLORIDE 10 MG: 10 TABLET ORAL at 09:08

## 2025-08-07 RX ADMIN — BUPROPION HYDROCHLORIDE 300 MG: 150 TABLET, EXTENDED RELEASE ORAL at 08:08

## 2025-08-07 RX ADMIN — MUPIROCIN: 20 OINTMENT TOPICAL at 08:08

## 2025-08-07 RX ADMIN — PROPRANOLOL HYDROCHLORIDE 40 MG: 20 TABLET ORAL at 08:08

## 2025-08-07 RX ADMIN — DOXEPIN HYDROCHLORIDE 25 MG: 25 CAPSULE ORAL at 09:08

## 2025-08-07 RX ADMIN — FAMOTIDINE 20 MG: 20 TABLET, FILM COATED ORAL at 08:08

## 2025-08-07 RX ADMIN — DOCUSATE SODIUM 100 MG: 100 CAPSULE, LIQUID FILLED ORAL at 09:08

## 2025-08-07 RX ADMIN — PROPRANOLOL HYDROCHLORIDE 40 MG: 20 TABLET ORAL at 09:08

## 2025-08-07 RX ADMIN — GABAPENTIN 300 MG: 300 CAPSULE ORAL at 03:08

## 2025-08-07 RX ADMIN — HEPARIN SODIUM 5000 UNITS: 5000 INJECTION INTRAVENOUS; SUBCUTANEOUS at 05:08

## 2025-08-07 RX ADMIN — CELECOXIB 100 MG: 100 CAPSULE ORAL at 08:08

## 2025-08-07 RX ADMIN — DOCUSATE SODIUM 100 MG: 100 CAPSULE, LIQUID FILLED ORAL at 08:08

## 2025-08-07 RX ADMIN — OXYCODONE HYDROCHLORIDE 10 MG: 10 TABLET ORAL at 05:08

## 2025-08-07 RX ADMIN — ATORVASTATIN CALCIUM 40 MG: 40 TABLET, FILM COATED ORAL at 09:08

## 2025-08-07 RX ADMIN — SODIUM CHLORIDE: 9 INJECTION, SOLUTION INTRAVENOUS at 03:08

## 2025-08-07 RX ADMIN — CHLORTHALIDONE 25 MG: 25 TABLET ORAL at 08:08

## 2025-08-07 RX ADMIN — CARIPRAZINE 3 MG: 1.5 CAPSULE, GELATIN COATED ORAL at 10:08

## 2025-08-07 RX ADMIN — LUMATEPERONE 42 MG: 42 CAPSULE ORAL at 09:08

## 2025-08-07 RX ADMIN — METHOCARBAMOL 750 MG: 750 TABLET ORAL at 08:08

## 2025-08-07 RX ADMIN — ALLOPURINOL 300 MG: 300 TABLET ORAL at 08:08

## 2025-08-07 RX ADMIN — METHOCARBAMOL 750 MG: 750 TABLET ORAL at 09:08

## 2025-08-07 RX ADMIN — METHOCARBAMOL 750 MG: 750 TABLET ORAL at 03:08

## 2025-08-07 RX ADMIN — OXYBUTYNIN CHLORIDE 5 MG: 5 TABLET, EXTENDED RELEASE ORAL at 08:08

## 2025-08-07 RX ADMIN — HEPARIN SODIUM 5000 UNITS: 5000 INJECTION INTRAVENOUS; SUBCUTANEOUS at 03:08

## 2025-08-07 RX ADMIN — MUPIROCIN: 20 OINTMENT TOPICAL at 09:08

## 2025-08-07 RX ADMIN — GABAPENTIN 300 MG: 300 CAPSULE ORAL at 08:08

## 2025-08-07 RX ADMIN — POLYETHYLENE GLYCOL 3350 17 G: 17 POWDER, FOR SOLUTION ORAL at 08:08

## 2025-08-07 RX ADMIN — LAMOTRIGINE 100 MG: 100 TABLET ORAL at 08:08

## 2025-08-07 RX ADMIN — HEPARIN SODIUM 5000 UNITS: 5000 INJECTION INTRAVENOUS; SUBCUTANEOUS at 09:08

## 2025-08-07 NOTE — CARE UPDATE
I have reviewed the chart of Tyrone Santos who is hospitalized for the following:    Active Hospital Problems    Diagnosis    *Lumbar radiculopathy    Other reduced mobility     Patient with Acute on chronic debility due to other reduced mobility. Latest AMPAC and GEMS scores have been reviewed. Evaluation for etiology is complete. Plan includes - Progressive mobility protocol initated  - PT/OT consulted  - Fall precautions in place.            RAULITO Josue-BC  Unit [facility]    Skyrizi Counseling: I discussed with the patient the risks of risankizumab-rzaa including but not limited to immunosuppression, and serious infections.  The patient understands that monitoring is required including a PPD at baseline and must alert us or the primary physician if symptoms of infection or other concerning signs are noted.

## 2025-08-07 NOTE — ASSESSMENT & PLAN NOTE
Tyrone Santos is a 60 y.o. male, who is now s/p L4/5 TLIF on 08/05    Surgical dressing C/D/I  Pain control: multimodal  PT/OT: WBAT BLE, spine precautions. LSO OOB  DVT PPx: SQH TID, FCDs at all times when not ambulating  Abx: postop Ancef x 24hrs   Drain: X2, overnight output: 65 cc  Bowman: Removed POD 1   Nursing: Incentive spirometry, Monitor and record drain output each shift     » Dispo: pending PT & drain      Output by Drain (mL) 08/05/25 0701 - 08/05/25 1900 08/05/25 1901 - 08/06/25 0700 08/06/25 0701 - 08/06/25 1900 08/06/25 1901 - 08/07/25 0625        Closed/Suction Drain 08/05/25 1429 Right;Posterior Back Accordion 10 Fr.  60 110 55        Closed/Suction Drain 08/05/25 1449 Left;Posterior Back Accordion 10 Fr.  30 20 10

## 2025-08-07 NOTE — PROGRESS NOTES
[facility]  Orthopedics  Progress Note    Patient Name: Tyrone Santos  MRN: [id number]  Admission Date: 8/5/2025  Hospital Length of Stay: 2 days  Attending Provider: Bart Frye MD  Primary Care Provider: Yoon Wong MD (Cindy)  Follow-up For: Procedure(s) (LRB):  ARTHRODESIS, SPINE, LUMBAR, TLIF, POSTERIOR, W/ PEDICLE SCREW L4-5 (N/A)    Post-Operative Day: 2 Days Post-Op  Subjective:     Principal Problem:Lumbar radiculopathy    Principal Orthopedic Problem: L4-L5 spondylolisthesis s/p L4-5 TLIF on 08/05    Interval History: Pt seen and examined at bedside. VSS other than /86 this AM, back down to 112/74.  NAEON.  No radicular pain to bilateral lower extremities. Reports no new symptoms. Patient reports having very vivid dreams while in the hospital leading to confusion upon waking up. Bowman removed, patient able to urinate on own without difficulty. No BM since surgery.    Worked with PT yesterday: walked ~300 feet with RW, ascended/descended 6' curb step with RW and no handrails. Rec: low intensity therapy.    Vitals:    08/07/25 0508   BP:    Pulse:    Resp: 16   Temp:          Review of patient's allergies indicates:   Allergen Reactions    Codeine Nausea Only    Seroquel [quetiapine]      Heart racing     Tizanidine Other (See Comments)     Caused increased pain and muscle pain       Current Facility-Administered Medications   Medication    0.9% NaCl infusion    0.9% NaCl infusion    allopurinoL tablet 300 mg    atorvastatin tablet 40 mg    bisacodyL suppository 10 mg    buPROPion TB24 tablet 300 mg    cariprazine Cap 3 mg    celecoxib capsule 100 mg    chlorthalidone tablet 25 mg    docusate sodium capsule 100 mg    doxepin capsule 25 mg    famotidine tablet 20 mg    gabapentin capsule 300 mg    heparin (porcine) injection 5,000 Units    hydrOXYzine HCL tablet 25 mg    lamoTRIgine tablet 100 mg    lumateperone Cap 42 mg    melatonin tablet 6 mg    methocarbamoL tablet 750 mg     "mupirocin 2 % ointment    ondansetron disintegrating tablet 8 mg    oxybutynin 24 hr tablet 5 mg    oxyCODONE immediate release tablet 5 mg    oxyCODONE immediate release tablet Tab 10 mg    polyethylene glycol packet 17 g    promethazine tablet 25 mg    propranoloL tablet 40 mg     Objective:     Vital Signs (Most Recent):  Temp: 98.3 °F (36.8 °C) (08/07/25 0459)  Pulse: 67 (08/07/25 0459)  Resp: 16 (08/07/25 0508)  BP: 112/74 (08/07/25 0459)  SpO2: 95 % (08/07/25 0459) Vital Signs (24h Range):  Temp:  [97.3 °F (36.3 °C)-98.3 °F (36.8 °C)] 98.3 °F (36.8 °C)  Pulse:  [67-80] 67  Resp:  [15-18] 16  SpO2:  [95 %-97 %] 95 %  BP: (110-159)/(74-86) 112/74     Weight: 94.6 kg (208 lb 8.9 oz)  Height: 5' 8" (172.7 cm)  Body mass index is 31.71 kg/m².      Intake/Output Summary (Last 24 hours) at 8/7/2025 0620  Last data filed at 8/7/2025 0515  Gross per 24 hour   Intake 700 ml   Output 1245 ml   Net -545 ml        Ortho/SPM Exam  Awake/alert/oriented x3, No acute distress, Afebrile, Vital signs stable  Normocephalic, Atraumatic  Good inspiratory effort with unlaboured breathing    Motor             RIGHT  LEFT  Iliopsoas (L2,3)       5/5    5/5  Quadriceps (L3,4)      5/5    5/5   Tibialis Anterior (L4.5)         5/5    5/5   Extensor Halucis Longus (L5)    5/5    5/5     Gastrocnemius/Soleus (S1)     5/5    5/5  Flexor Hallucis Longus(S2)     5/5    5/5    Sensation (a=absent, i=impaired, n=normal)       RIGHT  LEFT  L2 dermatome              n     n  L3 dermatome              n     n  L4 dermatome              n     n  L5 dermatome            n     n  S1 dermatome           n     n  S2 dermatome           n     n     Significant Labs: CBC:   Recent Labs   Lab 08/05/25  1547 08/06/25  0432   WBC 8.85 11.61   HGB 13.4* 12.8*   HCT 41.2 39.1*    217     CMP:   Recent Labs   Lab 08/05/25  1547 08/06/25  0432    140   K 4.4 4.6    107   CO2 21* 24   * 137*   BUN 9 10   CREATININE 1.2 1.1   CALCIUM " 8.1* 8.4*   PROT 6.1 6.2   ALBUMIN 3.4* 3.5   BILITOT 0.7 0.7   ALKPHOS 94 86   AST 36 69*   ALT 23 28   ANIONGAP 9 9     All pertinent labs within the past 24 hours have been reviewed.    Drain output: 65 cc    Significant Imaging: I have reviewed and interpreted all pertinent imaging results/findings.  Assessment/Plan:     * Lumbar radiculopathy  Tyrone Santos is a 60 y.o. male, who is now s/p L4/5 TLIF on 08/05    Surgical dressing C/D/I  Pain control: multimodal  PT/OT: WBAT BLE, spine precautions. LSO OOB  DVT PPx: SQH TID, FCDs at all times when not ambulating  Abx: postop Ancef x 24hrs   Drain: X2, overnight output: 65 cc  Bowman: Removed POD 1   Nursing: Incentive spirometry, Monitor and record drain output each shift     » Dispo: pending PT & drain      Output by Drain (mL) 08/05/25 0701 - 08/05/25 1900 08/05/25 1901 - 08/06/25 0700 08/06/25 0701 - 08/06/25 1900 08/06/25 1901 - 08/07/25 0625        Closed/Suction Drain 08/05/25 1429 Right;Posterior Back Accordion 10 Fr.  60 110 55        Closed/Suction Drain 08/05/25 1449 Left;Posterior Back Accordion 10 Fr.  30 20 10                Carlos Diallo MD  Orthopedics  [facility] - Surgery

## 2025-08-07 NOTE — PLAN OF CARE
Problem: Adult Inpatient Plan of Care  Goal: Plan of Care Review  Outcome: Progressing  Goal: Patient-Specific Goal (Individualized)  Outcome: Progressing  Goal: Absence of Hospital-Acquired Illness or Injury  Outcome: Progressing  Goal: Optimal Comfort and Wellbeing  Outcome: Progressing  Goal: Readiness for Transition of Care  Outcome: Progressing     Problem: Wound  Goal: Optimal Coping  Outcome: Progressing     Patient's pain assessed overnight. Pain controlled with multimodal pain medication. Surgical dressing is clean dry and intact. Both hemovac drain output recorded in flowsheet. Patient reports having hallucinations/vivid dreams during day time; 1x episode overnight. MD made aware. Home meds added to MAR. Educated patient on plan of care, patient verbalize understanding. Bed locked and in lowest position. Call light within reach.

## 2025-08-07 NOTE — PT/OT/SLP PROGRESS
"Physical Therapy Treatment    Patient Name:  Tyrone Santos   MRN:  [id number]    Recommendations:     Discharge Recommendations: Low Intensity Therapy  Discharge Equipment Recommendations: hip kit  Barriers to discharge: None    Assessment:     Tyrone Santos is a 60 y.o. male admitted with a medical diagnosis of Lumbar radiculopathy.  He presents with the following impairments/functional limitations: weakness, impaired endurance, impaired self care skills, impaired functional mobility, gait instability, impaired balance, decreased lower extremity function, decreased safety awareness, pain, orthopedic precautions. Patient agreeable to PT treatment this PM. Patient eating lunch upon initial attempt earlier in day. Upon return to room in PM, patient able to ambulate increased distance in hallway using RW with CGA/SBA. Patient educated on safety regarding maintenance of spinal precautions during all mobility. Patient able to recall 2/3 spinal precautions independently. Patient pleasant and motivated to engage in therapy. Patient will benefit from continued acute rehabilitation services to address deficits as well as progress mobility towards PLOF and increased functional independence for safe transition to home environment at time of discharge.     Rehab Prognosis: Good; patient would benefit from acute skilled PT services to address these deficits and reach maximum level of function.    Recent Surgery: Procedure(s) (LRB):  ARTHRODESIS, SPINE, LUMBAR, TLIF, POSTERIOR, W/ PEDICLE SCREW L4-5 (N/A) 2 Days Post-Op    Plan:     During this hospitalization, patient to be seen 3 x/week to address the identified rehab impairments via gait training, therapeutic activities, therapeutic exercises, neuromuscular re-education and progress toward the following goals:    Plan of Care Expires:  09/05/25    Subjective     Chief Complaint: lower back pain  Patient/Family Comments/goals: "I can walk again."  Pain/Comfort:  Pain " Rating 1: other (see comments) (Pain level not rated)  Location - Side 1: Bilateral  Location - Orientation 1: lower  Location 1: back  Pain Addressed 1: Reposition, Cessation of Activity  Pain Rating Post-Intervention 1: other (see comments) (Pain level not rated)      Objective:     Communicated with nurse prior to session.  Patient found supine with hemovac, peripheral IV upon PT entry to room.     General Precautions: Standard, fall  Orthopedic Precautions: spinal precautions  Braces: LSO (Donned prior to OOB activity)  Respiratory Status: Room air     Functional Mobility:  Bed Mobility:     Supine to Sit: stand by assistance and to L side of bed using side rails with HOB slightly elevated  Transfers:     Sit to Stand:  stand by assistance and from bed and low seat height sofa with no AD  Gait: Patient ambulated approximately 400 feet in hallway using RW with CGA/SBA. Patient with occasional turning to speak with others in hallway. Cues to maintain spinal precautions. Patient mildly unsteady as a result without LOB.   Balance: Static standing without at AD and with close SBA      AM-PAC 6 CLICK MOBILITY  Turning over in bed (including adjusting bedclothes, sheets and blankets)?: 3  Sitting down on and standing up from a chair with arms (e.g., wheelchair, bedside commode, etc.): 3  Moving from lying on back to sitting on the side of the bed?: 3  Moving to and from a bed to a chair (including a wheelchair)?: 3  Need to walk in hospital room?: 3  Climbing 3-5 steps with a railing?: 3  Basic Mobility Total Score: 18       Treatment & Education:  PT assisted patient with donning of LSO and additional gown over back prior to ambulation. PT secured hemovac to gown. PT assisted patient with removal of back gown and placement of SCDs to BLE while seated in bedside chair at completion of session.    Seated BLE exercises x 10 reps including LAQs and hip flexion. Performed to improve ROM and strengthening for safe and  efficient engagement in functional mobility.       Patient left up in chair with all lines intact, call button in reach, and nurse notified.    GOALS:   Multidisciplinary Problems       Physical Therapy Goals          Problem: Physical Therapy    Goal Priority Disciplines Outcome Interventions   Physical Therapy Goal     PT, PT/OT Progressing    Description: Goals to be met by: 25     Patient will increase functional independence with mobility by performin. Supine to sit with Set-up [place]  2. Sit to stand transfer with Supervision  3. Bed to chair transfer with Modified [place] using LRAD  4. Gait  x 200 feet with Supervision using LRAD.                          DME Justifications:  Hip kit recommended to maintain spinal precautions with ADLs.    Time Tracking:     PT Received On: 25  PT Start Time: 1417     PT Stop Time: 1435  PT Total Time (min): 18 min     Billable Minutes: Therapeutic Activity 18    Treatment Type: Treatment  PT/PTA: PT     Number of PTA visits since last PT visit: 0     2025

## 2025-08-07 NOTE — PLAN OF CARE
61 y/o male AAOX4 lying supine in bed. AND noted on room air. Surgical incision clean, dry, intact. X2 Hemovac drains intact. PIV infusing NS at 100 mL/hr. No complaints or concerns voiced at this time. Safety measures in place. Verbalized understanding.     Problem: Adult Inpatient Plan of Care  Goal: Plan of Care Review  Outcome: Progressing     Problem: Adult Inpatient Plan of Care  Goal: Patient-Specific Goal (Individualized)  Outcome: Progressing     Problem: Infection  Goal: Absence of Infection Signs and Symptoms  Outcome: Progressing     Problem: Wound  Goal: Optimal Pain Control and Function  Outcome: Progressing     Problem: Wound  Goal: Skin Health and Integrity  Outcome: Progressing     Problem: Fall Injury Risk  Goal: Absence of Fall and Fall-Related Injury  Outcome: Progressing

## 2025-08-07 NOTE — PLAN OF CARE
Patient reports he would like to go to outpatient PT following discharge. Ambulatory referral placed and copy provided to patient to present to the clinic of his choosing. LIS 8/8/25.    Shirin Carvalho RNCM  Case Management  [facility]  [phone]

## 2025-08-07 NOTE — PT/OT/SLP PROGRESS
Occupational Therapy   Treatment    Name: Tyrone Santos  MRN: [id number]  Admitting Diagnosis:  Lumbar radiculopathy  2 Days Post-Op    Recommendations:     Discharge Recommendations: Low Intensity Therapy  Discharge Equipment Recommendations:  hip kit  Barriers to discharge:  None    Assessment:     Tyrone Santos is a 60 y.o. male with a medical diagnosis of Lumbar radiculopathy.  He presents with the following performance deficits affecting function: weakness, impaired balance, impaired endurance, impaired self care skills, impaired functional mobility, gait instability, orthopedic precautions, decreased safety awareness, pain, decreased lower extremity function, decreased upper extremity function.     Rehab Prognosis:  Good; patient would benefit from acute skilled OT services to address these deficits and reach maximum level of function.       Plan:     Patient to be seen 4 x/week to address the above listed problems via self-care/home management, therapeutic activities, therapeutic exercises, neuromuscular re-education  Plan of Care Expires: 09/05/25  Plan of Care Reviewed with: patient    Subjective     Patient/Family Comments/goals: to improve function  Pain/Comfort:  Pain Rating 1: 8/10  Location - Orientation 1: lower  Location 1: back  Pain Addressed 1: Reposition, Distraction  Pain Rating Post-Intervention 1:  (unrated)    Objective:     Communicated with: RN prior to session.  Patient found HOB elevated with peripheral IV, hemovac, SCD upon OT entry to room.   Rima Barrera OTR/L readily available to answer questions about the patient's intervention at the time of the provision of services.     General Precautions: Standard, fall    Orthopedic Precautions:spinal precautions  Braces: LSO (worn OOB)  Respiratory Status: Room air     Occupational Performance:     Bed Mobility:    Patient completed Supine to Sit with supervision     Functional Mobility/Transfers:  Patient completed Sit <> Stand Transfer  with supervision  with  rolling walker   Functional Mobility: pt ambulating community distances with SBA using no AD, SOB noted after ambulation.     Activities of Daily Living:  Grooming: supervision standing at sink for oral hygiene and facial care  Upper Body Dressing: supervision to don button up shirt and LSO brace  Lower Body Dressing: modified independence to don pants with hip kit.       [facility] 6 Click ADL: 19    Treatment & Education:  Pt educated on OT POC and frequency during hospital stay.   Pt educated on proper hand placement and techniques for RW mgmt to improve safety awareness.   Pt educated on importance of OOB activity to improve function and activity tolerance.  Addressed all patient questions/concerns within PHELAN scope of practice.     Patient left HOB elevated with all lines intact and call button in reach    GOALS:   Multidisciplinary Problems       Occupational Therapy Goals          Problem: Occupational Therapy    Goal Priority Disciplines Outcome Interventions   Occupational Therapy Goal     OT, PT/OT Progressing    Description: Goals to be met by: 9/5/25     Patient will increase functional independence with ADLs by performing:    UE Dressing, including LSO, with Modified [place].  LE Dressing with Modified [place] and AE prn.  Grooming while standing at sink with Modified [place].  Toileting from toilet with Modified [place] for hygiene and clothing management.   Supine to sit with Modified [place].  Toilet transfer to toilet with Modified [place].                       Time Tracking:     OT Date of Treatment: 08/07/25  OT Start Time: 1038  OT Stop Time: 1106  OT Total Time (min): 28 min    Billable Minutes:Self Care/Home Management 12  Therapeutic Activity 16    OT/EDUARDO: EDUARDO     Number of EDUARDO visits since last OT visit: 1    8/7/2025

## 2025-08-07 NOTE — SUBJECTIVE & OBJECTIVE
Principal Problem:Lumbar radiculopathy    Principal Orthopedic Problem: L4-L5 spondylolisthesis s/p L4-5 TLIF on 08/05    Interval History: Pt seen and examined at bedside. VSS other than /86 this AM, back down to 112/74.  NAEON.  No radicular pain to bilateral lower extremities. Reports no new symptoms. Patient reports having very vivid dreams while in the hospital leading to confusion upon waking up. Bowman removed, patient able to urinate on own without difficulty. No BM since surgery.    Worked with PT yesterday: walked ~300 feet with RW, ascended/descended 6' curb step with RW and no handrails. Rec: low intensity therapy.    Vitals:    08/07/25 0508   BP:    Pulse:    Resp: 16   Temp:          Review of patient's allergies indicates:   Allergen Reactions    Codeine Nausea Only    Seroquel [quetiapine]      Heart racing     Tizanidine Other (See Comments)     Caused increased pain and muscle pain       Current Facility-Administered Medications   Medication    0.9% NaCl infusion    0.9% NaCl infusion    allopurinoL tablet 300 mg    atorvastatin tablet 40 mg    bisacodyL suppository 10 mg    buPROPion TB24 tablet 300 mg    cariprazine Cap 3 mg    celecoxib capsule 100 mg    chlorthalidone tablet 25 mg    docusate sodium capsule 100 mg    doxepin capsule 25 mg    famotidine tablet 20 mg    gabapentin capsule 300 mg    heparin (porcine) injection 5,000 Units    hydrOXYzine HCL tablet 25 mg    lamoTRIgine tablet 100 mg    lumateperone Cap 42 mg    melatonin tablet 6 mg    methocarbamoL tablet 750 mg    mupirocin 2 % ointment    ondansetron disintegrating tablet 8 mg    oxybutynin 24 hr tablet 5 mg    oxyCODONE immediate release tablet 5 mg    oxyCODONE immediate release tablet Tab 10 mg    polyethylene glycol packet 17 g    promethazine tablet 25 mg    propranoloL tablet 40 mg     Objective:     Vital Signs (Most Recent):  Temp: 98.3 °F (36.8 °C) (08/07/25 0459)  Pulse: 67 (08/07/25 0459)  Resp: 16 (08/07/25  "0508)  BP: 112/74 (08/07/25 0459)  SpO2: 95 % (08/07/25 0459) Vital Signs (24h Range):  Temp:  [97.3 °F (36.3 °C)-98.3 °F (36.8 °C)] 98.3 °F (36.8 °C)  Pulse:  [67-80] 67  Resp:  [15-18] 16  SpO2:  [95 %-97 %] 95 %  BP: (110-159)/(74-86) 112/74     Weight: 94.6 kg (208 lb 8.9 oz)  Height: 5' 8" (172.7 cm)  Body mass index is 31.71 kg/m².      Intake/Output Summary (Last 24 hours) at 8/7/2025 0620  Last data filed at 8/7/2025 0515  Gross per 24 hour   Intake 700 ml   Output 1245 ml   Net -545 ml        Ortho/SPM Exam  Awake/alert/oriented x3, No acute distress, Afebrile, Vital signs stable  Normocephalic, Atraumatic  Good inspiratory effort with unlaboured breathing    Motor             RIGHT  LEFT  Iliopsoas (L2,3)       5/5    5/5  Quadriceps (L3,4)      5/5    5/5   Tibialis Anterior (L4.5)         5/5    5/5   Extensor Halucis Longus (L5)    5/5    5/5     Gastrocnemius/Soleus (S1)     5/5    5/5  Flexor Hallucis Longus(S2)     5/5    5/5    Sensation (a=absent, i=impaired, n=normal)       RIGHT  LEFT  L2 dermatome              n     n  L3 dermatome              n     n  L4 dermatome              n     n  L5 dermatome            n     n  S1 dermatome           n     n  S2 dermatome           n     n     Significant Labs: CBC:   Recent Labs   Lab 08/05/25  1547 08/06/25  0432   WBC 8.85 11.61   HGB 13.4* 12.8*   HCT 41.2 39.1*    217     CMP:   Recent Labs   Lab 08/05/25  1547 08/06/25  0432    140   K 4.4 4.6    107   CO2 21* 24   * 137*   BUN 9 10   CREATININE 1.2 1.1   CALCIUM 8.1* 8.4*   PROT 6.1 6.2   ALBUMIN 3.4* 3.5   BILITOT 0.7 0.7   ALKPHOS 94 86   AST 36 69*   ALT 23 28   ANIONGAP 9 9     All pertinent labs within the past 24 hours have been reviewed.    Drain output: 65 cc    Significant Imaging: I have reviewed and interpreted all pertinent imaging results/findings.  "

## 2025-08-08 VITALS
HEART RATE: 71 BPM | SYSTOLIC BLOOD PRESSURE: 108 MMHG | BODY MASS INDEX: 31.61 KG/M2 | DIASTOLIC BLOOD PRESSURE: 66 MMHG | WEIGHT: 208.56 LBS | HEIGHT: 68 IN | TEMPERATURE: 99 F | RESPIRATION RATE: 18 BRPM | OXYGEN SATURATION: 93 %

## 2025-08-08 PROCEDURE — 25000003 PHARM REV CODE 250

## 2025-08-08 PROCEDURE — 97535 SELF CARE MNGMENT TRAINING: CPT | Mod: CO

## 2025-08-08 PROCEDURE — 63600175 PHARM REV CODE 636 W HCPCS

## 2025-08-08 RX ADMIN — BUPROPION HYDROCHLORIDE 300 MG: 150 TABLET, EXTENDED RELEASE ORAL at 08:08

## 2025-08-08 RX ADMIN — FAMOTIDINE 20 MG: 20 TABLET, FILM COATED ORAL at 08:08

## 2025-08-08 RX ADMIN — CHLORTHALIDONE 25 MG: 25 TABLET ORAL at 09:08

## 2025-08-08 RX ADMIN — LAMOTRIGINE 100 MG: 100 TABLET ORAL at 08:08

## 2025-08-08 RX ADMIN — OXYCODONE HYDROCHLORIDE 10 MG: 10 TABLET ORAL at 02:08

## 2025-08-08 RX ADMIN — ALLOPURINOL 300 MG: 300 TABLET ORAL at 09:08

## 2025-08-08 RX ADMIN — HEPARIN SODIUM 5000 UNITS: 5000 INJECTION INTRAVENOUS; SUBCUTANEOUS at 02:08

## 2025-08-08 RX ADMIN — HEPARIN SODIUM 5000 UNITS: 5000 INJECTION INTRAVENOUS; SUBCUTANEOUS at 06:08

## 2025-08-08 RX ADMIN — DOCUSATE SODIUM 100 MG: 100 CAPSULE, LIQUID FILLED ORAL at 08:08

## 2025-08-08 RX ADMIN — MUPIROCIN: 20 OINTMENT TOPICAL at 08:08

## 2025-08-08 RX ADMIN — OXYBUTYNIN CHLORIDE 5 MG: 5 TABLET, EXTENDED RELEASE ORAL at 08:08

## 2025-08-08 RX ADMIN — CARIPRAZINE 3 MG: 1.5 CAPSULE, GELATIN COATED ORAL at 08:08

## 2025-08-08 RX ADMIN — POLYETHYLENE GLYCOL 3350 17 G: 17 POWDER, FOR SOLUTION ORAL at 08:08

## 2025-08-08 RX ADMIN — OXYCODONE HYDROCHLORIDE 10 MG: 10 TABLET ORAL at 06:08

## 2025-08-08 RX ADMIN — METHOCARBAMOL 750 MG: 750 TABLET ORAL at 02:08

## 2025-08-08 RX ADMIN — GABAPENTIN 300 MG: 300 CAPSULE ORAL at 02:08

## 2025-08-08 RX ADMIN — CELECOXIB 100 MG: 100 CAPSULE ORAL at 08:08

## 2025-08-08 RX ADMIN — OXYCODONE HYDROCHLORIDE 10 MG: 10 TABLET ORAL at 09:08

## 2025-08-08 RX ADMIN — METHOCARBAMOL 750 MG: 750 TABLET ORAL at 08:08

## 2025-08-08 RX ADMIN — GABAPENTIN 300 MG: 300 CAPSULE ORAL at 08:08

## 2025-08-08 NOTE — SUBJECTIVE & OBJECTIVE
"Principal Problem:Lumbar radiculopathy    Principal Orthopedic Problem: L4-L5 spondylolisthesis s/p L4-5 TLIF on 08/05    Interval History: Pt seen and examined at bedside. ZOLTAN VERAS.  Reports no new symptoms, able to sleep better last night.    Worked with PT yesterday: walked ~400 feet with RW. Rec: low intensity therapy.    Vitals:    08/08/25 0601   BP:    Pulse:    Resp: 18   Temp:          Review of patient's allergies indicates:   Allergen Reactions    Codeine Nausea Only    Seroquel [quetiapine]      Heart racing     Tizanidine Other (See Comments)     Caused increased pain and muscle pain       Current Facility-Administered Medications   Medication    0.9% NaCl infusion    0.9% NaCl infusion    allopurinoL tablet 300 mg    atorvastatin tablet 40 mg    bisacodyL suppository 10 mg    buPROPion TB24 tablet 300 mg    cariprazine Cap 3 mg    celecoxib capsule 100 mg    chlorthalidone tablet 25 mg    docusate sodium capsule 100 mg    doxepin capsule 25 mg    famotidine tablet 20 mg    gabapentin capsule 300 mg    heparin (porcine) injection 5,000 Units    hydrOXYzine HCL tablet 25 mg    lamoTRIgine tablet 100 mg    lumateperone Cap 42 mg    melatonin tablet 6 mg    methocarbamoL tablet 750 mg    mupirocin 2 % ointment    ondansetron disintegrating tablet 8 mg    oxybutynin 24 hr tablet 5 mg    oxyCODONE immediate release tablet 5 mg    oxyCODONE immediate release tablet Tab 10 mg    polyethylene glycol packet 17 g    promethazine tablet 25 mg    propranoloL tablet 40 mg     Objective:     Vital Signs (Most Recent):  Temp: 97.8 °F (36.6 °C) (08/08/25 0524)  Pulse: 72 (08/08/25 0524)  Resp: 18 (08/08/25 0601)  BP: 125/66 (08/08/25 0524)  SpO2: 96 % (08/08/25 0524) Vital Signs (24h Range):  Temp:  [96.6 °F (35.9 °C)-98 °F (36.7 °C)] 97.8 °F (36.6 °C)  Pulse:  [70-72] 72  Resp:  [16-18] 18  SpO2:  [95 %-98 %] 96 %  BP: (107-130)/(66-79) 125/66     Weight: 94.6 kg (208 lb 8.9 oz)  Height: 5' 8" (172.7 cm)  Body mass " "index is 31.71 kg/m².      Intake/Output Summary (Last 24 hours) at 8/8/2025 0616  Last data filed at 8/8/2025 0524  Gross per 24 hour   Intake 780 ml   Output 1680 ml   Net -900 ml        Ortho/SPM Exam  Awake/alert/oriented x3, No acute distress, Afebrile, Vital signs stable  Normocephalic, Atraumatic  Good inspiratory effort with unlaboured breathing    Motor             RIGHT  LEFT  Iliopsoas (L2,3)       5/5    5/5  Quadriceps (L3,4)      5/5    5/5   Tibialis Anterior (L4.5)         5/5    5/5   Extensor Halucis Longus (L5)    5/5    5/5     Gastrocnemius/Soleus (S1)     5/5    5/5  Flexor Hallucis Longus(S2)     5/5    5/5    Sensation (a=absent, i=impaired, n=normal)       RIGHT  LEFT  L2 dermatome              n     n  L3 dermatome              n     n  L4 dermatome              n     n  L5 dermatome            n     n  S1 dermatome           n     n  S2 dermatome           n     n     Significant Labs: CBC:   No results for input(s): "WBC", "HGB", "HCT", "PLT" in the last 48 hours.    CMP:   No results for input(s): "NA", "K", "CL", "CO2", "GLU", "BUN", "CREATININE", "CALCIUM", "PROT", "ALBUMIN", "BILITOT", "ALKPHOS", "AST", "ALT", "ANIONGAP", "EGFRNONAA" in the last 48 hours.    Invalid input(s): "ESTGFAFRICA"    All pertinent labs within the past 24 hours have been reviewed.    Drain output: 30 cc    Significant Imaging: I have reviewed and interpreted all pertinent imaging results/findings.  "

## 2025-08-08 NOTE — NURSING
Pt has been discharged. Discharge instructions have been given, and pt verbalized understanding. IV has been taken out. Pt received medicine via pharmacy.Pt transported off unit via wheelchair.

## 2025-08-08 NOTE — ASSESSMENT & PLAN NOTE
Tyrone Santos is a 60 y.o. male, who is now s/p L4/5 TLIF on 08/05    Surgical dressing C/D/I  Pain control: multimodal  PT/OT: WBAT BLE, spine precautions. LSO OOB  DVT PPx: SQH TID, FCDs at all times when not ambulating  Abx: postop Ancef x 24hrs   Drain: X2, overnight output: 30 cc  Bowman: Removed POD 1   Nursing: Incentive spirometry, Monitor and record drain output each shift     » Dispo: pending PT & drain      Output by Drain (mL) 08/06/25 0701 - 08/06/25 1900 08/06/25 1901 - 08/07/25 0700 08/07/25 0701 - 08/07/25 1900 08/07/25 1901 - 08/08/25 0618        Closed/Suction Drain 08/05/25 1429 Right;Posterior Back Accordion 10 Fr. 110 55 40 30        Closed/Suction Drain 08/05/25 1449 Left;Posterior Back Accordion 10 Fr. 20 10 10 0

## 2025-08-08 NOTE — PT/OT/SLP PROGRESS
Occupational Therapy   Treatment    Name: Tyrone Santos  MRN: [id number]  Admitting Diagnosis:  Lumbar radiculopathy  3 Days Post-Op    Recommendations:     Discharge Recommendations: Low Intensity Therapy  Discharge Equipment Recommendations:  hip kit  Barriers to discharge:  None    Assessment:     Tyrone Santos is a 60 y.o. male with a medical diagnosis of Lumbar radiculopathy.  He presents with the following performance deficits affecting function: weakness, impaired self care skills, impaired endurance, gait instability, impaired functional mobility, orthopedic precautions, decreased upper extremity function, decreased coordination, pain, decreased safety awareness.     Rehab Prognosis:  Good; patient would benefit from acute skilled OT services to address these deficits and reach maximum level of function.       Plan:     Patient to be seen 4 x/week to address the above listed problems via self-care/home management, therapeutic activities, therapeutic exercises, neuromuscular re-education  Plan of Care Expires: 09/05/25  Plan of Care Reviewed with: patient    Subjective     Patient/Family Comments/goals: to improve function  Pain/Comfort:  Pain Rating 1: 7/10  Location - Orientation 1: lower  Location 1: back  Pain Addressed 1: Distraction, Reposition (unrated)  Pain Rating Post-Intervention 1:  (unrated)    Objective:     Communicated with: RN prior to session.  Patient found HOB elevated with peripheral IV, hemovac upon OT entry to room.   Rima Barrera OTR/L readily available to answer questions about the patient's intervention at the time of the provision of services.     General Precautions: Standard, fall    Orthopedic Precautions:spinal precautions  Braces: LSO (when OOB)  Respiratory Status: Room air     Occupational Performance:     Bed Mobility:    Patient completed Supine to Sit with supervision  Patient completed Sit to Supine with supervision     Functional Mobility/Transfers:  Patient  completed Sit <> Stand Transfer with stand by assistance  with  rolling walker   Functional Mobility: pt ambulating community distances with CGA using RW.     Activities of Daily Living:  Upper Body Dressing: stand by assistance to don/doff LSO brace      [facility] 6 Click ADL: 21    Treatment & Education:  Pt educated on OT POC and frequency during hospital stay.   Pt educated on proper hand placement and techniques for RW mgmt to improve safety awareness.   Pt educated on importance of OOB activity to improve function and activity tolerance.  Addressed all patient questions/concerns within PHELAN scope of practice.     Patient left HOB elevated with all lines intact and call button in reach    GOALS:   Multidisciplinary Problems       Occupational Therapy Goals          Problem: Occupational Therapy    Goal Priority Disciplines Outcome Interventions   Occupational Therapy Goal     OT, PT/OT Progressing    Description: Goals to be met by: 9/5/25     Patient will increase functional independence with ADLs by performing:    UE Dressing, including LSO, with Modified [place].  LE Dressing with Modified [place] and AE prn.  Grooming while standing at sink with Modified [place].  Toileting from toilet with Modified [place] for hygiene and clothing management.   Supine to sit with Modified [place].  Toilet transfer to toilet with Modified [place].                       Time Tracking:     OT Date of Treatment: 08/08/25  OT Start Time: 0925  OT Stop Time: 0943  OT Total Time (min): 18 min    Billable Minutes:Therapeutic Activity 18    OT/EDUARDO: EDUARDO     Number of EDUARDO visits since last OT visit: 2    8/8/2025

## 2025-08-08 NOTE — PLAN OF CARE
[facility]  Discharge Reassessment    Primary Care Provider: Yoon Wong MD (Cindy)    Expected Discharge Date: 8/9/2025    Reassessment (most recent)       Discharge Reassessment - 08/08/25 1359          Discharge Reassessment    Assessment Type Discharge Planning Reassessment     Did the patient's condition or plan change since previous assessment? No     Discharge Plan discussed with: Spouse/sig other     Communicated LIS with patient/caregiver Yes     Discharge Plan A Home with family                   Patient to d/c home with Outpatient PT once medically stable.    Shirin Carvalho RNCM  Case Management  [facility]  [phone]

## 2025-08-11 NOTE — DISCHARGE SUMMARY
[facility]  Orthopedics  Discharge Summary      Patient Name: Tyrone Santos  MRN: [id number]  Admission Date: 8/5/2025  Hospital Length of Stay: 3 days  Discharge Date and Time: 8/8/2025  4:12 PM  Attending Physician: No att. providers found   Discharging Provider: Ravindra Bradshaw MD  Primary Care Provider: Yoon Wong MD (Cindy)    HPI:   No notes on file    Procedure(s) (LRB):  ARTHRODESIS, SPINE, LUMBAR, TLIF, POSTERIOR, W/ PEDICLE SCREW L4-5 (N/A)      Hospital Course:  Please see the preoperative H&P and other available documentation for full details related to history prior to this admission.  Briefly, Tyrone Santos is a 60 y.o. male who was admitted following scheduled elective surgery for Lumbar radiculopathy. They underwent the following procedures: L4/5    Following a complete preoperative discussion of the risks and benefits of surgery with signed informed consent, the patient was taken to the operating room on 8/5/2025 and underwent the above stated procedures. The patient tolerated surgery well  and there were no complications. Please see the operative report for full intraoperative findings and details. Postoperatively, the patient did well  and was transferred from the PACU to the  floor in stable condition where they had a stable and uncomplicated  hospital course. Labs and vital signs remained stable and appropriate throughout course. Diet was advanced as tolerated and the patient's pain was controlled on oral pain medications without problem. Ambulating without issue. Voiding without issue with adequate urine output. Passing gas and stool. Incision site is clean, dry, and intact.    Currently, the patient is doing well and is stable and appropriate for discharge home at this time. Patient will follow up in clinic      Goals of Care Treatment Preferences:  Code Status: Full Code          Significant Diagnostic Studies: No pertinent studies.    Pending Diagnostic Studies:        None          Final Active Diagnoses:    Diagnosis Date Noted POA    PRINCIPAL PROBLEM:  Lumbar radiculopathy [M54.16] 08/05/2025 Yes    Other reduced mobility [Z74.09] 01/25/2021 Yes      Problems Resolved During this Admission:      Discharged Condition: good    Disposition: Home or Self Care    Follow Up:    Patient Instructions:      Ambulatory Referral/Consult to Physical Therapy   Referral Priority: Routine Referral Type: Physical Therapy   Referral Reason: Specialty Services Required   Number of Visits Requested: 1     Diet general     Lifting restrictions     Wound care routine (specify)   Order Comments: You may remove your dressing and shower 7 days after surgery. Until then please keep your wound clean and dry. Sponge baths are acceptable. Do not go in a pool or hot tub until seen in clinic. Please leave the small steri-strips covering your wound in place until they fall off naturally (2 weeks). You may notice clear suture ends hanging from the sides of your incision after the steri-strips are removed, it is ok to clip these with scissors.     Call MD for:  temperature >100.4     Call MD for:  persistent nausea and vomiting     Call MD for:  severe uncontrolled pain     Call MD for:  difficulty breathing, headache or visual disturbances     Call MD for:  redness, tenderness, or signs of infection (pain, swelling, redness, odor or green/yellow discharge around incision site)     Call MD for:  hives     Call MD for:  persistent dizziness or light-headedness     Call MD for:  extreme fatigue     Activity as tolerated   Order Comments: Spine Precautions, No bending, lifting, twisting     Medications:  Reconciled Home Medications:      Medication List        START taking these medications      acetaminophen 325 MG tablet  Commonly known as: TYLENOL  Take 2 tablets (650 mg total) by mouth every 6 (six) hours as needed for Pain.     celecoxib 100 MG capsule  Commonly known as: CeleBREX  Take 1 capsule (100 mg  total) by mouth 2 (two) times daily.     famotidine 20 MG tablet  Commonly known as: PEPCID  Take 1 tablet (20 mg total) by mouth 2 (two) times daily.     gabapentin 300 MG capsule  Commonly known as: NEURONTIN  Take 1 capsule (300 mg total) by mouth 3 (three) times daily.     methocarbamoL 500 MG Tab  Commonly known as: Robaxin  Take 1 tablet (500 mg total) by mouth 4 (four) times daily.     ondansetron 4 MG Tbdl  Commonly known as: ZOFRAN-ODT  Take 1 tablet (4 mg total) by mouth every 8 (eight) hours as needed.     oxyCODONE 5 MG immediate release tablet  Commonly known as: ROXICODONE  Take 1 tablet (5 mg total) by mouth every 4 (four) hours as needed for Pain.     STOOL SOFTENER-LAXATIVE 8.6-50 mg per tablet  Generic drug: senna-docusate  Take 1 tablet by mouth 2 (two) times daily. for 7 days            CONTINUE taking these medications      alfuzosin 10 mg Tb24  Commonly known as: UROXATRAL  Take 10 mg by mouth.     allopurinoL 300 MG tablet  Commonly known as: ZYLOPRIM  Take 300 mg by mouth once daily.     atorvastatin 40 MG tablet  Commonly known as: LIPITOR  Take 1 tablet (40 mg total) by mouth every evening. For cholesterol (new dose)     buPROPion 300 MG 24 hr tablet  Commonly known as: WELLBUTRIN XL  Take 300 mg by mouth once daily.     calcium-vitamin D3 500 mg-5 mcg (200 unit) per tablet  Commonly known as: OS-JALYN 500 + D3  Take 1 tablet by mouth 2 (two) times daily with meals.     CAPLYTA 42 mg Cap  Generic drug: lumateperone  every evening.     chlorthalidone 25 MG Tab  Commonly known as: HYGROTEN  TAKE 1 TABLET(25 MG) BY MOUTH EVERY DAY. REPLACES HCTZ FOR BLOOD PRESSURE     cyanocobalamin 100 MCG tablet  Commonly known as: VITAMIN B-12  Take 100 mcg by mouth once daily.     doxepin 150 MG Cap  Commonly known as: SINEQUAN  Take 25 mg by mouth every evening.     folic acid 1 MG tablet  Commonly known as: FOLVITE  Take 1 mg by mouth once daily.     lamoTRIgine 100 MG tablet  Commonly known as:  LAMICTAL  Take 1 tablet (100 mg total) by mouth once daily.     mirabegron 25 mg Tb24 ER tablet  Commonly known as: MYRBETRIQ  Take 25 mg by mouth once daily.     omeprazole 40 MG capsule  Commonly known as: PRILOSEC  Take 1 capsule (40 mg total) by mouth daily as needed (heartburn).     pantoprazole 40 MG tablet  Commonly known as: PROTONIX  Take 40 mg by mouth once daily.     potassium chloride 10 MEQ Cpsr  Commonly known as: MICRO-K  Take 10 mEq by mouth once.     propranoloL 40 MG tablet  Commonly known as: INDERAL  Take 1 tablet (40 mg total) by mouth 2 (two) times daily.     rizatriptan 10 MG tablet  Commonly known as: MAXALT  Take 1 tablet (10 mg total) by mouth as needed for Migraine. (Can repeat dose in 2 hours if needed, maximum 3 doses per day).     UNABLE TO FIND  1 mg. medication name: ANASTORZOLE 1MG EVERY  3 DAYS     VRAYLAR 1.5 mg Cap  Generic drug: cariprazine  Take 1.5 mg by mouth once daily. TAKES 3MG.            ASK your doctor about these medications      ergocalciferol 50,000 unit Cap  Commonly known as: ERGOCALCIFEROL  Take 50,000 Units by mouth every 7 days.              Ravindra Bradshaw MD  Orthopedics  [facility] - Surgery

## 2025-08-12 ENCOUNTER — CLINICAL SUPPORT (OUTPATIENT)
Dept: REHABILITATION | Facility: HOSPITAL | Age: 60
End: 2025-08-12
Attending: ORTHOPAEDIC SURGERY
Payer: MEDICAID

## 2025-08-12 DIAGNOSIS — R52 PAIN: Primary | ICD-10-CM

## 2025-08-12 PROCEDURE — 97110 THERAPEUTIC EXERCISES: CPT | Performed by: PHYSICAL THERAPIST

## 2025-08-12 PROCEDURE — 97162 PT EVAL MOD COMPLEX 30 MIN: CPT | Performed by: PHYSICAL THERAPIST

## 2025-08-12 NOTE — PLAN OF CARE
[facility]  Discharge Final Note    Primary Care Provider: Yoon Wong MD ([facility])    Expected Discharge Date: 8/8/2025    Final Discharge Note (most recent)       Final Note - 08/12/25 0806          Final Note    Assessment Type Final Discharge Note     Anticipated Discharge Disposition Home or Self Care     What phone number can be called within the next 1-3 days to see how you are doing after discharge? --   [phone]                    Important Message from Medicare           Future Appointments   Date Time Provider Department Center   8/12/2025  3:00 PM Christiano Rudd, PT [facility] [place]    9/3/2025  7:00 AM [facility]   9/3/2025  8:00 AM Yajaira Abarca PA-C [facility]     Patient discharged home to care of family on 8/8/25.    Shirin Carvalho RNCM  Case Management  [facility]  [phone]

## 2025-08-13 ENCOUNTER — TELEPHONE (OUTPATIENT)
Dept: NEUROSURGERY | Facility: CLINIC | Age: 60
End: 2025-08-13
Payer: MEDICAID

## 2025-08-15 ENCOUNTER — PATIENT MESSAGE (OUTPATIENT)
Dept: ORTHOPEDICS | Facility: CLINIC | Age: 60
End: 2025-08-15
Payer: MEDICAID

## 2025-08-15 ENCOUNTER — TELEPHONE (OUTPATIENT)
Facility: HOSPITAL | Age: 60
End: 2025-08-15
Payer: MEDICAID

## 2025-08-15 DIAGNOSIS — M54.16 LUMBAR RADICULOPATHY: ICD-10-CM

## 2025-08-15 RX ORDER — OXYCODONE HYDROCHLORIDE 5 MG/1
5 TABLET ORAL EVERY 6 HOURS PRN
Qty: 28 TABLET | Refills: 0 | Status: SHIPPED | OUTPATIENT
Start: 2025-08-15

## 2025-08-19 ENCOUNTER — CLINICAL SUPPORT (OUTPATIENT)
Dept: REHABILITATION | Facility: HOSPITAL | Age: 60
End: 2025-08-19
Attending: ORTHOPAEDIC SURGERY
Payer: MEDICAID

## 2025-08-19 DIAGNOSIS — R52 PAIN: Primary | ICD-10-CM

## 2025-08-19 PROCEDURE — 97110 THERAPEUTIC EXERCISES: CPT | Performed by: PHYSICAL THERAPIST

## 2025-08-26 ENCOUNTER — CLINICAL SUPPORT (OUTPATIENT)
Dept: REHABILITATION | Facility: HOSPITAL | Age: 60
End: 2025-08-26
Attending: ORTHOPAEDIC SURGERY
Payer: MEDICAID

## 2025-08-26 DIAGNOSIS — R52 PAIN: Primary | ICD-10-CM

## 2025-08-26 PROCEDURE — 97110 THERAPEUTIC EXERCISES: CPT | Performed by: PHYSICAL THERAPIST

## 2025-08-28 ENCOUNTER — TELEPHONE (OUTPATIENT)
Dept: ORTHOPEDICS | Facility: CLINIC | Age: 60
End: 2025-08-28
Payer: MEDICAID

## 2025-09-03 ENCOUNTER — HOSPITAL ENCOUNTER (OUTPATIENT)
Dept: RADIOLOGY | Facility: HOSPITAL | Age: 60
Discharge: HOME | End: 2025-09-03
Attending: ORTHOPAEDIC SURGERY
Payer: MEDICAID

## 2025-09-03 ENCOUNTER — OFFICE VISIT (OUTPATIENT)
Dept: ORTHOPEDICS | Facility: CLINIC | Age: 60
End: 2025-09-03
Payer: MEDICAID

## 2025-09-03 VITALS — BODY MASS INDEX: 31.28 KG/M2 | WEIGHT: 206.38 LBS | HEIGHT: 68 IN

## 2025-09-03 DIAGNOSIS — Z98.1 S/P LUMBAR SPINAL FUSION: ICD-10-CM

## 2025-09-03 DIAGNOSIS — M54.16 LUMBAR RADICULOPATHY: ICD-10-CM

## 2025-09-03 DIAGNOSIS — Z98.1 S/P LUMBAR FUSION: Primary | ICD-10-CM

## 2025-09-03 PROCEDURE — 99999 PR PBB SHADOW E&M-EST. PATIENT-LVL IV: CPT | Mod: PBBFAC,,, | Performed by: PHYSICIAN ASSISTANT

## 2025-09-03 PROCEDURE — 99214 OFFICE O/P EST MOD 30 MIN: CPT | Mod: PBBFAC,25 | Performed by: PHYSICIAN ASSISTANT

## 2025-09-03 PROCEDURE — 72100 X-RAY EXAM L-S SPINE 2/3 VWS: CPT | Mod: TC

## 2025-09-03 PROCEDURE — 99024 POSTOP FOLLOW-UP VISIT: CPT | Mod: ,,, | Performed by: PHYSICIAN ASSISTANT

## 2025-09-03 PROCEDURE — 72100 X-RAY EXAM L-S SPINE 2/3 VWS: CPT | Mod: 26,,, | Performed by: RADIOLOGY

## 2025-09-03 PROCEDURE — 1159F MED LIST DOCD IN RCRD: CPT | Mod: CPTII,,, | Performed by: PHYSICIAN ASSISTANT

## 2025-09-03 RX ORDER — OXYCODONE HYDROCHLORIDE 5 MG/1
5 TABLET ORAL EVERY 6 HOURS PRN
Qty: 28 TABLET | Refills: 0 | Status: SHIPPED | OUTPATIENT
Start: 2025-09-03

## (undated) DEVICE — DRESSING AQUACEL FOAM 5 X 5

## (undated) DEVICE — DRESSING LEUKOPLAST FLEX 1X3IN

## (undated) DEVICE — MASK FLYTE HOOD PEEL AWAY

## (undated) DEVICE — COVER BACK TBL HD 2-TIER 72IN

## (undated) DEVICE — ELECTRODE REM PLYHSV RETURN 9

## (undated) DEVICE — DRAPE C ARM 42 X 120 10/BX

## (undated) DEVICE — SUT VICRYL PLUS 2-0 CT1 18

## (undated) DEVICE — PENCIL ROCKER SWITCH 10FT CORD

## (undated) DEVICE — SYR 10CC LUER LOCK

## (undated) DEVICE — SUT 1 36IN COATED VICRYL UN

## (undated) DEVICE — BRUSH SCRUB HIBICLENS 4%

## (undated) DEVICE — DRESSING MEPILEX BORDER 4 X 4

## (undated) DEVICE — SYS CLSR DERMABOND PRINEO 22CM

## (undated) DEVICE — TUBING SPECLM SMOKE EVAC 10MM

## (undated) DEVICE — SEE MEDLINE ITEM 152548

## (undated) DEVICE — KIT PT CARE HANA PROFX SSXT

## (undated) DEVICE — KIT SPINAL PATIENT CARE JACK

## (undated) DEVICE — TAPE SILK 3IN

## (undated) DEVICE — DRESSING TRANS 4X4 TEGADERM

## (undated) DEVICE — SPONGE GAUZE 16PLY 4X4

## (undated) DEVICE — DRAPE EXCELSIUS GPS MONITOR

## (undated) DEVICE — SUT MONOCRYL 3-0 PS-2 UND

## (undated) DEVICE — GLOVE BIOGEL PI MICRO SZ 7

## (undated) DEVICE — DRAPE C-ARM ELAS CLIP 42X120IN

## (undated) DEVICE — GLOVE BIOGEL PI MICRO SZ 7.5

## (undated) DEVICE — UNDERGLOVES BIOGEL PI SIZE 8

## (undated) DEVICE — BLADE MILL+ BONE MEDIUM DISP

## (undated) DEVICE — MARKER SKIN STND TIP BLUE BARR

## (undated) DEVICE — SEE L#156916

## (undated) DEVICE — CONTAINER SPECIMEN STRL 4OZ

## (undated) DEVICE — DRAPE OPMI STERILE

## (undated) DEVICE — SUT 2-0 12-18IN SILK

## (undated) DEVICE — UNDERGLOVES BIOGEL PI SIZE 8.5

## (undated) DEVICE — DRESSING AQUACEL AG RBBN 2X45

## (undated) DEVICE — UNDERGLOVES BIOGEL PI SIZE 7.5

## (undated) DEVICE — SUT 2/0 36IN COATED VICRYL

## (undated) DEVICE — TUBE FRAZIER 5MM 2FT SOFT TIP

## (undated) DEVICE — GAUZE SPONGE PEANUT STRL

## (undated) DEVICE — TOWEL OR XRAY WHITE 17X26IN

## (undated) DEVICE — SEE MEDLINE ITEM 157144

## (undated) DEVICE — CORD BIPOLAR 12 FOOT

## (undated) DEVICE — NDL HYPO 27G X 1 1/2

## (undated) DEVICE — KIT SURGIFLO HEMOSTATIC MATRIX

## (undated) DEVICE — BLANKET HYPOTHERMIA 25X64IN

## (undated) DEVICE — NDL SPINAL 18GX3.5 SPINOCAN

## (undated) DEVICE — BLADE SAGITTAL 18 X 1.27 X 90M

## (undated) DEVICE — SUT MONOCYRL 4-0 PS2 UND

## (undated) DEVICE — PIN DISTRACTION DISP 14MM
Type: IMPLANTABLE DEVICE | Site: SPINE CERVICAL | Status: NON-FUNCTIONAL
Removed: 2018-10-23

## (undated) DEVICE — GAUZE SPONGE 4X4 12PLY

## (undated) DEVICE — DRAPE STERI-DRAPE 1000 17X11IN

## (undated) DEVICE — DRESSING TELFA N ADH 3X8

## (undated) DEVICE — SUT STRATAFIX SPRL PS-2 3-0

## (undated) DEVICE — DRAPE CORETEMP FLD WRM 56X62IN

## (undated) DEVICE — BLADE 4IN EDGE INSULATED

## (undated) DEVICE — BUR BONE CUT MICRO TPS 3X3.8MM

## (undated) DEVICE — KIT EVACUATOR 3-SPRING 1/8 DRN

## (undated) DEVICE — DRAPE IOBAN 2 STERI

## (undated) DEVICE — GLOVE BIOGEL SKINSENSE PI 8.0

## (undated) DEVICE — MARKER SKIN RULER STERILE

## (undated) DEVICE — DRESSING AQUACEL FOAM 3 X 3

## (undated) DEVICE — DRESSING ABSRBNT ISLAND 3.6X8

## (undated) DEVICE — NDI PASSIVE SPHERES

## (undated) DEVICE — DRAPE TOP 53X102IN

## (undated) DEVICE — DRAPE THREE-QTR REINF 53X77IN

## (undated) DEVICE — NDL 20GX1-1/2IN IB

## (undated) DEVICE — NDL 18GA X1 1/2 REG BEVEL

## (undated) DEVICE — ALCOHOL 70% ISOP RUBBING 4OZ

## (undated) DEVICE — Device

## (undated) DEVICE — SYR IRRIGATION BULB STER 60ML

## (undated) DEVICE — ALCOHOL 70% ISOP W/GREEN 16OZ

## (undated) DEVICE — PACK DRAPE UNIVERSAL CONVERTOR

## (undated) DEVICE — SUT VICRYL+ 1 CT1 18IN

## (undated) DEVICE — SEE MEDLINE ITEM 152529

## (undated) DEVICE — APPLICATOR CHLORAPREP ORN 26ML

## (undated) DEVICE — STAPLER SKIN PROXIMATE WIDE

## (undated) DEVICE — SOL BETADINE 5%

## (undated) DEVICE — SUT 2-0 SILK 30IN BLK BRAID

## (undated) DEVICE — SPONGE COTTON TRAY 4X4IN

## (undated) DEVICE — SUT CTD VICRYL 3-0 CR/SH

## (undated) DEVICE — SEE MEDLINE ITEM 157131

## (undated) DEVICE — BIT DRILL REAM GPS 3.5MM

## (undated) DEVICE — GOWN SMARTGOWN 3XL XLONG

## (undated) DEVICE — ELECTRODE MEGADYNE RETURN DUAL

## (undated) DEVICE — BNDG COFLEX FOAM LF2 ST 4X5YD

## (undated) DEVICE — DRAPE THYROID WITH ARMBOARD

## (undated) DEVICE — SPONGE LAP 4X18 PREWASHED

## (undated) DEVICE — DRAPE ABDOMINAL TIBURON 14X11

## (undated) DEVICE — SEE MEDLINE ITEM 156905

## (undated) DEVICE — SUT MONOCRYL 4-0 PS-1 UND

## (undated) DEVICE — DRESSING AQUACEL SACRAL 9 X 9

## (undated) DEVICE — DRAPE EXCELSIUS GPS C-ARM

## (undated) DEVICE — KIT IRR SUCTION HND PIECE

## (undated) DEVICE — DRAPE C-ARMOR EQUIPMENT COVER

## (undated) DEVICE — TRAY NEURO OMC

## (undated) DEVICE — DRILL BIT SURG GPS HI SPD 4MM

## (undated) DEVICE — GOWN POLY REINF BRTH SLV 3XL

## (undated) DEVICE — 16MM DRILL BIT

## (undated) DEVICE — GUIDE POST LP QUATTRO MEDIUM

## (undated) DEVICE — ADHESIVE MASTISOL VIAL 48/BX